# Patient Record
Sex: MALE | Race: WHITE | NOT HISPANIC OR LATINO | Employment: FULL TIME | URBAN - METROPOLITAN AREA
[De-identification: names, ages, dates, MRNs, and addresses within clinical notes are randomized per-mention and may not be internally consistent; named-entity substitution may affect disease eponyms.]

---

## 2022-01-01 ENCOUNTER — LAB VISIT (OUTPATIENT)
Dept: LAB | Facility: HOSPITAL | Age: 52
End: 2022-01-01
Attending: INTERNAL MEDICINE
Payer: COMMERCIAL

## 2022-01-01 ENCOUNTER — PATIENT MESSAGE (OUTPATIENT)
Dept: HEPATOLOGY | Facility: CLINIC | Age: 52
End: 2022-01-01
Payer: COMMERCIAL

## 2022-01-01 ENCOUNTER — HOSPITAL ENCOUNTER (OUTPATIENT)
Facility: HOSPITAL | Age: 52
Discharge: HOME | End: 2022-12-14
Attending: INTERNAL MEDICINE | Admitting: INTERNAL MEDICINE
Payer: COMMERCIAL

## 2022-01-01 ENCOUNTER — NURSE TRIAGE (OUTPATIENT)
Dept: ADMINISTRATIVE | Facility: CLINIC | Age: 52
End: 2022-01-01
Payer: COMMERCIAL

## 2022-01-01 ENCOUNTER — PATIENT MESSAGE (OUTPATIENT)
Dept: ENDOSCOPY | Facility: HOSPITAL | Age: 52
End: 2022-01-01
Payer: COMMERCIAL

## 2022-01-01 ENCOUNTER — HOSPITAL ENCOUNTER (OUTPATIENT)
Facility: HOSPITAL | Age: 52
Discharge: HOME | End: 2022-12-21
Attending: INTERNAL MEDICINE | Admitting: INTERNAL MEDICINE
Payer: COMMERCIAL

## 2022-01-01 ENCOUNTER — TELEPHONE (OUTPATIENT)
Dept: GASTROENTEROLOGY | Facility: CLINIC | Age: 52
End: 2022-01-01
Payer: COMMERCIAL

## 2022-01-01 ENCOUNTER — PATIENT MESSAGE (OUTPATIENT)
Dept: GASTROENTEROLOGY | Facility: CLINIC | Age: 52
End: 2022-01-01
Payer: COMMERCIAL

## 2022-01-01 ENCOUNTER — PATIENT OUTREACH (OUTPATIENT)
Dept: ADMINISTRATIVE | Facility: CLINIC | Age: 52
End: 2022-01-01
Payer: COMMERCIAL

## 2022-01-01 ENCOUNTER — HOSPITAL ENCOUNTER (OUTPATIENT)
Dept: RADIOLOGY | Facility: HOSPITAL | Age: 52
Discharge: HOME | End: 2022-12-08
Attending: INTERNAL MEDICINE
Payer: COMMERCIAL

## 2022-01-01 ENCOUNTER — HOSPITAL ENCOUNTER (INPATIENT)
Facility: HOSPITAL | Age: 52
LOS: 2 days | Discharge: HOME | DRG: 393 | End: 2022-10-19
Attending: INTERNAL MEDICINE | Admitting: INTERNAL MEDICINE
Payer: COMMERCIAL

## 2022-01-01 ENCOUNTER — ANESTHESIA (OUTPATIENT)
Dept: ENDOSCOPY | Facility: HOSPITAL | Age: 52
DRG: 393 | End: 2022-01-01
Payer: COMMERCIAL

## 2022-01-01 ENCOUNTER — ANESTHESIA (OUTPATIENT)
Dept: ENDOSCOPY | Facility: HOSPITAL | Age: 52
End: 2022-01-01
Payer: COMMERCIAL

## 2022-01-01 ENCOUNTER — ANESTHESIA EVENT (OUTPATIENT)
Dept: ENDOSCOPY | Facility: HOSPITAL | Age: 52
DRG: 393 | End: 2022-01-01
Payer: COMMERCIAL

## 2022-01-01 ENCOUNTER — HOSPITAL ENCOUNTER (OUTPATIENT)
Dept: RADIOLOGY | Facility: HOSPITAL | Age: 52
Discharge: HOME | End: 2022-12-27
Attending: PHYSICIAN ASSISTANT
Payer: COMMERCIAL

## 2022-01-01 ENCOUNTER — ANESTHESIA EVENT (OUTPATIENT)
Dept: ENDOSCOPY | Facility: HOSPITAL | Age: 52
End: 2022-01-01
Payer: COMMERCIAL

## 2022-01-01 ENCOUNTER — OFFICE VISIT (OUTPATIENT)
Dept: HEPATOLOGY | Facility: CLINIC | Age: 52
End: 2022-01-01
Payer: COMMERCIAL

## 2022-01-01 ENCOUNTER — TELEPHONE (OUTPATIENT)
Dept: HEPATOLOGY | Facility: CLINIC | Age: 52
End: 2022-01-01

## 2022-01-01 ENCOUNTER — TELEPHONE (OUTPATIENT)
Dept: HEPATOLOGY | Facility: CLINIC | Age: 52
End: 2022-01-01
Payer: COMMERCIAL

## 2022-01-01 VITALS
SYSTOLIC BLOOD PRESSURE: 132 MMHG | OXYGEN SATURATION: 98 % | TEMPERATURE: 99 F | WEIGHT: 182.75 LBS | HEART RATE: 73 BPM | RESPIRATION RATE: 18 BRPM | BODY MASS INDEX: 24.75 KG/M2 | DIASTOLIC BLOOD PRESSURE: 64 MMHG | HEIGHT: 72 IN

## 2022-01-01 VITALS
SYSTOLIC BLOOD PRESSURE: 113 MMHG | BODY MASS INDEX: 24.73 KG/M2 | HEIGHT: 72 IN | RESPIRATION RATE: 18 BRPM | HEART RATE: 95 BPM | OXYGEN SATURATION: 96 % | TEMPERATURE: 99 F | DIASTOLIC BLOOD PRESSURE: 70 MMHG | WEIGHT: 182.56 LBS

## 2022-01-01 VITALS
BODY MASS INDEX: 29.05 KG/M2 | HEIGHT: 72 IN | RESPIRATION RATE: 20 BRPM | SYSTOLIC BLOOD PRESSURE: 137 MMHG | HEART RATE: 92 BPM | WEIGHT: 214.5 LBS | OXYGEN SATURATION: 97 % | TEMPERATURE: 98 F | DIASTOLIC BLOOD PRESSURE: 86 MMHG

## 2022-01-01 VITALS
HEIGHT: 72 IN | HEART RATE: 88 BPM | DIASTOLIC BLOOD PRESSURE: 90 MMHG | BODY MASS INDEX: 29.09 KG/M2 | SYSTOLIC BLOOD PRESSURE: 138 MMHG

## 2022-01-01 DIAGNOSIS — R79.89 ABNORMAL LFTS: Primary | ICD-10-CM

## 2022-01-01 DIAGNOSIS — K86.2 PANCREATIC CYST: ICD-10-CM

## 2022-01-01 DIAGNOSIS — K83.01 PRIMARY SCLEROSING CHOLANGITIS: Primary | ICD-10-CM

## 2022-01-01 DIAGNOSIS — K83.01 PSC (PRIMARY SCLEROSING CHOLANGITIS): Primary | ICD-10-CM

## 2022-01-01 DIAGNOSIS — K83.8 DILATED BILE DUCT: Primary | ICD-10-CM

## 2022-01-01 DIAGNOSIS — K85.90 POST-ERCP ACUTE PANCREATITIS: ICD-10-CM

## 2022-01-01 DIAGNOSIS — R07.9 CHEST PAIN: ICD-10-CM

## 2022-01-01 DIAGNOSIS — R79.89 ABNORMAL LFTS: ICD-10-CM

## 2022-01-01 DIAGNOSIS — K83.01 PSC (PRIMARY SCLEROSING CHOLANGITIS): ICD-10-CM

## 2022-01-01 DIAGNOSIS — R10.9 ABDOMINAL PAIN, UNSPECIFIED ABDOMINAL LOCATION: ICD-10-CM

## 2022-01-01 DIAGNOSIS — K86.2 PANCREATIC CYST: Primary | ICD-10-CM

## 2022-01-01 DIAGNOSIS — K83.01 PRIMARY SCLEROSING CHOLANGITIS: ICD-10-CM

## 2022-01-01 DIAGNOSIS — R10.11 RUQ ABDOMINAL PAIN: Primary | ICD-10-CM

## 2022-01-01 DIAGNOSIS — R10.9 ABDOMINAL PAIN: ICD-10-CM

## 2022-01-01 DIAGNOSIS — R10.11 RUQ ABDOMINAL PAIN: ICD-10-CM

## 2022-01-01 DIAGNOSIS — R93.5 ABNORMAL FINDINGS ON DIAGNOSTIC IMAGING OF ABDOMEN: ICD-10-CM

## 2022-01-01 DIAGNOSIS — R10.9 ABDOMINAL PAIN, UNSPECIFIED ABDOMINAL LOCATION: Primary | ICD-10-CM

## 2022-01-01 DIAGNOSIS — K91.89 POST-ERCP ACUTE PANCREATITIS: ICD-10-CM

## 2022-01-01 LAB
A1AT SERPL-MCNC: 270 MG/DL (ref 100–190)
ALBUMIN SERPL BCP-MCNC: 2.2 G/DL (ref 3.5–5.2)
ALBUMIN SERPL BCP-MCNC: 3.1 G/DL (ref 3.5–5.2)
ALBUMIN SERPL BCP-MCNC: 3.6 G/DL (ref 3.5–5.2)
ALBUMIN SERPL BCP-MCNC: 3.8 G/DL (ref 3.5–5.2)
ALBUMIN SERPL BCP-MCNC: 3.9 G/DL (ref 3.5–5.2)
ALP SERPL-CCNC: 175 U/L (ref 55–135)
ALP SERPL-CCNC: 188 U/L (ref 55–135)
ALP SERPL-CCNC: 190 U/L (ref 55–135)
ALP SERPL-CCNC: 197 U/L (ref 55–135)
ALP SERPL-CCNC: 329 U/L (ref 55–135)
ALP SERPL-CCNC: 338 U/L (ref 55–135)
ALP SERPL-CCNC: 382 U/L (ref 55–135)
ALT SERPL W/O P-5'-P-CCNC: 140 U/L (ref 10–44)
ALT SERPL W/O P-5'-P-CCNC: 174 U/L (ref 10–44)
ALT SERPL W/O P-5'-P-CCNC: 229 U/L (ref 10–44)
ALT SERPL W/O P-5'-P-CCNC: 48 U/L (ref 10–44)
ALT SERPL W/O P-5'-P-CCNC: 49 U/L (ref 10–44)
ALT SERPL W/O P-5'-P-CCNC: 54 U/L (ref 10–44)
ALT SERPL W/O P-5'-P-CCNC: 63 U/L (ref 10–44)
ANA SER QL IF: NORMAL
ANION GAP SERPL CALC-SCNC: 10 MMOL/L (ref 8–16)
ANION GAP SERPL CALC-SCNC: 12 MMOL/L (ref 8–16)
ANION GAP SERPL CALC-SCNC: 14 MMOL/L (ref 8–16)
ANION GAP SERPL CALC-SCNC: 16 MMOL/L (ref 8–16)
ANION GAP SERPL CALC-SCNC: 9 MMOL/L (ref 8–16)
AST SERPL-CCNC: 116 U/L (ref 10–40)
AST SERPL-CCNC: 44 U/L (ref 10–40)
AST SERPL-CCNC: 47 U/L (ref 10–40)
AST SERPL-CCNC: 47 U/L (ref 10–40)
AST SERPL-CCNC: 50 U/L (ref 10–40)
AST SERPL-CCNC: 77 U/L (ref 10–40)
AST SERPL-CCNC: 88 U/L (ref 10–40)
BACTERIA BLD CULT: NORMAL
BACTERIA BLD CULT: NORMAL
BACTERIA STL CULT: NORMAL
BACTERIA STL CULT: NORMAL
BASOPHILS # BLD AUTO: 0.05 K/UL (ref 0–0.2)
BASOPHILS # BLD AUTO: 0.08 K/UL (ref 0–0.2)
BASOPHILS NFR BLD: 0.4 % (ref 0–1.9)
BASOPHILS NFR BLD: 0.7 % (ref 0–1.9)
BASOPHILS NFR BLD: 0.9 % (ref 0–1.9)
BASOPHILS NFR BLD: 1 % (ref 0–1.9)
BILIRUB DIRECT SERPL-MCNC: 0.8 MG/DL (ref 0.1–0.3)
BILIRUB SERPL-MCNC: 0.8 MG/DL (ref 0.1–1)
BILIRUB SERPL-MCNC: 1.1 MG/DL (ref 0.1–1)
BILIRUB SERPL-MCNC: 1.2 MG/DL (ref 0.1–1)
BILIRUB SERPL-MCNC: 1.3 MG/DL (ref 0.1–1)
BILIRUB SERPL-MCNC: 2.6 MG/DL (ref 0.1–1)
BILIRUB SERPL-MCNC: 3 MG/DL (ref 0.1–1)
BILIRUB SERPL-MCNC: 3.6 MG/DL (ref 0.1–1)
BUN SERPL-MCNC: 10 MG/DL (ref 6–20)
BUN SERPL-MCNC: 11 MG/DL (ref 6–20)
BUN SERPL-MCNC: 5 MG/DL (ref 6–20)
BUN SERPL-MCNC: 6 MG/DL (ref 6–20)
BUN SERPL-MCNC: 8 MG/DL (ref 6–20)
BUN SERPL-MCNC: 9 MG/DL (ref 6–20)
BUN SERPL-MCNC: 9 MG/DL (ref 6–20)
C DIFF GDH STL QL: NEGATIVE
C DIFF TOX A+B STL QL IA: NEGATIVE
CALCIUM SERPL-MCNC: 7.9 MG/DL (ref 8.7–10.5)
CALCIUM SERPL-MCNC: 8.1 MG/DL (ref 8.7–10.5)
CALCIUM SERPL-MCNC: 8.7 MG/DL (ref 8.7–10.5)
CALCIUM SERPL-MCNC: 9.1 MG/DL (ref 8.7–10.5)
CALCIUM SERPL-MCNC: 9.6 MG/DL (ref 8.7–10.5)
CALCIUM SERPL-MCNC: 9.7 MG/DL (ref 8.7–10.5)
CALCIUM SERPL-MCNC: 9.8 MG/DL (ref 8.7–10.5)
CANCER AG19-9 SERPL-ACNC: 1181.2 U/ML (ref 0–40)
CERULOPLASMIN SERPL-MCNC: 32 MG/DL (ref 15–45)
CHLORIDE SERPL-SCNC: 101 MMOL/L (ref 95–110)
CHLORIDE SERPL-SCNC: 102 MMOL/L (ref 95–110)
CHLORIDE SERPL-SCNC: 103 MMOL/L (ref 95–110)
CHLORIDE SERPL-SCNC: 98 MMOL/L (ref 95–110)
CHLORIDE SERPL-SCNC: 99 MMOL/L (ref 95–110)
CO2 SERPL-SCNC: 27 MMOL/L (ref 23–29)
CO2 SERPL-SCNC: 28 MMOL/L (ref 23–29)
CO2 SERPL-SCNC: 28 MMOL/L (ref 23–29)
CO2 SERPL-SCNC: 29 MMOL/L (ref 23–29)
CO2 SERPL-SCNC: 31 MMOL/L (ref 23–29)
CREAT SERPL-MCNC: 0.7 MG/DL (ref 0.5–1.4)
CREAT SERPL-MCNC: 0.8 MG/DL (ref 0.5–1.4)
DIFFERENTIAL METHOD: ABNORMAL
EOSINOPHIL # BLD AUTO: 0.1 K/UL (ref 0–0.5)
EOSINOPHIL # BLD AUTO: 0.2 K/UL (ref 0–0.5)
EOSINOPHIL NFR BLD: 0.7 % (ref 0–8)
EOSINOPHIL NFR BLD: 1.4 % (ref 0–8)
EOSINOPHIL NFR BLD: 1.7 % (ref 0–8)
EOSINOPHIL NFR BLD: 2.7 % (ref 0–8)
ERYTHROCYTE [DISTWIDTH] IN BLOOD BY AUTOMATED COUNT: 12.5 % (ref 11.5–14.5)
ERYTHROCYTE [DISTWIDTH] IN BLOOD BY AUTOMATED COUNT: 12.7 % (ref 11.5–14.5)
ERYTHROCYTE [DISTWIDTH] IN BLOOD BY AUTOMATED COUNT: 12.7 % (ref 11.5–14.5)
ERYTHROCYTE [DISTWIDTH] IN BLOOD BY AUTOMATED COUNT: 13.8 % (ref 11.5–14.5)
ERYTHROCYTE [DISTWIDTH] IN BLOOD BY AUTOMATED COUNT: 14 % (ref 11.5–14.5)
ERYTHROCYTE [DISTWIDTH] IN BLOOD BY AUTOMATED COUNT: 14.2 % (ref 11.5–14.5)
ERYTHROCYTE [DISTWIDTH] IN BLOOD BY AUTOMATED COUNT: 14.6 % (ref 11.5–14.5)
EST. GFR  (NO RACE VARIABLE): >60 ML/MIN/1.73 M^2
FERRITIN SERPL-MCNC: 296 NG/ML (ref 20–300)
GGT SERPL-CCNC: 238 U/L (ref 8–55)
GGT SERPL-CCNC: 535 U/L (ref 8–55)
GLUCOSE SERPL-MCNC: 100 MG/DL (ref 70–110)
GLUCOSE SERPL-MCNC: 69 MG/DL (ref 70–110)
GLUCOSE SERPL-MCNC: 80 MG/DL (ref 70–110)
GLUCOSE SERPL-MCNC: 84 MG/DL (ref 70–110)
GLUCOSE SERPL-MCNC: 85 MG/DL (ref 70–110)
GLUCOSE SERPL-MCNC: 89 MG/DL (ref 70–110)
GLUCOSE SERPL-MCNC: 97 MG/DL (ref 70–110)
HAV IGG SER QL IA: NORMAL
HBV SURFACE AB SER-ACNC: 17.97 MIU/ML
HBV SURFACE AB SER-ACNC: REACTIVE M[IU]/ML
HBV SURFACE AG SERPL QL IA: NORMAL
HCT VFR BLD AUTO: 29.1 % (ref 40–54)
HCT VFR BLD AUTO: 32 % (ref 40–54)
HCT VFR BLD AUTO: 32.2 % (ref 40–54)
HCT VFR BLD AUTO: 37.2 % (ref 40–54)
HCT VFR BLD AUTO: 37.5 % (ref 40–54)
HCT VFR BLD AUTO: 41.4 % (ref 40–54)
HCT VFR BLD AUTO: 44.4 % (ref 40–54)
HCV AB SERPL QL IA: NORMAL
HGB BLD-MCNC: 10.3 G/DL (ref 14–18)
HGB BLD-MCNC: 10.6 G/DL (ref 14–18)
HGB BLD-MCNC: 12 G/DL (ref 14–18)
HGB BLD-MCNC: 12.3 G/DL (ref 14–18)
HGB BLD-MCNC: 13.4 G/DL (ref 14–18)
HGB BLD-MCNC: 14.3 G/DL (ref 14–18)
HGB BLD-MCNC: 9.5 G/DL (ref 14–18)
IGA SERPL-MCNC: 273 MG/DL (ref 40–350)
IGG SERPL-MCNC: 1293 MG/DL (ref 650–1600)
IGG4 SER-MCNC: 15 MG/DL (ref 4–86)
IGM SERPL-MCNC: 106 MG/DL (ref 50–300)
IMM GRANULOCYTES # BLD AUTO: 0.01 K/UL (ref 0–0.04)
IMM GRANULOCYTES # BLD AUTO: 0.02 K/UL (ref 0–0.04)
IMM GRANULOCYTES # BLD AUTO: 0.04 K/UL (ref 0–0.04)
IMM GRANULOCYTES # BLD AUTO: 0.06 K/UL (ref 0–0.04)
IMM GRANULOCYTES NFR BLD AUTO: 0.2 % (ref 0–0.5)
IMM GRANULOCYTES NFR BLD AUTO: 0.3 % (ref 0–0.5)
IMM GRANULOCYTES NFR BLD AUTO: 0.5 % (ref 0–0.5)
IMM GRANULOCYTES NFR BLD AUTO: 0.5 % (ref 0–0.5)
IRON SERPL-MCNC: 51 UG/DL (ref 45–160)
LIPASE SERPL-CCNC: 26 U/L (ref 4–60)
LIPASE SERPL-CCNC: 34 U/L (ref 4–60)
LYMPHOCYTES # BLD AUTO: 1 K/UL (ref 1–4.8)
LYMPHOCYTES # BLD AUTO: 1 K/UL (ref 1–4.8)
LYMPHOCYTES # BLD AUTO: 1.2 K/UL (ref 1–4.8)
LYMPHOCYTES # BLD AUTO: 1.6 K/UL (ref 1–4.8)
LYMPHOCYTES NFR BLD: 13.6 % (ref 18–48)
LYMPHOCYTES NFR BLD: 17.3 % (ref 18–48)
LYMPHOCYTES NFR BLD: 21.1 % (ref 18–48)
LYMPHOCYTES NFR BLD: 9.3 % (ref 18–48)
MCH RBC QN AUTO: 29.4 PG (ref 27–31)
MCH RBC QN AUTO: 29.4 PG (ref 27–31)
MCH RBC QN AUTO: 30 PG (ref 27–31)
MCH RBC QN AUTO: 30.6 PG (ref 27–31)
MCH RBC QN AUTO: 30.6 PG (ref 27–31)
MCH RBC QN AUTO: 30.8 PG (ref 27–31)
MCH RBC QN AUTO: 30.9 PG (ref 27–31)
MCHC RBC AUTO-ENTMCNC: 32.2 G/DL (ref 32–36)
MCHC RBC AUTO-ENTMCNC: 32.2 G/DL (ref 32–36)
MCHC RBC AUTO-ENTMCNC: 32.3 G/DL (ref 32–36)
MCHC RBC AUTO-ENTMCNC: 32.4 G/DL (ref 32–36)
MCHC RBC AUTO-ENTMCNC: 32.6 G/DL (ref 32–36)
MCHC RBC AUTO-ENTMCNC: 32.8 G/DL (ref 32–36)
MCHC RBC AUTO-ENTMCNC: 32.9 G/DL (ref 32–36)
MCV RBC AUTO: 91 FL (ref 82–98)
MCV RBC AUTO: 91 FL (ref 82–98)
MCV RBC AUTO: 93 FL (ref 82–98)
MCV RBC AUTO: 94 FL (ref 82–98)
MCV RBC AUTO: 95 FL (ref 82–98)
MITOCHONDRIA AB TITR SER IF: NORMAL {TITER}
MONOCYTES # BLD AUTO: 0.5 K/UL (ref 0.3–1)
MONOCYTES # BLD AUTO: 0.6 K/UL (ref 0.3–1)
MONOCYTES # BLD AUTO: 0.8 K/UL (ref 0.3–1)
MONOCYTES # BLD AUTO: 0.9 K/UL (ref 0.3–1)
MONOCYTES NFR BLD: 10.5 % (ref 4–15)
MONOCYTES NFR BLD: 6.8 % (ref 4–15)
MONOCYTES NFR BLD: 7.5 % (ref 4–15)
MONOCYTES NFR BLD: 9.6 % (ref 4–15)
NEUTROPHILS # BLD AUTO: 10.1 K/UL (ref 1.8–7.7)
NEUTROPHILS # BLD AUTO: 4 K/UL (ref 1.8–7.7)
NEUTROPHILS # BLD AUTO: 5.3 K/UL (ref 1.8–7.7)
NEUTROPHILS # BLD AUTO: 5.5 K/UL (ref 1.8–7.7)
NEUTROPHILS NFR BLD: 67.9 % (ref 38–73)
NEUTROPHILS NFR BLD: 70.6 % (ref 38–73)
NEUTROPHILS NFR BLD: 73.2 % (ref 38–73)
NEUTROPHILS NFR BLD: 81.6 % (ref 38–73)
NRBC BLD-RTO: 0 /100 WBC
PLATELET # BLD AUTO: 332 K/UL (ref 150–450)
PLATELET # BLD AUTO: 354 K/UL (ref 150–450)
PLATELET # BLD AUTO: 374 K/UL (ref 150–450)
PLATELET # BLD AUTO: 383 K/UL (ref 150–450)
PLATELET # BLD AUTO: 432 K/UL (ref 150–450)
PLATELET # BLD AUTO: 452 K/UL (ref 150–450)
PLATELET # BLD AUTO: 482 K/UL (ref 150–450)
PMV BLD AUTO: 8.1 FL (ref 9.2–12.9)
PMV BLD AUTO: 8.8 FL (ref 9.2–12.9)
PMV BLD AUTO: 8.8 FL (ref 9.2–12.9)
PMV BLD AUTO: 9 FL (ref 9.2–12.9)
PMV BLD AUTO: 9.1 FL (ref 9.2–12.9)
PMV BLD AUTO: 9.2 FL (ref 9.2–12.9)
PMV BLD AUTO: 9.4 FL (ref 9.2–12.9)
POTASSIUM SERPL-SCNC: 3 MMOL/L (ref 3.5–5.1)
POTASSIUM SERPL-SCNC: 3.2 MMOL/L (ref 3.5–5.1)
POTASSIUM SERPL-SCNC: 3.4 MMOL/L (ref 3.5–5.1)
POTASSIUM SERPL-SCNC: 3.5 MMOL/L (ref 3.5–5.1)
POTASSIUM SERPL-SCNC: 3.6 MMOL/L (ref 3.5–5.1)
POTASSIUM SERPL-SCNC: 3.7 MMOL/L (ref 3.5–5.1)
POTASSIUM SERPL-SCNC: 3.9 MMOL/L (ref 3.5–5.1)
PROT SERPL-MCNC: 5.3 G/DL (ref 6–8.4)
PROT SERPL-MCNC: 5.3 G/DL (ref 6–8.4)
PROT SERPL-MCNC: 5.9 G/DL (ref 6–8.4)
PROT SERPL-MCNC: 6.7 G/DL (ref 6–8.4)
PROT SERPL-MCNC: 7.3 G/DL (ref 6–8.4)
PROT SERPL-MCNC: 7.9 G/DL (ref 6–8.4)
PROT SERPL-MCNC: 7.9 G/DL (ref 6–8.4)
RBC # BLD AUTO: 3.1 M/UL (ref 4.6–6.2)
RBC # BLD AUTO: 3.37 M/UL (ref 4.6–6.2)
RBC # BLD AUTO: 3.43 M/UL (ref 4.6–6.2)
RBC # BLD AUTO: 3.99 M/UL (ref 4.6–6.2)
RBC # BLD AUTO: 4.08 M/UL (ref 4.6–6.2)
RBC # BLD AUTO: 4.56 M/UL (ref 4.6–6.2)
RBC # BLD AUTO: 4.77 M/UL (ref 4.6–6.2)
SATURATED IRON: 15 % (ref 20–50)
SMOOTH MUSCLE AB TITR SER IF: NORMAL {TITER}
SODIUM SERPL-SCNC: 137 MMOL/L (ref 136–145)
SODIUM SERPL-SCNC: 139 MMOL/L (ref 136–145)
SODIUM SERPL-SCNC: 142 MMOL/L (ref 136–145)
SODIUM SERPL-SCNC: 142 MMOL/L (ref 136–145)
SODIUM SERPL-SCNC: 143 MMOL/L (ref 136–145)
TOTAL IRON BINDING CAPACITY: 330 UG/DL (ref 250–450)
TRANSFERRIN SERPL-MCNC: 223 MG/DL (ref 200–375)
WBC # BLD AUTO: 12.34 K/UL (ref 3.9–12.7)
WBC # BLD AUTO: 12.75 K/UL (ref 3.9–12.7)
WBC # BLD AUTO: 13.85 K/UL (ref 3.9–12.7)
WBC # BLD AUTO: 19.36 K/UL (ref 3.9–12.7)
WBC # BLD AUTO: 5.71 K/UL (ref 3.9–12.7)
WBC # BLD AUTO: 7.44 K/UL (ref 3.9–12.7)
WBC # BLD AUTO: 7.77 K/UL (ref 3.9–12.7)

## 2022-01-01 PROCEDURE — 63600175 PHARM REV CODE 636 W HCPCS: Performed by: NURSE PRACTITIONER

## 2022-01-01 PROCEDURE — 88173 PR  INTERPRETATION OF FNA SMEAR: ICD-10-PCS | Mod: 26,,, | Performed by: PATHOLOGY

## 2022-01-01 PROCEDURE — 80053 COMPREHEN METABOLIC PANEL: CPT | Performed by: INTERNAL MEDICINE

## 2022-01-01 PROCEDURE — 82728 ASSAY OF FERRITIN: CPT | Performed by: INTERNAL MEDICINE

## 2022-01-01 PROCEDURE — 83690 ASSAY OF LIPASE: CPT | Performed by: NURSE PRACTITIONER

## 2022-01-01 PROCEDURE — 94760 N-INVAS EAR/PLS OXIMETRY 1: CPT

## 2022-01-01 PROCEDURE — 37000008 HC ANESTHESIA 1ST 15 MINUTES: Performed by: INTERNAL MEDICINE

## 2022-01-01 PROCEDURE — C1769 GUIDE WIRE: HCPCS | Performed by: INTERNAL MEDICINE

## 2022-01-01 PROCEDURE — 88342 IMHCHEM/IMCYTCHM 1ST ANTB: CPT | Mod: 26,,, | Performed by: PATHOLOGY

## 2022-01-01 PROCEDURE — 1160F PR REVIEW ALL MEDS BY PRESCRIBER/CLIN PHARMACIST DOCUMENTED: ICD-10-PCS | Mod: CPTII,S$GLB,, | Performed by: INTERNAL MEDICINE

## 2022-01-01 PROCEDURE — 82977 ASSAY OF GGT: CPT | Performed by: INTERNAL MEDICINE

## 2022-01-01 PROCEDURE — 25000003 PHARM REV CODE 250: Performed by: NURSE ANESTHETIST, CERTIFIED REGISTERED

## 2022-01-01 PROCEDURE — 27201012 HC FORCEPS, HOT/COLD, DISP: Performed by: INTERNAL MEDICINE

## 2022-01-01 PROCEDURE — 36415 COLL VENOUS BLD VENIPUNCTURE: CPT | Performed by: NURSE PRACTITIONER

## 2022-01-01 PROCEDURE — 25500020 PHARM REV CODE 255: Mod: PN | Performed by: INTERNAL MEDICINE

## 2022-01-01 PROCEDURE — C9113 INJ PANTOPRAZOLE SODIUM, VIA: HCPCS | Performed by: NURSE PRACTITIONER

## 2022-01-01 PROCEDURE — 80076 HEPATIC FUNCTION PANEL: CPT | Performed by: INTERNAL MEDICINE

## 2022-01-01 PROCEDURE — 74328 X-RAY BILE DUCT ENDOSCOPY: CPT | Mod: 26,51,, | Performed by: INTERNAL MEDICINE

## 2022-01-01 PROCEDURE — C2617 STENT, NON-COR, TEM W/O DEL: HCPCS | Performed by: INTERNAL MEDICINE

## 2022-01-01 PROCEDURE — 43245 EGD DILATE STRICTURE: CPT | Mod: 51,,, | Performed by: INTERNAL MEDICINE

## 2022-01-01 PROCEDURE — 83690 ASSAY OF LIPASE: CPT | Performed by: INTERNAL MEDICINE

## 2022-01-01 PROCEDURE — 27202125 HC BALLOON, EXTRACTION (ANY): Performed by: INTERNAL MEDICINE

## 2022-01-01 PROCEDURE — 99214 OFFICE O/P EST MOD 30 MIN: CPT | Mod: S$GLB,,, | Performed by: INTERNAL MEDICINE

## 2022-01-01 PROCEDURE — 25000003 PHARM REV CODE 250: Performed by: PHYSICIAN ASSISTANT

## 2022-01-01 PROCEDURE — 87040 BLOOD CULTURE FOR BACTERIA: CPT | Performed by: NURSE PRACTITIONER

## 2022-01-01 PROCEDURE — 74178 CT ABD&PLV WO CNTR FLWD CNTR: CPT | Mod: TC,PN

## 2022-01-01 PROCEDURE — 88305 TISSUE EXAM BY PATHOLOGIST: CPT | Mod: 59 | Performed by: PATHOLOGY

## 2022-01-01 PROCEDURE — 80053 COMPREHEN METABOLIC PANEL: CPT | Performed by: NURSE PRACTITIONER

## 2022-01-01 PROCEDURE — 99214 PR OFFICE/OUTPT VISIT, EST, LEVL IV, 30-39 MIN: ICD-10-PCS | Mod: S$GLB,,, | Performed by: INTERNAL MEDICINE

## 2022-01-01 PROCEDURE — 27201089 HC SNARE, DISP (ANY): Performed by: INTERNAL MEDICINE

## 2022-01-01 PROCEDURE — 1160F RVW MEDS BY RX/DR IN RCRD: CPT | Mod: CPTII,S$GLB,, | Performed by: INTERNAL MEDICINE

## 2022-01-01 PROCEDURE — 94761 N-INVAS EAR/PLS OXIMETRY MLT: CPT

## 2022-01-01 PROCEDURE — 99222 PR INITIAL HOSPITAL CARE,LEVL II: ICD-10-PCS | Mod: ,,, | Performed by: INTERNAL MEDICINE

## 2022-01-01 PROCEDURE — 85027 COMPLETE CBC AUTOMATED: CPT | Performed by: NURSE PRACTITIONER

## 2022-01-01 PROCEDURE — 99999 PR PBB SHADOW E&M-EST. PATIENT-LVL III: CPT | Mod: PBBFAC,,, | Performed by: INTERNAL MEDICINE

## 2022-01-01 PROCEDURE — 25000003 PHARM REV CODE 250: Performed by: INTERNAL MEDICINE

## 2022-01-01 PROCEDURE — 88342 CHG IMMUNOCYTOCHEMISTRY: ICD-10-PCS | Mod: 26,,, | Performed by: PATHOLOGY

## 2022-01-01 PROCEDURE — 25000003 PHARM REV CODE 250: Performed by: NURSE PRACTITIONER

## 2022-01-01 PROCEDURE — 85025 COMPLETE CBC W/AUTO DIFF WBC: CPT | Performed by: INTERNAL MEDICINE

## 2022-01-01 PROCEDURE — 43260 ERCP W/SPECIMEN COLLECTION: CPT | Mod: 51,53,, | Performed by: INTERNAL MEDICINE

## 2022-01-01 PROCEDURE — 43238 EGD US FINE NEEDLE BX/ASPIR: CPT | Mod: 51,,, | Performed by: INTERNAL MEDICINE

## 2022-01-01 PROCEDURE — 63600175 PHARM REV CODE 636 W HCPCS: Performed by: INTERNAL MEDICINE

## 2022-01-01 PROCEDURE — 96366 THER/PROPH/DIAG IV INF ADDON: CPT

## 2022-01-01 PROCEDURE — 86790 VIRUS ANTIBODY NOS: CPT | Performed by: INTERNAL MEDICINE

## 2022-01-01 PROCEDURE — 27200976 HC DILATOR, BILIARY: Performed by: INTERNAL MEDICINE

## 2022-01-01 PROCEDURE — 4010F PR ACE/ARB THEARPY RXD/TAKEN: ICD-10-PCS | Mod: CPTII,S$GLB,, | Performed by: INTERNAL MEDICINE

## 2022-01-01 PROCEDURE — 25500020 PHARM REV CODE 255: Performed by: INTERNAL MEDICINE

## 2022-01-01 PROCEDURE — 99999 PR PBB SHADOW E&M-EST. PATIENT-LVL III: ICD-10-PCS | Mod: PBBFAC,,, | Performed by: INTERNAL MEDICINE

## 2022-01-01 PROCEDURE — 1159F MED LIST DOCD IN RCRD: CPT | Mod: CPTII,S$GLB,, | Performed by: INTERNAL MEDICINE

## 2022-01-01 PROCEDURE — 82103 ALPHA-1-ANTITRYPSIN TOTAL: CPT | Performed by: INTERNAL MEDICINE

## 2022-01-01 PROCEDURE — 88173 CYTOPATH EVAL FNA REPORT: CPT | Mod: 59 | Performed by: PATHOLOGY

## 2022-01-01 PROCEDURE — 43239 EGD BIOPSY SINGLE/MULTIPLE: CPT | Mod: 59,,, | Performed by: INTERNAL MEDICINE

## 2022-01-01 PROCEDURE — 27201014 HC GRASPER DEVICE: Performed by: INTERNAL MEDICINE

## 2022-01-01 PROCEDURE — 63600175 PHARM REV CODE 636 W HCPCS: Performed by: NURSE ANESTHETIST, CERTIFIED REGISTERED

## 2022-01-01 PROCEDURE — 86038 ANTINUCLEAR ANTIBODIES: CPT | Performed by: INTERNAL MEDICINE

## 2022-01-01 PROCEDURE — 1111F PR DISCHARGE MEDS RECONCILED W/ CURRENT OUTPATIENT MED LIST: ICD-10-PCS | Mod: CPTII,S$GLB,, | Performed by: INTERNAL MEDICINE

## 2022-01-01 PROCEDURE — 87340 HEPATITIS B SURFACE AG IA: CPT | Performed by: INTERNAL MEDICINE

## 2022-01-01 PROCEDURE — 99221 1ST HOSP IP/OBS SF/LOW 40: CPT | Mod: ,,, | Performed by: INTERNAL MEDICINE

## 2022-01-01 PROCEDURE — C1726 CATH, BAL DIL, NON-VASCULAR: HCPCS | Performed by: INTERNAL MEDICINE

## 2022-01-01 PROCEDURE — 82787 IGG 1 2 3 OR 4 EACH: CPT | Performed by: INTERNAL MEDICINE

## 2022-01-01 PROCEDURE — 96365 THER/PROPH/DIAG IV INF INIT: CPT

## 2022-01-01 PROCEDURE — 87045 FECES CULTURE AEROBIC BACT: CPT | Performed by: INTERNAL MEDICINE

## 2022-01-01 PROCEDURE — 43260 ERCP W/SPECIMEN COLLECTION: CPT | Mod: 74 | Performed by: INTERNAL MEDICINE

## 2022-01-01 PROCEDURE — 3008F BODY MASS INDEX DOCD: CPT | Mod: CPTII,S$GLB,, | Performed by: INTERNAL MEDICINE

## 2022-01-01 PROCEDURE — 27200946 HC BRUSH, CYTOLOGY: Performed by: INTERNAL MEDICINE

## 2022-01-01 PROCEDURE — 87427 SHIGA-LIKE TOXIN AG IA: CPT | Mod: 59 | Performed by: INTERNAL MEDICINE

## 2022-01-01 PROCEDURE — 86381 MITOCHONDRIAL ANTIBODY EACH: CPT | Performed by: INTERNAL MEDICINE

## 2022-01-01 PROCEDURE — 43261 PR ERCP,BIOPSY: ICD-10-PCS | Mod: 59,,, | Performed by: INTERNAL MEDICINE

## 2022-01-01 PROCEDURE — 48999 PR ENDOSCOPIC NECROSECTOMY OF PANCREAS: ICD-10-PCS | Mod: ,,, | Performed by: INTERNAL MEDICINE

## 2022-01-01 PROCEDURE — 36415 COLL VENOUS BLD VENIPUNCTURE: CPT | Performed by: INTERNAL MEDICINE

## 2022-01-01 PROCEDURE — 21400001 HC TELEMETRY ROOM

## 2022-01-01 PROCEDURE — 74328 PR  X-RAY FOR BILE DUCT ENDOSCOPY: ICD-10-PCS | Mod: 26,51,, | Performed by: INTERNAL MEDICINE

## 2022-01-01 PROCEDURE — 43276 ERCP STENT EXCHANGE W/DILATE: CPT | Performed by: INTERNAL MEDICINE

## 2022-01-01 PROCEDURE — 43276 PR ERCP W/RMVL/EXCH STENT BILIARY/PANCREATIC DUCT W/DILATION: ICD-10-PCS | Mod: ,,, | Performed by: INTERNAL MEDICINE

## 2022-01-01 PROCEDURE — 4010F ACE/ARB THERAPY RXD/TAKEN: CPT | Mod: CPTII,S$GLB,, | Performed by: INTERNAL MEDICINE

## 2022-01-01 PROCEDURE — 36415 COLL VENOUS BLD VENIPUNCTURE: CPT | Mod: PN | Performed by: INTERNAL MEDICINE

## 2022-01-01 PROCEDURE — 3008F PR BODY MASS INDEX (BMI) DOCUMENTED: ICD-10-PCS | Mod: CPTII,S$GLB,, | Performed by: INTERNAL MEDICINE

## 2022-01-01 PROCEDURE — 86706 HEP B SURFACE ANTIBODY: CPT | Performed by: INTERNAL MEDICINE

## 2022-01-01 PROCEDURE — 88112 CYTOPATH CELL ENHANCE TECH: CPT | Mod: 26,59,, | Performed by: PATHOLOGY

## 2022-01-01 PROCEDURE — 3080F DIAST BP >= 90 MM HG: CPT | Mod: CPTII,S$GLB,, | Performed by: INTERNAL MEDICINE

## 2022-01-01 PROCEDURE — 99221 PR INITIAL HOSPITAL CARE,LEVL I: ICD-10-PCS | Mod: ,,, | Performed by: INTERNAL MEDICINE

## 2022-01-01 PROCEDURE — 43238 EGD US FINE NEEDLE BX/ASPIR: CPT | Performed by: INTERNAL MEDICINE

## 2022-01-01 PROCEDURE — 37000009 HC ANESTHESIA EA ADD 15 MINS: Performed by: INTERNAL MEDICINE

## 2022-01-01 PROCEDURE — 88342 IMHCHEM/IMCYTCHM 1ST ANTB: CPT | Performed by: PATHOLOGY

## 2022-01-01 PROCEDURE — 43260 PR ERCP,DIAGNOSTIC: ICD-10-PCS | Mod: 51,53,, | Performed by: INTERNAL MEDICINE

## 2022-01-01 PROCEDURE — 1111F DSCHRG MED/CURRENT MED MERGE: CPT | Mod: CPTII,S$GLB,, | Performed by: INTERNAL MEDICINE

## 2022-01-01 PROCEDURE — 25000003 PHARM REV CODE 250: Performed by: EMERGENCY MEDICINE

## 2022-01-01 PROCEDURE — 43245 PR EGD, FLEX, W/DILATION, GASTR/DUOD STRICTURE: ICD-10-PCS | Mod: 51,,, | Performed by: INTERNAL MEDICINE

## 2022-01-01 PROCEDURE — 27202059 HC NEEDLE, FNA (ANY): Performed by: INTERNAL MEDICINE

## 2022-01-01 PROCEDURE — 43238 PR UPGI ENDOSCOPY W/US FN BX: ICD-10-PCS | Mod: 51,,, | Performed by: INTERNAL MEDICINE

## 2022-01-01 PROCEDURE — 25500020 PHARM REV CODE 255: Mod: PN | Performed by: PHYSICIAN ASSISTANT

## 2022-01-01 PROCEDURE — 80048 BASIC METABOLIC PNL TOTAL CA: CPT | Performed by: INTERNAL MEDICINE

## 2022-01-01 PROCEDURE — 48999 UNLISTED PROCEDURE PANCREAS: CPT | Mod: ,,, | Performed by: INTERNAL MEDICINE

## 2022-01-01 PROCEDURE — 74177 CT ABD & PELVIS W/CONTRAST: CPT | Mod: TC,PN

## 2022-01-01 PROCEDURE — 88112 PR  CYTOPATH, CELL ENHANCE TECH: ICD-10-PCS | Mod: 26,59,, | Performed by: PATHOLOGY

## 2022-01-01 PROCEDURE — 86803 HEPATITIS C AB TEST: CPT | Performed by: INTERNAL MEDICINE

## 2022-01-01 PROCEDURE — G0379 DIRECT REFER HOSPITAL OBSERV: HCPCS

## 2022-01-01 PROCEDURE — 43239 EGD BIOPSY SINGLE/MULTIPLE: CPT | Mod: 59 | Performed by: INTERNAL MEDICINE

## 2022-01-01 PROCEDURE — 82390 ASSAY OF CERULOPLASMIN: CPT | Performed by: INTERNAL MEDICINE

## 2022-01-01 PROCEDURE — 96376 TX/PRO/DX INJ SAME DRUG ADON: CPT

## 2022-01-01 PROCEDURE — 86301 IMMUNOASSAY TUMOR CA 19-9: CPT | Performed by: INTERNAL MEDICINE

## 2022-01-01 PROCEDURE — 84466 ASSAY OF TRANSFERRIN: CPT | Performed by: INTERNAL MEDICINE

## 2022-01-01 PROCEDURE — 43239 PR EGD, FLEX, W/BIOPSY, SGL/MULTI: ICD-10-PCS | Mod: 59,,, | Performed by: INTERNAL MEDICINE

## 2022-01-01 PROCEDURE — C2625 STENT, NON-COR, TEM W/DEL SY: HCPCS | Performed by: INTERNAL MEDICINE

## 2022-01-01 PROCEDURE — 43261 ENDO CHOLANGIOPANCREATOGRAPH: CPT | Mod: 59 | Performed by: INTERNAL MEDICINE

## 2022-01-01 PROCEDURE — 82784 ASSAY IGA/IGD/IGG/IGM EACH: CPT | Performed by: INTERNAL MEDICINE

## 2022-01-01 PROCEDURE — 63600175 PHARM REV CODE 636 W HCPCS: Performed by: STUDENT IN AN ORGANIZED HEALTH CARE EDUCATION/TRAINING PROGRAM

## 2022-01-01 PROCEDURE — 43276 ERCP STENT EXCHANGE W/DILATE: CPT | Mod: ,,, | Performed by: INTERNAL MEDICINE

## 2022-01-01 PROCEDURE — 43240 EGD W/TRANSMURAL DRAIN CYST: CPT | Mod: ,,, | Performed by: INTERNAL MEDICINE

## 2022-01-01 PROCEDURE — 88305 TISSUE EXAM BY PATHOLOGIST: ICD-10-PCS | Mod: 26,,, | Performed by: PATHOLOGY

## 2022-01-01 PROCEDURE — 43261 ENDO CHOLANGIOPANCREATOGRAPH: CPT | Mod: 59,,, | Performed by: INTERNAL MEDICINE

## 2022-01-01 PROCEDURE — 99222 1ST HOSP IP/OBS MODERATE 55: CPT | Mod: ,,, | Performed by: INTERNAL MEDICINE

## 2022-01-01 PROCEDURE — 3075F SYST BP GE 130 - 139MM HG: CPT | Mod: CPTII,S$GLB,, | Performed by: INTERNAL MEDICINE

## 2022-01-01 PROCEDURE — 88305 TISSUE EXAM BY PATHOLOGIST: CPT | Mod: 26,,, | Performed by: PATHOLOGY

## 2022-01-01 PROCEDURE — 96361 HYDRATE IV INFUSION ADD-ON: CPT

## 2022-01-01 PROCEDURE — 43240 PR UPPER GI ENDOSCOPY,DRAIN PSEUDOCYST: ICD-10-PCS | Mod: ,,, | Performed by: INTERNAL MEDICINE

## 2022-01-01 PROCEDURE — 3080F PR MOST RECENT DIASTOLIC BLOOD PRESSURE >= 90 MM HG: ICD-10-PCS | Mod: CPTII,S$GLB,, | Performed by: INTERNAL MEDICINE

## 2022-01-01 PROCEDURE — 87046 STOOL CULTR AEROBIC BACT EA: CPT | Performed by: INTERNAL MEDICINE

## 2022-01-01 PROCEDURE — 88173 CYTOPATH EVAL FNA REPORT: CPT | Mod: 26,,, | Performed by: PATHOLOGY

## 2022-01-01 PROCEDURE — 43240 EGD W/TRANSMURAL DRAIN CYST: CPT | Performed by: INTERNAL MEDICINE

## 2022-01-01 PROCEDURE — G0378 HOSPITAL OBSERVATION PER HR: HCPCS

## 2022-01-01 PROCEDURE — 1159F PR MEDICATION LIST DOCUMENTED IN MEDICAL RECORD: ICD-10-PCS | Mod: CPTII,S$GLB,, | Performed by: INTERNAL MEDICINE

## 2022-01-01 PROCEDURE — 74328 X-RAY BILE DUCT ENDOSCOPY: CPT | Performed by: INTERNAL MEDICINE

## 2022-01-01 PROCEDURE — 87324 CLOSTRIDIUM AG IA: CPT | Performed by: INTERNAL MEDICINE

## 2022-01-01 PROCEDURE — 88112 CYTOPATH CELL ENHANCE TECH: CPT | Performed by: PATHOLOGY

## 2022-01-01 PROCEDURE — 96375 TX/PRO/DX INJ NEW DRUG ADDON: CPT

## 2022-01-01 PROCEDURE — 48999 UNLISTED PROCEDURE PANCREAS: CPT | Performed by: INTERNAL MEDICINE

## 2022-01-01 PROCEDURE — 43245 EGD DILATE STRICTURE: CPT | Performed by: INTERNAL MEDICINE

## 2022-01-01 PROCEDURE — 27201674 HC SPHINCTERTOME: Performed by: INTERNAL MEDICINE

## 2022-01-01 PROCEDURE — C1874 STENT, COATED/COV W/DEL SYS: HCPCS | Performed by: INTERNAL MEDICINE

## 2022-01-01 PROCEDURE — 3075F PR MOST RECENT SYSTOLIC BLOOD PRESS GE 130-139MM HG: ICD-10-PCS | Mod: CPTII,S$GLB,, | Performed by: INTERNAL MEDICINE

## 2022-01-01 RX ORDER — DICYCLOMINE HYDROCHLORIDE 20 MG/1
20 TABLET ORAL
Status: DISCONTINUED | OUTPATIENT
Start: 2022-01-01 | End: 2022-01-01 | Stop reason: HOSPADM

## 2022-01-01 RX ORDER — FENTANYL CITRATE 50 UG/ML
INJECTION, SOLUTION INTRAMUSCULAR; INTRAVENOUS
Status: DISCONTINUED | OUTPATIENT
Start: 2022-01-01 | End: 2022-01-01

## 2022-01-01 RX ORDER — MAG HYDROX/ALUMINUM HYD/SIMETH 200-200-20
30 SUSPENSION, ORAL (FINAL DOSE FORM) ORAL
Status: DISCONTINUED | OUTPATIENT
Start: 2022-01-01 | End: 2022-01-01

## 2022-01-01 RX ORDER — AMOXICILLIN AND CLAVULANATE POTASSIUM 500; 125 MG/1; MG/1
1 TABLET, FILM COATED ORAL 3 TIMES DAILY
Status: DISCONTINUED | OUTPATIENT
Start: 2022-01-01 | End: 2022-01-01 | Stop reason: HOSPADM

## 2022-01-01 RX ORDER — ONDANSETRON 2 MG/ML
INJECTION INTRAMUSCULAR; INTRAVENOUS
Status: DISCONTINUED | OUTPATIENT
Start: 2022-01-01 | End: 2022-01-01

## 2022-01-01 RX ORDER — POLYETHYLENE GLYCOL 3350 17 G/17G
17 POWDER, FOR SOLUTION ORAL ONCE
Status: COMPLETED | OUTPATIENT
Start: 2022-01-01 | End: 2022-01-01

## 2022-01-01 RX ORDER — OXYCODONE HYDROCHLORIDE 5 MG/1
10 TABLET ORAL EVERY 6 HOURS PRN
Status: DISCONTINUED | OUTPATIENT
Start: 2022-01-01 | End: 2022-01-01 | Stop reason: HOSPADM

## 2022-01-01 RX ORDER — ONDANSETRON 2 MG/ML
4 INJECTION INTRAMUSCULAR; INTRAVENOUS EVERY 8 HOURS PRN
Status: DISCONTINUED | OUTPATIENT
Start: 2022-01-01 | End: 2022-01-01 | Stop reason: HOSPADM

## 2022-01-01 RX ORDER — CIPROFLOXACIN 750 MG/1
750 TABLET, FILM COATED ORAL EVERY 12 HOURS
Status: DISCONTINUED | OUTPATIENT
Start: 2022-01-01 | End: 2022-01-01

## 2022-01-01 RX ORDER — SODIUM CHLORIDE 9 MG/ML
INJECTION, SOLUTION INTRAVENOUS CONTINUOUS
Status: DISCONTINUED | OUTPATIENT
Start: 2022-01-01 | End: 2022-01-01

## 2022-01-01 RX ORDER — LIDOCAINE HYDROCHLORIDE 10 MG/ML
INJECTION, SOLUTION EPIDURAL; INFILTRATION; INTRACAUDAL; PERINEURAL
Status: DISCONTINUED | OUTPATIENT
Start: 2022-01-01 | End: 2022-01-01

## 2022-01-01 RX ORDER — METRONIDAZOLE 500 MG/1
500 TABLET ORAL EVERY 8 HOURS
Status: DISCONTINUED | OUTPATIENT
Start: 2022-01-01 | End: 2022-01-01 | Stop reason: HOSPADM

## 2022-01-01 RX ORDER — AMOXICILLIN AND CLAVULANATE POTASSIUM 875; 125 MG/1; MG/1
1 TABLET, FILM COATED ORAL EVERY 12 HOURS
Status: DISCONTINUED | OUTPATIENT
Start: 2022-01-01 | End: 2022-01-01 | Stop reason: HOSPADM

## 2022-01-01 RX ORDER — MORPHINE SULFATE 4 MG/ML
4 INJECTION, SOLUTION INTRAMUSCULAR; INTRAVENOUS EVERY 4 HOURS PRN
Status: DISCONTINUED | OUTPATIENT
Start: 2022-01-01 | End: 2022-01-01 | Stop reason: HOSPADM

## 2022-01-01 RX ORDER — PROPOFOL 10 MG/ML
VIAL (ML) INTRAVENOUS
Status: DISCONTINUED | OUTPATIENT
Start: 2022-01-01 | End: 2022-01-01

## 2022-01-01 RX ORDER — SODIUM CHLORIDE 9 MG/ML
INJECTION, SOLUTION INTRAVENOUS CONTINUOUS
Status: DISCONTINUED | OUTPATIENT
Start: 2022-01-01 | End: 2022-01-01 | Stop reason: HOSPADM

## 2022-01-01 RX ORDER — ROCURONIUM BROMIDE 10 MG/ML
INJECTION, SOLUTION INTRAVENOUS
Status: DISCONTINUED | OUTPATIENT
Start: 2022-01-01 | End: 2022-01-01

## 2022-01-01 RX ORDER — PANTOPRAZOLE SODIUM 40 MG/1
40 TABLET, DELAYED RELEASE ORAL DAILY
Status: DISCONTINUED | OUTPATIENT
Start: 2022-01-01 | End: 2022-01-01 | Stop reason: HOSPADM

## 2022-01-01 RX ORDER — HYDROMORPHONE HYDROCHLORIDE 1 MG/ML
0.2 INJECTION, SOLUTION INTRAMUSCULAR; INTRAVENOUS; SUBCUTANEOUS ONCE
Status: COMPLETED | OUTPATIENT
Start: 2022-01-01 | End: 2022-01-01

## 2022-01-01 RX ORDER — LANOLIN ALCOHOL/MO/W.PET/CERES
800 CREAM (GRAM) TOPICAL
Status: DISCONTINUED | OUTPATIENT
Start: 2022-01-01 | End: 2022-01-01 | Stop reason: HOSPADM

## 2022-01-01 RX ORDER — HYDROCODONE BITARTRATE AND ACETAMINOPHEN 10; 325 MG/1; MG/1
1 TABLET ORAL EVERY 12 HOURS PRN
COMMUNITY
End: 2023-01-01 | Stop reason: ALTCHOICE

## 2022-01-01 RX ORDER — SODIUM CHLORIDE, SODIUM LACTATE, POTASSIUM CHLORIDE, CALCIUM CHLORIDE 600; 310; 30; 20 MG/100ML; MG/100ML; MG/100ML; MG/100ML
INJECTION, SOLUTION INTRAVENOUS CONTINUOUS
Status: DISCONTINUED | OUTPATIENT
Start: 2022-01-01 | End: 2022-01-01 | Stop reason: HOSPADM

## 2022-01-01 RX ORDER — LIDOCAINE HYDROCHLORIDE 20 MG/ML
INJECTION INTRAVENOUS
Status: DISCONTINUED | OUTPATIENT
Start: 2022-01-01 | End: 2022-01-01

## 2022-01-01 RX ORDER — MIDAZOLAM HYDROCHLORIDE 1 MG/ML
INJECTION, SOLUTION INTRAMUSCULAR; INTRAVENOUS
Status: DISCONTINUED | OUTPATIENT
Start: 2022-01-01 | End: 2022-01-01

## 2022-01-01 RX ORDER — PROCHLORPERAZINE EDISYLATE 5 MG/ML
5 INJECTION INTRAMUSCULAR; INTRAVENOUS EVERY 6 HOURS PRN
Status: DISCONTINUED | OUTPATIENT
Start: 2022-01-01 | End: 2022-01-01 | Stop reason: HOSPADM

## 2022-01-01 RX ORDER — PHENYLEPHRINE HYDROCHLORIDE 10 MG/ML
INJECTION INTRAVENOUS
Status: DISCONTINUED | OUTPATIENT
Start: 2022-01-01 | End: 2022-01-01

## 2022-01-01 RX ORDER — AMOXICILLIN AND CLAVULANATE POTASSIUM 500; 125 MG/1; MG/1
1 TABLET, FILM COATED ORAL 2 TIMES DAILY
Qty: 28 TABLET | Refills: 0 | Status: ON HOLD | OUTPATIENT
Start: 2022-01-01 | End: 2022-01-01 | Stop reason: SDUPTHER

## 2022-01-01 RX ORDER — IPRATROPIUM BROMIDE AND ALBUTEROL SULFATE 2.5; .5 MG/3ML; MG/3ML
3 SOLUTION RESPIRATORY (INHALATION) EVERY 6 HOURS PRN
Status: DISCONTINUED | OUTPATIENT
Start: 2022-01-01 | End: 2022-01-01 | Stop reason: HOSPADM

## 2022-01-01 RX ORDER — MORPHINE SULFATE 2 MG/ML
2 INJECTION, SOLUTION INTRAMUSCULAR; INTRAVENOUS EVERY 4 HOURS PRN
Status: DISCONTINUED | OUTPATIENT
Start: 2022-01-01 | End: 2022-01-01

## 2022-01-01 RX ORDER — SUCCINYLCHOLINE CHLORIDE 20 MG/ML
INJECTION INTRAMUSCULAR; INTRAVENOUS
Status: DISCONTINUED | OUTPATIENT
Start: 2022-01-01 | End: 2022-01-01

## 2022-01-01 RX ORDER — OXYCODONE HYDROCHLORIDE 5 MG/1
10 TABLET ORAL EVERY 4 HOURS PRN
Status: DISCONTINUED | OUTPATIENT
Start: 2022-01-01 | End: 2022-01-01 | Stop reason: HOSPADM

## 2022-01-01 RX ORDER — OXYCODONE HYDROCHLORIDE 5 MG/1
10 TABLET ORAL EVERY 6 HOURS PRN
Status: DISCONTINUED | OUTPATIENT
Start: 2022-01-01 | End: 2022-01-01

## 2022-01-01 RX ORDER — NALOXONE HCL 0.4 MG/ML
0.02 VIAL (ML) INJECTION
Status: DISCONTINUED | OUTPATIENT
Start: 2022-01-01 | End: 2022-01-01 | Stop reason: HOSPADM

## 2022-01-01 RX ORDER — SUCRALFATE 1 G/10ML
1 SUSPENSION ORAL EVERY 6 HOURS
Status: DISCONTINUED | OUTPATIENT
Start: 2022-01-01 | End: 2022-01-01

## 2022-01-01 RX ORDER — AMOXICILLIN AND CLAVULANATE POTASSIUM 500; 125 MG/1; MG/1
1 TABLET, FILM COATED ORAL 2 TIMES DAILY
Status: DISCONTINUED | OUTPATIENT
Start: 2022-01-01 | End: 2022-01-01 | Stop reason: DRUGHIGH

## 2022-01-01 RX ORDER — INDOMETHACIN 50 MG/1
100 SUPPOSITORY RECTAL ONCE AS NEEDED
Status: COMPLETED | OUTPATIENT
Start: 2022-01-01 | End: 2022-01-01

## 2022-01-01 RX ORDER — SODIUM CHLORIDE, SODIUM LACTATE, POTASSIUM CHLORIDE, CALCIUM CHLORIDE 600; 310; 30; 20 MG/100ML; MG/100ML; MG/100ML; MG/100ML
INJECTION, SOLUTION INTRAVENOUS CONTINUOUS PRN
Status: DISCONTINUED | OUTPATIENT
Start: 2022-01-01 | End: 2022-01-01

## 2022-01-01 RX ORDER — ONDANSETRON 2 MG/ML
4 INJECTION INTRAMUSCULAR; INTRAVENOUS ONCE
Status: COMPLETED | OUTPATIENT
Start: 2022-01-01 | End: 2022-01-01

## 2022-01-01 RX ORDER — DICYCLOMINE HYDROCHLORIDE 20 MG/1
20 TABLET ORAL
Qty: 120 TABLET | Refills: 0 | Status: ON HOLD | OUTPATIENT
Start: 2022-01-01 | End: 2023-01-01 | Stop reason: HOSPADM

## 2022-01-01 RX ORDER — ACETAMINOPHEN 500 MG
1000 TABLET ORAL EVERY 6 HOURS PRN
Status: DISCONTINUED | OUTPATIENT
Start: 2022-01-01 | End: 2022-01-01 | Stop reason: HOSPADM

## 2022-01-01 RX ORDER — ONDANSETRON 2 MG/ML
4 INJECTION INTRAMUSCULAR; INTRAVENOUS EVERY 6 HOURS PRN
Status: DISCONTINUED | OUTPATIENT
Start: 2022-01-01 | End: 2022-01-01 | Stop reason: HOSPADM

## 2022-01-01 RX ORDER — MORPHINE SULFATE 2 MG/ML
6 INJECTION, SOLUTION INTRAMUSCULAR; INTRAVENOUS EVERY 4 HOURS PRN
Status: DISCONTINUED | OUTPATIENT
Start: 2022-01-01 | End: 2022-01-01

## 2022-01-01 RX ORDER — DICYCLOMINE HYDROCHLORIDE 10 MG/1
10 CAPSULE ORAL 3 TIMES DAILY
Status: DISCONTINUED | OUTPATIENT
Start: 2022-01-01 | End: 2022-01-01 | Stop reason: HOSPADM

## 2022-01-01 RX ORDER — HYDROCODONE BITARTRATE AND ACETAMINOPHEN 10; 325 MG/1; MG/1
1 TABLET ORAL EVERY 12 HOURS PRN
Status: DISCONTINUED | OUTPATIENT
Start: 2022-01-01 | End: 2022-01-01 | Stop reason: HOSPADM

## 2022-01-01 RX ORDER — IBUPROFEN 600 MG/1
600 TABLET ORAL EVERY 6 HOURS PRN
Status: DISCONTINUED | OUTPATIENT
Start: 2022-01-01 | End: 2022-01-01

## 2022-01-01 RX ORDER — CIPROFLOXACIN 2 MG/ML
INJECTION, SOLUTION INTRAVENOUS
Status: DISCONTINUED | OUTPATIENT
Start: 2022-01-01 | End: 2022-01-01

## 2022-01-01 RX ORDER — SODIUM CHLORIDE 0.9 % (FLUSH) 0.9 %
10 SYRINGE (ML) INJECTION EVERY 8 HOURS PRN
Status: DISCONTINUED | OUTPATIENT
Start: 2022-01-01 | End: 2022-01-01 | Stop reason: HOSPADM

## 2022-01-01 RX ORDER — AMOXICILLIN AND CLAVULANATE POTASSIUM 500; 125 MG/1; MG/1
1 TABLET, FILM COATED ORAL 2 TIMES DAILY
Qty: 14 TABLET | Refills: 0 | Status: SHIPPED | OUTPATIENT
Start: 2022-01-01 | End: 2022-01-01

## 2022-01-01 RX ORDER — CIPROFLOXACIN 500 MG/1
500 TABLET ORAL EVERY 12 HOURS
Status: DISCONTINUED | OUTPATIENT
Start: 2022-01-01 | End: 2022-01-01

## 2022-01-01 RX ORDER — MORPHINE SULFATE 4 MG/ML
4 INJECTION, SOLUTION INTRAMUSCULAR; INTRAVENOUS EVERY 4 HOURS PRN
Status: DISCONTINUED | OUTPATIENT
Start: 2022-01-01 | End: 2022-01-01

## 2022-01-01 RX ORDER — POTASSIUM CHLORIDE 20 MEQ/1
40 TABLET, EXTENDED RELEASE ORAL ONCE
Status: COMPLETED | OUTPATIENT
Start: 2022-01-01 | End: 2022-01-01

## 2022-01-01 RX ORDER — OXYCODONE HYDROCHLORIDE 10 MG/1
10 TABLET ORAL EVERY 6 HOURS PRN
Qty: 10 TABLET | Refills: 0 | Status: SHIPPED | OUTPATIENT
Start: 2022-01-01 | End: 2023-01-01 | Stop reason: ALTCHOICE

## 2022-01-01 RX ORDER — HYDRALAZINE HYDROCHLORIDE 20 MG/ML
10 INJECTION INTRAMUSCULAR; INTRAVENOUS EVERY 6 HOURS PRN
Status: DISCONTINUED | OUTPATIENT
Start: 2022-01-01 | End: 2022-01-01 | Stop reason: HOSPADM

## 2022-01-01 RX ORDER — PANTOPRAZOLE SODIUM 40 MG/10ML
40 INJECTION, POWDER, LYOPHILIZED, FOR SOLUTION INTRAVENOUS 2 TIMES DAILY
Status: DISCONTINUED | OUTPATIENT
Start: 2022-01-01 | End: 2022-01-01 | Stop reason: HOSPADM

## 2022-01-01 RX ORDER — HYDROMORPHONE HYDROCHLORIDE 2 MG/ML
0.5 INJECTION, SOLUTION INTRAMUSCULAR; INTRAVENOUS; SUBCUTANEOUS EVERY 6 HOURS PRN
Status: DISCONTINUED | OUTPATIENT
Start: 2022-01-01 | End: 2022-01-01 | Stop reason: HOSPADM

## 2022-01-01 RX ADMIN — ROCURONIUM BROMIDE 5 MG: 10 INJECTION, SOLUTION INTRAVENOUS at 09:12

## 2022-01-01 RX ADMIN — PIPERACILLIN SODIUM AND TAZOBACTAM SODIUM 4.5 G: 4; .5 INJECTION, POWDER, LYOPHILIZED, FOR SOLUTION INTRAVENOUS at 06:10

## 2022-01-01 RX ADMIN — ONDANSETRON 4 MG: 2 INJECTION, SOLUTION INTRAMUSCULAR; INTRAVENOUS at 09:12

## 2022-01-01 RX ADMIN — SODIUM CHLORIDE, POTASSIUM CHLORIDE, SODIUM LACTATE AND CALCIUM CHLORIDE: 600; 310; 30; 20 INJECTION, SOLUTION INTRAVENOUS at 03:12

## 2022-01-01 RX ADMIN — ACETAMINOPHEN 1000 MG: 500 TABLET ORAL at 06:10

## 2022-01-01 RX ADMIN — FENTANYL CITRATE 25 MCG: 50 INJECTION, SOLUTION INTRAMUSCULAR; INTRAVENOUS at 08:10

## 2022-01-01 RX ADMIN — FENTANYL CITRATE 25 MCG: 50 INJECTION, SOLUTION INTRAMUSCULAR; INTRAVENOUS at 10:12

## 2022-01-01 RX ADMIN — AMOXICILLIN AND CLAVULANATE POTASSIUM 500 MG: 500; 125 TABLET, FILM COATED ORAL at 10:12

## 2022-01-01 RX ADMIN — PANTOPRAZOLE SODIUM 40 MG: 40 TABLET, DELAYED RELEASE ORAL at 09:12

## 2022-01-01 RX ADMIN — MORPHINE SULFATE 4 MG: 4 INJECTION INTRAVENOUS at 05:10

## 2022-01-01 RX ADMIN — OXYCODONE HYDROCHLORIDE 10 MG: 5 TABLET ORAL at 02:10

## 2022-01-01 RX ADMIN — PIPERACILLIN SODIUM AND TAZOBACTAM SODIUM 4.5 G: 4; .5 INJECTION, POWDER, LYOPHILIZED, FOR SOLUTION INTRAVENOUS at 02:10

## 2022-01-01 RX ADMIN — ONDANSETRON 4 MG: 2 INJECTION INTRAMUSCULAR; INTRAVENOUS at 05:12

## 2022-01-01 RX ADMIN — PANTOPRAZOLE SODIUM 40 MG: 40 INJECTION, POWDER, FOR SOLUTION INTRAVENOUS at 08:10

## 2022-01-01 RX ADMIN — DICYCLOMINE HYDROCHLORIDE 20 MG: 20 TABLET ORAL at 09:12

## 2022-01-01 RX ADMIN — SODIUM CHLORIDE, SODIUM LACTATE, POTASSIUM CHLORIDE, AND CALCIUM CHLORIDE: .6; .31; .03; .02 INJECTION, SOLUTION INTRAVENOUS at 08:10

## 2022-01-01 RX ADMIN — ROCURONIUM BROMIDE 5 MG: 10 INJECTION, SOLUTION INTRAVENOUS at 03:12

## 2022-01-01 RX ADMIN — MORPHINE SULFATE 4 MG: 4 INJECTION INTRAVENOUS at 09:10

## 2022-01-01 RX ADMIN — FENTANYL CITRATE 50 MCG: 50 INJECTION, SOLUTION INTRAMUSCULAR; INTRAVENOUS at 09:12

## 2022-01-01 RX ADMIN — HYDROMORPHONE HYDROCHLORIDE 0.5 MG: 2 INJECTION INTRAMUSCULAR; INTRAVENOUS; SUBCUTANEOUS at 01:12

## 2022-01-01 RX ADMIN — DICYCLOMINE HYDROCHLORIDE 10 MG: 10 CAPSULE ORAL at 09:12

## 2022-01-01 RX ADMIN — POLYETHYLENE GLYCOL 3350 17 G: 17 POWDER, FOR SOLUTION ORAL at 12:12

## 2022-01-01 RX ADMIN — MIDAZOLAM HYDROCHLORIDE 2 MG: 1 INJECTION, SOLUTION INTRAMUSCULAR; INTRAVENOUS at 06:10

## 2022-01-01 RX ADMIN — DICYCLOMINE HYDROCHLORIDE 20 MG: 20 TABLET ORAL at 05:12

## 2022-01-01 RX ADMIN — PIPERACILLIN SODIUM AND TAZOBACTAM SODIUM 4.5 G: 4; .5 INJECTION, POWDER, LYOPHILIZED, FOR SOLUTION INTRAVENOUS at 04:12

## 2022-01-01 RX ADMIN — MORPHINE SULFATE 4 MG: 4 INJECTION INTRAVENOUS at 06:10

## 2022-01-01 RX ADMIN — IOHEXOL 30 ML: 350 INJECTION, SOLUTION INTRAVENOUS at 08:12

## 2022-01-01 RX ADMIN — SUCCINYLCHOLINE CHLORIDE 160 MG: 20 INJECTION, SOLUTION INTRAMUSCULAR; INTRAVENOUS at 09:12

## 2022-01-01 RX ADMIN — ONDANSETRON 4 MG: 2 INJECTION, SOLUTION INTRAMUSCULAR; INTRAVENOUS at 07:10

## 2022-01-01 RX ADMIN — SODIUM CHLORIDE, SODIUM LACTATE, POTASSIUM CHLORIDE, AND CALCIUM CHLORIDE: .6; .31; .03; .02 INJECTION, SOLUTION INTRAVENOUS at 06:10

## 2022-01-01 RX ADMIN — OXYCODONE HYDROCHLORIDE 10 MG: 5 TABLET ORAL at 05:10

## 2022-01-01 RX ADMIN — SODIUM CHLORIDE: 9 INJECTION, SOLUTION INTRAVENOUS at 04:10

## 2022-01-01 RX ADMIN — PROPOFOL 150 MG: 10 INJECTION, EMULSION INTRAVENOUS at 09:12

## 2022-01-01 RX ADMIN — HYDROMORPHONE HYDROCHLORIDE 0.5 MG: 2 INJECTION INTRAMUSCULAR; INTRAVENOUS; SUBCUTANEOUS at 05:12

## 2022-01-01 RX ADMIN — FENTANYL CITRATE 50 MCG: 50 INJECTION, SOLUTION INTRAMUSCULAR; INTRAVENOUS at 07:10

## 2022-01-01 RX ADMIN — SODIUM CHLORIDE, SODIUM LACTATE, POTASSIUM CHLORIDE, AND CALCIUM CHLORIDE: .6; .31; .03; .02 INJECTION, SOLUTION INTRAVENOUS at 12:12

## 2022-01-01 RX ADMIN — SODIUM CHLORIDE, SODIUM LACTATE, POTASSIUM CHLORIDE, AND CALCIUM CHLORIDE: .6; .31; .03; .02 INJECTION, SOLUTION INTRAVENOUS at 09:12

## 2022-01-01 RX ADMIN — OXYCODONE HYDROCHLORIDE 10 MG: 5 TABLET ORAL at 09:10

## 2022-01-01 RX ADMIN — OXYCODONE HYDROCHLORIDE 10 MG: 5 TABLET ORAL at 12:12

## 2022-01-01 RX ADMIN — PIPERACILLIN SODIUM AND TAZOBACTAM SODIUM 4.5 G: 4; .5 INJECTION, POWDER, LYOPHILIZED, FOR SOLUTION INTRAVENOUS at 11:10

## 2022-01-01 RX ADMIN — IOHEXOL 100 ML: 350 INJECTION, SOLUTION INTRAVENOUS at 06:12

## 2022-01-01 RX ADMIN — AMOXICILLIN AND CLAVULANATE POTASSIUM 500 MG: 500; 125 TABLET, FILM COATED ORAL at 09:12

## 2022-01-01 RX ADMIN — LIDOCAINE HYDROCHLORIDE 50 MG: 10 INJECTION, SOLUTION EPIDURAL; INFILTRATION; INTRACAUDAL; PERINEURAL at 03:12

## 2022-01-01 RX ADMIN — PHENYLEPHRINE HYDROCHLORIDE 100 MCG: 10 INJECTION INTRAVENOUS at 10:12

## 2022-01-01 RX ADMIN — ALUMINUM HYDROXIDE, MAGNESIUM HYDROXIDE, AND DIMETHICONE 30 ML: 200; 20; 200 SUSPENSION ORAL at 07:12

## 2022-01-01 RX ADMIN — DICYCLOMINE HYDROCHLORIDE 20 MG: 20 TABLET ORAL at 10:12

## 2022-01-01 RX ADMIN — ALUMINUM HYDROXIDE, MAGNESIUM HYDROXIDE, AND DIMETHICONE 30 ML: 200; 20; 200 SUSPENSION ORAL at 09:12

## 2022-01-01 RX ADMIN — MORPHINE SULFATE 4 MG: 4 INJECTION INTRAVENOUS at 10:10

## 2022-01-01 RX ADMIN — PANTOPRAZOLE SODIUM 40 MG: 40 INJECTION, POWDER, FOR SOLUTION INTRAVENOUS at 09:10

## 2022-01-01 RX ADMIN — PIPERACILLIN SODIUM AND TAZOBACTAM SODIUM 4.5 G: 4; .5 INJECTION, POWDER, LYOPHILIZED, FOR SOLUTION INTRAVENOUS at 09:10

## 2022-01-01 RX ADMIN — METRONIDAZOLE 500 MG: 500 TABLET ORAL at 01:10

## 2022-01-01 RX ADMIN — OXYCODONE HYDROCHLORIDE 10 MG: 5 TABLET ORAL at 09:12

## 2022-01-01 RX ADMIN — SODIUM CHLORIDE: 9 INJECTION, SOLUTION INTRAVENOUS at 03:10

## 2022-01-01 RX ADMIN — ROCURONIUM BROMIDE 5 MG: 10 INJECTION, SOLUTION INTRAVENOUS at 07:10

## 2022-01-01 RX ADMIN — SUCRALFATE 1 G: 1 SUSPENSION ORAL at 12:12

## 2022-01-01 RX ADMIN — METRONIDAZOLE 500 MG: 500 TABLET ORAL at 06:10

## 2022-01-01 RX ADMIN — MORPHINE SULFATE 4 MG: 4 INJECTION INTRAVENOUS at 03:10

## 2022-01-01 RX ADMIN — SUCCINYLCHOLINE CHLORIDE 200 MG: 20 INJECTION, SOLUTION INTRAMUSCULAR; INTRAVENOUS at 07:10

## 2022-01-01 RX ADMIN — ONDANSETRON 4 MG: 2 INJECTION, SOLUTION INTRAMUSCULAR; INTRAVENOUS at 03:12

## 2022-01-01 RX ADMIN — PANTOPRAZOLE SODIUM 40 MG: 40 TABLET, DELAYED RELEASE ORAL at 03:12

## 2022-01-01 RX ADMIN — METRONIDAZOLE 500 MG: 500 TABLET ORAL at 09:10

## 2022-01-01 RX ADMIN — OXYCODONE HYDROCHLORIDE 10 MG: 5 TABLET ORAL at 04:12

## 2022-01-01 RX ADMIN — OXYCODONE HYDROCHLORIDE 10 MG: 5 TABLET ORAL at 01:10

## 2022-01-01 RX ADMIN — OXYCODONE HYDROCHLORIDE 10 MG: 5 TABLET ORAL at 08:10

## 2022-01-01 RX ADMIN — OXYCODONE HYDROCHLORIDE 10 MG: 5 TABLET ORAL at 03:10

## 2022-01-01 RX ADMIN — AMOXICILLIN AND CLAVULANATE POTASSIUM 1 TABLET: 875; 125 TABLET, FILM COATED ORAL at 09:12

## 2022-01-01 RX ADMIN — SUCCINYLCHOLINE CHLORIDE 140 MG: 20 INJECTION, SOLUTION INTRAMUSCULAR; INTRAVENOUS at 03:12

## 2022-01-01 RX ADMIN — HYDROMORPHONE HYDROCHLORIDE 0.5 MG: 2 INJECTION INTRAMUSCULAR; INTRAVENOUS; SUBCUTANEOUS at 11:12

## 2022-01-01 RX ADMIN — CIPROFLOXACIN 400 MG: 2 INJECTION INTRAVENOUS at 09:12

## 2022-01-01 RX ADMIN — Medication 80 MG: at 07:10

## 2022-01-01 RX ADMIN — PIPERACILLIN SODIUM AND TAZOBACTAM SODIUM 4.5 G: 4; .5 INJECTION, POWDER, LYOPHILIZED, FOR SOLUTION INTRAVENOUS at 05:10

## 2022-01-01 RX ADMIN — PANTOPRAZOLE SODIUM 40 MG: 40 TABLET, DELAYED RELEASE ORAL at 10:12

## 2022-01-01 RX ADMIN — PROPOFOL 200 MG: 10 INJECTION, EMULSION INTRAVENOUS at 07:10

## 2022-01-01 RX ADMIN — MORPHINE SULFATE 6 MG: 2 INJECTION, SOLUTION INTRAMUSCULAR; INTRAVENOUS at 11:10

## 2022-01-01 RX ADMIN — SODIUM CHLORIDE: 9 INJECTION, SOLUTION INTRAVENOUS at 06:10

## 2022-01-01 RX ADMIN — DICYCLOMINE HYDROCHLORIDE 20 MG: 20 TABLET ORAL at 03:12

## 2022-01-01 RX ADMIN — OXYCODONE HYDROCHLORIDE 10 MG: 5 TABLET ORAL at 12:10

## 2022-01-01 RX ADMIN — LIDOCAINE HYDROCHLORIDE 100 MG: 10 INJECTION, SOLUTION EPIDURAL; INFILTRATION; INTRACAUDAL; PERINEURAL at 09:12

## 2022-01-01 RX ADMIN — OXYCODONE HYDROCHLORIDE 10 MG: 5 TABLET ORAL at 06:10

## 2022-01-01 RX ADMIN — SODIUM CHLORIDE: 9 INJECTION, SOLUTION INTRAVENOUS at 08:10

## 2022-01-01 RX ADMIN — OXYCODONE HYDROCHLORIDE 10 MG: 5 TABLET ORAL at 10:10

## 2022-01-01 RX ADMIN — SODIUM CHLORIDE, SODIUM LACTATE, POTASSIUM CHLORIDE, AND CALCIUM CHLORIDE: .6; .31; .03; .02 INJECTION, SOLUTION INTRAVENOUS at 07:12

## 2022-01-01 RX ADMIN — HYDROMORPHONE HYDROCHLORIDE 0.2 MG: 1 INJECTION, SOLUTION INTRAMUSCULAR; INTRAVENOUS; SUBCUTANEOUS at 05:12

## 2022-01-01 RX ADMIN — SODIUM CHLORIDE: 9 INJECTION, SOLUTION INTRAVENOUS at 12:10

## 2022-01-01 RX ADMIN — DIATRIZOATE MEGLUMINE AND DIATRIZOATE SODIUM 120 ML: 660; 100 LIQUID ORAL; RECTAL at 10:12

## 2022-01-01 RX ADMIN — SODIUM CHLORIDE, SODIUM LACTATE, POTASSIUM CHLORIDE, AND CALCIUM CHLORIDE: .6; .31; .03; .02 INJECTION, SOLUTION INTRAVENOUS at 03:12

## 2022-01-01 RX ADMIN — FENTANYL CITRATE 50 MCG: 50 INJECTION, SOLUTION INTRAMUSCULAR; INTRAVENOUS at 08:10

## 2022-01-01 RX ADMIN — MORPHINE SULFATE 4 MG: 4 INJECTION INTRAVENOUS at 07:10

## 2022-01-01 RX ADMIN — PROPOFOL 150 MG: 10 INJECTION, EMULSION INTRAVENOUS at 03:12

## 2022-01-01 RX ADMIN — CEFTRIAXONE 1 G: 1 INJECTION, SOLUTION INTRAVENOUS at 10:10

## 2022-01-01 RX ADMIN — IOHEXOL 100 ML: 350 INJECTION, SOLUTION INTRAVENOUS at 09:12

## 2022-01-01 RX ADMIN — POTASSIUM CHLORIDE 40 MEQ: 1500 TABLET, EXTENDED RELEASE ORAL at 01:10

## 2022-01-01 RX ADMIN — OXYCODONE HYDROCHLORIDE 10 MG: 5 TABLET ORAL at 07:10

## 2022-01-01 RX ADMIN — FENTANYL CITRATE 50 MCG: 50 INJECTION, SOLUTION INTRAMUSCULAR; INTRAVENOUS at 06:10

## 2022-01-01 RX ADMIN — MORPHINE SULFATE 4 MG: 4 INJECTION INTRAVENOUS at 02:10

## 2022-01-01 RX ADMIN — HYDROMORPHONE HYDROCHLORIDE 0.5 MG: 2 INJECTION INTRAMUSCULAR; INTRAVENOUS; SUBCUTANEOUS at 06:12

## 2022-10-06 NOTE — TELEPHONE ENCOUNTER
Phoned patient and spoke with him and scheduled his EUS/ERCP with Dr. Teran on 10.14.22 per referral from Dr. Santa.     Patient will call back on Monday to receive instructions as he is out of town at the moment.

## 2022-10-06 NOTE — TELEPHONE ENCOUNTER
----- Message from Vince Teran MD sent at 10/6/2022  4:14 PM CDT -----  Please schedule him for EUS with ERCP with me on 10/14/2022

## 2022-10-14 NOTE — ANESTHESIA PREPROCEDURE EVALUATION
10/14/2022  Guevara Osullivan is a 52 y.o., male.      Pre-op Assessment    I have reviewed the Patient Summary Reports.     I have reviewed the Nursing Notes. I have reviewed the NPO Status.   I have reviewed the Medications.     Review of Systems  Anesthesia Hx:  No problems with previous Anesthesia  Denies Family Hx of Anesthesia complications.   Denies Personal Hx of Anesthesia complications.   Social:  Non-Smoker    Hematology/Oncology:  Hematology Normal        Cardiovascular:   Hypertension    Pulmonary:  Pulmonary Normal    Renal/:  Renal/ Normal     Hepatic/GI:   Liver Disease,    Neurological:  Neurology Normal    Endocrine:  Endocrine Normal        Physical Exam  General: Well nourished    Airway:  Mallampati: I   Mouth Opening: Normal  Neck ROM: Normal ROM    Dental:  Intact    Chest/Lungs:  Clear to auscultation, Normal Respiratory Rate    Heart:  Rate: Normal  Rhythm: Regular Rhythm        Anesthesia Plan  Type of Anesthesia, risks & benefits discussed:    Anesthesia Type: Gen ETT  Intra-op Monitoring Plan: Standard ASA Monitors  Post Op Pain Control Plan: multimodal analgesia  Airway Plan: Direct  Informed Consent: Informed consent signed with the Patient and all parties understand the risks and agree with anesthesia plan.  All questions answered.   ASA Score: 2  Day of Surgery Review of History & Physical: H&P Update referred to the surgeon/provider.    Ready For Surgery From Anesthesia Perspective.     .

## 2022-10-14 NOTE — ANESTHESIA PROCEDURE NOTES
Intubation    Date/Time: 10/14/2022 7:02 AM  Performed by: Marguerite Hill CRNA  Authorized by: Zev Loomis MD     Intubation:     Induction:  Intravenous    Intubated:  Postinduction    Mask Ventilation:  Easy mask    Attempts:  1    Attempted By:  CRNA    Method of Intubation:  Video laryngoscopy    Blade:  Kendrick 2    Laryngeal View Grade: Grade I - full view of cords      Difficult Airway Encountered?: No      Complications:  None    Airway Device:  Oral endotracheal tube    Airway Device Size:  7.5    Style/Cuff Inflation:  Cuffed (inflated to minimal occlusive pressure)    Inflation Amount (mL):  7    Tube secured:  21    Secured at:  The lips    Placement Verified By:  Capnometry and Colorimetric ETCO2 device    Complicating Factors:  None    Findings Post-Intubation:  BS equal bilateral

## 2022-10-14 NOTE — ANESTHESIA RELEASE NOTE
Anesthesia Release from PACU Note    Patient: Guevara Osullivan    Procedure(s) Performed: Procedure(s) (LRB):  ERCP (ENDOSCOPIC RETROGRADE CHOLANGIOPANCREATOGRAPHY) with spyglass (N/A)  ULTRASOUND, UPPER GI TRACT, (N/A)    Anesthesia type: general    Post pain: Adequate analgesia    Post assessment: no apparent anesthetic complications, tolerated procedure well and no evidence of recall    Last Vitals:   Visit Vitals  /88 (BP Location: Left arm, Patient Position: Lying)   Pulse 75   Temp 36.6 °C (97.9 °F) (Temporal)   Resp 18   Ht 6' (1.829 m)   Wt 93 kg (205 lb)   SpO2 98%   BMI 27.80 kg/m²       Post vital signs: stable    Level of consciousness: awake, alert  and oriented    Nausea/Vomiting: no nausea/no vomiting    Complications: none    Airway Patency: patent    Respiratory: unassisted, spontaneous ventilation, room air    Cardiovascular: stable and blood pressure at baseline    Hydration: euvolemic

## 2022-10-14 NOTE — TRANSFER OF CARE
Anesthesia Transfer of Care Note    Patient: Guevara Osullivan    Procedure(s) Performed: Procedure(s) (LRB):  ERCP (ENDOSCOPIC RETROGRADE CHOLANGIOPANCREATOGRAPHY) with spyglass (N/A)  ULTRASOUND, UPPER GI TRACT, (N/A)    Patient location: PACU    Anesthesia Type: general    Transport from OR: Transported from OR on room air with adequate spontaneous ventilation    Post pain: adequate analgesia    Post assessment: no apparent anesthetic complications and tolerated procedure well    Post vital signs: stable    Level of consciousness: alert, awake and oriented    Nausea/Vomiting: no nausea/vomiting    Complications: none    Transfer of care protocol was followed      Last vitals:   Visit Vitals  /88 (BP Location: Left arm, Patient Position: Lying)   Pulse 75   Temp 36.6 °C (97.9 °F) (Temporal)   Resp 18   Ht 6' (1.829 m)   Wt 93 kg (205 lb)   SpO2 98%   BMI 27.80 kg/m²

## 2022-10-14 NOTE — ANESTHESIA POSTPROCEDURE EVALUATION
Anesthesia Post Evaluation    Patient: Guevara Osullivan    Procedure(s) Performed: Procedure(s) (LRB):  ERCP (ENDOSCOPIC RETROGRADE CHOLANGIOPANCREATOGRAPHY) with spyglass (N/A)  ULTRASOUND, UPPER GI TRACT, (N/A)    Final Anesthesia Type: general      Patient location during evaluation: GI PACU  Patient participation: Yes- Able to Participate  Level of consciousness: awake and alert  Post-procedure vital signs: reviewed and stable  Pain management: adequate  Airway patency: patent  SHI mitigation strategies: Multimodal analgesia  PONV status at discharge: No PONV  Anesthetic complications: no      Cardiovascular status: blood pressure returned to baseline and hemodynamically stable  Respiratory status: unassisted, spontaneous ventilation and room air  Hydration status: euvolemic  Follow-up not needed.          Vitals Value Taken Time   /82 10/14/22 0855   Temp 36.6 °C (97.9 °F) 10/14/22 0855   Pulse 89 10/14/22 0855   Resp 16 10/14/22 0855   SpO2 100 % 10/14/22 0855         No case tracking events are documented in the log.      Pain/Margo Score: Margo Score: 9 (10/14/2022  8:55 AM)

## 2022-10-15 NOTE — TELEPHONE ENCOUNTER
"Spoke with SO of pt who reports pt had endoscopy procedure yesterday, and pt has been having pain since procedure, pt states pain does worsen with movement ,and feels like spasms. States that pt vomited x2 yesterday. Started antibiotic today. Pt. Advised to be seen in ED now. Pt declined dispo. SO asking if there is anything else that can be done at this time.    Spoke with Dr. Castellano, advised pt should be seen in ED to rule out complications    SO advised, and verbalized understanding.      Reason for Disposition   [1] Pain in upper abdomen lasts > 10 minutes AND [2] age > 50    Additional Information   Negative: Shock suspected (e.g., cold/pale/clammy skin, too weak to stand, low BP, rapid pulse)   Negative: Difficult to awaken or acting confused (e.g., disoriented, slurred speech)   Negative: Passed out (i.e., lost consciousness, collapsed and was not responding)   Negative: Sounds like a life-threatening emergency to the triager   Negative: SEVERE abdomen pain (e.g., excruciating)   Negative: SEVERE dizziness (e.g., unable to stand, requires support to walk, feels like passing out now)   Negative: [1] Vomited blood AND [2] more than a few streaks of blood  (Exception: few streaks and only occurred once)   Negative: [1] Vomiting AND [2] contains black ("coffee ground") material   Negative: Black or tarry bowel movements   Negative: Area of skin on neck or upper chest swells or feels crunchy (crackles) when touched   Negative: [1] Pain in upper abdomen  lasts > 10 minutes AND [2] age > 35 AND [3] at least one cardiac risk factor (i.e., hypertension, diabetes, obesity, smoker or strong family history of heart disease)   Negative: [1] Pain in upper abdomen lasts > 10 minutes AND [2] age > 40 AND [3] associated chest, arm, neck, upper back or jaw pain    Protocols used: Endoscopy (Upper GI) Symptoms and [place]zy[place]otoe-L-CN    "

## 2022-10-16 PROBLEM — K85.90 POST-ERCP ACUTE PANCREATITIS: Status: ACTIVE | Noted: 2022-01-01

## 2022-10-16 PROBLEM — I10 PRIMARY HYPERTENSION: Status: ACTIVE | Noted: 2022-01-01

## 2022-10-16 PROBLEM — E78.5 HYPERLIPIDEMIA: Status: ACTIVE | Noted: 2022-01-01

## 2022-10-16 PROBLEM — K91.89 POST-ERCP ACUTE PANCREATITIS: Status: ACTIVE | Noted: 2022-01-01

## 2022-10-16 PROBLEM — K83.01 PRIMARY SCLEROSING CHOLANGITIS: Status: ACTIVE | Noted: 2022-01-01

## 2022-10-16 PROBLEM — A41.9 SEPSIS: Status: ACTIVE | Noted: 2022-01-01

## 2022-10-16 NOTE — PROVIDER TRANSFER
Outside Transfer Acceptance Note / Regional Referral Center    Referring facility: [facility]   Referring provider: LAMINE HALL  Accepting facility: [facility]  Accepting provider: GLORIA HAILE  Admitting provider: PINO MELCHOR  Reason for transfer: Higher Level of Care   Transfer diagnosis: abdominal pain  Transfer specialty requested: Pancreatic Billiary  Transfer specialty notified: yes  Transfer level: NUMBER 1-5: 2  Bed type requested: med-tele  Isolation status: No active isolations   Admission class or status: OP- Observation      Narrative     52-year-old male with a history of hypertension, hyperlipidemia, ulcerative colitis with fissure, and recent ERCP/EUS (October 14) presented to [facility] with abdominal pain on October 16.  He noted achy abdominal pain worse in the upper abdomen along with nausea without vomiting.  No fever noted.  He has been taking his postprocedure Augmentin, and he has been taking Protonix.  He did report several months of ongoing weight loss and jaundice.  In the emergency department he had abdominal tenderness and mild distention.  No vomiting and no evidence of altered mentation or respiratory compromise.  He received Zosyn, 1 L normal saline, 1 L LR, Zofran, and Dilaudid.  Requesting transfer to Hospital Medicine at [facility] for further Gastroenterology/Advanced Endoscopy evaluation.    COVID negative (by report)--awaiting hard copy  Sodium 131, potassium 3.3, chloride 93, CO2 31, BUN 10, creatinine 0.92, glucose 125, total bilirubin 4.5, AST 56, , phosphorus 2.8, amylase 265, lipase 673, magnesium 1.7, CRP greater than 10.5, INR 1.1, white blood cells 28.12, hemoglobin 14, hematocrit 41, platelets 522, lactic acid 1.7  Urinalysis with 2+ protein, trace ketone, trace blood, 3+ bilirubin, positive nitrite, few bacteria, 0-5 white blood cell, 0-5 red blood  cell    CT of the abdomen and pelvis with IV contrast noted that in this patient who by history had recent placement of biliary/pancreatic duct stent (but it is most likely the pancreatic duct stent), the stent appears displaced and in a loop of small bowel.  No biliary prosthesis appears to be in position.  Marked inflammatory changes surrounding the pancreas and extending into the root of the mesentery.  Multiple air collections in and amongst these marked inflammatory changes.  These could be iatrogenic, introduced during stent placement, versus related to an anaerobic infection, versus related to a bowel perforation (otherwise feel this is much less likely).  Clinical correlation is advised.  Associated mild ascites without definite focal drainable fluid collection seen at this time.  Periportal edema noted as well.  Despite placement of the biliary prosthesis with air in the intrahepatic and intrahepatic biliary tree, the gallbladder remains distended.  Findings are concerning for obstruction of the cystic duct.  Moderate bilateral lower lobe atelectasis/consolidation.  Negative for acute process otherwise.  Changes to the bowel that indicate a near-total/total colectomy.  Fluid in the proximal small bowel loops, most likely early ileus related to the inflammatory changes.    October 14: Endoscopic ultrasound noted stricture in the proximal and mid common bile duct with no mass seen.  Portal vein was endoscopic graphically normal.  Celiac trunk appeared normal.  Normal lymph nodes visualized.  No specimens collected.  -ERCP noted PSC with dominant stricture at the proximal to mid common bile duct with cholangioscopy performed that showed a low vascularity stricture with non malignant appearance with biopsies and brushings obtained.  7 Algerian, 7 cm plastic biliary stent placed in the CBD.5 Algerian, 3 cm stent placed into the ventral pancreatic duct.    August 22:  , ALT 1137, GGT 1640    Objective      Vitals:  Temperature 98°, pulse 95, respirations 18, blood pressure 131/84, oxygen saturation 96% on room air  Allergies: Review of patient's allergies indicates:  No Known Allergies   NPO: No    Instructions    Admit to Hospital Medicine    Upon patient arrival to floor, please contact Hospital Medicine on call.     HENRI Aguayo MD  Hospital Medicine Staff  Cell: [phone]

## 2022-10-16 NOTE — ASSESSMENT & PLAN NOTE
This patient does have evidence of infective focus  My overall impression is sepsis. Vital signs were reviewed and noted in progress note.  Antibiotics given-   Antibiotics (From admission, onward)    Start     Stop Route Frequency Ordered    10/16/22 1815  piperacillin-tazobactam 4.5 g in dextrose 5 % 100 mL IVPB (ready to mix system)         -- IV Every 8 hours (non-standard times) 10/16/22 1710        Cultures were taken-   Microbiology Results (last 7 days)     Procedure Component Value Units Date/Time    Blood culture [[id number]]     Order Status: Sent Specimen: Blood     Blood culture [[id number]]     Order Status: Sent Specimen: Blood         Latest lactate reviewed, they are-  No results for input(s): LACTATE in the last 72 hours.    Organ dysfunction indicated by none  Source- Intra abdominal    Source control Achieved by- antibiotics

## 2022-10-16 NOTE — HPI
52-year-old male with a history of hypertension, hyperlipidemia, ulcerative colitis with fissure, and recent ERCP/EUS (October 14) presented to [facility] with abdominal pain on October 16.  He noted achy abdominal pain worse in the upper abdomen along with nausea without vomiting.  No fever noted.  He has been taking his postprocedure Augmentin, and he has been taking Protonix.  He did report several months of ongoing weight loss and jaundice.  In the emergency department he had abdominal tenderness and mild distention.  No vomiting and no evidence of altered mentation or respiratory compromise.  He received Zosyn, 1 L normal saline, 1 L LR, Zofran, and Dilaudid.  Pt was transferred from outside facility for further evaluation by Gastroenterology/Advanced Endoscopy evaluation.    Sodium 131, potassium 3.3, chloride 93, CO2 31, BUN 10, creatinine 0.92, glucose 125, total bilirubin 4.5, AST 56, , phosphorus 2.8, amylase 265, lipase 673, magnesium 1.7, CRP greater than 10.5, INR 1.1, white blood cells 28.12, hemoglobin 14, hematocrit 41, platelets 522, lactic acid 1.7  Urinalysis with 2+ protein, trace ketone, trace blood, 3+ bilirubin, positive nitrite, few bacteria, 0-5 white blood cell, 0-5 red blood cell    CT imaging notes recent placement of biliary/pancreatic duct stent (but it is most likely the pancreatic duct stent), the stent appears displaced and in a loop of small bowel.  No biliary prosthesis appears to be in position.  Marked inflammatory changes surrounding the pancreas and extending into the root of the mesentery.  Multiple air collections in and amongst these marked inflammatory changes.  These could be iatrogenic, introduced during stent placement, versus related to an anaerobic infection, versus related to a bowel perforation (otherwise feel this is much less likely).  Clinical correlation is advised.  Associated mild ascites without definite focal drainable fluid  collection seen at this time.  Periportal edema noted as well.  Despite placement of the biliary prosthesis with air in the intrahepatic and intrahepatic biliary tree, the gallbladder remains distended.  Findings are concerning for obstruction of the cystic duct.  Moderate bilateral lower lobe atelectasis/consolidation.  Negative for acute process otherwise.  Changes to the bowel that indicate a near-total/total colectomy.  Fluid in the proximal small bowel loops, most likely early ileus related to the inflammatory changes.    As clarification, on 10/16/2022 patient should be admitted for hospital observation services under my care in collaboration with Sidney Diaz, *

## 2022-10-16 NOTE — H&P
[facility] Medicine  History & Physical    Patient Name: Guevara Osullivan  MRN: [id number]  Patient Class: OP- Observation  Admission Date: 10/16/2022  Attending Physician: Sidney Diaz, *   Primary Care Provider: Dima Ch MD         Patient information was obtained from patient, past medical records and ER records.     Subjective:     Principal Problem:Post-ERCP acute pancreatitis    Chief Complaint: No chief complaint on file.       HPI: 52-year-old male with a history of hypertension, hyperlipidemia, ulcerative colitis with fissure, and recent ERCP/EUS (October 14) presented to [facility] with abdominal pain on October 16.  He noted achy abdominal pain worse in the upper abdomen along with nausea without vomiting.  No fever noted.  He has been taking his postprocedure Augmentin, and he has been taking Protonix.  He did report several months of ongoing weight loss and jaundice.  In the emergency department he had abdominal tenderness and mild distention.  No vomiting and no evidence of altered mentation or respiratory compromise.  He received Zosyn, 1 L normal saline, 1 L LR, Zofran, and Dilaudid.  Pt was transferred from outside facility for further evaluation by Gastroenterology/Advanced Endoscopy evaluation.    Sodium 131, potassium 3.3, chloride 93, CO2 31, BUN 10, creatinine 0.92, glucose 125, total bilirubin 4.5, AST 56, , phosphorus 2.8, amylase 265, lipase 673, magnesium 1.7, CRP greater than 10.5, INR 1.1, white blood cells 28.12, hemoglobin 14, hematocrit 41, platelets 522, lactic acid 1.7  Urinalysis with 2+ protein, trace ketone, trace blood, 3+ bilirubin, positive nitrite, few bacteria, 0-5 white blood cell, 0-5 red blood cell    CT imaging notes recent placement of biliary/pancreatic duct stent (but it is most likely the pancreatic duct stent), the stent appears displaced and in a loop of small bowel.  No biliary prosthesis  appears to be in position.  Marked inflammatory changes surrounding the pancreas and extending into the root of the mesentery.  Multiple air collections in and amongst these marked inflammatory changes.  These could be iatrogenic, introduced during stent placement, versus related to an anaerobic infection, versus related to a bowel perforation (otherwise feel this is much less likely).  Clinical correlation is advised.  Associated mild ascites without definite focal drainable fluid collection seen at this time.  Periportal edema noted as well.  Despite placement of the biliary prosthesis with air in the intrahepatic and intrahepatic biliary tree, the gallbladder remains distended.  Findings are concerning for obstruction of the cystic duct.  Moderate bilateral lower lobe atelectasis/consolidation.  Negative for acute process otherwise.  Changes to the bowel that indicate a near-total/total colectomy.  Fluid in the proximal small bowel loops, most likely early ileus related to the inflammatory changes.    As clarification, on 10/16/2022 patient should be admitted for hospital observation services under my care in collaboration with Sidney Diaz, *        Past Medical History:   Diagnosis Date    Cancer     Digestive disorder     Hypertension     Liver disease        Past Surgical History:   Procedure Laterality Date    COLON SURGERY      ENDOSCOPIC ULTRASOUND OF UPPER GASTROINTESTINAL TRACT N/A 10/14/2022    Procedure: ULTRASOUND, UPPER GI TRACT,;  Surgeon: Vince Teran MD;  Location: [facility];  Service: Endoscopy;  Laterality: N/A;  upper and linear    ERCP N/A 10/14/2022    Procedure: ERCP (ENDOSCOPIC RETROGRADE CHOLANGIOPANCREATOGRAPHY) with spyglass;  Surgeon: Vince Teran MD;  Location: [facility];  Service: Endoscopy;  Laterality: N/A;    POUCHOSCOPY         Review of patient's allergies indicates:  No Known Allergies    No current facility-administered medications on  file prior to encounter.     Current Outpatient Medications on File Prior to Encounter   Medication Sig    amoxicillin-clavulanate 875-125mg (AUGMENTIN) 875-125 mg per tablet Take 1 tablet by mouth every 12 (twelve) hours. for 5 days    HYDROcodone-acetaminophen (NORCO) 7.5-325 mg per tablet Take 1 tablet by mouth every 24 hours as needed for Pain.    ondansetron (ZOFRAN-ODT) 4 MG TbDL Take 2 tablets (8 mg total) by mouth every 6 (six) hours as needed (nausea).    pantoprazole (PROTONIX) 40 MG tablet Take 40 mg by mouth once daily.    sucralfate (CARAFATE) 1 gram tablet Take 1 g by mouth 2 (two) times daily.     Family History    None       Tobacco Use    Smoking status: Never    Smokeless tobacco: Never   Substance and Sexual Activity    Alcohol use: Not Currently    Drug use: Never    Sexual activity: Not on file     Review of Systems   Constitutional:  Positive for chills, fatigue and fever.   HENT: Negative.     Respiratory:  Negative for cough and shortness of breath.    Cardiovascular:  Negative for chest pain.   Gastrointestinal:  Positive for abdominal pain and nausea. Negative for diarrhea and vomiting.   Genitourinary: Negative.    All other systems reviewed and are negative.  Objective:     Vital Signs (Most Recent):  Temp: (!) 100.5 °F (38.1 °C) (10/16/22 1701)  Pulse: 99 (10/16/22 1701)  Resp: 18 (10/16/22 1740)  BP: 133/76 (10/16/22 1701)  SpO2: 99 % (10/16/22 1701)   Vital Signs (24h Range):  Temp:  [98 °F (36.7 °C)-100.5 °F (38.1 °C)] 100.5 °F (38.1 °C)  Pulse:  [95-99] 99  Resp:  [18] 18  SpO2:  [96 %-99 %] 99 %  BP: (131-133)/(76-84) 133/76        There is no height or weight on file to calculate BMI.    Physical Exam  Vitals reviewed.   Constitutional:       Appearance: He is well-developed. He is ill-appearing.   HENT:      Head: Normocephalic and atraumatic.      Nose: Nose normal.   Eyes:      General: No scleral icterus.     Conjunctiva/sclera: Conjunctivae normal.   Cardiovascular:       Rate and Rhythm: Normal rate and regular rhythm.      Heart sounds: Normal heart sounds. No murmur heard.    No friction rub. No gallop.   Pulmonary:      Effort: Pulmonary effort is normal.      Breath sounds: Normal breath sounds.   Abdominal:      General: Bowel sounds are normal. There is distension.      Palpations: Abdomen is soft.      Tenderness: There is abdominal tenderness in the right upper quadrant.   Musculoskeletal:         General: No tenderness. Normal range of motion.      Cervical back: Normal range of motion and neck supple.   Skin:     General: Skin is warm and dry.   Neurological:      Mental Status: He is alert and oriented to person, place, and time.   Psychiatric:         Behavior: Behavior normal.           Significant Labs: All pertinent labs within the past 24 hours have been reviewed.  CBC: No results for input(s): WBC, HGB, HCT, PLT in the last 48 hours.  CMP: No results for input(s): NA, K, CL, CO2, GLU, BUN, CREATININE, CALCIUM, PROT, ALBUMIN, BILITOT, ALKPHOS, AST, ALT, ANIONGAP, EGFRNONAA in the last 48 hours.    Invalid input(s): ESTGF[place]    Significant Imaging: I have reviewed all pertinent imaging results/findings within the past 24 hours.    Assessment/Plan:     Sepsis  This patient does have evidence of infective focus  My overall impression is sepsis. Vital signs were reviewed and noted in progress note.  Antibiotics given-   Antibiotics (From admission, onward)    Start     Stop Route Frequency Ordered    10/16/22 1815  piperacillin-tazobactam 4.5 g in dextrose 5 % 100 mL IVPB (ready to mix system)         -- IV Every 8 hours (non-standard times) 10/16/22 1710        Cultures were taken-   Microbiology Results (last 7 days)     Procedure Component Value Units Date/Time    Blood culture [[id number]]     Order Status: Sent Specimen: Blood     Blood culture [[id number]]     Order Status: Sent Specimen: Blood         Latest lactate reviewed, they are-  No results for  input(s): LACTATE in the last 72 hours.    Organ dysfunction indicated by none  Source- Intra abdominal    Source control Achieved by- antibiotics      Primary sclerosing cholangitis  Was seen by Dr. Dhillon on 10/14 and pancreatic duct stent was placed      Primary hypertension  Hydralazine prn      Post-ERCP acute pancreatitis  Pancreatitis vs cholangitis  NPO  Aggressive IV fluids  Prn analgesia and antiemetics  GI consult for possible ERCP      VTE Risk Mitigation (From admission, onward)         Ordered     Reason for No Pharmacological VTE Prophylaxis  Once        Question:  Reasons:  Answer:  Risk of Bleeding    10/16/22 1710     IP VTE HIGH RISK PATIENT  Once         10/16/22 1710     Place sequential compression device  Until discontinued         10/16/22 1710                   Maeve Parish NP  Department of Hospital Medicine   [facility] ([facility])

## 2022-10-16 NOTE — ASSESSMENT & PLAN NOTE
Pancreatitis vs cholangitis  NPO  Aggressive IV fluids  Prn analgesia and antiemetics  GI consult for possible ERCP

## 2022-10-16 NOTE — PLAN OF CARE
POC reviewed with pt. Pt verbalizes understanding of POC. No questions at this time.  AAOx3. NADN.  NSR on cardiac monitor.  Pt remains free of falls tonight.  No complaints at this time.  Safety measures in place. Will continue to monitor.  Informed pt to call for assistance before getting up. Pt verbalizes understanding.

## 2022-10-16 NOTE — SUBJECTIVE & OBJECTIVE
Past Medical History:   Diagnosis Date    Cancer     Digestive disorder     Hypertension     Liver disease        Past Surgical History:   Procedure Laterality Date    COLON SURGERY      ENDOSCOPIC ULTRASOUND OF UPPER GASTROINTESTINAL TRACT N/A 10/14/2022    Procedure: ULTRASOUND, UPPER GI TRACT,;  Surgeon: Vince Teran MD;  Location: [facility];  Service: Endoscopy;  Laterality: N/A;  upper and linear    ERCP N/A 10/14/2022    Procedure: ERCP (ENDOSCOPIC RETROGRADE CHOLANGIOPANCREATOGRAPHY) with spyglass;  Surgeon: Vince Teran MD;  Location: [facility];  Service: Endoscopy;  Laterality: N/A;    POUCHOSCOPY         Review of patient's allergies indicates:  No Known Allergies    No current facility-administered medications on file prior to encounter.     Current Outpatient Medications on File Prior to Encounter   Medication Sig    amoxicillin-clavulanate 875-125mg (AUGMENTIN) 875-125 mg per tablet Take 1 tablet by mouth every 12 (twelve) hours. for 5 days    HYDROcodone-acetaminophen (NORCO) 7.5-325 mg per tablet Take 1 tablet by mouth every 24 hours as needed for Pain.    ondansetron (ZOFRAN-ODT) 4 MG TbDL Take 2 tablets (8 mg total) by mouth every 6 (six) hours as needed (nausea).    pantoprazole (PROTONIX) 40 MG tablet Take 40 mg by mouth once daily.    sucralfate (CARAFATE) 1 gram tablet Take 1 g by mouth 2 (two) times daily.     Family History    None       Tobacco Use    Smoking status: Never    Smokeless tobacco: Never   Substance and Sexual Activity    Alcohol use: Not Currently    Drug use: Never    Sexual activity: Not on file     Review of Systems   Constitutional:  Positive for chills, fatigue and fever.   HENT: Negative.     Respiratory:  Negative for cough and shortness of breath.    Cardiovascular:  Negative for chest pain.   Gastrointestinal:  Positive for abdominal pain and nausea. Negative for diarrhea and vomiting.   Genitourinary: Negative.    All other systems reviewed  and are negative.  Objective:     Vital Signs (Most Recent):  Temp: (!) 100.5 °F (38.1 °C) (10/16/22 1701)  Pulse: 99 (10/16/22 1701)  Resp: 18 (10/16/22 1740)  BP: 133/76 (10/16/22 1701)  SpO2: 99 % (10/16/22 1701)   Vital Signs (24h Range):  Temp:  [98 °F (36.7 °C)-100.5 °F (38.1 °C)] 100.5 °F (38.1 °C)  Pulse:  [95-99] 99  Resp:  [18] 18  SpO2:  [96 %-99 %] 99 %  BP: (131-133)/(76-84) 133/76        There is no height or weight on file to calculate BMI.    Physical Exam  Vitals reviewed.   Constitutional:       Appearance: He is well-developed. He is ill-appearing.   HENT:      Head: Normocephalic and atraumatic.      Nose: Nose normal.   Eyes:      General: No scleral icterus.     Conjunctiva/sclera: Conjunctivae normal.   Cardiovascular:      Rate and Rhythm: Normal rate and regular rhythm.      Heart sounds: Normal heart sounds. No murmur heard.    No friction rub. No gallop.   Pulmonary:      Effort: Pulmonary effort is normal.      Breath sounds: Normal breath sounds.   Abdominal:      General: Bowel sounds are normal. There is distension.      Palpations: Abdomen is soft.      Tenderness: There is abdominal tenderness in the right upper quadrant.   Musculoskeletal:         General: No tenderness. Normal range of motion.      Cervical back: Normal range of motion and neck supple.   Skin:     General: Skin is warm and dry.   Neurological:      Mental Status: He is alert and oriented to person, place, and time.   Psychiatric:         Behavior: Behavior normal.           Significant Labs: All pertinent labs within the past 24 hours have been reviewed.  CBC: No results for input(s): WBC, HGB, HCT, PLT in the last 48 hours.  CMP: No results for input(s): NA, K, CL, CO2, GLU, BUN, CREATININE, CALCIUM, PROT, ALBUMIN, BILITOT, ALKPHOS, AST, ALT, ANIONGAP, EGFRNONAA in the last 48 hours.    Invalid input(s): ESTGF[place]    Significant Imaging: I have reviewed all pertinent imaging results/findings within the past 24  hours.

## 2022-10-17 NOTE — CONSULTS
[facility] ([facility])  Gastroenterology  Consult Note    Patient Name: Guevara Osullivan  MRN: [id number]  Admission Date: 10/16/2022  Hospital Length of Stay: 0 days  Code Status: Full Code   Attending Provider: Sidney Diaz, *   Consulting Provider: Reynaldo Karimi PA-C  Primary Care Physician: Dima Ch MD  Principal Problem:Post-ERCP acute pancreatitis    Inpatient consult to Gastroenterology  Consult performed by: Reynaldo Karimi PA-C  Consult ordered by: Maeve Parish NP  Reason for consult: Post-ERCP pancreatitis        Subjective:     HPI:  The patient has a history of PSC and elevated LFTs. He underwent EUS/ERCP 10/14/22 which showed findings consistent with PSC. Stent was placed in the CBD and ventral PD. He was given a dose of Zosyn following and prescribed five days of Augmentin. He developed pain after his procedure with gradual worsening. Denies nausea or vomiting but had fever. He presented to an outside ER: Sodium 131, potassium 3.3, chloride 93, CO2 31, BUN 10, creatinine 0.92, glucose 125, total bilirubin 4.5, AST 56, , phosphorus 2.8, amylase 265, lipase 673, magnesium 1.7, CRP greater than 10.5, INR 1.1, white blood cells 28.12, hemoglobin 14, hematocrit 41, platelets 522, lactic acid 1.7. Urinalysis with 2+ protein, trace ketone, trace blood, 3+ bilirubin, positive nitrite, few bacteria, 0-5 white blood cell, 0-5 red blood cell.   CT of the abdomen and pelvis with IV contrast noted that in this patient who by history had recent placement of biliary/pancreatic duct stent (but it is most likely the pancreatic duct stent), the stent appears displaced and in a loop of small bowel.  No biliary prosthesis appears to be in position.  Marked inflammatory changes surrounding the pancreas and extending into the root of the mesentery.  Multiple air collections in and amongst these marked inflammatory changes.  These could be iatrogenic, introduced during stent  placement, versus related to an anaerobic infection, versus related to a bowel perforation (otherwise feel this is much less likely).  Clinical correlation is advised.  Associated mild ascites without definite focal drainable fluid collection seen at this time.  Periportal edema noted as well.  Despite placement of the biliary prosthesis with air in the intrahepatic and intrahepatic biliary tree, the gallbladder remains distended.  Findings are concerning for obstruction of the cystic duct.  Moderate bilateral lower lobe atelectasis/consolidation. Negative for acute process otherwise.  Changes to the bowel that indicate a near-total/total colectomy.  Fluid in the proximal small bowel loops, most likely early ileus related to the inflammatory changes.  WBC count, Lipase and T. Bili trending down.       Past Medical History:   Diagnosis Date    Cancer     Digestive disorder     Hypertension     Liver disease        Past Surgical History:   Procedure Laterality Date    COLON SURGERY      ENDOSCOPIC ULTRASOUND OF UPPER GASTROINTESTINAL TRACT N/A 10/14/2022    Procedure: ULTRASOUND, UPPER GI TRACT,;  Surgeon: Vince Teran MD;  Location: [facility];  Service: Endoscopy;  Laterality: N/A;  upper and linear    ERCP N/A 10/14/2022    Procedure: ERCP (ENDOSCOPIC RETROGRADE CHOLANGIOPANCREATOGRAPHY) with spyglass;  Surgeon: Vince Teran MD;  Location: [facility];  Service: Endoscopy;  Laterality: N/A;    POUCHOSCOPY         Review of patient's allergies indicates:  No Known Allergies  Family History    None       Tobacco Use    Smoking status: Never    Smokeless tobacco: Never   Substance and Sexual Activity    Alcohol use: Not Currently    Drug use: Never    Sexual activity: Not on file     Review of Systems   Constitutional:  Positive for fever.   HENT:  Negative for hearing loss.    Eyes:  Negative for visual disturbance.   Respiratory:  Negative for cough and shortness of breath.     Cardiovascular:  Negative for chest pain and palpitations.   Gastrointestinal:         As per HPI.   Genitourinary:  Negative for difficulty urinating, dysuria, frequency and hematuria.   Musculoskeletal:  Negative for arthralgias and back pain.   Skin:  Negative for color change and rash.   Neurological:  Negative for seizures, syncope, weakness, numbness and headaches.   Hematological:  Does not bruise/bleed easily.   Psychiatric/Behavioral:  The patient is not nervous/anxious.    Objective:     Vital Signs (Most Recent):  Temp: 99.4 °F (37.4 °C) (10/17/22 0719)  Pulse: 95 (10/17/22 0719)  Resp: 18 (10/17/22 0719)  BP: 119/75 (10/17/22 0719)  SpO2: 95 % (10/17/22 0719) Vital Signs (24h Range):  Temp:  [98 °F (36.7 °C)-101.1 °F (38.4 °C)] 99.4 °F (37.4 °C)  Pulse:  [] 95  Resp:  [17-20] 18  SpO2:  [94 %-99 %] 95 %  BP: (119-133)/(74-84) 119/75        There is no height or weight on file to calculate BMI.      Intake/Output Summary (Last 24 hours) at 10/17/2022 0808  Last data filed at 10/17/2022 0438  Gross per 24 hour   Intake --   Output 300 ml   Net -300 ml       Lines/Drains/Airways       None                   Physical Exam  Constitutional:       Appearance: He is well-developed. He is not ill-appearing.   HENT:      Head: Normocephalic and atraumatic.   Eyes:      Extraocular Movements: Extraocular movements intact.   Cardiovascular:      Rate and Rhythm: Normal rate and regular rhythm.      Heart sounds: Normal heart sounds. No murmur heard.  Pulmonary:      Effort: Pulmonary effort is normal. No respiratory distress.      Breath sounds: Normal breath sounds. No wheezing.   Abdominal:      General: Bowel sounds are normal. There is distension.      Palpations: Abdomen is soft. There is hepatomegaly.      Tenderness: There is abdominal tenderness (moderate, worse across the upper abdomen). There is no guarding or rebound.   Musculoskeletal:      Cervical back: Normal range of motion and neck supple.       Right lower leg: No edema.      Left lower leg: No edema.   Skin:     General: Skin is warm and dry.      Findings: No rash.   Neurological:      Mental Status: He is alert and oriented to person, place, and time.      Cranial Nerves: No cranial nerve deficit.   Psychiatric:         Behavior: Behavior normal.       Significant Labs:  CBC:   Recent Labs   Lab 10/17/22  0429   WBC 19.36*   HGB 10.6*   HCT 32.2*        CMP:   Recent Labs   Lab 10/17/22  0429   GLU 80   CALCIUM 8.7   ALBUMIN 2.2*   PROT 5.9*      K 3.2*   CO2 28   CL 99   BUN 9   CREATININE 0.7   ALKPHOS 190*   ALT 54*   AST 44*   BILITOT 3.6*     Coagulation: No results for input(s): PT, INR, APTT in the last 48 hours.  Lipase:   Recent Labs   Lab 10/17/22  0429   LIPASE 34       Significant Imaging:  Imaging results within the past 24 hours have been reviewed.    Assessment/Plan:     * Post-ERCP acute pancreatitis  Continued bowel rest and IV fluids.   Pain meds and antiemetics PRN.   CT reports PD stent moved. Will get KUB to check placement of CBD stent. Will also check for free air.   LFTs and WBC count trending down. Continue to monitor.   Continue antibiotics.     Primary sclerosing cholangitis  See plan above.         Thank you for your consult. I will follow-up with patient. Please contact us if you have any additional questions.    Reynaldo Karimi PA-C  Gastroenterology  [facility] ([facility])

## 2022-10-17 NOTE — HOSPITAL COURSE
10/17: Bili: 3.5, GI evaluating CBD stent. Continues on NPO, WBC: 19.36, afebrile. Awaiting further recommendations from GI. Vitals stable. Pain improved.    10/18: Bilirubin is trending down. Lipase normal. Potassium replaced. Pt experiencing less abdominal pain and denies further nausea. Dr. Teran spoke with patient about proper placement of pancreatic duct stent. Pt tolerated a clear liquid and will try a soft diet. Possible discharge today as patient is very anxious about discharge due to worry of recurrence of symptoms.     10/19/22 - Pt was tolerating oral intake without increased abdominal pain and nausea. He verbalized desire for discharge. Pt was prescribed oxycodone and Augmentin to complete antibiotic course. Pt was reminded to follow up with his Gastroenterology and Dr. Serra, Hepatology. Also, pt to have stent removal in December by Dr. Dhillon. Pt was seen and examined and determined to be safe and stable for discharge.

## 2022-10-17 NOTE — PLAN OF CARE
[facility] - Telemetry (Hospital)  Initial Discharge Assessment       Primary Care Provider: Dima Ch MD    Admission Diagnosis: Abdominal pain [R10.9]    Admission Date: 10/16/2022  Expected Discharge Date:     Discharge Barriers Identified: None    Payor: [employer] / Plan: [facility] CHOICE PLUS / Product Type: Commercial /     Extended Emergency Contact Information  Primary Emergency Contact: Kylah Méndez  Mobile Phone: [phone]  Relation: Significant other  Preferred language: English   needed? No    Discharge Plan A: Home with family         [id number] - [facility] [place] - [address]  [address]  [place] [address]  Phone: [phone] Fax: [phone]      Initial Assessment (most recent)       Adult Discharge Assessment - 10/17/22 1334          Discharge Assessment    Assessment Type Discharge Planning Assessment     Confirmed/corrected address, phone number and insurance Yes     Confirmed Demographics Correct on Facesheet     Source of Information patient     Communicated APRIL with patient/caregiver Date not available/Unable to determine     Reason For Admission pancreatitis     Lives With significant other     Facility Arrived From: home     Do you expect to return to your current living situation? Yes     Do you have help at home or someone to help you manage your care at home? Yes     Prior to hospitilization cognitive status: Alert/Oriented     Current cognitive status: Alert/Oriented     Walking or Climbing Stairs Difficulty none     Dressing/Bathing Difficulty none     Equipment Currently Used at Home none     Readmission within 30 days? No     Patient currently being followed by outpatient case management? No     Do you currently have service(s) that help you manage your care at home? No     Do you take prescription medications? Yes     Do you have prescription coverage? Yes     Do you have any problems affording any of your  prescribed medications? No     Is the patient taking medications as prescribed? yes     Who is going to help you get home at discharge? significant other     How do you get to doctors appointments? car, drives self     Are you on dialysis? No     Discharge Plan A Home with family     DME Needed Upon Discharge  none     Discharge Plan discussed with: Patient     Discharge Barriers Identified None                   Anticipated DC Dispo: home with significant other  Prior Level of Function: independent, drives self and denies use of DME.  PCP: Dr. Ch  Comments:  CM met with patient at bedside to introduce role and discuss d/c planning. Patient is independent, no identified CM needs at this time. Will continue to follow.

## 2022-10-17 NOTE — PROGRESS NOTES
[facility] Hospital Medicine  Progress Note    Patient Name: Guevara Osullivan  MRN: [id number]  Patient Class: IP- Inpatient   Admission Date: 10/16/2022  Length of Stay: 0 days  Attending Physician: Sidney Diaz, *  Primary Care Provider: Dima Ch MD        Subjective:     Principal Problem:Post-ERCP acute pancreatitis        HPI:  52-year-old male with a history of hypertension, hyperlipidemia, ulcerative colitis with fissure, and recent ERCP/EUS (October 14) presented to [facility] with abdominal pain on October 16.  He noted achy abdominal pain worse in the upper abdomen along with nausea without vomiting.  No fever noted.  He has been taking his postprocedure Augmentin, and he has been taking Protonix.  He did report several months of ongoing weight loss and jaundice.  In the emergency department he had abdominal tenderness and mild distention.  No vomiting and no evidence of altered mentation or respiratory compromise.  He received Zosyn, 1 L normal saline, 1 L LR, Zofran, and Dilaudid.  Pt was transferred from outside facility for further evaluation by Gastroenterology/Advanced Endoscopy evaluation.    Sodium 131, potassium 3.3, chloride 93, CO2 31, BUN 10, creatinine 0.92, glucose 125, total bilirubin 4.5, AST 56, , phosphorus 2.8, amylase 265, lipase 673, magnesium 1.7, CRP greater than 10.5, INR 1.1, white blood cells 28.12, hemoglobin 14, hematocrit 41, platelets 522, lactic acid 1.7  Urinalysis with 2+ protein, trace ketone, trace blood, 3+ bilirubin, positive nitrite, few bacteria, 0-5 white blood cell, 0-5 red blood cell    CT imaging notes recent placement of biliary/pancreatic duct stent (but it is most likely the pancreatic duct stent), the stent appears displaced and in a loop of small bowel.  No biliary prosthesis appears to be in position.  Marked inflammatory changes surrounding the pancreas and extending into the root of the  mesentery.  Multiple air collections in and amongst these marked inflammatory changes.  These could be iatrogenic, introduced during stent placement, versus related to an anaerobic infection, versus related to a bowel perforation (otherwise feel this is much less likely).  Clinical correlation is advised.  Associated mild ascites without definite focal drainable fluid collection seen at this time.  Periportal edema noted as well.  Despite placement of the biliary prosthesis with air in the intrahepatic and intrahepatic biliary tree, the gallbladder remains distended.  Findings are concerning for obstruction of the cystic duct.  Moderate bilateral lower lobe atelectasis/consolidation.  Negative for acute process otherwise.  Changes to the bowel that indicate a near-total/total colectomy.  Fluid in the proximal small bowel loops, most likely early ileus related to the inflammatory changes.    As clarification, on 10/16/2022 patient should be admitted for hospital observation services under my care in collaboration with Sidney Diaz, *        Overview/Hospital Course:  10/17: Bili: 3.5, GI evaluating CBD stent. Continues on NPO, WBC: 19.36, afebrile. Awaiting further recommendations from GI. Vitals stable. Pain improved.        Interval History: Awaiting further recommendations from GI, evaluating CBD stent. Pain controlled, N/V improved. Bili: 3.5    Review of Systems   Constitutional:  Positive for activity change, appetite change and fatigue. Negative for chills, diaphoresis, fever and unexpected weight change.   HENT:  Negative for congestion, hearing loss, nosebleeds, postnasal drip, rhinorrhea and trouble swallowing.    Eyes:  Negative for discharge and visual disturbance.   Respiratory:  Negative for cough, chest tightness and shortness of breath.    Cardiovascular:  Negative for chest pain, palpitations and leg swelling.   Gastrointestinal:  Positive for abdominal distention and abdominal pain.  Negative for constipation, diarrhea, nausea and vomiting.   Endocrine: Negative for cold intolerance and heat intolerance.   Genitourinary:  Negative for difficulty urinating, dysuria, frequency and hematuria.   Musculoskeletal:  Positive for arthralgias and back pain. Negative for gait problem and myalgias.   Skin: Negative.    Neurological:  Negative for dizziness, weakness, light-headedness and headaches.   Hematological:  Negative for adenopathy. Does not bruise/bleed easily.   Psychiatric/Behavioral:  Negative for agitation, behavioral problems and confusion. The patient is not nervous/anxious.    Objective:     Vital Signs (Most Recent):  Temp: 99.4 °F (37.4 °C) (10/17/22 1550)  Pulse: 99 (10/17/22 1550)  Resp: 18 (10/17/22 1550)  BP: 135/84 (10/17/22 1550)  SpO2: 95 % (10/17/22 1550) Vital Signs (24h Range):  Temp:  [98.8 °F (37.1 °C)-101.1 °F (38.4 °C)] 99.4 °F (37.4 °C)  Pulse:  [] 99  Resp:  [16-20] 18  SpO2:  [94 %-99 %] 95 %  BP: (119-136)/(74-84) 135/84     Weight: 96 kg (211 lb 10.3 oz)  Body mass index is 28.7 kg/m².    Intake/Output Summary (Last 24 hours) at 10/17/2022 1659  Last data filed at 10/17/2022 1227  Gross per 24 hour   Intake 517.26 ml   Output 300 ml   Net 217.26 ml      Physical Exam  Vitals and nursing note reviewed.   Constitutional:       General: He is not in acute distress.     Appearance: He is well-developed. He is not diaphoretic.   HENT:      Head: Normocephalic and atraumatic.      Right Ear: Hearing and external ear normal.      Left Ear: Hearing and external ear normal.      Nose: Nose normal. No mucosal edema or rhinorrhea.      Mouth/Throat:      Pharynx: Uvula midline.   Eyes:      General:         Right eye: No discharge.         Left eye: No discharge.      Conjunctiva/sclera: Conjunctivae normal.      Right eye: No chemosis.     Left eye: No chemosis.     Pupils: Pupils are equal, round, and reactive to light.   Neck:      Thyroid: No thyroid mass or thyromegaly.       Trachea: Trachea normal.   Cardiovascular:      Rate and Rhythm: Normal rate and regular rhythm.      Pulses:           Dorsalis pedis pulses are 2+ on the right side and 2+ on the left side.      Heart sounds: Normal heart sounds. No murmur heard.  Pulmonary:      Effort: Pulmonary effort is normal. No respiratory distress.      Breath sounds: Normal breath sounds. No decreased breath sounds or wheezing.   Abdominal:      General: Bowel sounds are normal. There is no distension.      Palpations: Abdomen is soft.      Tenderness: There is generalized abdominal tenderness.   Musculoskeletal:         General: Normal range of motion.      Cervical back: Normal range of motion and neck supple.   Lymphadenopathy:      Cervical: No cervical adenopathy.      Upper Body:      Right upper body: No supraclavicular adenopathy.      Left upper body: No supraclavicular adenopathy.   Skin:     General: Skin is warm and dry.      Capillary Refill: Capillary refill takes less than 2 seconds.      Findings: No rash.   Neurological:      Mental Status: He is alert and oriented to person, place, and time.   Psychiatric:         Mood and Affect: Mood is not anxious.         Speech: Speech normal.         Behavior: Behavior normal.         Thought Content: Thought content normal.         Judgment: Judgment normal.       Significant Labs: All pertinent labs within the past 24 hours have been reviewed.  CBC:   Recent Labs   Lab 10/17/22  0429   WBC 19.36*   HGB 10.6*   HCT 32.2*        CMP:   Recent Labs   Lab 10/17/22  0429      K 3.2*   CL 99   CO2 28   GLU 80   BUN 9   CREATININE 0.7   CALCIUM 8.7   PROT 5.9*   ALBUMIN 2.2*   BILITOT 3.6*   ALKPHOS 190*   AST 44*   ALT 54*   ANIONGAP 10       Significant Imaging: I have reviewed all pertinent imaging results/findings within the past 24 hours.      Assessment/Plan:      * Post-ERCP acute pancreatitis  Pancreatitis vs cholangitis  NPO  Aggressive IV fluids  Prn  analgesia and antiemetics  GI consult for possible ERCP- evaluating    Sepsis  This patient does have evidence of infective focus  My overall impression is sepsis. Vital signs were reviewed and noted in progress note.  Antibiotics given-   Antibiotics (From admission, onward)    Start     Stop Route Frequency Ordered    10/16/22 1815  piperacillin-tazobactam 4.5 g in dextrose 5 % 100 mL IVPB (ready to mix system)         -- IV Every 8 hours (non-standard times) 10/16/22 1710        Cultures were taken-   Microbiology Results (last 7 days)     Procedure Component Value Units Date/Time    Blood culture [[id number]] Collected: 10/16/22 1849    Order Status: Completed Specimen: Blood from Antecubital, Right Arm Updated: 10/17/22 0915     Blood Culture, Routine No Growth to date    Blood culture [[id number]] Collected: 10/16/22 1848    Order Status: Completed Specimen: Blood from Antecubital, Left Arm Updated: 10/17/22 0915     Blood Culture, Routine No Growth to date        Latest lactate reviewed, they are-  No results for input(s): LACTATE in the last 72 hours.    Organ dysfunction indicated by none  Source- Intra abdominal    Source control Achieved by- antibiotics      Primary sclerosing cholangitis  Was seen by Dr. Dhillon on 10/14 and pancreatic duct stent was placed      Primary hypertension  Hydralazine prn        VTE Risk Mitigation (From admission, onward)         Ordered     Reason for No Pharmacological VTE Prophylaxis  Once        Question:  Reasons:  Answer:  Risk of Bleeding    10/16/22 1710     IP VTE HIGH RISK PATIENT  Once         10/16/22 1710     Place sequential compression device  Until discontinued         10/16/22 1710                Discharge Planning   APRIL:      Code Status: Full Code   Is the patient medically ready for discharge?:     Reason for patient still in hospital (select all that apply): Patient trending condition  Discharge Plan A: Home with family                  Shahida Naik  NP  Department of Hospital Medicine   [facility] - Telemetry ([facility])

## 2022-10-17 NOTE — SUBJECTIVE & OBJECTIVE
Interval History: Awaiting further recommendations from GI, evaluating CBD stent. Pain controlled, N/V improved. Bili: 3.5    Review of Systems   Constitutional:  Positive for activity change, appetite change and fatigue. Negative for chills, diaphoresis, fever and unexpected weight change.   HENT:  Negative for congestion, hearing loss, nosebleeds, postnasal drip, rhinorrhea and trouble swallowing.    Eyes:  Negative for discharge and visual disturbance.   Respiratory:  Negative for cough, chest tightness and shortness of breath.    Cardiovascular:  Negative for chest pain, palpitations and leg swelling.   Gastrointestinal:  Positive for abdominal distention and abdominal pain. Negative for constipation, diarrhea, nausea and vomiting.   Endocrine: Negative for cold intolerance and heat intolerance.   Genitourinary:  Negative for difficulty urinating, dysuria, frequency and hematuria.   Musculoskeletal:  Positive for arthralgias and back pain. Negative for gait problem and myalgias.   Skin: Negative.    Neurological:  Negative for dizziness, weakness, light-headedness and headaches.   Hematological:  Negative for adenopathy. Does not bruise/bleed easily.   Psychiatric/Behavioral:  Negative for agitation, behavioral problems and confusion. The patient is not nervous/anxious.    Objective:     Vital Signs (Most Recent):  Temp: 99.4 °F (37.4 °C) (10/17/22 1550)  Pulse: 99 (10/17/22 1550)  Resp: 18 (10/17/22 1550)  BP: 135/84 (10/17/22 1550)  SpO2: 95 % (10/17/22 1550) Vital Signs (24h Range):  Temp:  [98.8 °F (37.1 °C)-101.1 °F (38.4 °C)] 99.4 °F (37.4 °C)  Pulse:  [] 99  Resp:  [16-20] 18  SpO2:  [94 %-99 %] 95 %  BP: (119-136)/(74-84) 135/84     Weight: 96 kg (211 lb 10.3 oz)  Body mass index is 28.7 kg/m².    Intake/Output Summary (Last 24 hours) at 10/17/2022 1659  Last data filed at 10/17/2022 1227  Gross per 24 hour   Intake 517.26 ml   Output 300 ml   Net 217.26 ml      Physical Exam  Vitals and nursing  note reviewed.   Constitutional:       General: He is not in acute distress.     Appearance: He is well-developed. He is not diaphoretic.   HENT:      Head: Normocephalic and atraumatic.      Right Ear: Hearing and external ear normal.      Left Ear: Hearing and external ear normal.      Nose: Nose normal. No mucosal edema or rhinorrhea.      Mouth/Throat:      Pharynx: Uvula midline.   Eyes:      General:         Right eye: No discharge.         Left eye: No discharge.      Conjunctiva/sclera: Conjunctivae normal.      Right eye: No chemosis.     Left eye: No chemosis.     Pupils: Pupils are equal, round, and reactive to light.   Neck:      Thyroid: No thyroid mass or thyromegaly.      Trachea: Trachea normal.   Cardiovascular:      Rate and Rhythm: Normal rate and regular rhythm.      Pulses:           Dorsalis pedis pulses are 2+ on the right side and 2+ on the left side.      Heart sounds: Normal heart sounds. No murmur heard.  Pulmonary:      Effort: Pulmonary effort is normal. No respiratory distress.      Breath sounds: Normal breath sounds. No decreased breath sounds or wheezing.   Abdominal:      General: Bowel sounds are normal. There is no distension.      Palpations: Abdomen is soft.      Tenderness: There is generalized abdominal tenderness.   Musculoskeletal:         General: Normal range of motion.      Cervical back: Normal range of motion and neck supple.   Lymphadenopathy:      Cervical: No cervical adenopathy.      Upper Body:      Right upper body: No supraclavicular adenopathy.      Left upper body: No supraclavicular adenopathy.   Skin:     General: Skin is warm and dry.      Capillary Refill: Capillary refill takes less than 2 seconds.      Findings: No rash.   Neurological:      Mental Status: He is alert and oriented to person, place, and time.   Psychiatric:         Mood and Affect: Mood is not anxious.         Speech: Speech normal.         Behavior: Behavior normal.         Thought  Content: Thought content normal.         Judgment: Judgment normal.       Significant Labs: All pertinent labs within the past 24 hours have been reviewed.  CBC:   Recent Labs   Lab 10/17/22  0429   WBC 19.36*   HGB 10.6*   HCT 32.2*        CMP:   Recent Labs   Lab 10/17/22  0429      K 3.2*   CL 99   CO2 28   GLU 80   BUN 9   CREATININE 0.7   CALCIUM 8.7   PROT 5.9*   ALBUMIN 2.2*   BILITOT 3.6*   ALKPHOS 190*   AST 44*   ALT 54*   ANIONGAP 10       Significant Imaging: I have reviewed all pertinent imaging results/findings within the past 24 hours.

## 2022-10-17 NOTE — ASSESSMENT & PLAN NOTE
This patient does have evidence of infective focus  My overall impression is sepsis. Vital signs were reviewed and noted in progress note.  Antibiotics given-   Antibiotics (From admission, onward)    Start     Stop Route Frequency Ordered    10/16/22 1815  piperacillin-tazobactam 4.5 g in dextrose 5 % 100 mL IVPB (ready to mix system)         -- IV Every 8 hours (non-standard times) 10/16/22 1710        Cultures were taken-   Microbiology Results (last 7 days)     Procedure Component Value Units Date/Time    Blood culture [[id number]] Collected: 10/16/22 1849    Order Status: Completed Specimen: Blood from Antecubital, Right Arm Updated: 10/17/22 0915     Blood Culture, Routine No Growth to date    Blood culture [[id number]] Collected: 10/16/22 1848    Order Status: Completed Specimen: Blood from Antecubital, Left Arm Updated: 10/17/22 0915     Blood Culture, Routine No Growth to date        Latest lactate reviewed, they are-  No results for input(s): LACTATE in the last 72 hours.    Organ dysfunction indicated by none  Source- Intra abdominal    Source control Achieved by- antibiotics

## 2022-10-17 NOTE — SUBJECTIVE & OBJECTIVE
Past Medical History:   Diagnosis Date    Cancer     Digestive disorder     Hypertension     Liver disease        Past Surgical History:   Procedure Laterality Date    COLON SURGERY      ENDOSCOPIC ULTRASOUND OF UPPER GASTROINTESTINAL TRACT N/A 10/14/2022    Procedure: ULTRASOUND, UPPER GI TRACT,;  Surgeon: Vince Teran MD;  Location: [facility];  Service: Endoscopy;  Laterality: N/A;  upper and linear    ERCP N/A 10/14/2022    Procedure: ERCP (ENDOSCOPIC RETROGRADE CHOLANGIOPANCREATOGRAPHY) with spyglass;  Surgeon: Vince Teran MD;  Location: [facility];  Service: Endoscopy;  Laterality: N/A;    POUCHOSCOPY         Review of patient's allergies indicates:  No Known Allergies  Family History    None       Tobacco Use    Smoking status: Never    Smokeless tobacco: Never   Substance and Sexual Activity    Alcohol use: Not Currently    Drug use: Never    Sexual activity: Not on file     Review of Systems   Constitutional:  Positive for fever.   HENT:  Negative for hearing loss.    Eyes:  Negative for visual disturbance.   Respiratory:  Negative for cough and shortness of breath.    Cardiovascular:  Negative for chest pain and palpitations.   Gastrointestinal:         As per HPI.   Genitourinary:  Negative for difficulty urinating, dysuria, frequency and hematuria.   Musculoskeletal:  Negative for arthralgias and back pain.   Skin:  Negative for color change and rash.   Neurological:  Negative for seizures, syncope, weakness, numbness and headaches.   Hematological:  Does not bruise/bleed easily.   Psychiatric/Behavioral:  The patient is not nervous/anxious.    Objective:     Vital Signs (Most Recent):  Temp: 99.4 °F (37.4 °C) (10/17/22 0719)  Pulse: 95 (10/17/22 0719)  Resp: 18 (10/17/22 0719)  BP: 119/75 (10/17/22 0719)  SpO2: 95 % (10/17/22 0719) Vital Signs (24h Range):  Temp:  [98 °F (36.7 °C)-101.1 °F (38.4 °C)] 99.4 °F (37.4 °C)  Pulse:  [] 95  Resp:  [17-20] 18  SpO2:  [94 %-99 %]  95 %  BP: (119-133)/(74-84) 119/75        There is no height or weight on file to calculate BMI.      Intake/Output Summary (Last 24 hours) at 10/17/2022 0808  Last data filed at 10/17/2022 0438  Gross per 24 hour   Intake --   Output 300 ml   Net -300 ml       Lines/Drains/Airways       None                   Physical Exam  Constitutional:       Appearance: He is well-developed. He is not ill-appearing.   HENT:      Head: Normocephalic and atraumatic.   Eyes:      Extraocular Movements: Extraocular movements intact.   Cardiovascular:      Rate and Rhythm: Normal rate and regular rhythm.      Heart sounds: Normal heart sounds. No murmur heard.  Pulmonary:      Effort: Pulmonary effort is normal. No respiratory distress.      Breath sounds: Normal breath sounds. No wheezing.   Abdominal:      General: Bowel sounds are normal. There is distension.      Palpations: Abdomen is soft. There is hepatomegaly.      Tenderness: There is abdominal tenderness (moderate, worse across the upper abdomen). There is no guarding or rebound.   Musculoskeletal:      Cervical back: Normal range of motion and neck supple.      Right lower leg: No edema.      Left lower leg: No edema.   Skin:     General: Skin is warm and dry.      Findings: No rash.   Neurological:      Mental Status: He is alert and oriented to person, place, and time.      Cranial Nerves: No cranial nerve deficit.   Psychiatric:         Behavior: Behavior normal.       Significant Labs:  CBC:   Recent Labs   Lab 10/17/22  0429   WBC 19.36*   HGB 10.6*   HCT 32.2*        CMP:   Recent Labs   Lab 10/17/22  0429   GLU 80   CALCIUM 8.7   ALBUMIN 2.2*   PROT 5.9*      K 3.2*   CO2 28   CL 99   BUN 9   CREATININE 0.7   ALKPHOS 190*   ALT 54*   AST 44*   BILITOT 3.6*     Coagulation: No results for input(s): PT, INR, APTT in the last 48 hours.  Lipase:   Recent Labs   Lab 10/17/22  0429   LIPASE 34       Significant Imaging:  Imaging results within the past 24  hours have been reviewed.

## 2022-10-17 NOTE — PLAN OF CARE
Patient and spouse updated on plan of care. Instructed patient to use call light for assistance, call light in reach. Hourly rounding performed, bed locked, side rails up, personal items within reach, and in low position. NS @150ml/hr. Patient low-grade temperature with tmax 100.9 but resolved to 99.4F. Patient pain addressed with PRN medication; BP and RR stable. Education provided, questions answered as of now. Sinus rhythm to sinus tach on Tele box #[id number].

## 2022-10-17 NOTE — ASSESSMENT & PLAN NOTE
Continued bowel rest and IV fluids.   Pain meds and antiemetics PRN.   CT reports PD stent moved. Will get KUB to check placement of CBD stent. Will also check for free air.   LFTs and WBC count trending down. Continue to monitor.   Continue antibiotics.

## 2022-10-17 NOTE — ASSESSMENT & PLAN NOTE
Pancreatitis vs cholangitis  NPO  Aggressive IV fluids  Prn analgesia and antiemetics  GI consult for possible ERCP- evaluating

## 2022-10-17 NOTE — HPI
The patient has a history of PSC and elevated LFTs. He underwent EUS/ERCP 10/14/22 which showed findings consistent with PSC. Stent was placed in the CBD and ventral PD. He was given a dose of Zosyn following and prescribed five days of Augmentin. He developed pain after his procedure with gradual worsening. Denies nausea or vomiting but had fever. He presented to an outside ER: Sodium 131, potassium 3.3, chloride 93, CO2 31, BUN 10, creatinine 0.92, glucose 125, total bilirubin 4.5, AST 56, , phosphorus 2.8, amylase 265, lipase 673, magnesium 1.7, CRP greater than 10.5, INR 1.1, white blood cells 28.12, hemoglobin 14, hematocrit 41, platelets 522, lactic acid 1.7. Urinalysis with 2+ protein, trace ketone, trace blood, 3+ bilirubin, positive nitrite, few bacteria, 0-5 white blood cell, 0-5 red blood cell.   CT of the abdomen and pelvis with IV contrast noted that in this patient who by history had recent placement of biliary/pancreatic duct stent (but it is most likely the pancreatic duct stent), the stent appears displaced and in a loop of small bowel.  No biliary prosthesis appears to be in position.  Marked inflammatory changes surrounding the pancreas and extending into the root of the mesentery.  Multiple air collections in and amongst these marked inflammatory changes.  These could be iatrogenic, introduced during stent placement, versus related to an anaerobic infection, versus related to a bowel perforation (otherwise feel this is much less likely).  Clinical correlation is advised.  Associated mild ascites without definite focal drainable fluid collection seen at this time.  Periportal edema noted as well.  Despite placement of the biliary prosthesis with air in the intrahepatic and intrahepatic biliary tree, the gallbladder remains distended.  Findings are concerning for obstruction of the cystic duct.  Moderate bilateral lower lobe atelectasis/consolidation. Negative for acute process otherwise.   Changes to the bowel that indicate a near-total/total colectomy.  Fluid in the proximal small bowel loops, most likely early ileus related to the inflammatory changes.  WBC count, Lipase and T. Bili trending down.

## 2022-10-17 NOTE — PLAN OF CARE
Aox4. Able to verbalize needs & follow commands. Calm & cooperative throughout shift.   POC reviewed with pt & spouse. Interventions implemented as appropriate.    VSS; SR on tele-monitor.  On RA.    PIV patent. Integrity maintained. NS infusing.  Receiving IV abx. No adverse reactions noted.  Skin WDI. No new skin issues.    C/O abdominal pain. Alleviated w/ prn Oxycodone IR 10mg. Has Morphine 4 & 6mg, depending on pain rating, for breakthrough pain.  Ambulatory. Activity ad dudley. Frequent position changes encouraged. Able to reposition in bed independently.  Educated on s/sx of pressure injury;  verbalized understanding.  NADN. Resting quietly in bed.   Free of falls. Hourly rounding complete.   All safety measures remain in place. SR up x2; bed low & locked. Call light w/in reach.   Will continue to monitor throughout shift.  Spouse at bedside participating in care.      Problem: Adult Inpatient Plan of Care  Goal: Plan of Care Review  Outcome: Ongoing, Progressing  Goal: Patient-Specific Goal (Individualized)  Outcome: Ongoing, Progressing  Goal: Absence of Hospital-Acquired Illness or Injury  Outcome: Ongoing, Progressing  Goal: Optimal Comfort and Wellbeing  Outcome: Ongoing, Progressing  Goal: Readiness for Transition of Care  Outcome: Ongoing, Progressing     Problem: Infection  Goal: Absence of Infection Signs and Symptoms  Outcome: Ongoing, Progressing

## 2022-10-18 NOTE — ASSESSMENT & PLAN NOTE
Clinically improving.   Start clear liquid and advance to soft later as tolerated.   Pain meds and antiemetics PRN.   LFTs and WBC count trending down.   Continue antibiotics.

## 2022-10-18 NOTE — PROGRESS NOTES
[facility] Medicine  Progress Note    Patient Name: Guevara Osullivan  MRN: [id number]  Patient Class: IP- Inpatient   Admission Date: 10/16/2022  Length of Stay: 1 days  Attending Physician: Cordelia Santa, *  Primary Care Provider: Dima Ch MD        Subjective:     Principal Problem:Post-ERCP acute pancreatitis        HPI:  52-year-old male with a history of hypertension, hyperlipidemia, ulcerative colitis with fissure, and recent ERCP/EUS (October 14) presented to [facility] with abdominal pain on October 16.  He noted achy abdominal pain worse in the upper abdomen along with nausea without vomiting.  No fever noted.  He has been taking his postprocedure Augmentin, and he has been taking Protonix.  He did report several months of ongoing weight loss and jaundice.  In the emergency department he had abdominal tenderness and mild distention.  No vomiting and no evidence of altered mentation or respiratory compromise.  He received Zosyn, 1 L normal saline, 1 L LR, Zofran, and Dilaudid.  Pt was transferred from outside facility for further evaluation by Gastroenterology/Advanced Endoscopy evaluation.    Sodium 131, potassium 3.3, chloride 93, CO2 31, BUN 10, creatinine 0.92, glucose 125, total bilirubin 4.5, AST 56, , phosphorus 2.8, amylase 265, lipase 673, magnesium 1.7, CRP greater than 10.5, INR 1.1, white blood cells 28.12, hemoglobin 14, hematocrit 41, platelets 522, lactic acid 1.7  Urinalysis with 2+ protein, trace ketone, trace blood, 3+ bilirubin, positive nitrite, few bacteria, 0-5 white blood cell, 0-5 red blood cell    CT imaging notes recent placement of biliary/pancreatic duct stent (but it is most likely the pancreatic duct stent), the stent appears displaced and in a loop of small bowel.  No biliary prosthesis appears to be in position.  Marked inflammatory changes surrounding the pancreas and extending into the root of the  mesentery.  Multiple air collections in and amongst these marked inflammatory changes.  These could be iatrogenic, introduced during stent placement, versus related to an anaerobic infection, versus related to a bowel perforation (otherwise feel this is much less likely).  Clinical correlation is advised.  Associated mild ascites without definite focal drainable fluid collection seen at this time.  Periportal edema noted as well.  Despite placement of the biliary prosthesis with air in the intrahepatic and intrahepatic biliary tree, the gallbladder remains distended.  Findings are concerning for obstruction of the cystic duct.  Moderate bilateral lower lobe atelectasis/consolidation.  Negative for acute process otherwise.  Changes to the bowel that indicate a near-total/total colectomy.  Fluid in the proximal small bowel loops, most likely early ileus related to the inflammatory changes.    As clarification, on 10/16/2022 patient should be admitted for hospital observation services under my care in collaboration with Sidney Diaz, *        Overview/Hospital Course:  10/17: Bili: 3.5, GI evaluating CBD stent. Continues on NPO, WBC: 19.36, afebrile. Awaiting further recommendations from GI. Vitals stable. Pain improved.    10/18: Bilirubin is trending down. Lipase normal. Potassium replaced. Pt experiencing less abdominal pain and denies further nausea. Dr. Teran spoke with patient about proper placement of pancreatic duct stent. Pt tolerated a clear liquid and will try a soft diet. Possible discharge today as patient is very anxious about discharge due to worry of recurrence of symptoms.       Interval History:  See Hospital course    Review of Systems   Constitutional:  Positive for chills, fatigue and fever.   HENT: Negative.     Respiratory:  Negative for cough and shortness of breath.    Cardiovascular:  Negative for chest pain.   Gastrointestinal:  Positive for abdominal pain and nausea. Negative  for diarrhea and vomiting.   Genitourinary: Negative.    All other systems reviewed and are negative.  Objective:     Vital Signs (Most Recent):  Temp: 98.5 °F (36.9 °C) (10/18/22 1146)  Pulse: 103 (10/18/22 1146)  Resp: 18 (10/18/22 1311)  BP: (!) 157/77 (10/18/22 1146)  SpO2: 95 % (10/18/22 1146)   Vital Signs (24h Range):  Temp:  [98.5 °F (36.9 °C)-99.6 °F (37.6 °C)] 98.5 °F (36.9 °C)  Pulse:  [] 103  Resp:  [16-20] 18  SpO2:  [94 %-96 %] 95 %  BP: (131-157)/(77-89) 157/77     Weight: 95.9 kg (211 lb 6.7 oz)  Body mass index is 28.67 kg/m².    Intake/Output Summary (Last 24 hours) at 10/18/2022 1432  Last data filed at 10/18/2022 1300  Gross per 24 hour   Intake 691.17 ml   Output --   Net 691.17 ml      Physical Exam  Vitals and nursing note reviewed.   Constitutional:       General: He is not in acute distress.     Appearance: He is well-developed. He is not diaphoretic.   HENT:      Head: Normocephalic and atraumatic.      Right Ear: Hearing and external ear normal.      Left Ear: Hearing and external ear normal.      Nose: Nose normal. No mucosal edema or rhinorrhea.      Mouth/Throat:      Pharynx: Uvula midline.   Eyes:      General:         Right eye: No discharge.         Left eye: No discharge.      Conjunctiva/sclera: Conjunctivae normal.      Right eye: No chemosis.     Left eye: No chemosis.     Pupils: Pupils are equal, round, and reactive to light.   Neck:      Thyroid: No thyroid mass or thyromegaly.      Trachea: Trachea normal.   Cardiovascular:      Rate and Rhythm: Normal rate and regular rhythm.      Pulses:           Dorsalis pedis pulses are 2+ on the right side and 2+ on the left side.      Heart sounds: Normal heart sounds. No murmur heard.  Pulmonary:      Effort: Pulmonary effort is normal. No respiratory distress.      Breath sounds: Normal breath sounds. No decreased breath sounds or wheezing.   Abdominal:      General: Bowel sounds are normal. There is no distension.       Palpations: Abdomen is soft.      Tenderness: There is generalized abdominal tenderness.   Musculoskeletal:         General: Normal range of motion.      Cervical back: Normal range of motion and neck supple.   Lymphadenopathy:      Cervical: No cervical adenopathy.      Upper Body:      Right upper body: No supraclavicular adenopathy.      Left upper body: No supraclavicular adenopathy.   Skin:     General: Skin is warm and dry.      Capillary Refill: Capillary refill takes less than 2 seconds.      Findings: No rash.   Neurological:      Mental Status: He is alert and oriented to person, place, and time.   Psychiatric:         Mood and Affect: Mood is not anxious.         Speech: Speech normal.         Behavior: Behavior normal.         Thought Content: Thought content normal.         Judgment: Judgment normal.       Significant Labs: All pertinent labs within the past 24 hours have been reviewed.  CBC:   Recent Labs   Lab 10/17/22  0429 10/18/22  0454   WBC 19.36* 13.85*   HGB 10.6* 10.3*   HCT 32.2* 32.0*    432     CMP:   Recent Labs   Lab 10/17/22  0429 10/18/22  0454    137   K 3.2* 3.4*   CL 99 98   CO2 28 29   GLU 80 69*   BUN 9 9   CREATININE 0.7 0.7   CALCIUM 8.7 8.1*   PROT 5.9* 5.3*   ALBUMIN 2.2* 2.2*   BILITOT 3.6* 3.0*   ALKPHOS 190* 188*   AST 44* 47*   ALT 54* 49*   ANIONGAP 10 10       Significant Imaging: I have reviewed all pertinent imaging results/findings within the past 24 hours.      Assessment/Plan:      * Post-ERCP acute pancreatitis  improving  Aggressive IV fluids  Prn analgesia and antiemetics  Soft diet  cipro and flagyl upon discharge        Sepsis  This patient does have evidence of infective focus  My overall impression is sepsis. Vital signs were reviewed and noted in progress note.  Antibiotics given-   Antibiotics (From admission, onward)    Start     Stop Route Frequency Ordered    10/18/22 2100  ciprofloxacin HCl tablet 750 mg         -- Oral Every 12 hours  10/18/22 1240    10/18/22 1400  metroNIDAZOLE tablet 500 mg         -- Oral Every 8 hours 10/18/22 1224        Cultures were taken-   Microbiology Results (last 7 days)     Procedure Component Value Units Date/Time    Blood culture [[id number]] Collected: 10/16/22 1849    Order Status: Completed Specimen: Blood from Antecubital, Right Arm Updated: 10/18/22 0613     Blood Culture, Routine No Growth to date      No Growth to date    Blood culture [[id number]] Collected: 10/16/22 1848    Order Status: Completed Specimen: Blood from Antecubital, Left Arm Updated: 10/18/22 0613     Blood Culture, Routine No Growth to date      No Growth to date        Latest lactate reviewed, they are-  No results for input(s): LACTATE in the last 72 hours.    Organ dysfunction indicated by none  Source- Intra abdominal    Source control Achieved by- antibiotics      Primary sclerosing cholangitis  Was seen by Dr. Dhillon on 10/14 and pancreatic duct stent was placed      Hyperlipidemia        Primary hypertension  Hydralazine prn        VTE Risk Mitigation (From admission, onward)         Ordered     Reason for No Pharmacological VTE Prophylaxis  Once        Question:  Reasons:  Answer:  Risk of Bleeding    10/16/22 1710     IP VTE HIGH RISK PATIENT  Once         10/16/22 1710     Place sequential compression device  Until discontinued         10/16/22 1710                Discharge Planning   APRIL:      Code Status: Full Code   Is the patient medically ready for discharge?:     Reason for patient still in hospital (select all that apply): Patient trending condition  Discharge Plan A: Home with family                  Maeve Parish NP  Department of Hospital Medicine   [facility] ([facility])

## 2022-10-18 NOTE — ASSESSMENT & PLAN NOTE
improving  Aggressive IV fluids  Prn analgesia and antiemetics  Soft diet  cipro and flagyl upon discharge

## 2022-10-18 NOTE — PROGRESS NOTES
[facility] ([facility])  Gastroenterology  Progress Note    Patient Name: Guevara Osullivan  MRN: [id number]  Admission Date: 10/16/2022  Hospital Length of Stay: 1 days  Code Status: Full Code   Attending Provider: Cordelia Santa, *  Consulting Provider: Reynaldo Karimi PA-C  Primary Care Physician: Dima Ch MD  Principal Problem: Post-ERCP acute pancreatitis      Subjective:     Interval History: The patient is feeling better. Pain continues but much better compared to two days ago. He is hungry and tolerated clear liquids so far. He had a BM today. LFTs trending down. KUB was ok.    Review of Systems   Constitutional:         See Interval History for daily ROS.    Objective:     Vital Signs (Most Recent):  Temp: 99.1 °F (37.3 °C) (10/18/22 0715)  Pulse: 103 (10/18/22 0803)  Resp: 18 (10/18/22 0916)  BP: 136/79 (10/18/22 0715)  SpO2: (!) 94 % (10/18/22 0803)   Vital Signs (24h Range):  Temp:  [99 °F (37.2 °C)-100.9 °F (38.3 °C)] 99.1 °F (37.3 °C)  Pulse:  [] 103  Resp:  [16-20] 18  SpO2:  [94 %-96 %] 94 %  BP: (131-137)/(79-89) 136/79     Weight: 95.9 kg (211 lb 6.7 oz) (10/18/22 0004)  Body mass index is 28.67 kg/m².      Intake/Output Summary (Last 24 hours) at 10/18/2022 1042  Last data filed at 10/17/2022 1840  Gross per 24 hour   Intake 989.43 ml   Output --   Net 989.43 ml       Lines/Drains/Airways       Peripheral Intravenous Line  Duration                  Peripheral IV - Single Lumen 10/16/22 1200 20 G Anterior;Distal;Left Upper Arm 1 day                    Physical Exam  Constitutional:       General: He is not in acute distress.     Appearance: He is well-developed. He is not diaphoretic.   HENT:      Head: Normocephalic and atraumatic.   Eyes:      Extraocular Movements: Extraocular movements intact.   Cardiovascular:      Rate and Rhythm: Normal rate and regular rhythm.   Pulmonary:      Effort: Pulmonary effort is normal. No respiratory distress.      Breath sounds:  Normal breath sounds. No wheezing.   Abdominal:      General: Bowel sounds are normal. There is no distension.      Palpations: Abdomen is soft.      Tenderness: There is abdominal tenderness (upper abdomen, improved). There is no guarding or rebound.   Neurological:      Mental Status: He is alert and oriented to person, place, and time.      Cranial Nerves: No cranial nerve deficit.   Psychiatric:         Behavior: Behavior normal.       Significant Labs:  CBC:   Recent Labs   Lab 10/17/22  0429 10/18/22  0454   WBC 19.36* 13.85*   HGB 10.6* 10.3*   HCT 32.2* 32.0*    432     CMP:   Recent Labs   Lab 10/18/22  0454   GLU 69*   CALCIUM 8.1*   ALBUMIN 2.2*   PROT 5.3*      K 3.4*   CO2 29   CL 98   BUN 9   CREATININE 0.7   ALKPHOS 188*   ALT 49*   AST 47*   BILITOT 3.0*     Coagulation: No results for input(s): PT, INR, APTT in the last 48 hours.      Significant Imaging:  Imaging results within the past 24 hours have been reviewed.    Assessment/Plan:     * Post-ERCP acute pancreatitis  Clinically improving.   Start clear liquid and advance to soft later as tolerated.   Pain meds and antiemetics PRN.   LFTs and WBC count trending down.   Continue antibiotics.     Primary sclerosing cholangitis  See plan above. Follow up with Dr. Santa and recommend establishing care with a Hepatologist.         Thank you for your consult. I will sign off. Please contact us if you have any additional questions.    Reynaldo Karimi PA-C  Gastroenterology  [facility] ([facility])

## 2022-10-18 NOTE — PLAN OF CARE
Aox4. Able to verbalize needs & follow commands. Calm & cooperative throughout shift.   POC reviewed with pt & spouse. Interventions implemented as appropriate.    VSS; SR on tele-monitor.  On RA.    PIV patent. Integrity maintained. NS infusing.  Receiving IV abx. No adverse reactions noted.  Skin WDI. No new skin issues.    C/O abdominal pain. Alleviated w/ prn Oxycodone IR 10mg q4h & Morphine 4 mg q4h for breakthrough pain.  Ambulatory. Activity ad dudley. Frequent position changes encouraged. Able to reposition in bed independently.  Educated on s/sx of pressure injury;  verbalized understanding.  NADN. Resting quietly in bed.   Free of falls. Hourly rounding complete.   All safety measures remain in place. SR up x2; bed low & locked. Call light w/in reach.   Will continue to monitor throughout shift.        Problem: Adult Inpatient Plan of Care  Goal: Plan of Care Review  Outcome: Ongoing, Progressing  Goal: Patient-Specific Goal (Individualized)  Outcome: Ongoing, Progressing  Goal: Absence of Hospital-Acquired Illness or Injury  Outcome: Ongoing, Progressing  Goal: Optimal Comfort and Wellbeing  Outcome: Ongoing, Progressing  Goal: Readiness for Transition of Care  Outcome: Ongoing, Progressing     Problem: Infection  Goal: Absence of Infection Signs and Symptoms  Outcome: Ongoing, Progressing

## 2022-10-18 NOTE — PROGRESS NOTES
Pharmacist Renal Dose Adjustment Note    Guevara Osullivan is a 52 y.o. male being treated with the medication ciprofloxacin.    Patient Data:    Vital Signs (Most Recent):  Temp: 98.5 °F (36.9 °C) (10/18/22 1146)  Pulse: 103 (10/18/22 1146)  Resp: 18 (10/18/22 1146)  BP: (!) 157/77 (10/18/22 1146)  SpO2: 95 % (10/18/22 1146)   Vital Signs (72h Range):  Temp:  [98 °F (36.7 °C)-101.1 °F (38.4 °C)]   Pulse:  []   Resp:  [16-20]   BP: (119-157)/(74-89)   SpO2:  [94 %-99 %]      Recent Labs   Lab 10/17/22  0429 10/18/22  0454   CREATININE 0.7 0.7     Serum creatinine: 0.7 mg/dL 10/18/22 0454  Estimated creatinine clearance: 144.9 mL/min    Medication: Ciprofloxacin dose: 500 mg frequency every 12 hours will be changed to medication:ciprofloxcin dose: 750 mg  frequency: every 12 hours, for CrCl> 30 ml/min    Pharmacist's Name: Seda Curran, PharmD

## 2022-10-18 NOTE — SUBJECTIVE & OBJECTIVE
Subjective:     Interval History: The patient is feeling better. Pain continues but much better compared to two days ago. He is hungry and tolerated clear liquids so far. He had a BM today. LFTs trending down.    Review of Systems   Constitutional:         See Interval History for daily ROS.    Objective:     Vital Signs (Most Recent):  Temp: 99.1 °F (37.3 °C) (10/18/22 0715)  Pulse: 103 (10/18/22 0803)  Resp: 18 (10/18/22 0916)  BP: 136/79 (10/18/22 0715)  SpO2: (!) 94 % (10/18/22 0803)   Vital Signs (24h Range):  Temp:  [99 °F (37.2 °C)-100.9 °F (38.3 °C)] 99.1 °F (37.3 °C)  Pulse:  [] 103  Resp:  [16-20] 18  SpO2:  [94 %-96 %] 94 %  BP: (131-137)/(79-89) 136/79     Weight: 95.9 kg (211 lb 6.7 oz) (10/18/22 0004)  Body mass index is 28.67 kg/m².      Intake/Output Summary (Last 24 hours) at 10/18/2022 1042  Last data filed at 10/17/2022 1840  Gross per 24 hour   Intake 989.43 ml   Output --   Net 989.43 ml       Lines/Drains/Airways       Peripheral Intravenous Line  Duration                  Peripheral IV - Single Lumen 10/16/22 1200 20 G Anterior;Distal;Left Upper Arm 1 day                    Physical Exam  Constitutional:       General: He is not in acute distress.     Appearance: He is well-developed. He is not diaphoretic.   HENT:      Head: Normocephalic and atraumatic.   Eyes:      Extraocular Movements: Extraocular movements intact.   Cardiovascular:      Rate and Rhythm: Normal rate and regular rhythm.   Pulmonary:      Effort: Pulmonary effort is normal. No respiratory distress.      Breath sounds: Normal breath sounds. No wheezing.   Abdominal:      General: Bowel sounds are normal. There is no distension.      Palpations: Abdomen is soft.      Tenderness: There is abdominal tenderness (upper abdomen, improved). There is no guarding or rebound.   Neurological:      Mental Status: He is alert and oriented to person, place, and time.      Cranial Nerves: No cranial nerve deficit.   Psychiatric:          Behavior: Behavior normal.       Significant Labs:  CBC:   Recent Labs   Lab 10/17/22  0429 10/18/22  0454   WBC 19.36* 13.85*   HGB 10.6* 10.3*   HCT 32.2* 32.0*    432     CMP:   Recent Labs   Lab 10/18/22  0454   GLU 69*   CALCIUM 8.1*   ALBUMIN 2.2*   PROT 5.3*      K 3.4*   CO2 29   CL 98   BUN 9   CREATININE 0.7   ALKPHOS 188*   ALT 49*   AST 47*   BILITOT 3.0*     Coagulation: No results for input(s): PT, INR, APTT in the last 48 hours.      Significant Imaging:  Imaging results within the past 24 hours have been reviewed.

## 2022-10-18 NOTE — ASSESSMENT & PLAN NOTE
This patient does have evidence of infective focus  My overall impression is sepsis. Vital signs were reviewed and noted in progress note.  Antibiotics given-   Antibiotics (From admission, onward)    Start     Stop Route Frequency Ordered    10/18/22 2100  ciprofloxacin HCl tablet 750 mg         -- Oral Every 12 hours 10/18/22 1240    10/18/22 1400  metroNIDAZOLE tablet 500 mg         -- Oral Every 8 hours 10/18/22 1224        Cultures were taken-   Microbiology Results (last 7 days)     Procedure Component Value Units Date/Time    Blood culture [[id number]] Collected: 10/16/22 1849    Order Status: Completed Specimen: Blood from Antecubital, Right Arm Updated: 10/18/22 0613     Blood Culture, Routine No Growth to date      No Growth to date    Blood culture [[id number]] Collected: 10/16/22 1848    Order Status: Completed Specimen: Blood from Antecubital, Left Arm Updated: 10/18/22 0613     Blood Culture, Routine No Growth to date      No Growth to date        Latest lactate reviewed, they are-  No results for input(s): LACTATE in the last 72 hours.    Organ dysfunction indicated by none  Source- Intra abdominal    Source control Achieved by- antibiotics

## 2022-10-18 NOTE — SUBJECTIVE & OBJECTIVE
Interval History:  See Hospital course    Review of Systems   Constitutional:  Positive for chills, fatigue and fever.   HENT: Negative.     Respiratory:  Negative for cough and shortness of breath.    Cardiovascular:  Negative for chest pain.   Gastrointestinal:  Positive for abdominal pain and nausea. Negative for diarrhea and vomiting.   Genitourinary: Negative.    All other systems reviewed and are negative.  Objective:     Vital Signs (Most Recent):  Temp: 98.5 °F (36.9 °C) (10/18/22 1146)  Pulse: 103 (10/18/22 1146)  Resp: 18 (10/18/22 1311)  BP: (!) 157/77 (10/18/22 1146)  SpO2: 95 % (10/18/22 1146)   Vital Signs (24h Range):  Temp:  [98.5 °F (36.9 °C)-99.6 °F (37.6 °C)] 98.5 °F (36.9 °C)  Pulse:  [] 103  Resp:  [16-20] 18  SpO2:  [94 %-96 %] 95 %  BP: (131-157)/(77-89) 157/77     Weight: 95.9 kg (211 lb 6.7 oz)  Body mass index is 28.67 kg/m².    Intake/Output Summary (Last 24 hours) at 10/18/2022 1432  Last data filed at 10/18/2022 1300  Gross per 24 hour   Intake 691.17 ml   Output --   Net 691.17 ml      Physical Exam  Vitals and nursing note reviewed.   Constitutional:       General: He is not in acute distress.     Appearance: He is well-developed. He is not diaphoretic.   HENT:      Head: Normocephalic and atraumatic.      Right Ear: Hearing and external ear normal.      Left Ear: Hearing and external ear normal.      Nose: Nose normal. No mucosal edema or rhinorrhea.      Mouth/Throat:      Pharynx: Uvula midline.   Eyes:      General:         Right eye: No discharge.         Left eye: No discharge.      Conjunctiva/sclera: Conjunctivae normal.      Right eye: No chemosis.     Left eye: No chemosis.     Pupils: Pupils are equal, round, and reactive to light.   Neck:      Thyroid: No thyroid mass or thyromegaly.      Trachea: Trachea normal.   Cardiovascular:      Rate and Rhythm: Normal rate and regular rhythm.      Pulses:           Dorsalis pedis pulses are 2+ on the right side and 2+ on the  left side.      Heart sounds: Normal heart sounds. No murmur heard.  Pulmonary:      Effort: Pulmonary effort is normal. No respiratory distress.      Breath sounds: Normal breath sounds. No decreased breath sounds or wheezing.   Abdominal:      General: Bowel sounds are normal. There is no distension.      Palpations: Abdomen is soft.      Tenderness: There is generalized abdominal tenderness.   Musculoskeletal:         General: Normal range of motion.      Cervical back: Normal range of motion and neck supple.   Lymphadenopathy:      Cervical: No cervical adenopathy.      Upper Body:      Right upper body: No supraclavicular adenopathy.      Left upper body: No supraclavicular adenopathy.   Skin:     General: Skin is warm and dry.      Capillary Refill: Capillary refill takes less than 2 seconds.      Findings: No rash.   Neurological:      Mental Status: He is alert and oriented to person, place, and time.   Psychiatric:         Mood and Affect: Mood is not anxious.         Speech: Speech normal.         Behavior: Behavior normal.         Thought Content: Thought content normal.         Judgment: Judgment normal.       Significant Labs: All pertinent labs within the past 24 hours have been reviewed.  CBC:   Recent Labs   Lab 10/17/22  0429 10/18/22  0454   WBC 19.36* 13.85*   HGB 10.6* 10.3*   HCT 32.2* 32.0*    432     CMP:   Recent Labs   Lab 10/17/22  0429 10/18/22  0454    137   K 3.2* 3.4*   CL 99 98   CO2 28 29   GLU 80 69*   BUN 9 9   CREATININE 0.7 0.7   CALCIUM 8.7 8.1*   PROT 5.9* 5.3*   ALBUMIN 2.2* 2.2*   BILITOT 3.6* 3.0*   ALKPHOS 190* 188*   AST 44* 47*   ALT 54* 49*   ANIONGAP 10 10       Significant Imaging: I have reviewed all pertinent imaging results/findings within the past 24 hours.

## 2022-10-19 NOTE — NURSING
Pt refused ciprofloxin. Pt previously had medication reaction; suffered achilles rupture d/t med.

## 2022-10-19 NOTE — PLAN OF CARE
[facility] - Telemetry (Hospital)  Discharge Final Note    Primary Care Provider: Dima Ch MD    Expected Discharge Date: 10/19/2022    Final Discharge Note (most recent)       Final Note - 10/19/22 1133          Final Note    Assessment Type Final Discharge Note     Anticipated Discharge Disposition Home or Self Care     Hospital Resources/Appts/Education Provided Appointments scheduled and added to AVS                     Important Message from Medicare             Contact Info       Dr. Santa        Next Steps: Follow up    Instructions: Follow up regarding cholangitis    Annia Vieira MD   Specialty: Gastroenterology, Hepatology    [address]  [place] [address]   Phone: [phone]       Next Steps: Follow up    Instructions: Follow up with liver specialist regarding cholangitis    Annia Vieira MD   Specialty: Gastroenterology, Hepatology    [address] [facility] [address]   Phone: [phone]       Next Steps: Follow up          DC Dispo: home  Patient has f/u scheduled Oct 31st

## 2022-10-19 NOTE — NURSING
Discharge instructions received and reviewed with pt and family at bedside.  Pt voiced understanding and all questions answered to satisfaction.  Stressed importance to making and keeping all follow up appointments.  Medications brought to bedside and reviewed with pt.  Tele monitor removed and brought to monitor tech.  IV d/c'd with tip intact, pressure dressing applied.  Pt eating lunch prior to d/c.

## 2022-10-19 NOTE — PLAN OF CARE
Aox4. Able to verbalize needs & follow commands. Calm & cooperative throughout shift.   POC reviewed with pt & spouse. Interventions implemented as appropriate.    VSS; SR on tele-monitor.  On RA.    PIV patent. Integrity maintained. NS infusing.  Receiving IV abx. No adverse reactions noted.  Skin WDI. No new skin issues.    C/O abdominal pain. Alleviated w/ prn Oxycodone IR 10mg q4h. Has not required Morphine for breakthrough pain.  Ambulated in moreno twice during shift. Activity ad dudley. Frequent position changes encouraged. Able to reposition in bed independently.  Educated on s/sx of pressure injury;  verbalized understanding.  NADN. Resting quietly in bed.   Free of falls. Hourly rounding complete.   All safety measures remain in place. SR up x2; bed low & locked. Call light w/in reach.   Will continue to monitor throughout shift.

## 2022-10-19 NOTE — DISCHARGE SUMMARY
[facility]  Discharge Summary      Patient Name: Guevara Osullivan  MRN: [id number]  Patient Class: IP- Inpatient  Admission Date: 10/16/2022  Hospital Length of Stay: 2 days  Discharge Date and Time:  10/19/2022 12:39 PM  Attending Physician: Cordelia Santa MD  Discharging Provider: Maeve Parish NP  Primary Care Provider: Dima Ch MD      HPI:   52-year-old male with a history of hypertension, hyperlipidemia, ulcerative colitis with fissure, and recent ERCP/EUS (October 14) presented to [facility] with abdominal pain on October 16.  He noted achy abdominal pain worse in the upper abdomen along with nausea without vomiting.  No fever noted.  He has been taking his postprocedure Augmentin, and he has been taking Protonix.  He did report several months of ongoing weight loss and jaundice.  In the emergency department he had abdominal tenderness and mild distention.  No vomiting and no evidence of altered mentation or respiratory compromise.  He received Zosyn, 1 L normal saline, 1 L LR, Zofran, and Dilaudid.  Pt was transferred from outside facility for further evaluation by Gastroenterology/Advanced Endoscopy evaluation.    Sodium 131, potassium 3.3, chloride 93, CO2 31, BUN 10, creatinine 0.92, glucose 125, total bilirubin 4.5, AST 56, , phosphorus 2.8, amylase 265, lipase 673, magnesium 1.7, CRP greater than 10.5, INR 1.1, white blood cells 28.12, hemoglobin 14, hematocrit 41, platelets 522, lactic acid 1.7  Urinalysis with 2+ protein, trace ketone, trace blood, 3+ bilirubin, positive nitrite, few bacteria, 0-5 white blood cell, 0-5 red blood cell    CT imaging notes recent placement of biliary/pancreatic duct stent (but it is most likely the pancreatic duct stent), the stent appears displaced and in a loop of small bowel.  No biliary prosthesis appears to be in position.  Marked inflammatory changes surrounding the pancreas and  extending into the root of the mesentery.  Multiple air collections in and amongst these marked inflammatory changes.  These could be iatrogenic, introduced during stent placement, versus related to an anaerobic infection, versus related to a bowel perforation (otherwise feel this is much less likely).  Clinical correlation is advised.  Associated mild ascites without definite focal drainable fluid collection seen at this time.  Periportal edema noted as well.  Despite placement of the biliary prosthesis with air in the intrahepatic and intrahepatic biliary tree, the gallbladder remains distended.  Findings are concerning for obstruction of the cystic duct.  Moderate bilateral lower lobe atelectasis/consolidation.  Negative for acute process otherwise.  Changes to the bowel that indicate a near-total/total colectomy.  Fluid in the proximal small bowel loops, most likely early ileus related to the inflammatory changes.    As clarification, on 10/16/2022 patient should be admitted for hospital observation services under my care in collaboration with Sidney Diaz, *        * No surgery found *      Hospital Course:   10/17: Bili: 3.5, GI evaluating CBD stent. Continues on NPO, WBC: 19.36, afebrile. Awaiting further recommendations from GI. Vitals stable. Pain improved.    10/18: Bilirubin is trending down. Lipase normal. Potassium replaced. Pt experiencing less abdominal pain and denies further nausea. Dr. Teran spoke with patient about proper placement of pancreatic duct stent. Pt tolerated a clear liquid and will try a soft diet. Possible discharge today as patient is very anxious about discharge due to worry of recurrence of symptoms.     10/19/22 - Pt was tolerating oral intake without increased abdominal pain and nausea. He verbalized desire for discharge. Pt was prescribed oxycodone and Augmentin to complete antibiotic course. Pt was reminded to follow up with his Gastroenterology and Dr. Serra,  Hepatology. Also, pt to have stent removal in December by Dr. Dhillon. Pt was seen and examined and determined to be safe and stable for discharge.        Goals of Care Treatment Preferences:  Code Status: Full Code      Consults:   Consults (From admission, onward)        Status Ordering Provider     Inpatient consult to Gastroenterology  Once        Provider:  Tiesha Castellano MD    Completed EDNISE SUNG          No new Assessment & Plan notes have been filed under this hospital service since the last note was generated.  Service: Hospital Medicine    Final Active Diagnoses:    Diagnosis Date Noted POA    PRINCIPAL PROBLEM:  Post-ERCP acute pancreatitis [K91.89, K85.90] 10/16/2022 Yes    Primary hypertension [I10] 10/16/2022 Yes    Hyperlipidemia [E78.5] 10/16/2022 Yes    Primary sclerosing cholangitis [K83.01] 10/16/2022 Yes    Sepsis [A41.9] 10/16/2022 Yes      Problems Resolved During this Admission:       Discharged Condition: stable    Disposition: Home or Self Care    Follow Up:   Follow-up Information     Dr. Santa Follow up.    Why: Follow up regarding cholangitis           Annia Vieira MD Follow up.    Specialties: Gastroenterology, Hepatology  Why: Follow up with liver specialist regarding cholangitis  Contact information:  [address]  [place] [address] [phone]             Annia Vieira MD .    Specialties: Gastroenterology, Hepatology  Contact information:  [address] [facility] [address] [phone]                       Patient Instructions:      Notify your health care provider if you experience any of the following:  temperature >100.4     Notify your health care provider if you experience any of the following:  persistent nausea and vomiting or diarrhea     Notify your health care provider if you experience any of the following:  severe uncontrolled pain     Activity as tolerated       Significant Diagnostic Studies: Labs:   CMP   Recent  Labs   Lab 10/18/22  0454 10/19/22  0448    137   K 3.4* 3.0*   CL 98 98   CO2 29 27   GLU 69* 89   BUN 9 6   CREATININE 0.7 0.7   CALCIUM 8.1* 7.9*   PROT 5.3* 5.3*   ALBUMIN 2.2* 2.2*   BILITOT 3.0* 2.6*   ALKPHOS 188* 175*   AST 47* 50*   ALT 49* 48*   ANIONGAP 10 12    and CBC   Recent Labs   Lab 10/18/22  0454 10/19/22  0448   WBC 13.85* 12.75*   HGB 10.3* 9.5*   HCT 32.0* 29.1*    452*       Pending Diagnostic Studies:     None         Medications:  Reconciled Home Medications:      Medication List      START taking these medications    oxyCODONE 10 mg Tab immediate release tablet  Commonly known as: ROXICODONE  Take 1 tablet (10 mg total) by mouth every 6 (six) hours as needed for Pain.        CONTINUE taking these medications    amoxicillin-clavulanate 875-125mg 875-125 mg per tablet  Commonly known as: AUGMENTIN  Take 1 tablet by mouth every 12 (twelve) hours. for 5 days     ondansetron 4 MG Tbdl  Commonly known as: ZOFRAN-ODT  Take 2 tablets (8 mg total) by mouth every 6 (six) hours as needed (nausea).     pantoprazole 40 MG tablet  Commonly known as: PROTONIX  Take 40 mg by mouth once daily.     sucralfate 1 gram tablet  Commonly known as: CARAFATE  Take 1 g by mouth 2 (two) times daily.        STOP taking these medications    HYDROcodone-acetaminophen 7.5-325 mg per tablet  Commonly known as: NORCO            Indwelling Lines/Drains at time of discharge:   Lines/Drains/Airways     None                 Time spent on the discharge of patient: > 30 minutes         Maeve Parish NP  Department of Hospital Medicine  [facility] - Telemetry ([facility])

## 2022-10-20 NOTE — PROGRESS NOTES
C3 nurse spoke with Guevara Osullivan  for a TCC post hospital discharge follow up call. The patient reports does not have a scheduled HOSFU appointment. C3 nurse was unable to schedule HOSFU appointment for [facility]. Patient advised to contact their PCP to schedule a HOSPFU within 5-7 days.

## 2022-10-25 NOTE — TELEPHONE ENCOUNTER
Returned call and scheduled patient for ERCP on 12.19.22.  Went over instructions and will also send via [facility] Spoon.  Patient asked if he will need to stay overnight like he did last time because he developed post procedure pancreatitis.  Advised patient that Dr. Teran is out of the office but I will send a message and we will get back with him.  He verbalized understanding.

## 2022-10-25 NOTE — TELEPHONE ENCOUNTER
----- Message from Esme Alexander sent at 10/25/2022 11:01 AM CDT -----  Regarding: stint removal  Contact: patient  Patient needs to schedule his stint removal for 12/19/22, please call him back at [phone]

## 2022-10-26 NOTE — TELEPHONE ENCOUNTER
----- Message from Crista Daniel sent at 10/26/2022  1:58 PM CDT -----  Contact: Guevara  Type:  Sooner Apoointment Request    Caller is requesting a sooner appointment. Caller will not accept being placed on the waitlist and is requesting a message be sent to doctor.  Name of Caller:Guevara  When is the first available appointment?unknown  Symptoms:referral  Would the patient rather a call back or a response via [facility]? call  Best Call Back Number:[phone]  Additional Information: Reports a verbal referral is to be sent provided by GI doctor and request to schedule for soonest availability.  Thank you,  GH

## 2022-10-26 NOTE — TELEPHONE ENCOUNTER
----- Message from Vince Teran MD sent at 10/20/2022  5:03 PM CDT -----  Please schedule him for ERCP in 2 months for a 60 minute block.    -EVA

## 2022-10-26 NOTE — TELEPHONE ENCOUNTER
Returned patient's call. He states that he's currently scheduled to see Dr. Vieira but was told by Dr. Santa to see Dr. Wang only. I informed the patient Dr. Wang doesn't have an availability until January, patient then stated that Dr. Santa will be contacting Dr. Wang personally. I informed the patient that once they speak and Dr. Wang gives permission that we would have our coordinator reach out to him to schedule. I also advised the patient to contact Dr. Santa's office to have them place the referral. Patient voiced understanding.

## 2022-10-27 NOTE — TELEPHONE ENCOUNTER
----- Message from Cele Saavedra sent at 10/27/2022  9:39 AM CDT -----  Regarding: Dr. Santa office  Nesha Bass with Dr. Arina white is requesting a call back regarding the following patient.    Nesha can be reached out [phone].    Thank you  [facility]  Joe De La Torre

## 2022-10-27 NOTE — TELEPHONE ENCOUNTER
Jose Miguel Jeffries's call. I informed her that the patient is currently scheduled to see Dr. Vieira but per Dr. Santa only wants the patient to see Dr. Wang. I explained that Dr. Wang doesn't have any availability but I would forward the message to Dr. Wang for her input and follow up with her.

## 2022-10-31 NOTE — TELEPHONE ENCOUNTER
----- Message from Jaya East MA sent at 10/31/2022 11:10 AM CDT -----  Regarding: FW: Dr. Santa office  This is the one that said Dr. Wang spoke with Dr. Santa.   ----- Message -----  From: Yennifer Wang MD  Sent: 10/30/2022  11:13 AM CDT  To: Jaya East MA  Subject: RE: Dr. Santa office                           Yes he contacted me about this patient. PLease schedule him to see me.  ----- Message -----  From: aJya East MA  Sent: 10/27/2022  10:33 AM CDT  To: Yennifer Wang MD  Subject: FW: Dr. Santa office                           Dr. Santa's nurse stated that he has tried contacting you in reference to getting this patient seen by YOU only. Please advise. Patient is currently scheduled to see Dr. Vieira on Monday 10/31/22.   ----- Message -----  From: Cele Saavedra  Sent: 10/27/2022   9:41 AM CDT  To: [facility] Staff  Subject: Dr. Santa office                               Nesha Bass with Dr. Arina white is requesting a call back regarding the following patient.    Nesha can be reached out [phone].    Thank you  Cele De La Torre

## 2022-10-31 NOTE — TELEPHONE ENCOUNTER
Spoke with patient and scheduled an appointment for 11/02/2022 at 4:30 pm with Dr. Yennifer Wang at [facility].

## 2022-11-02 NOTE — PROGRESS NOTES
Subjective:     Guevara Osullivan is here for evaluation of PSC      HPI  Guevara Osullivan is here for evaluation of possible PSC.  Of note the patient has a history of ulcerative colitis status post colectomy with a J-pouch.  That was done decades ago.  He more recently had some issues with abnormal LFTs in pruritus.  He had further evaluation with a biliary stricture and has concern for PSC.  Brushings and cytology were done which were negative for cancer.  After the procedure he developed post ERCP pancreatitis and has still been having abdominal pain.  Each day he reports he is getting a little bit better but the pain can be mild to moderate.  He also has issues with fatigue.  He does have other chronic pain issues for which she sees pain management.    No significant pruritus at this time.    Review of Systems   Constitutional:  Positive for activity change and fatigue.   HENT: Negative.     Eyes: Negative.    Respiratory: Negative.     Cardiovascular: Negative.    Gastrointestinal:  Positive for abdominal pain.   Genitourinary: Negative.    Musculoskeletal:  Positive for arthralgias and myalgias.   Skin: Negative.    Neurological: Negative.    Psychiatric/Behavioral: Negative.       Objective:     Physical Exam  Vitals reviewed.   Constitutional:       General: He is not in acute distress.     Appearance: He is well-developed.   HENT:      Head: Normocephalic and atraumatic.      Mouth/Throat:      Pharynx: No oropharyngeal exudate.   Eyes:      General: No scleral icterus.        Right eye: No discharge.         Left eye: No discharge.      Conjunctiva/sclera: Conjunctivae normal.      Pupils: Pupils are equal, round, and reactive to light.   Pulmonary:      Effort: Pulmonary effort is normal. No respiratory distress.      Breath sounds: Normal breath sounds. No wheezing.   Abdominal:      General: There is no distension.      Palpations: Abdomen is soft.      Tenderness: There is abdominal  tenderness.   Neurological:      Mental Status: He is alert and oriented to person, place, and time.   Psychiatric:         Behavior: Behavior normal.       Computed MELD-Na score unavailable. Necessary lab results were not found in the last year.  Computed MELD score unavailable. Necessary lab results were not found in the last year.    WBC   Date Value Ref Range Status   11/02/2022 7.77 3.90 - 12.70 K/uL Final     Hemoglobin   Date Value Ref Range Status   11/02/2022 12.3 (L) 14.0 - 18.0 g/dL Final     Hematocrit   Date Value Ref Range Status   11/02/2022 37.5 (L) 40.0 - 54.0 % Final     Platelets   Date Value Ref Range Status   11/02/2022 482 (H) 150 - 450 K/uL Final     BUN   Date Value Ref Range Status   11/02/2022 10 6 - 20 mg/dL Final     Creatinine   Date Value Ref Range Status   11/02/2022 0.8 0.5 - 1.4 mg/dL Final     Glucose   Date Value Ref Range Status   11/02/2022 100 70 - 110 mg/dL Final     Calcium   Date Value Ref Range Status   11/02/2022 9.7 8.7 - 10.5 mg/dL Final     Sodium   Date Value Ref Range Status   11/02/2022 143 136 - 145 mmol/L Final     Potassium   Date Value Ref Range Status   11/02/2022 3.7 3.5 - 5.1 mmol/L Final     Chloride   Date Value Ref Range Status   11/02/2022 102 95 - 110 mmol/L Final     AST   Date Value Ref Range Status   11/02/2022 47 (H) 10 - 40 U/L Final     ALT   Date Value Ref Range Status   11/02/2022 63 (H) 10 - 44 U/L Final     Alkaline Phosphatase   Date Value Ref Range Status   11/02/2022 197 (H) 55 - 135 U/L Final     Total Bilirubin   Date Value Ref Range Status   11/02/2022 1.3 (H) 0.1 - 1.0 mg/dL Final     Comment:     For infants and newborns, interpretation of results should be based  on gestational age, weight and in agreement with clinical  observations.    Premature Infant recommended reference ranges:  Up to 24 hours.............<8.0 mg/dL  Up to 48 hours............<12.0 mg/dL  3-5 days..................<15.0 mg/dL  6-29 days.................<15.0  mg/dL       Albumin   Date Value Ref Range Status   11/02/2022 3.8 3.5 - 5.2 g/dL Final     No results found for: INR      Assessment/Plan:     1. Primary sclerosing cholangitis    2. Abnormal LFTs    3. Post-ERCP acute pancreatitis      Guevara Osullivan is a 52 y.o. male withPSC    Primary sclerosing cholangitis-likely diagnosis. Will check for Igg4 component  -Educated patient and wife about dx and that there is no effective treatment for PSC but we need to monitor labs and survey for cholangiocarcinoma  -Will discuss with Dr. Teran  -     Immunoglobulins (IgG, IgA, IgM) Quantitative; Future; Expected date: 11/02/2022  -     PATRICK Screen w/Reflex; Future; Expected date: 11/02/2022  -     Hepatic Function Panel; Standing  -     Cancer Antigen 19-9; Future; Expected date: 11/02/2022  -     Immunoglobulin G Subclass 4; Future; Expected date: 11/02/2022    Abnormal LFTs- will eval for other causes of liver disease  -     Immunoglobulins (IgG, IgA, IgM) Quantitative; Future; Expected date: 11/02/2022  -     Anti-Smooth Muscle Antibody; Future; Expected date: 11/02/2022  -     PATRICK Screen w/Reflex; Future; Expected date: 11/02/2022  -     Iron and TIBC; Future; Expected date: 11/02/2022  -     Ferritin; Future; Expected date: 11/02/2022  -     Ceruloplasmin; Future; Expected date: 11/02/2022  -     Alpha-1-Antitrypsin; Future; Expected date: 11/02/2022  -     Hepatitis A antibody, IgG; Future; Expected date: 11/02/2022  -     Gamma GT; Standing  -     Antimitochondrial Antibody; Future; Expected date: 11/02/2022  -     CBC Auto Differential; Standing  -     Hepatic Function Panel; Standing  -     Basic Metabolic Panel; Standing  -     Cancer Antigen 19-9; Future; Expected date: 11/02/2022  -     Hepatitis B Surface Ab, Qualitative; Future; Expected date: 11/02/2022  -     Hepatitis B Surface Antigen; Future; Expected date: 11/02/2022  -     Hepatitis C Antibody; Future; Expected date: 11/02/2022  -      Immunoglobulin G Subclass 4; Future; Expected date: 11/02/2022    Post-ERCP Pancreatitis-significant symptoms from his post ERCP pancreatitis.  This is significantly affecting his energy level, fatigue, abdominal pain inability to function.  He would not be able to return to work until he the post ERCP pancreatitis resolves.  If the symptoms do not get better over the next few weeks will need to re-evaluate whether not his symptoms could be from the biliary stent itself.    RTC in 3 months with preclinic labs    Yennifer Wang MD     Addendum: Discussed case with Dr. Teran there is intrahepatic biliary dilatation. Will get repeat CA 19-9 with his next preclinic labs

## 2022-11-07 NOTE — PROGRESS NOTES
ERCP is consistent with PSC with a dominant stricture that was stented and will need follow up ERCP. Biopsies via cholangioscopy and brushings were negative for malignancy. Stricture will be re-biopsied on repeat ERCP.    -[facility]
negative - no change in level of consciousness

## 2022-12-01 NOTE — TELEPHONE ENCOUNTER
Patient concerned that he is still having abdominal pain that is significant in his epigastric area to the right upper quadrant.  He reports he is slowly getting better but concerned that it is still present.  Uncertain if this could be related to his episode of pancreatitis or the stents that were placed.  Will send a message to Dr. Teran to see if he has any other thoughts. Will get repeat labs.

## 2022-12-03 NOTE — TELEPHONE ENCOUNTER
Pt called. Weight loss of 50 lbs experienced along with abdominal pain.     Will need CT to assess for pseudocyst or other etiologies. ERCP will be planned on the 19th of December as scheduled.    Vince Teran MD  Gastroenterology  Director of Advanced Endoscopy at [facility]

## 2022-12-05 NOTE — TELEPHONE ENCOUNTER
----- Message from Vince Teran MD sent at 12/2/2022  6:16 PM CST -----  Please schedule him for CT abdomen .    -EVA

## 2022-12-13 NOTE — ANESTHESIA POSTPROCEDURE EVALUATION
Anesthesia Post Evaluation    Patient: Guevara Osullivan II    Procedure(s) Performed: Procedure(s) (LRB):  ERCP (ENDOSCOPIC RETROGRADE CHOLANGIOPANCREATOGRAPHY) (N/A)  ULTRASOUND, UPPER GI TRACT, ENDOSCOPIC (N/A)    Final Anesthesia Type: general      Patient location during evaluation: PACU  Patient participation: Yes- Able to Participate  Level of consciousness: awake  Post-procedure vital signs: reviewed and stable  Pain management: adequate  Airway patency: patent    PONV status at discharge: No PONV  Anesthetic complications: no      Cardiovascular status: blood pressure returned to baseline and hemodynamically stable  Respiratory status: unassisted and spontaneous ventilation  Hydration status: euvolemic  Follow-up not needed.          No case tracking events are documented in the log.      Pain/Margo Score: No data recorded

## 2022-12-13 NOTE — ANESTHESIA PROCEDURE NOTES
Intubation    Date/Time: 12/13/2022 3:42 PM  Performed by: Jonnathan George CRNA  Authorized by: Jose J Abdullahi II, MD     Intubation:     Induction:  Intravenous    Intubated:  Postinduction    Mask Ventilation:  Easy with oral airway    Attempts:  1    Attempted By:  CRNA    Method of Intubation:  Direct    Blade:  Kendrick 2    Laryngeal View Grade: Grade I - full view of cords      Difficult Airway Encountered?: No      Complications:  None    Airway Device:  Oral endotracheal tube    Airway Device Size:  7.5    Style/Cuff Inflation:  Cuffed (inflated to minimal occlusive pressure)    Tube secured:  22    Secured at:  The lips    Placement Verified By:  Capnometry    Complicating Factors:  None    Findings Post-Intubation:  BS equal bilateral and atraumatic/condition of teeth unchanged

## 2022-12-13 NOTE — PLAN OF CARE
Pt sleepy but easily aroused, vss, pt tx to med surg, report given to and bs handoff completed with Marianne    no

## 2022-12-13 NOTE — PLAN OF CARE
Dr. Teran at  to discuss findings. VSS. Pain decreasing, no GI bleeding. Pt to be discharged from unit to med surg

## 2022-12-13 NOTE — H&P
PRE PROCEDURE H&P    Patient Name: Guevara Osullivan II  MRN: [id number]  : 1970  Date of Procedure:  2022  Referring Physician: Vince Teran *  Primary Physician: Dima Ch MD  Procedure Physician: Vince Teran MD       Planned Procedure: EUS and ERCP  Diagnosis: PSC, pancreatic walled off necrosis cystgastrostomy  Chief Complaint: Same as above    HPI: Patient is an 52 y.o. male is here for the above.     ERCP is currently recommended. The risks, benefits and alternatives of the procedure were discussed with the patient in detail. Benefits include removal of stones, stents, debri, sludge; dilation; placement of a stent; biopsies. Risks include bleeding (0.3-2%), pancreatitis (3%), cholangitis (0.5-3%), perforation (0.08-0.6%), cholecystitis (0.5%), sedation related adverse events. Endoscopic ultrasound with possible fine needle aspiration/biopsy was recommended. The procedure was described along with the risks and benefits. Risks include perforation (0.02%), pancreatitis (0-2%), bleeding (4%), sedation related adverse events. Other risks include, risks of adverse reaction to sedation requiring the use of reversal agents, bleeding requiring blood transfusion, perforation requiring surgical intervention, technical failure, aspiration leading to respiratory distress and respiratory failure resulting in endotracheal intubation and mechanical ventilation including death. Anesthesia is utilized for this procedure, it is up to the anesthesiologist to determine airway safety including elective endotracheal intubation. Questions were answered, the patient agreed to proceed. There were no language barriers.        Past Medical History:   Past Medical History:   Diagnosis Date    Cancer     COLON CANCER     Digestive disorder     Hypertension     Primary sclerosing cholangitis     Ulcerative colitis     s/p colectomy with J- pouch        Past Surgical History:  Past Surgical  History:   Procedure Laterality Date    COLON SURGERY      Colectomy with J- pouch    ENDOSCOPIC ULTRASOUND OF UPPER GASTROINTESTINAL TRACT N/A 10/14/2022    Procedure: ULTRASOUND, UPPER GI TRACT,;  Surgeon: Vince Teran MD;  Location: [facility];  Service: Endoscopy;  Laterality: N/A;  upper and linear    ERCP N/A 10/14/2022    Procedure: ERCP (ENDOSCOPIC RETROGRADE CHOLANGIOPANCREATOGRAPHY) with spyglass;  Surgeon: Vince Teran MD;  Location: [facility];  Service: Endoscopy;  Laterality: N/A;    POUCHOSCOPY          Home Medications:  Prior to Admission medications    Medication Sig Start Date End Date Taking? Authorizing Provider   oxyCODONE (ROXICODONE) 10 mg Tab immediate release tablet Take 1 tablet (10 mg total) by mouth every 6 (six) hours as needed for Pain. 10/19/22  Yes Maeve Parish, JOHN   pantoprazole (PROTONIX) 40 MG tablet Take 40 mg by mouth once daily.   Yes Historical Provider        Allergies:  Review of patient's allergies indicates:   Allergen Reactions    Ciprofloxacin Other (See Comments)     Pt previously had medication reaction; suffered achilles rupture         Social History:   Social History     Socioeconomic History    Marital status: Single   Tobacco Use    Smoking status: Never    Smokeless tobacco: Never   Substance and Sexual Activity    Alcohol use: Not Currently    Drug use: Never       Family History:  History reviewed. No pertinent family history.    ROS: No acute cardiac events, no acute respiratory complaints.     Physical Exam (all patients):    /73 (BP Location: Left arm, Patient Position: Lying)   Pulse 81   Temp 98.6 °F (37 °C) (Temporal)   Resp 16   Ht 6' (1.829 m)   Wt 83.9 kg (185 lb)   SpO2 100%   BMI 25.09 kg/m²   Lungs: Clear to auscultation bilaterally, respirations unlabored  Heart: Regular rate and rhythm, S1 and S2 normal, no obvious murmurs  Abdomen:         Soft, non-tender, bowel sounds normal, no masses, no  organomegaly    Lab Results   Component Value Date    WBC 5.71 12/02/2022    MCV 93 12/02/2022    RDW 12.7 12/02/2022     12/02/2022    GLU 97 12/02/2022    HGBA1C 5.5 10/20/2021    BUN 8 12/02/2022     12/02/2022    K 3.9 12/02/2022     12/02/2022        SEDATION PLAN: per anesthesia      History reviewed, vital signs satisfactory, cardiopulmonary status satisfactory, sedation options, risks and plans have been discussed with the patient  All their questions were answered and the patient agrees to the sedation procedures as planned and the patient is deemed an appropriate candidate for the sedation as planned.    Procedure explained to patient, informed consent obtained and placed in chart.    Vince Teran  12/13/2022  3:30 PM

## 2022-12-13 NOTE — TRANSFER OF CARE
Anesthesia Transfer of Care Note    Patient: Guevara Osullivan II    Procedure(s) Performed: Procedure(s) (LRB):  ERCP (ENDOSCOPIC RETROGRADE CHOLANGIOPANCREATOGRAPHY) (N/A)  ULTRASOUND, UPPER GI TRACT, ENDOSCOPIC (N/A)    Patient location: PACU    Anesthesia Type: general    Transport from OR: Transported from OR on room air with adequate spontaneous ventilation    Post pain: adequate analgesia    Post assessment: no apparent anesthetic complications and tolerated procedure well    Post vital signs: stable    Level of consciousness: awake    Nausea/Vomiting: no nausea/vomiting    Complications: none    Transfer of care protocol was followed      Last vitals:   Visit Vitals  /73 (BP Location: Left arm, Patient Position: Lying)   Pulse 81   Temp 37 °C (98.6 °F) (Temporal)   Resp 16   Ht 6' (1.829 m)   Wt 83.9 kg (185 lb)   SpO2 100%   BMI 25.09 kg/m²

## 2022-12-13 NOTE — ANESTHESIA PREPROCEDURE EVALUATION
12/13/2022  Guevara Osullivan II is a 52 y.o., male.      Pre-op Assessment    I have reviewed the Patient Summary Reports.     I have reviewed the Nursing Notes. I have reviewed the NPO Status.   I have reviewed the Medications.     Review of Systems  Anesthesia Hx:  No problems with previous Anesthesia  Denies Family Hx of Anesthesia complications.   Denies Personal Hx of Anesthesia complications.   Social:  No Alcohol Use, Non-Smoker    Hematology/Oncology:         -- Anemia:   Cardiovascular:   Hypertension Denies MI.   Denies CABG/stent.   Denies CHF. hyperlipidemia    Pulmonary:   Denies COPD.  Denies Asthma.  Denies Sleep Apnea.    Renal/:  Renal/ Normal     Hepatic/GI:   PUD, Denies GERD. Liver Disease,  Denies Hepatitis. Primary sclerosing cholangitis  Ulcerative colitis   Musculoskeletal:  Musculoskeletal Normal    Neurological:   Denies CVA. Denies Seizures.    Endocrine:   Denies Diabetes. Denies Hypothyroidism. Denies Hyperthyroidism.        Physical Exam  General: Well nourished    Airway:  Mallampati: II   Mouth Opening: Normal    Dental:  Intact    Chest/Lungs:  Clear to auscultation, Normal Respiratory Rate    Heart:  Rate: Normal  Rhythm: Regular Rhythm        Anesthesia Plan  Type of Anesthesia, risks & benefits discussed:    Anesthesia Type: Gen ETT  Intra-op Monitoring Plan: Standard ASA Monitors  Induction:  IV  Informed Consent: Informed consent signed with the Patient and all parties understand the risks and agree with anesthesia plan.  All questions answered.   ASA Score: 3  Day of Surgery Review of History & Physical: H&P Update referred to the surgeon/provider.    Ready For Surgery From Anesthesia Perspective.     .

## 2022-12-13 NOTE — PROGRESS NOTES
Endoscopic ultrasound was performed with cyst gastrostomy.  10 mm x 10 mm axial stent was placed.  On ERCP the duodenal bulb was compressed due to the pseudocyst and of the duodenum scope was not able to advance to the post bulbar duodenum    Will admit postprocedure observe for pancreatitis, get a CT abdomen to assess the pseudocyst after cyst gastrostomy.  Continue antibiotics will continue Augmentin as he was given Zosyn during the procedure.  We will check CBC and CMP.  Can continue with clear liquids.    Will plan on ERCP tentatively soon.    Vince Teran MD  Gastroenterology  Director of Advanced Endoscopy at [facility]

## 2022-12-14 PROBLEM — K86.2 PANCREATIC CYST: Status: ACTIVE | Noted: 2022-01-01

## 2022-12-14 NOTE — PLAN OF CARE
Pt being discharged Home in stable condition. IV removed, catheter intact, pt tolerated well. No tele monitor in place. Discharge instructions given to pt, pt verbalized understanding. Medications delivered to bedside. 12 hour chart check completed.

## 2022-12-14 NOTE — NURSING
Pt is resting in bed. No adverse events overnight. Pt remains free from falls. Chart check Q12 hours complete.

## 2022-12-14 NOTE — PROVATION PATIENT INSTRUCTIONS
Discharge Summary/Instructions after an Endoscopic Procedure  Patient Name: Guevara Osullivan  Patient MRN: [id number]  Patient YOB: 1970 Tuesday, December 13, 2022 Vince Teran MD  Dear patient,  As a result of recent federal legislation (The Federal Cures Act), you may   receive lab or pathology results from your procedure in your [facility]   account before your physician is able to contact you. Your physician or   their representative will relay the results to you with their   recommendations at their soonest availability.  Thank you,  RESTRICTIONS:  During your procedure today, you received medications for sedation.  These   medications may affect your judgment, balance and coordination.  Therefore,   for 24 hours, you have the following restrictions:   - DO NOT drive a car, operate machinery, make legal/financial decisions,   sign important papers or drink alcohol.    ACTIVITY:  Today: no heavy lifting, straining or running due to procedural   sedation/anesthesia.  The following day: return to full activity including work.  DIET:  Eat and drink normally unless instructed otherwise.     TREATMENT FOR COMMON SIDE EFFECTS:  - Mild abdominal pain, nausea, belching, bloating or excessive gas:  rest,   eat lightly and use a heating pad.  - Sore Throat: treat with throat lozenges and/or gargle with warm salt   water.  - Because air was used during the procedure, expelling large amounts of air   from your rectum or belching is normal.  - If a bowel prep was taken, you may not have a bowel movement for 1-3 days.    This is normal.  SYMPTOMS TO WATCH FOR AND REPORT TO YOUR PHYSICIAN:  1. Abdominal pain or bloating, other than gas cramps.  2. Chest pain.  3. Back pain.  4. Signs of infection such as: chills or fever occurring within 24 hours   after the procedure.  5. Rectal bleeding, which would show as bright red, maroon, or black stools.   (A tablespoon of blood from the rectum is not serious,  especially if   hemorrhoids are present.)  6. Vomiting.  7. Weakness or dizziness.  GO DIRECTLY TO THE NEAREST EMERGENCY ROOM IF YOU HAVE ANY OF THE FOLLOWING:      Difficulty breathing              Chills and/or fever over 101 F   Persistent vomiting and/or vomiting blood   Severe abdominal pain   Severe chest pain   Black, tarry stools   Bleeding- more than one tablespoon   Any other symptom or condition that you feel may need urgent attention  Your doctor recommends these additional instructions:  If any biopsies were taken, your doctors clinic will contact you in 1 to 2   weeks with any results.  - Admit the patient to [facility] for observation.   - Clear liquid diet.   - Continue present medications.   - Augmentin (amoxicillin/clavulanate) 875 mg PO BID for 2 weeks.   - Perform CT scan (computed tomography) of the abdomen with contrast in 2   weeks.   - EGD in 1 week for necrosectomy.  For questions, problems or results please call your physician Vince Teran MD at Work:  [phone]  If you have any questions about the above instructions, call the GI   department at [phone] or call the endoscopy unit at [phone]   from 7am until 3 pm.  [facility], EMERGENCY ROOM PHONE NUMBER:   [phone]  IF A COMPLICATION OR EMERGENCY SITUATION ARISES AND YOU ARE UNABLE TO REACH   YOUR PHYSICIAN - GO DIRECTLY TO THE EMERGENCY ROOM.  I have read or have had read to me these discharge instructions for my   procedure and have received a written copy.  I understand these   instructions and will follow-up with my physician if I have any questions.     __________________________________       _____________________________________  Nurse Signature                                          Patient/Designated   Responsible Party Signature  MD Vince Eduardo MD  12/13/2022 8:46:18 PM  This report has been verified and signed  electronically.  Dear patient,  As a result of recent federal legislation (The Federal Cures Act), you may   receive lab or pathology results from your procedure in your [facility]   account before your physician is able to contact you. Your physician or   their representative will relay the results to you with their   recommendations at their soonest availability.  Thank you,  PROVATION

## 2022-12-14 NOTE — PROVATION PATIENT INSTRUCTIONS
Discharge Summary/Instructions after an Endoscopic Procedure  Patient Name: Guevara Osullivan  Patient MRN: [id number]  Patient YOB: 1970 Tuesday, December 13, 2022 Vince Teran MD  Dear patient,  As a result of recent federal legislation (The Federal Cures Act), you may   receive lab or pathology results from your procedure in your [facility]   account before your physician is able to contact you. Your physician or   their representative will relay the results to you with their   recommendations at their soonest availability.  Thank you,  RESTRICTIONS:  During your procedure today, you received medications for sedation.  These   medications may affect your judgment, balance and coordination.  Therefore,   for 24 hours, you have the following restrictions:   - DO NOT drive a car, operate machinery, make legal/financial decisions,   sign important papers or drink alcohol.    ACTIVITY:  Today: no heavy lifting, straining or running due to procedural   sedation/anesthesia.  The following day: return to full activity including work.  DIET:  Eat and drink normally unless instructed otherwise.     TREATMENT FOR COMMON SIDE EFFECTS:  - Mild abdominal pain, nausea, belching, bloating or excessive gas:  rest,   eat lightly and use a heating pad.  - Sore Throat: treat with throat lozenges and/or gargle with warm salt   water.  - Because air was used during the procedure, expelling large amounts of air   from your rectum or belching is normal.  - If a bowel prep was taken, you may not have a bowel movement for 1-3 days.    This is normal.  SYMPTOMS TO WATCH FOR AND REPORT TO YOUR PHYSICIAN:  1. Abdominal pain or bloating, other than gas cramps.  2. Chest pain.  3. Back pain.  4. Signs of infection such as: chills or fever occurring within 24 hours   after the procedure.  5. Rectal bleeding, which would show as bright red, maroon, or black stools.   (A tablespoon of blood from the rectum is not serious,  especially if   hemorrhoids are present.)  6. Vomiting.  7. Weakness or dizziness.  GO DIRECTLY TO THE NEAREST EMERGENCY ROOM IF YOU HAVE ANY OF THE FOLLOWING:      Difficulty breathing              Chills and/or fever over 101 F   Persistent vomiting and/or vomiting blood   Severe abdominal pain   Severe chest pain   Black, tarry stools   Bleeding- more than one tablespoon   Any other symptom or condition that you feel may need urgent attention  Your doctor recommends these additional instructions:  If any biopsies were taken, your doctors clinic will contact you in 1 to 2   weeks with any results.  - Admit the patient to [facility] for ongoing care.   - Resume previous diet.   - Continue present medications.   - Attempt ERCP after cystgastrostomy and decompression.  For questions, problems or results please call your physician Vince Teran MD at Work:  [phone]  If you have any questions about the above instructions, call the GI   department at [phone] or call the endoscopy unit at [phone]   from 7am until 3 pm.  [facility], EMERGENCY ROOM PHONE NUMBER:   [phone]  IF A COMPLICATION OR EMERGENCY SITUATION ARISES AND YOU ARE UNABLE TO REACH   YOUR PHYSICIAN - GO DIRECTLY TO THE EMERGENCY ROOM.  I have read or have had read to me these discharge instructions for my   procedure and have received a written copy.  I understand these   instructions and will follow-up with my physician if I have any questions.     __________________________________       _____________________________________  Nurse Signature                                          Patient/Designated   Responsible Party Signature  MD Vince Eduardo MD  12/13/2022 9:08:56 PM  This report has been verified and signed electronically.  Dear patient,  As a result of recent federal legislation (The Federal Cures Act), you may   receive lab or pathology  results from your procedure in your [facility]   account before your physician is able to contact you. Your physician or   their representative will relay the results to you with their   recommendations at their soonest availability.  Thank you,  PROVATION

## 2022-12-14 NOTE — DISCHARGE SUMMARY
[facility] Surg  Discharge Note  Short Stay    Procedure(s) (LRB):  ERCP (ENDOSCOPIC RETROGRADE CHOLANGIOPANCREATOGRAPHY) (N/A)  ULTRASOUND, UPPER GI TRACT, ENDOSCOPIC (N/A)      OUTCOME: Patient tolerated treatment/procedure well without complication and is now ready for discharge.    DISPOSITION: Home or Self Care    FINAL DIAGNOSIS:  Pancreatic cyst    FOLLOWUP: In clinic    DISCHARGE INSTRUCTIONS:    Discharge Procedure Orders   Diet Dysphagia Soft     Activity as tolerated        TIME SPENT ON DISCHARGE: 16 minutes

## 2022-12-14 NOTE — PLAN OF CARE
Problem: Adjustment to Illness (Sepsis/Septic Shock)  Goal: Optimal Coping  Outcome: Ongoing, Progressing     Problem: Bleeding (Sepsis/Septic Shock)  Goal: Absence of Bleeding  Outcome: Ongoing, Progressing     Problem: Glycemic Control Impaired (Sepsis/Septic Shock)  Goal: Blood Glucose Level Within Desired Range  Outcome: Ongoing, Progressing     Problem: Infection Progression (Sepsis/Septic Shock)  Goal: Absence of Infection Signs and Symptoms  Outcome: Ongoing, Progressing     Problem: Nutrition Impaired (Sepsis/Septic Shock)  Goal: Optimal Nutrition Intake  Outcome: Ongoing, Progressing     Problem: Adult Inpatient Plan of Care  Goal: Plan of Care Review  Outcome: Ongoing, Progressing  Goal: Patient-Specific Goal (Individualized)  Outcome: Ongoing, Progressing  Goal: Absence of Hospital-Acquired Illness or Injury  Outcome: Ongoing, Progressing  Goal: Optimal Comfort and Wellbeing  Outcome: Ongoing, Progressing  Goal: Readiness for Transition of Care  Outcome: Ongoing, Progressing

## 2022-12-14 NOTE — PLAN OF CARE
[facility] Med Surg  Initial Discharge Assessment       Primary Care Provider: Dima Ch MD    Admission Diagnosis: PSC (primary sclerosing cholangitis) [K83.01]    Admission Date: 12/13/2022  Expected Discharge Date: 12/14/2022    Discharge Barriers Identified: None    Payor: [employer] / Plan: [facility] CHOICE PLUS / Product Type: Commercial /     Extended Emergency Contact Information  Primary Emergency Contact: Kylah Méndez  Mobile Phone: [phone]  Relation: Significant other  Preferred language: English   needed? No    Discharge Plan A: Home with family  Discharge Plan B: Home with family      [id number] - [place] - [address]  [address]  [employer] [address]  Phone: [phone] Fax: [phone]      Initial Assessment (most recent)       Adult Discharge Assessment - 12/14/22 1422          Discharge Assessment    Assessment Type Discharge Planning Assessment     Confirmed/corrected address, phone number and insurance Yes     Confirmed Demographics Correct on Facesheet     Source of Information patient     Communicated APRIL with patient/caregiver Yes     People in Home significant other     Do you expect to return to your current living situation? Yes     Do you have help at home or someone to help you manage your care at home? Yes     Who are your caregiver(s) and their phone number(s)? Leticia Lynne (ANDREI) [phone]     Prior to hospitilization cognitive status: Alert/Oriented     Current cognitive status: Alert/Oriented     Equipment Currently Used at Home none     Readmission within 30 days? No     Patient currently being followed by outpatient case management? No     Do you currently have service(s) that help you manage your care at home? No     Do you take prescription medications? Yes     Do you have prescription coverage? Yes     Do you have any problems affording any of your prescribed medications? No     Is the  patient taking medications as prescribed? yes     Who is going to help you get home at discharge? Leticia Lynne ([place]) [phone]     How do you get to doctors appointments? car, drives self     Are you on dialysis? No     Do you take coumadin? No     Discharge Plan A Home with family     Discharge Plan B Home with family     DME Needed Upon Discharge  none     Discharge Plan discussed with: Spouse/sig other     Name(s) and Number(s) Leticia Stillur ([place]) [phone]     Discharge Barriers Identified None                      Spoke to pt at bedside for DC assessment. Pt lives at home with his partner and does not use any DME. Pt's partner asked that pt's nurse take pt's temperature because she's worried that it may be rising. Pt's nurse notified. No CM DC needs identified at this time.     Tino Birmingham LMSW 12/14/2022 2:27 PM

## 2022-12-14 NOTE — PLAN OF CARE
[facility] Med [facility]  Discharge Final Note    Primary Care Provider: Dima Ch MD    Expected Discharge Date: 12/14/2022    Final Discharge Note (most recent)       Final Note - 12/14/22 1430          Final Note    Assessment Type Final Discharge Note     Anticipated Discharge Disposition Home or Self Care        Post-Acute Status    Discharge Delays None known at this time                     Pt to DC with no CM DC needs.    Tino Birmingham LMSW 12/14/2022 2:32 PM

## 2022-12-20 NOTE — H&P
PRE PROCEDURE H&P    Patient Name: Guevara Osullivan II  MRN: [id number]  : 1970  Date of Procedure:  2022  Referring Physician: Reynaldo Karimi PA-C  Primary Physician: Dima Ch MD  Procedure Physician: Vince Teran MD       Planned Procedure: EGD and ERCP  Diagnosis: pancreatic endoscopic necrosectomy, ERCP for stent exchange for PSC  Chief Complaint: Same as above    HPI: Patient is an 52 y.o. male is here for the above.     ERCP is currently recommended. The risks, benefits and alternatives of the procedure were discussed with the patient in detail. Benefits include removal of stones, stents, debri, sludge; dilation; placement of a stent; biopsies. Risks include bleeding (0.3-2%), pancreatitis (3%), cholangitis (0.5-3%), perforation (0.08-0.6%), cholecystitis (0.5%), sedation related adverse events.. Educational material of the biliary anatomy has been provided today for educational purposes. Other risks include, risks of adverse reaction to sedation requiring the use of reversal agents, bleeding requiring blood transfusion, perforation requiring surgical intervention, technical failure, aspiration leading to respiratory distress and respiratory failure resulting in endotracheal intubation and mechanical ventilation including death. Anesthesia is utilized for this procedure, it is up to the anesthesiologist to determine airway safety including elective endotracheal intubation. Questions were answered, the patient agree to proceed. There was no language barriers.       Past Medical History:   Past Medical History:   Diagnosis Date    Cancer     COLON CANCER     Digestive disorder     Hypertension     Primary sclerosing cholangitis     Ulcerative colitis     s/p colectomy with J- pouch        Past Surgical History:  Past Surgical History:   Procedure Laterality Date    COLON SURGERY      Colectomy with J- pouch    ENDOSCOPIC ULTRASOUND OF UPPER GASTROINTESTINAL TRACT N/A  10/14/2022    Procedure: ULTRASOUND, UPPER GI TRACT,;  Surgeon: Vince Teran MD;  Location: [facility] ENDO;  Service: Endoscopy;  Laterality: N/A;  upper and linear    ENDOSCOPIC ULTRASOUND OF UPPER GASTROINTESTINAL TRACT N/A 12/13/2022    Procedure: ULTRASOUND, UPPER GI TRACT, ENDOSCOPIC;  Surgeon: Vince Teran MD;  Location: [facility] ENDO;  Service: Endoscopy;  Laterality: N/A;    ERCP N/A 10/14/2022    Procedure: ERCP (ENDOSCOPIC RETROGRADE CHOLANGIOPANCREATOGRAPHY) with spyglass;  Surgeon: Vince Teran MD;  Location: [facility] ENDO;  Service: Endoscopy;  Laterality: N/A;    ERCP N/A 12/13/2022    Procedure: ERCP (ENDOSCOPIC RETROGRADE CHOLANGIOPANCREATOGRAPHY);  Surgeon: Vince Teran MD;  Location: [facility];  Service: Endoscopy;  Laterality: N/A;    POUCHOSCOPY          Home Medications:  Prior to Admission medications    Medication Sig Start Date End Date Taking? Authorizing Provider   amoxicillin-clavulanate 500-125mg (AUGMENTIN) 500-125 mg Tab Take 1 tablet (500 mg total) by mouth 2 (two) times daily. for 14 days 12/14/22 12/28/22 Yes Reynaldo Karimi PA-C   dicyclomine (BENTYL) 20 mg tablet Take 1 tablet (20 mg total) by mouth 4 (four) times daily before meals and nightly. PRN 12/14/22 1/13/23 Yes Reynaldo Karimi PA-C   HYDROcodone-acetaminophen (NORCO)  mg per tablet Take 1 tablet by mouth every 12 (twelve) hours as needed for Pain.   Yes Historical Provider   oxyCODONE (ROXICODONE) 10 mg Tab immediate release tablet Take 1 tablet (10 mg total) by mouth every 6 (six) hours as needed for Pain. 10/19/22  Yes Maeve Parish, JOHN   pantoprazole (PROTONIX) 40 MG tablet Take 40 mg by mouth once daily.    Historical Provider        Allergies:  Review of patient's allergies indicates:   Allergen Reactions    Ciprofloxacin Other (See Comments)     Pt previously had medication reaction; suffered achilles rupture         Social History:   Social History     Socioeconomic  History    Marital status: Single   Tobacco Use    Smoking status: Never    Smokeless tobacco: Never   Substance and Sexual Activity    Alcohol use: Not Currently    Drug use: Never       Family History:  History reviewed. No pertinent family history.    ROS: No acute cardiac events, no acute respiratory complaints.     Physical Exam (all patients):    /82 (BP Location: Left arm, Patient Position: Lying)   Pulse 89   Temp 97.7 °F (36.5 °C) (Temporal)   Resp 18   Ht 6' (1.829 m)   Wt 81.6 kg (180 lb)   SpO2 98%   BMI 24.41 kg/m²   Lungs: Clear to auscultation bilaterally, respirations unlabored  Heart: Regular rate and rhythm, S1 and S2 normal, no obvious murmurs  Abdomen:         Soft, non-tender, bowel sounds normal, no masses, no organomegaly    Lab Results   Component Value Date    WBC 12.34 12/14/2022    MCV 91 12/14/2022    RDW 12.7 12/14/2022     12/14/2022    GLU 84 12/14/2022    HGBA1C 5.5 10/20/2021    BUN 11 12/14/2022     12/14/2022    K 3.6 12/14/2022    CL 98 12/14/2022        SEDATION PLAN: per anesthesia      History reviewed, vital signs satisfactory, cardiopulmonary status satisfactory, sedation options, risks and plans have been discussed with the patient  All their questions were answered and the patient agrees to the sedation procedures as planned and the patient is deemed an appropriate candidate for the sedation as planned.    Procedure explained to patient, informed consent obtained and placed in chart.    Vince Trean  12/20/2022  9:25 AM

## 2022-12-20 NOTE — PLAN OF CARE
Discussed poc with pt, pt verbalized understanding    Purposeful rounding every 2hours    VS wnl  Fall precautions in place, remains injury free  Pain and nausea under control with PRN meds    IVFs  Abx to be given as prescribed  Bed locked at lowest position  Call light within reach    Chart check complete  Will cont with POC

## 2022-12-20 NOTE — PROGRESS NOTES
Mr. Osullivan underwent upper endoscopy with necrosectomy of the pancreatic walled-off necrosis.  A duodenal stricture was seen at the bulb that was biopsied and dilated to 18 mm.  Did not appear neoplastic but appeared more concerning for a peptic stricture.  Mr. Osullivan did admit to having prior peptic ulcer disease at the distal stomach/duodenum.      ERCP was then performed and the biliary stent was exchanged.  Intrahepatic ducts had a string of beads appearance consistent with PSC.  A distal bile duct stricture 3 cm was seen that was brushed and biopsied.  Contrast is not drained easily from the intrahepatic ducts and the common hepatic duct so the decision was made to place another stent.  Dilation of the distal biliary stricture was performed with Soehendra dilators with 7 Romanian followed by a half Romanian.  The 7 Romanian 7 cm stent was placed across the stricture.    He is to continue antibiotics and will be continued with Augmentin 500 mg twice a day for a week.  Clear liquids for now.  We will get an upper GI study to assess the duodenal stricture and if it is patent on this study we will hold off of any further treatment otherwise we will plan to place a temporary fully covered duodenal stent, we will plan for an Axios 20 mm stent.    Will discuss his last CT findings on tumor board as well as Dr. Wang.    Vince Teran MD  Gastroenterology  Director of Advanced Endoscopy at [facility]

## 2022-12-20 NOTE — PROVATION PATIENT INSTRUCTIONS
Discharge Summary/Instructions after an Endoscopic Procedure  Patient Name: Guevara Osullivan  Patient MRN: [id number]  Patient YOB: 1970 Tuesday, December 20, 2022 Vince Teran MD  Dear patient,  As a result of recent federal legislation (The Federal Cures Act), you may   receive lab or pathology results from your procedure in your [facility]   account before your physician is able to contact you. Your physician or   their representative will relay the results to you with their   recommendations at their soonest availability.  Thank you,  RESTRICTIONS:  During your procedure today, you received medications for sedation.  These   medications may affect your judgment, balance and coordination.  Therefore,   for 24 hours, you have the following restrictions:   - DO NOT drive a car, operate machinery, make legal/financial decisions,   sign important papers or drink alcohol.    ACTIVITY:  Today: no heavy lifting, straining or running due to procedural   sedation/anesthesia.  The following day: return to full activity including work.  DIET:  Eat and drink normally unless instructed otherwise.     TREATMENT FOR COMMON SIDE EFFECTS:  - Mild abdominal pain, nausea, belching, bloating or excessive gas:  rest,   eat lightly and use a heating pad.  - Sore Throat: treat with throat lozenges and/or gargle with warm salt   water.  - Because air was used during the procedure, expelling large amounts of air   from your rectum or belching is normal.  - If a bowel prep was taken, you may not have a bowel movement for 1-3 days.    This is normal.  SYMPTOMS TO WATCH FOR AND REPORT TO YOUR PHYSICIAN:  1. Abdominal pain or bloating, other than gas cramps.  2. Chest pain.  3. Back pain.  4. Signs of infection such as: chills or fever occurring within 24 hours   after the procedure.  5. Rectal bleeding, which would show as bright red, maroon, or black stools.   (A tablespoon of blood from the rectum is not serious,  especially if   hemorrhoids are present.)  6. Vomiting.  7. Weakness or dizziness.  GO DIRECTLY TO THE NEAREST EMERGENCY ROOM IF YOU HAVE ANY OF THE FOLLOWING:      Difficulty breathing              Chills and/or fever over 101 F   Persistent vomiting and/or vomiting blood   Severe abdominal pain   Severe chest pain   Black, tarry stools   Bleeding- more than one tablespoon   Any other symptom or condition that you feel may need urgent attention  Your doctor recommends these additional instructions:  If any biopsies were taken, your doctors clinic will contact you in 1 to 2   weeks with any results.  - Admit the patient to [facility] for observation.   - Clear liquid diet.   - Continue present medications.   - Await pathology results.   - Repeat ERCP in 2 months to exchange stent.  For questions, problems or results please call your physician Vince Teran MD at Work:  [phone]  If you have any questions about the above instructions, call the GI   department at [phone] or call the endoscopy unit at [phone]   from 7am until 3 pm.  [facility], EMERGENCY ROOM PHONE NUMBER:   [phone]  IF A COMPLICATION OR EMERGENCY SITUATION ARISES AND YOU ARE UNABLE TO REACH   YOUR PHYSICIAN - GO DIRECTLY TO THE EMERGENCY ROOM.  I have read or have had read to me these discharge instructions for my   procedure and have received a written copy.  I understand these   instructions and will follow-up with my physician if I have any questions.     __________________________________       _____________________________________  Nurse Signature                                          Patient/Designated   Responsible Party Signature  MD Vince Eduardo MD  12/20/2022 4:08:42 PM  This report has been verified and signed electronically.  Dear patient,  As a result of recent federal legislation (The Federal Cures Act), you may   receive lab or  pathology results from your procedure in your [facility]   account before your physician is able to contact you. Your physician or   their representative will relay the results to you with their   recommendations at their soonest availability.  Thank you,  PROVATION

## 2022-12-20 NOTE — PROGRESS NOTES
Pharmacist Renal Dose Adjustment Note    Guevara Osullivan II is a 52 y.o. male being treated with the medication augmentin    Patient Data:    Vital Signs (Most Recent):  Temp: 98.2 °F (36.8 °C) (12/20/22 1112)  Pulse: 88 (12/20/22 1410)  Resp: 16 (12/20/22 1340)  BP: 117/71 (12/20/22 1410)  SpO2: 97 % (12/20/22 1410) Vital Signs (72h Range):  Temp:  [97.7 °F (36.5 °C)-98.2 °F (36.8 °C)]   Pulse:  [77-91]   Resp:  [16-18]   BP: (110-133)/(67-82)   SpO2:  [97 %-98 %]      Recent Labs   Lab 12/14/22  0450   CREATININE 0.8     Serum creatinine: 0.8 mg/dL 12/14/22 0450  Estimated creatinine clearance: 115.9 mL/min    Medication:augmentin 500mg every 12 hours will be changed to augmentin 500mg every 8 hours for crcl >30 ml/min    Pharmacist's Name: Maeve Zuniga  Pharmacist's Extension: [phone]

## 2022-12-20 NOTE — PROVATION PATIENT INSTRUCTIONS
Discharge Summary/Instructions after an Endoscopic Procedure  Patient Name: Guevara Osullivan  Patient MRN: [id number]  Patient YOB: 1970 Tuesday, December 20, 2022 Vince Teran MD  Dear patient,  As a result of recent federal legislation (The Federal Cures Act), you may   receive lab or pathology results from your procedure in your [facility]   account before your physician is able to contact you. Your physician or   their representative will relay the results to you with their   recommendations at their soonest availability.  Thank you,  RESTRICTIONS:  During your procedure today, you received medications for sedation.  These   medications may affect your judgment, balance and coordination.  Therefore,   for 24 hours, you have the following restrictions:   - DO NOT drive a car, operate machinery, make legal/financial decisions,   sign important papers or drink alcohol.    ACTIVITY:  Today: no heavy lifting, straining or running due to procedural   sedation/anesthesia.  The following day: return to full activity including work.  DIET:  Eat and drink normally unless instructed otherwise.     TREATMENT FOR COMMON SIDE EFFECTS:  - Mild abdominal pain, nausea, belching, bloating or excessive gas:  rest,   eat lightly and use a heating pad.  - Sore Throat: treat with throat lozenges and/or gargle with warm salt   water.  - Because air was used during the procedure, expelling large amounts of air   from your rectum or belching is normal.  - If a bowel prep was taken, you may not have a bowel movement for 1-3 days.    This is normal.  SYMPTOMS TO WATCH FOR AND REPORT TO YOUR PHYSICIAN:  1. Abdominal pain or bloating, other than gas cramps.  2. Chest pain.  3. Back pain.  4. Signs of infection such as: chills or fever occurring within 24 hours   after the procedure.  5. Rectal bleeding, which would show as bright red, maroon, or black stools.   (A tablespoon of blood from the rectum is not serious,  especially if   hemorrhoids are present.)  6. Vomiting.  7. Weakness or dizziness.  GO DIRECTLY TO THE NEAREST EMERGENCY ROOM IF YOU HAVE ANY OF THE FOLLOWING:      Difficulty breathing              Chills and/or fever over 101 F   Persistent vomiting and/or vomiting blood   Severe abdominal pain   Severe chest pain   Black, tarry stools   Bleeding- more than one tablespoon   Any other symptom or condition that you feel may need urgent attention  Your doctor recommends these additional instructions:  If any biopsies were taken, your doctors clinic will contact you in 1 to 2   weeks with any results.  - Admit the patient to [facility] for observation.   - Clear liquid diet.   - Continue present medications.   - Repeat upper endoscopy in 2 weeks stent removal.  For questions, problems or results please call your physician Vince Teran MD at Work:  [phone]  If you have any questions about the above instructions, call the GI   department at [phone] or call the endoscopy unit at [phone]   from 7am until 3 pm.  [facility] [place], EMERGENCY ROOM PHONE NUMBER:   [phone]  IF A COMPLICATION OR EMERGENCY SITUATION ARISES AND YOU ARE UNABLE TO REACH   YOUR PHYSICIAN - GO DIRECTLY TO THE EMERGENCY ROOM.  I have read or have had read to me these discharge instructions for my   procedure and have received a written copy.  I understand these   instructions and will follow-up with my physician if I have any questions.     __________________________________       _____________________________________  Nurse Signature                                          Patient/Designated   Responsible Party Signature  MD Vince Eduardo MD  12/20/2022 4:00:05 PM  This report has been verified and signed electronically.  Dear patient,  As a result of recent federal legislation (The Federal Cures Act), you may   receive lab or pathology results from  your procedure in your [facility]   account before your physician is able to contact you. Your physician or   their representative will relay the results to you with their   recommendations at their soonest availability.  Thank you,  PROVATION

## 2022-12-20 NOTE — ANESTHESIA PREPROCEDURE EVALUATION
12/20/2022  Guevara Osullivan II is a 52 y.o., male.      Pre-op Assessment    I have reviewed the Patient Summary Reports.     I have reviewed the Nursing Notes. I have reviewed the NPO Status.   I have reviewed the Medications.     Review of Systems  Anesthesia Hx:  No problems with previous Anesthesia  Denies Family Hx of Anesthesia complications.   Denies Personal Hx of Anesthesia complications.   Social:  Non-Smoker    Hematology/Oncology:  Hematology Normal       -- Cancer in past history:    EENT/Dental:EENT/Dental Normal   Cardiovascular:   Exercise tolerance: good Hypertension ECG has been reviewed.    Pulmonary:  Pulmonary Normal    Renal/:  Renal/ Normal     Hepatic/GI:   PUD, Liver Disease, Primary sclerosing cholangitis and pancreatic cyst   Musculoskeletal:  Musculoskeletal Normal    Neurological:  Neurology Normal    Endocrine:  Endocrine Normal    Dermatological:  Skin Normal    Psych:  Psychiatric Normal           Physical Exam  General: Well nourished, Cooperative, Alert and Oriented    Airway:  Mallampati: II   Mouth Opening: Normal  TM Distance: Normal  Tongue: Normal  Neck ROM: Normal ROM    Dental:  Intact  Pt denies loose or removable dentition  Heart:  Rate: Normal  Rhythm: Regular Rhythm        Anesthesia Plan  Type of Anesthesia, risks & benefits discussed:    Anesthesia Type: Gen ETT  Intra-op Monitoring Plan: Standard ASA Monitors  Post Op Pain Control Plan: multimodal analgesia  Induction:  IV  Informed Consent: Informed consent signed with the Patient and all parties understand the risks and agree with anesthesia plan.  All questions answered.   ASA Score: 3  Day of Surgery Review of History & Physical: H&P Update referred to the surgeon/provider.I have interviewed and examined the patient. I have reviewed the patient's H&P dated: There are no significant changes.      Ready For Surgery From Anesthesia Perspective.     .

## 2022-12-20 NOTE — TRANSFER OF CARE
Anesthesia Transfer of Care Note    Patient: Guevara Osullivan II    Procedure(s) Performed: Procedure(s) (LRB):  ERCP (ENDOSCOPIC RETROGRADE CHOLANGIOPANCREATOGRAPHY) (N/A)  EGD (ESOPHAGOGASTRODUODENOSCOPY) (N/A)    Patient location: GI    Anesthesia Type: general    Transport from OR: Transported from OR on room air with adequate spontaneous ventilation    Post pain: adequate analgesia    Post assessment: no apparent anesthetic complications    Post vital signs: stable    Level of consciousness: sedated and responds to stimulation    Nausea/Vomiting: no nausea/vomiting    Complications: none    Transfer of care protocol was followed      Last vitals:   Visit Vitals  /67 (BP Location: Left arm, Patient Position: Lying)   Pulse 91   Temp 36.8 °C (98.2 °F) (Temporal)   Resp 18   Ht 6' (1.829 m)   Wt 81.6 kg (180 lb)   SpO2 98%   BMI 24.41 kg/m²

## 2022-12-21 NOTE — DISCHARGE SUMMARY
[facility]  Discharge Note  Short Stay    Procedure(s) (LRB):  ERCP (ENDOSCOPIC RETROGRADE CHOLANGIOPANCREATOGRAPHY) (N/A)  EGD (ESOPHAGOGASTRODUODENOSCOPY) (N/A)      OUTCOME: Patient tolerated treatment/procedure well without complication and is now ready for discharge.  UGI series showed duodenal narrowing but contrast was able to traverse the area. Patient tolerating liquid diet. Pain controlled. LFTs trending down.     DISPOSITION: Home or Self Care    FINAL DIAGNOSIS:  <principal problem not specified>    FOLLOWUP: In clinic    DISCHARGE INSTRUCTIONS:    Discharge Procedure Orders   Diet Dysphagia Soft     Activity as tolerated         Clinical Reference Documents Added to Patient Instructions         Document    BILIARY STENT PLACEMENT (ENGLISH)    ENDOSCOPIC RETROGRADE CHOLANGIOPANCREATOGRAPHY DISCHARGE INSTRUCTIONS (ENGLISH)            TIME SPENT ON DISCHARGE: 16 minutes

## 2022-12-21 NOTE — PLAN OF CARE
[facility] Med Surg  Discharge Final Note    Primary Care Provider: Dima Ch MD    Expected Discharge Date: 12/21/2022    Final Discharge Note (most recent)       Final Note - 12/21/22 1515          Final Note    Assessment Type Final Discharge Note     Anticipated Discharge Disposition Home or Self Care        Post-Acute Status    Discharge Delays None known at this time                   Pt Dced home with no CM DC needs.    Tino Birmingham LMSW 12/21/2022 3:17 PM

## 2022-12-21 NOTE — NURSING
DISCHARGE INSTRUCTIONS REVIEWED WITH PATIENT AND FAMILY AT BEDSIDE WITH VERBALIZATION OF UNDERSTANDING.  SALINE LOCK REMOVED FROM RIGHT AC WITH PRESSURE DRESSING APPLIED. MEDICATION RECEIVED FROM PHARMACY. PATIENT TRANSPORTED TO VEHICLE PER W/C ACCOMPANIED BY STAFF.

## 2022-12-21 NOTE — PLAN OF CARE
Problem: Adult Inpatient Plan of Care  Goal: Plan of Care Review  Outcome: Ongoing, Progressing     Problem: Pain Acute  Goal: Acceptable Pain Control and Functional Ability  Outcome: Ongoing, Progressing     Problem: Nutrition Impaired (Sepsis/Septic Shock)  Goal: Optimal Nutrition Intake  Outcome: Ongoing, Progressing     Problem: Infection Progression (Sepsis/Septic Shock)  Goal: Absence of Infection Signs and Symptoms  Outcome: Ongoing, Progressing    Patient remains free from injury. Safety precautions maintained. No s/s of acute distress. Pain controlled per MD order. IVF infusing. Chart and orders review completed. Pt education about care completed.

## 2022-12-22 NOTE — ANESTHESIA POSTPROCEDURE EVALUATION
Anesthesia Post Evaluation    Patient: Guevara Osullivan II    Procedure(s) Performed: Procedure(s) (LRB):  ERCP (ENDOSCOPIC RETROGRADE CHOLANGIOPANCREATOGRAPHY) (N/A)  EGD (ESOPHAGOGASTRODUODENOSCOPY) (N/A)    Final Anesthesia Type: general      Patient location during evaluation: PACU  Patient participation: Yes- Able to Participate  Level of consciousness: awake and alert and oriented  Post-procedure vital signs: reviewed and stable  Pain management: adequate  Airway patency: patent  SHI mitigation strategies: Verification of full reversal of neuromuscular block  PONV status at discharge: No PONV  Anesthetic complications: no      Cardiovascular status: blood pressure returned to baseline and hemodynamically stable  Respiratory status: unassisted  Hydration status: euvolemic  Follow-up not needed.          Vitals Value Taken Time   /64 12/21/22 1300   Temp 37.2 °C (98.9 °F) 12/21/22 1300   Pulse 73 12/21/22 1300   Resp 18 12/21/22 1315   SpO2 98 % 12/21/22 1300         Event Time   Out of Recovery 16:08:04         Pain/Margo Score: Pain Rating Prior to Med Admin: 7 (12/21/2022  1:15 PM)  Pain Rating Post Med Admin: 2 (12/21/2022  1:45 PM)

## 2022-12-22 NOTE — ANESTHESIA POSTPROCEDURE EVALUATION
Anesthesia Post Evaluation    Patient: Guevara Osullivan II    Procedure(s) Performed: Procedure(s) (LRB):  ERCP (ENDOSCOPIC RETROGRADE CHOLANGIOPANCREATOGRAPHY) (N/A)  EGD (ESOPHAGOGASTRODUODENOSCOPY) (N/A)    [facility] Anesthesia Post Op Evaluation      Vitals Value Taken Time   /64 12/21/22 1300   Temp 37.2 °C (98.9 °F) 12/21/22 1300   Pulse 73 12/21/22 1300   Resp 18 12/21/22 1315   SpO2 98 % 12/21/22 1300         Event Time   Out of Recovery 16:08:04         Pain/Margo Score: Pain Rating Prior to Med Admin: 7 (12/21/2022  1:15 PM)  Pain Rating Post Med Admin: 2 (12/21/2022  1:45 PM)

## 2023-01-01 ENCOUNTER — LAB VISIT (OUTPATIENT)
Dept: LAB | Facility: HOSPITAL | Age: 53
End: 2023-01-01
Attending: INTERNAL MEDICINE
Payer: COMMERCIAL

## 2023-01-01 ENCOUNTER — HOSPITAL ENCOUNTER (OUTPATIENT)
Facility: HOSPITAL | Age: 53
Discharge: HOME | End: 2023-01-18
Attending: STUDENT IN AN ORGANIZED HEALTH CARE EDUCATION/TRAINING PROGRAM | Admitting: STUDENT IN AN ORGANIZED HEALTH CARE EDUCATION/TRAINING PROGRAM
Payer: COMMERCIAL

## 2023-01-01 ENCOUNTER — TELEPHONE (OUTPATIENT)
Dept: PALLIATIVE MEDICINE | Facility: HOSPITAL | Age: 53
End: 2023-01-01
Payer: COMMERCIAL

## 2023-01-01 ENCOUNTER — DOCUMENTATION ONLY (OUTPATIENT)
Dept: HEMATOLOGY/ONCOLOGY | Facility: CLINIC | Age: 53
End: 2023-01-01
Payer: COMMERCIAL

## 2023-01-01 ENCOUNTER — ANESTHESIA EVENT (OUTPATIENT)
Dept: SURGERY | Facility: HOSPITAL | Age: 53
End: 2023-01-01
Payer: COMMERCIAL

## 2023-01-01 ENCOUNTER — PATIENT MESSAGE (OUTPATIENT)
Dept: NUTRITION | Facility: CLINIC | Age: 53
End: 2023-01-01
Payer: COMMERCIAL

## 2023-01-01 ENCOUNTER — TELEPHONE (OUTPATIENT)
Dept: SURGERY | Facility: CLINIC | Age: 53
End: 2023-01-01
Payer: COMMERCIAL

## 2023-01-01 ENCOUNTER — PATIENT MESSAGE (OUTPATIENT)
Dept: GASTROENTEROLOGY | Facility: CLINIC | Age: 53
End: 2023-01-01
Payer: COMMERCIAL

## 2023-01-01 ENCOUNTER — TELEPHONE (OUTPATIENT)
Dept: ENDOSCOPY | Facility: HOSPITAL | Age: 53
End: 2023-01-01
Payer: COMMERCIAL

## 2023-01-01 ENCOUNTER — PATIENT MESSAGE (OUTPATIENT)
Dept: PALLIATIVE MEDICINE | Facility: CLINIC | Age: 53
End: 2023-01-01
Payer: COMMERCIAL

## 2023-01-01 ENCOUNTER — OFFICE VISIT (OUTPATIENT)
Dept: GASTROENTEROLOGY | Facility: CLINIC | Age: 53
End: 2023-01-01
Payer: COMMERCIAL

## 2023-01-01 ENCOUNTER — PATIENT MESSAGE (OUTPATIENT)
Dept: HEMATOLOGY/ONCOLOGY | Facility: CLINIC | Age: 53
End: 2023-01-01

## 2023-01-01 ENCOUNTER — PATIENT MESSAGE (OUTPATIENT)
Dept: SURGERY | Facility: CLINIC | Age: 53
End: 2023-01-01
Payer: COMMERCIAL

## 2023-01-01 ENCOUNTER — OFFICE VISIT (OUTPATIENT)
Dept: PALLIATIVE MEDICINE | Facility: CLINIC | Age: 53
End: 2023-01-01
Payer: COMMERCIAL

## 2023-01-01 ENCOUNTER — ANESTHESIA EVENT (OUTPATIENT)
Dept: ENDOSCOPY | Facility: HOSPITAL | Age: 53
DRG: 920 | End: 2023-01-01
Payer: COMMERCIAL

## 2023-01-01 ENCOUNTER — PATIENT MESSAGE (OUTPATIENT)
Dept: PALLIATIVE MEDICINE | Facility: HOSPITAL | Age: 53
End: 2023-01-01
Payer: COMMERCIAL

## 2023-01-01 ENCOUNTER — TELEPHONE (OUTPATIENT)
Dept: PALLIATIVE MEDICINE | Facility: CLINIC | Age: 53
End: 2023-01-01
Payer: COMMERCIAL

## 2023-01-01 ENCOUNTER — OFFICE VISIT (OUTPATIENT)
Dept: HEMATOLOGY/ONCOLOGY | Facility: CLINIC | Age: 53
End: 2023-01-01
Payer: COMMERCIAL

## 2023-01-01 ENCOUNTER — HOSPITAL ENCOUNTER (OUTPATIENT)
Facility: HOSPITAL | Age: 53
Discharge: HOME | End: 2023-03-22
Attending: EMERGENCY MEDICINE | Admitting: INTERNAL MEDICINE
Payer: COMMERCIAL

## 2023-01-01 ENCOUNTER — TELEPHONE (OUTPATIENT)
Dept: PREADMISSION TESTING | Facility: HOSPITAL | Age: 53
End: 2023-01-01
Payer: COMMERCIAL

## 2023-01-01 ENCOUNTER — ANESTHESIA EVENT (OUTPATIENT)
Dept: ENDOSCOPY | Facility: HOSPITAL | Age: 53
DRG: 374 | End: 2023-01-01
Payer: COMMERCIAL

## 2023-01-01 ENCOUNTER — TELEPHONE (OUTPATIENT)
Dept: ENDOSCOPY | Facility: HOSPITAL | Age: 53
End: 2023-01-01

## 2023-01-01 ENCOUNTER — PATIENT MESSAGE (OUTPATIENT)
Dept: HEMATOLOGY/ONCOLOGY | Facility: CLINIC | Age: 53
End: 2023-01-01
Payer: COMMERCIAL

## 2023-01-01 ENCOUNTER — OFFICE VISIT (OUTPATIENT)
Dept: GENETICS | Facility: CLINIC | Age: 53
End: 2023-01-01
Payer: COMMERCIAL

## 2023-01-01 ENCOUNTER — HOSPITAL ENCOUNTER (OUTPATIENT)
Dept: RADIOLOGY | Facility: HOSPITAL | Age: 53
Discharge: HOME | End: 2023-03-09
Payer: COMMERCIAL

## 2023-01-01 ENCOUNTER — TELEPHONE (OUTPATIENT)
Dept: HEMATOLOGY/ONCOLOGY | Facility: CLINIC | Age: 53
End: 2023-01-01
Payer: COMMERCIAL

## 2023-01-01 ENCOUNTER — INFUSION (OUTPATIENT)
Dept: INFUSION THERAPY | Facility: HOSPITAL | Age: 53
End: 2023-01-01
Attending: INTERNAL MEDICINE
Payer: COMMERCIAL

## 2023-01-01 ENCOUNTER — ANESTHESIA (OUTPATIENT)
Dept: ENDOSCOPY | Facility: HOSPITAL | Age: 53
DRG: 920 | End: 2023-01-01
Payer: COMMERCIAL

## 2023-01-01 ENCOUNTER — PATIENT MESSAGE (OUTPATIENT)
Dept: HEPATOLOGY | Facility: CLINIC | Age: 53
End: 2023-01-01
Payer: COMMERCIAL

## 2023-01-01 ENCOUNTER — PATIENT MESSAGE (OUTPATIENT)
Dept: GENETICS | Facility: CLINIC | Age: 53
End: 2023-01-01
Payer: COMMERCIAL

## 2023-01-01 ENCOUNTER — HOSPITAL ENCOUNTER (OUTPATIENT)
Dept: RADIOLOGY | Facility: HOSPITAL | Age: 53
Discharge: HOME | End: 2023-02-09
Attending: SURGERY | Admitting: SURGERY
Payer: COMMERCIAL

## 2023-01-01 ENCOUNTER — SOCIAL WORK (OUTPATIENT)
Dept: HEMATOLOGY/ONCOLOGY | Facility: CLINIC | Age: 53
End: 2023-01-01
Payer: COMMERCIAL

## 2023-01-01 ENCOUNTER — HOSPITAL ENCOUNTER (OUTPATIENT)
Dept: RADIOLOGY | Facility: HOSPITAL | Age: 53
Discharge: HOME | End: 2023-02-02
Attending: INTERNAL MEDICINE
Payer: COMMERCIAL

## 2023-01-01 ENCOUNTER — HOSPITAL ENCOUNTER (OUTPATIENT)
Facility: HOSPITAL | Age: 53
Discharge: HOME | End: 2023-02-09
Attending: SURGERY | Admitting: SURGERY
Payer: COMMERCIAL

## 2023-01-01 ENCOUNTER — HOSPITAL ENCOUNTER (INPATIENT)
Facility: HOSPITAL | Age: 53
LOS: 2 days | Discharge: HOME | DRG: 920 | End: 2023-01-05
Attending: EMERGENCY MEDICINE | Admitting: FAMILY MEDICINE
Payer: COMMERCIAL

## 2023-01-01 ENCOUNTER — ANESTHESIA (OUTPATIENT)
Dept: ENDOSCOPY | Facility: HOSPITAL | Age: 53
End: 2023-01-01
Payer: COMMERCIAL

## 2023-01-01 ENCOUNTER — DOCUMENTATION ONLY (OUTPATIENT)
Dept: PALLIATIVE MEDICINE | Facility: CLINIC | Age: 53
End: 2023-01-01
Payer: COMMERCIAL

## 2023-01-01 ENCOUNTER — ANESTHESIA (OUTPATIENT)
Dept: SURGERY | Facility: HOSPITAL | Age: 53
End: 2023-01-01
Payer: COMMERCIAL

## 2023-01-01 ENCOUNTER — LAB VISIT (OUTPATIENT)
Dept: LAB | Facility: HOSPITAL | Age: 53
End: 2023-01-01
Attending: NURSE PRACTITIONER
Payer: COMMERCIAL

## 2023-01-01 ENCOUNTER — DOCUMENTATION ONLY (OUTPATIENT)
Dept: HEMATOLOGY/ONCOLOGY | Facility: CLINIC | Age: 53
End: 2023-01-01

## 2023-01-01 ENCOUNTER — TUMOR BOARD CONFERENCE (OUTPATIENT)
Dept: HEMATOLOGY/ONCOLOGY | Facility: CLINIC | Age: 53
End: 2023-01-01
Payer: COMMERCIAL

## 2023-01-01 ENCOUNTER — ANESTHESIA (OUTPATIENT)
Dept: ENDOSCOPY | Facility: HOSPITAL | Age: 53
DRG: 374 | End: 2023-01-01
Payer: COMMERCIAL

## 2023-01-01 ENCOUNTER — OFFICE VISIT (OUTPATIENT)
Dept: SURGERY | Facility: CLINIC | Age: 53
End: 2023-01-01
Payer: COMMERCIAL

## 2023-01-01 ENCOUNTER — TELEPHONE (OUTPATIENT)
Dept: HEPATOLOGY | Facility: CLINIC | Age: 53
End: 2023-01-01
Payer: COMMERCIAL

## 2023-01-01 ENCOUNTER — HOSPITAL ENCOUNTER (OUTPATIENT)
Dept: RADIOLOGY | Facility: HOSPITAL | Age: 53
Discharge: HOME | End: 2023-04-11
Attending: INTERNAL MEDICINE
Payer: COMMERCIAL

## 2023-01-01 ENCOUNTER — HOSPITAL ENCOUNTER (INPATIENT)
Facility: HOSPITAL | Age: 53
LOS: 5 days | Discharge: HOME | DRG: 374 | End: 2023-02-25
Attending: EMERGENCY MEDICINE | Admitting: INTERNAL MEDICINE
Payer: COMMERCIAL

## 2023-01-01 ENCOUNTER — DOCUMENTATION ONLY (OUTPATIENT)
Dept: SURGERY | Facility: CLINIC | Age: 53
End: 2023-01-01

## 2023-01-01 ENCOUNTER — CLINICAL SUPPORT (OUTPATIENT)
Dept: HEMATOLOGY/ONCOLOGY | Facility: CLINIC | Age: 53
End: 2023-01-01
Payer: COMMERCIAL

## 2023-01-01 ENCOUNTER — ANESTHESIA EVENT (OUTPATIENT)
Dept: ENDOSCOPY | Facility: HOSPITAL | Age: 53
End: 2023-01-01
Payer: COMMERCIAL

## 2023-01-01 ENCOUNTER — HOSPITAL ENCOUNTER (OUTPATIENT)
Facility: HOSPITAL | Age: 53
Discharge: HOME | End: 2023-04-24
Attending: INTERNAL MEDICINE | Admitting: INTERNAL MEDICINE
Payer: COMMERCIAL

## 2023-01-01 ENCOUNTER — HOSPITAL ENCOUNTER (OUTPATIENT)
Dept: RADIOLOGY | Facility: HOSPITAL | Age: 53
Discharge: HOME | End: 2023-03-10
Payer: COMMERCIAL

## 2023-01-01 ENCOUNTER — HOSPITAL ENCOUNTER (OUTPATIENT)
Dept: CARDIOLOGY | Facility: HOSPITAL | Age: 53
Discharge: HOME | End: 2023-03-29
Payer: COMMERCIAL

## 2023-01-01 ENCOUNTER — PATIENT MESSAGE (OUTPATIENT)
Dept: GASTROENTEROLOGY | Facility: CLINIC | Age: 53
End: 2023-01-01

## 2023-01-01 ENCOUNTER — CLINICAL SUPPORT (OUTPATIENT)
Dept: AUDIOLOGY | Facility: CLINIC | Age: 53
End: 2023-01-01
Payer: COMMERCIAL

## 2023-01-01 ENCOUNTER — CLINICAL SUPPORT (OUTPATIENT)
Dept: NUTRITION | Facility: CLINIC | Age: 53
End: 2023-01-01
Payer: COMMERCIAL

## 2023-01-01 VITALS
RESPIRATION RATE: 18 BRPM | SYSTOLIC BLOOD PRESSURE: 131 MMHG | TEMPERATURE: 97 F | HEART RATE: 69 BPM | HEART RATE: 71 BPM | DIASTOLIC BLOOD PRESSURE: 67 MMHG | OXYGEN SATURATION: 98 % | OXYGEN SATURATION: 97 % | RESPIRATION RATE: 16 BRPM | DIASTOLIC BLOOD PRESSURE: 82 MMHG | SYSTOLIC BLOOD PRESSURE: 106 MMHG | BODY MASS INDEX: 22.46 KG/M2 | HEIGHT: 72 IN | WEIGHT: 165.81 LBS | TEMPERATURE: 99 F

## 2023-01-01 VITALS
HEART RATE: 83 BPM | OXYGEN SATURATION: 98 % | WEIGHT: 164.25 LBS | HEIGHT: 72 IN | DIASTOLIC BLOOD PRESSURE: 79 MMHG | SYSTOLIC BLOOD PRESSURE: 122 MMHG | TEMPERATURE: 98 F | BODY MASS INDEX: 22.25 KG/M2

## 2023-01-01 VITALS
HEART RATE: 80 BPM | RESPIRATION RATE: 18 BRPM | SYSTOLIC BLOOD PRESSURE: 101 MMHG | DIASTOLIC BLOOD PRESSURE: 85 MMHG | OXYGEN SATURATION: 98 % | OXYGEN SATURATION: 97 % | SYSTOLIC BLOOD PRESSURE: 127 MMHG | RESPIRATION RATE: 16 BRPM | DIASTOLIC BLOOD PRESSURE: 72 MMHG | TEMPERATURE: 98 F | TEMPERATURE: 98 F | HEART RATE: 62 BPM

## 2023-01-01 VITALS
DIASTOLIC BLOOD PRESSURE: 74 MMHG | HEART RATE: 77 BPM | TEMPERATURE: 98 F | HEART RATE: 69 BPM | OXYGEN SATURATION: 98 % | TEMPERATURE: 98 F | RESPIRATION RATE: 16 BRPM | SYSTOLIC BLOOD PRESSURE: 127 MMHG | DIASTOLIC BLOOD PRESSURE: 71 MMHG | OXYGEN SATURATION: 97 % | SYSTOLIC BLOOD PRESSURE: 109 MMHG | RESPIRATION RATE: 16 BRPM

## 2023-01-01 VITALS
HEIGHT: 72 IN | TEMPERATURE: 98 F | WEIGHT: 173.31 LBS | OXYGEN SATURATION: 100 % | HEART RATE: 78 BPM | SYSTOLIC BLOOD PRESSURE: 152 MMHG | DIASTOLIC BLOOD PRESSURE: 94 MMHG | BODY MASS INDEX: 23.47 KG/M2

## 2023-01-01 VITALS
RESPIRATION RATE: 16 BRPM | HEIGHT: 72 IN | DIASTOLIC BLOOD PRESSURE: 68 MMHG | TEMPERATURE: 98 F | WEIGHT: 138.25 LBS | OXYGEN SATURATION: 98 % | HEART RATE: 71 BPM | BODY MASS INDEX: 18.73 KG/M2 | SYSTOLIC BLOOD PRESSURE: 107 MMHG

## 2023-01-01 VITALS
BODY MASS INDEX: 24.18 KG/M2 | DIASTOLIC BLOOD PRESSURE: 84 MMHG | TEMPERATURE: 98 F | HEART RATE: 74 BPM | OXYGEN SATURATION: 99 % | SYSTOLIC BLOOD PRESSURE: 129 MMHG | HEIGHT: 72 IN | WEIGHT: 178.56 LBS | RESPIRATION RATE: 18 BRPM

## 2023-01-01 VITALS
BODY MASS INDEX: 21.42 KG/M2 | DIASTOLIC BLOOD PRESSURE: 69 MMHG | TEMPERATURE: 98 F | WEIGHT: 153 LBS | DIASTOLIC BLOOD PRESSURE: 78 MMHG | SYSTOLIC BLOOD PRESSURE: 102 MMHG | RESPIRATION RATE: 16 BRPM | SYSTOLIC BLOOD PRESSURE: 111 MMHG | HEIGHT: 71 IN | SYSTOLIC BLOOD PRESSURE: 118 MMHG | BODY MASS INDEX: 20.6 KG/M2 | OXYGEN SATURATION: 97 % | OXYGEN SATURATION: 98 % | RESPIRATION RATE: 20 BRPM | WEIGHT: 147.69 LBS | HEART RATE: 76 BPM | OXYGEN SATURATION: 98 % | TEMPERATURE: 98 F | HEART RATE: 77 BPM | TEMPERATURE: 98 F | HEART RATE: 86 BPM | DIASTOLIC BLOOD PRESSURE: 78 MMHG

## 2023-01-01 VITALS
HEART RATE: 79 BPM | SYSTOLIC BLOOD PRESSURE: 128 MMHG | DIASTOLIC BLOOD PRESSURE: 79 MMHG | TEMPERATURE: 98 F | WEIGHT: 167.13 LBS | BODY MASS INDEX: 21.68 KG/M2 | HEART RATE: 87 BPM | HEIGHT: 72 IN | SYSTOLIC BLOOD PRESSURE: 155 MMHG | DIASTOLIC BLOOD PRESSURE: 93 MMHG | TEMPERATURE: 98 F | HEIGHT: 72 IN | RESPIRATION RATE: 15 BRPM | OXYGEN SATURATION: 97 % | OXYGEN SATURATION: 97 % | WEIGHT: 160.06 LBS | BODY MASS INDEX: 22.64 KG/M2 | RESPIRATION RATE: 18 BRPM

## 2023-01-01 VITALS
HEIGHT: 72 IN | TEMPERATURE: 98 F | OXYGEN SATURATION: 98 % | WEIGHT: 170 LBS | BODY MASS INDEX: 23.03 KG/M2 | SYSTOLIC BLOOD PRESSURE: 141 MMHG | HEART RATE: 80 BPM | DIASTOLIC BLOOD PRESSURE: 88 MMHG | RESPIRATION RATE: 15 BRPM

## 2023-01-01 VITALS
BODY MASS INDEX: 23.56 KG/M2 | WEIGHT: 173.94 LBS | SYSTOLIC BLOOD PRESSURE: 117 MMHG | HEART RATE: 84 BPM | HEIGHT: 72 IN | RESPIRATION RATE: 20 BRPM | DIASTOLIC BLOOD PRESSURE: 74 MMHG | TEMPERATURE: 98 F

## 2023-01-01 VITALS
DIASTOLIC BLOOD PRESSURE: 88 MMHG | SYSTOLIC BLOOD PRESSURE: 123 MMHG | OXYGEN SATURATION: 99 % | BODY MASS INDEX: 17.5 KG/M2 | HEART RATE: 77 BPM | TEMPERATURE: 98 F | HEIGHT: 72 IN | WEIGHT: 129.19 LBS

## 2023-01-01 VITALS
HEART RATE: 77 BPM | SYSTOLIC BLOOD PRESSURE: 104 MMHG | TEMPERATURE: 98 F | WEIGHT: 145.94 LBS | BODY MASS INDEX: 20.43 KG/M2 | DIASTOLIC BLOOD PRESSURE: 77 MMHG | HEIGHT: 71 IN | OXYGEN SATURATION: 98 %

## 2023-01-01 VITALS
DIASTOLIC BLOOD PRESSURE: 78 MMHG | WEIGHT: 171.06 LBS | SYSTOLIC BLOOD PRESSURE: 117 MMHG | TEMPERATURE: 98 F | BODY MASS INDEX: 23.17 KG/M2 | HEIGHT: 72 IN | HEART RATE: 78 BPM

## 2023-01-01 VITALS
OXYGEN SATURATION: 100 % | BODY MASS INDEX: 23.28 KG/M2 | TEMPERATURE: 99 F | SYSTOLIC BLOOD PRESSURE: 133 MMHG | HEIGHT: 72 IN | HEART RATE: 86 BPM | WEIGHT: 171.88 LBS | DIASTOLIC BLOOD PRESSURE: 77 MMHG

## 2023-01-01 VITALS
WEIGHT: 140 LBS | OXYGEN SATURATION: 100 % | SYSTOLIC BLOOD PRESSURE: 123 MMHG | RESPIRATION RATE: 13 BRPM | HEIGHT: 72 IN | TEMPERATURE: 98 F | DIASTOLIC BLOOD PRESSURE: 72 MMHG | BODY MASS INDEX: 18.96 KG/M2 | HEART RATE: 67 BPM

## 2023-01-01 VITALS
DIASTOLIC BLOOD PRESSURE: 90 MMHG | RESPIRATION RATE: 16 BRPM | HEART RATE: 76 BPM | OXYGEN SATURATION: 98 % | TEMPERATURE: 98 F | SYSTOLIC BLOOD PRESSURE: 138 MMHG

## 2023-01-01 VITALS
RESPIRATION RATE: 18 BRPM | OXYGEN SATURATION: 98 % | DIASTOLIC BLOOD PRESSURE: 75 MMHG | SYSTOLIC BLOOD PRESSURE: 124 MMHG | TEMPERATURE: 98 F | HEART RATE: 81 BPM

## 2023-01-01 DIAGNOSIS — C48.2 PRIMARY PERITONEAL ADENOCARCINOMA: ICD-10-CM

## 2023-01-01 DIAGNOSIS — D84.821 IMMUNODEFICIENCY DUE TO CHEMOTHERAPY: ICD-10-CM

## 2023-01-01 DIAGNOSIS — C17.9: ICD-10-CM

## 2023-01-01 DIAGNOSIS — R63.4 UNINTENTIONAL WEIGHT LOSS: ICD-10-CM

## 2023-01-01 DIAGNOSIS — H90.3 SENSORINEURAL HEARING LOSS (SNHL) OF BOTH EARS: Primary | ICD-10-CM

## 2023-01-01 DIAGNOSIS — T45.1X5A IMMUNODEFICIENCY DUE TO CHEMOTHERAPY: ICD-10-CM

## 2023-01-01 DIAGNOSIS — G89.3 NEOPLASM RELATED PAIN: Primary | ICD-10-CM

## 2023-01-01 DIAGNOSIS — Z90.49 S/P TOTAL COLECTOMY: ICD-10-CM

## 2023-01-01 DIAGNOSIS — C17.9: Primary | ICD-10-CM

## 2023-01-01 DIAGNOSIS — Z15.89: ICD-10-CM

## 2023-01-01 DIAGNOSIS — R79.89 ELEVATED LFTS: ICD-10-CM

## 2023-01-01 DIAGNOSIS — F40.298 FEAR OF SIDE EFFECTS OF MEDICATION: ICD-10-CM

## 2023-01-01 DIAGNOSIS — C80.1 PANCREATOBILIARY-TYPE CARCINOMA: ICD-10-CM

## 2023-01-01 DIAGNOSIS — C79.9 METASTATIC ADENOCARCINOMA: ICD-10-CM

## 2023-01-01 DIAGNOSIS — I10 PRIMARY HYPERTENSION: ICD-10-CM

## 2023-01-01 DIAGNOSIS — C76.2 ABDOMINAL CARCINOMATOSIS: Primary | ICD-10-CM

## 2023-01-01 DIAGNOSIS — Z15.89 MONOALLELIC MUTATION OF FH GENE: Primary | ICD-10-CM

## 2023-01-01 DIAGNOSIS — D84.821 IMMUNOCOMPROMISED STATE DUE TO DRUG THERAPY: ICD-10-CM

## 2023-01-01 DIAGNOSIS — K59.00 CONSTIPATION: ICD-10-CM

## 2023-01-01 DIAGNOSIS — R10.84 GENERALIZED ABDOMINAL PAIN: Primary | ICD-10-CM

## 2023-01-01 DIAGNOSIS — C76.2 ABDOMINAL CARCINOMATOSIS: ICD-10-CM

## 2023-01-01 DIAGNOSIS — Z71.3 DIETARY COUNSELING AND SURVEILLANCE: ICD-10-CM

## 2023-01-01 DIAGNOSIS — C26.9 MUCINOUS ADENOCARCINOMA OF GASTROINTESTINAL TRACT: ICD-10-CM

## 2023-01-01 DIAGNOSIS — E43 PROTEIN-CALORIE MALNUTRITION, SEVERE: Primary | ICD-10-CM

## 2023-01-01 DIAGNOSIS — R07.9 CHEST PAIN: ICD-10-CM

## 2023-01-01 DIAGNOSIS — Z15.09 MONOALLELIC MUTATION OF FH GENE: ICD-10-CM

## 2023-01-01 DIAGNOSIS — D50.0 IRON DEFICIENCY ANEMIA DUE TO CHRONIC BLOOD LOSS: ICD-10-CM

## 2023-01-01 DIAGNOSIS — R11.2 NAUSEA AND VOMITING, UNSPECIFIED VOMITING TYPE: ICD-10-CM

## 2023-01-01 DIAGNOSIS — C79.9 METASTATIC ADENOCARCINOMA: Primary | ICD-10-CM

## 2023-01-01 DIAGNOSIS — Z79.899 IMMUNODEFICIENCY DUE TO CHEMOTHERAPY: ICD-10-CM

## 2023-01-01 DIAGNOSIS — K83.01 PRIMARY SCLEROSING CHOLANGITIS: ICD-10-CM

## 2023-01-01 DIAGNOSIS — Z80.9 FAMILY HISTORY OF CANCER: ICD-10-CM

## 2023-01-01 DIAGNOSIS — R64 CACHEXIA: ICD-10-CM

## 2023-01-01 DIAGNOSIS — R63.4 ABNORMAL WEIGHT LOSS: ICD-10-CM

## 2023-01-01 DIAGNOSIS — E78.1 PURE HYPERTRIGLYCERIDEMIA: ICD-10-CM

## 2023-01-01 DIAGNOSIS — C48.2 PRIMARY PERITONEAL ADENOCARCINOMA: Primary | ICD-10-CM

## 2023-01-01 DIAGNOSIS — F40.298 FEAR OF SIDE EFFECTS OF MEDICATION: Primary | ICD-10-CM

## 2023-01-01 DIAGNOSIS — Z15.89 MONOALLELIC MUTATION OF FH GENE: ICD-10-CM

## 2023-01-01 DIAGNOSIS — D50.0 IRON DEFICIENCY ANEMIA DUE TO CHRONIC BLOOD LOSS: Primary | ICD-10-CM

## 2023-01-01 DIAGNOSIS — R00.0 TACHYCARDIA: ICD-10-CM

## 2023-01-01 DIAGNOSIS — K83.8 DILATED BILE DUCT: ICD-10-CM

## 2023-01-01 DIAGNOSIS — R93.89 ABNORMAL FINDING ON IMAGING: Primary | ICD-10-CM

## 2023-01-01 DIAGNOSIS — G89.3 NEOPLASM RELATED PAIN: ICD-10-CM

## 2023-01-01 DIAGNOSIS — K86.2 PANCREATIC CYST: ICD-10-CM

## 2023-01-01 DIAGNOSIS — T45.1X5A CHEMOTHERAPY-INDUCED NEUROPATHY: ICD-10-CM

## 2023-01-01 DIAGNOSIS — G62.0 CHEMOTHERAPY-INDUCED NEUROPATHY: ICD-10-CM

## 2023-01-01 DIAGNOSIS — Z15.09: ICD-10-CM

## 2023-01-01 DIAGNOSIS — K83.01 PRIMARY SCLEROSING CHOLANGITIS: Primary | ICD-10-CM

## 2023-01-01 DIAGNOSIS — K83.01 PSC (PRIMARY SCLEROSING CHOLANGITIS): ICD-10-CM

## 2023-01-01 DIAGNOSIS — R50.9 FEVER: ICD-10-CM

## 2023-01-01 DIAGNOSIS — K83.8 DILATED BILE DUCT: Primary | ICD-10-CM

## 2023-01-01 DIAGNOSIS — C26.9: Primary | ICD-10-CM

## 2023-01-01 DIAGNOSIS — K59.04 CHRONIC IDIOPATHIC CONSTIPATION: ICD-10-CM

## 2023-01-01 DIAGNOSIS — Z79.899 IMMUNOCOMPROMISED STATE DUE TO DRUG THERAPY: ICD-10-CM

## 2023-01-01 DIAGNOSIS — Z51.5 ENCOUNTER FOR PALLIATIVE CARE: ICD-10-CM

## 2023-01-01 DIAGNOSIS — Z15.09 MONOALLELIC MUTATION OF FH GENE: Primary | ICD-10-CM

## 2023-01-01 DIAGNOSIS — Z98.890 S/P ERCP: ICD-10-CM

## 2023-01-01 DIAGNOSIS — R63.0 DECREASED APPETITE: Primary | ICD-10-CM

## 2023-01-01 DIAGNOSIS — K65.1 INTRA-ABDOMINAL ABSCESS: ICD-10-CM

## 2023-01-01 DIAGNOSIS — R10.84 INTRACTABLE GENERALIZED ABDOMINAL PAIN: Primary | ICD-10-CM

## 2023-01-01 DIAGNOSIS — K83.1 BILIARY STRICTURE: Primary | ICD-10-CM

## 2023-01-01 LAB
ALBUMIN SERPL BCP-MCNC: 2.5 G/DL (ref 3.5–5.2)
ALBUMIN SERPL BCP-MCNC: 2.7 G/DL (ref 3.5–5.2)
ALBUMIN SERPL BCP-MCNC: 2.8 G/DL (ref 3.5–5.2)
ALBUMIN SERPL BCP-MCNC: 2.8 G/DL (ref 3.5–5.2)
ALBUMIN SERPL BCP-MCNC: 2.9 G/DL (ref 3.5–5.2)
ALBUMIN SERPL BCP-MCNC: 2.9 G/DL (ref 3.5–5.2)
ALBUMIN SERPL BCP-MCNC: 3 G/DL (ref 3.5–5.2)
ALBUMIN SERPL BCP-MCNC: 3.1 G/DL (ref 3.5–5.2)
ALBUMIN SERPL BCP-MCNC: 3.2 G/DL (ref 3.5–5.2)
ALBUMIN SERPL BCP-MCNC: 3.2 G/DL (ref 3.5–5.2)
ALBUMIN SERPL BCP-MCNC: 3.3 G/DL (ref 3.5–5.2)
ALBUMIN SERPL BCP-MCNC: 3.3 G/DL (ref 3.5–5.2)
ALBUMIN SERPL BCP-MCNC: 3.4 G/DL (ref 3.5–5.2)
ALBUMIN SERPL BCP-MCNC: 3.5 G/DL (ref 3.5–5.2)
ALBUMIN SERPL BCP-MCNC: 3.6 G/DL (ref 3.5–5.2)
ALP SERPL-CCNC: 1245 U/L (ref 55–135)
ALP SERPL-CCNC: 349 U/L (ref 55–135)
ALP SERPL-CCNC: 349 U/L (ref 55–135)
ALP SERPL-CCNC: 375 U/L (ref 55–135)
ALP SERPL-CCNC: 479 U/L (ref 55–135)
ALP SERPL-CCNC: 502 U/L (ref 55–135)
ALP SERPL-CCNC: 527 U/L (ref 55–135)
ALP SERPL-CCNC: 535 U/L (ref 55–135)
ALP SERPL-CCNC: 598 U/L (ref 55–135)
ALP SERPL-CCNC: 611 U/L (ref 55–135)
ALP SERPL-CCNC: 631 U/L (ref 55–135)
ALP SERPL-CCNC: 642 U/L (ref 55–135)
ALP SERPL-CCNC: 645 U/L (ref 55–135)
ALP SERPL-CCNC: 678 U/L (ref 55–135)
ALP SERPL-CCNC: 704 U/L (ref 55–135)
ALP SERPL-CCNC: 724 U/L (ref 55–135)
ALP SERPL-CCNC: 790 U/L (ref 55–135)
ALP SERPL-CCNC: 800 U/L (ref 55–135)
ALP SERPL-CCNC: 825 U/L (ref 55–135)
ALP SERPL-CCNC: 878 U/L (ref 55–135)
ALP SERPL-CCNC: 972 U/L (ref 55–135)
ALT SERPL W/O P-5'-P-CCNC: 104 U/L (ref 10–44)
ALT SERPL W/O P-5'-P-CCNC: 105 U/L (ref 10–44)
ALT SERPL W/O P-5'-P-CCNC: 121 U/L (ref 10–44)
ALT SERPL W/O P-5'-P-CCNC: 122 U/L (ref 10–44)
ALT SERPL W/O P-5'-P-CCNC: 135 U/L (ref 10–44)
ALT SERPL W/O P-5'-P-CCNC: 141 U/L (ref 10–44)
ALT SERPL W/O P-5'-P-CCNC: 146 U/L (ref 10–44)
ALT SERPL W/O P-5'-P-CCNC: 170 U/L (ref 10–44)
ALT SERPL W/O P-5'-P-CCNC: 46 U/L (ref 10–44)
ALT SERPL W/O P-5'-P-CCNC: 52 U/L (ref 10–44)
ALT SERPL W/O P-5'-P-CCNC: 53 U/L (ref 10–44)
ALT SERPL W/O P-5'-P-CCNC: 56 U/L (ref 10–44)
ALT SERPL W/O P-5'-P-CCNC: 63 U/L (ref 10–44)
ALT SERPL W/O P-5'-P-CCNC: 65 U/L (ref 10–44)
ALT SERPL W/O P-5'-P-CCNC: 72 U/L (ref 10–44)
ALT SERPL W/O P-5'-P-CCNC: 75 U/L (ref 10–44)
ALT SERPL W/O P-5'-P-CCNC: 80 U/L (ref 10–44)
ALT SERPL W/O P-5'-P-CCNC: 80 U/L (ref 10–44)
ALT SERPL W/O P-5'-P-CCNC: 82 U/L (ref 10–44)
ALT SERPL W/O P-5'-P-CCNC: 91 U/L (ref 10–44)
ALT SERPL W/O P-5'-P-CCNC: 96 U/L (ref 10–44)
AMYLASE SERPL-CCNC: 60 U/L (ref 20–110)
ANION GAP SERPL CALC-SCNC: 10 MMOL/L (ref 8–16)
ANION GAP SERPL CALC-SCNC: 11 MMOL/L (ref 8–16)
ANION GAP SERPL CALC-SCNC: 13 MMOL/L (ref 8–16)
ANION GAP SERPL CALC-SCNC: 14 MMOL/L (ref 8–16)
ANION GAP SERPL CALC-SCNC: 15 MMOL/L (ref 8–16)
ANION GAP SERPL CALC-SCNC: 15 MMOL/L (ref 8–16)
ANION GAP SERPL CALC-SCNC: 9 MMOL/L (ref 8–16)
ANION GAP SERPL CALC-SCNC: 9 MMOL/L (ref 8–16)
ANISOCYTOSIS BLD QL SMEAR: SLIGHT
AST SERPL-CCNC: 100 U/L (ref 10–40)
AST SERPL-CCNC: 101 U/L (ref 10–40)
AST SERPL-CCNC: 119 U/L (ref 10–40)
AST SERPL-CCNC: 175 U/L (ref 10–40)
AST SERPL-CCNC: 31 U/L (ref 10–40)
AST SERPL-CCNC: 36 U/L (ref 10–40)
AST SERPL-CCNC: 39 U/L (ref 10–40)
AST SERPL-CCNC: 41 U/L (ref 10–40)
AST SERPL-CCNC: 43 U/L (ref 10–40)
AST SERPL-CCNC: 49 U/L (ref 10–40)
AST SERPL-CCNC: 51 U/L (ref 10–40)
AST SERPL-CCNC: 52 U/L (ref 10–40)
AST SERPL-CCNC: 53 U/L (ref 10–40)
AST SERPL-CCNC: 56 U/L (ref 10–40)
AST SERPL-CCNC: 57 U/L (ref 10–40)
AST SERPL-CCNC: 58 U/L (ref 10–40)
AST SERPL-CCNC: 60 U/L (ref 10–40)
AST SERPL-CCNC: 73 U/L (ref 10–40)
AST SERPL-CCNC: 73 U/L (ref 10–40)
AST SERPL-CCNC: 88 U/L (ref 10–40)
AST SERPL-CCNC: 90 U/L (ref 10–40)
BACTERIA #/AREA URNS HPF: NORMAL /HPF
BACTERIA BLD CULT: NORMAL
BASOPHILS # BLD AUTO: 0.01 K/UL (ref 0–0.2)
BASOPHILS # BLD AUTO: 0.01 K/UL (ref 0–0.2)
BASOPHILS # BLD AUTO: 0.02 K/UL (ref 0–0.2)
BASOPHILS # BLD AUTO: 0.03 K/UL (ref 0–0.2)
BASOPHILS # BLD AUTO: 0.04 K/UL (ref 0–0.2)
BASOPHILS # BLD AUTO: 0.05 K/UL (ref 0–0.2)
BASOPHILS # BLD AUTO: 0.05 K/UL (ref 0–0.2)
BASOPHILS # BLD AUTO: 0.06 K/UL (ref 0–0.2)
BASOPHILS # BLD AUTO: 0.08 K/UL (ref 0–0.2)
BASOPHILS NFR BLD: 0 % (ref 0–1.9)
BASOPHILS NFR BLD: 0.2 % (ref 0–1.9)
BASOPHILS NFR BLD: 0.3 % (ref 0–1.9)
BASOPHILS NFR BLD: 0.4 % (ref 0–1.9)
BASOPHILS NFR BLD: 0.5 % (ref 0–1.9)
BASOPHILS NFR BLD: 0.5 % (ref 0–1.9)
BASOPHILS NFR BLD: 0.6 % (ref 0–1.9)
BASOPHILS NFR BLD: 0.6 % (ref 0–1.9)
BASOPHILS NFR BLD: 0.8 % (ref 0–1.9)
BASOPHILS NFR BLD: 0.8 % (ref 0–1.9)
BASOPHILS NFR BLD: 1 % (ref 0–1.9)
BASOPHILS NFR BLD: 1.1 % (ref 0–1.9)
BASOPHILS NFR BLD: 1.4 % (ref 0–1.9)
BILIRUB DIRECT SERPL-MCNC: 0.5 MG/DL (ref 0.1–0.3)
BILIRUB DIRECT SERPL-MCNC: 0.7 MG/DL (ref 0.1–0.3)
BILIRUB SERPL-MCNC: 0.7 MG/DL (ref 0.1–1)
BILIRUB SERPL-MCNC: 0.8 MG/DL (ref 0.1–1)
BILIRUB SERPL-MCNC: 0.9 MG/DL (ref 0.1–1)
BILIRUB SERPL-MCNC: 0.9 MG/DL (ref 0.1–1)
BILIRUB SERPL-MCNC: 1 MG/DL (ref 0.1–1)
BILIRUB SERPL-MCNC: 1.1 MG/DL (ref 0.1–1)
BILIRUB SERPL-MCNC: 1.2 MG/DL (ref 0.1–1)
BILIRUB SERPL-MCNC: 1.2 MG/DL (ref 0.1–1)
BILIRUB SERPL-MCNC: 1.4 MG/DL (ref 0.1–1)
BILIRUB SERPL-MCNC: 1.5 MG/DL (ref 0.1–1)
BILIRUB SERPL-MCNC: 1.6 MG/DL (ref 0.1–1)
BILIRUB SERPL-MCNC: 1.7 MG/DL (ref 0.1–1)
BILIRUB SERPL-MCNC: 1.7 MG/DL (ref 0.1–1)
BILIRUB SERPL-MCNC: 3.4 MG/DL (ref 0.1–1)
BILIRUB SERPL-MCNC: 3.6 MG/DL (ref 0.1–1)
BILIRUB UR QL STRIP: NEGATIVE
BILIRUB UR QL STRIP: NEGATIVE
BUN SERPL-MCNC: 10 MG/DL (ref 6–20)
BUN SERPL-MCNC: 11 MG/DL (ref 6–20)
BUN SERPL-MCNC: 11 MG/DL (ref 6–20)
BUN SERPL-MCNC: 12 MG/DL (ref 6–20)
BUN SERPL-MCNC: 13 MG/DL (ref 6–20)
BUN SERPL-MCNC: 13 MG/DL (ref 6–20)
BUN SERPL-MCNC: 3 MG/DL (ref 6–20)
BUN SERPL-MCNC: 5 MG/DL (ref 6–20)
BUN SERPL-MCNC: 6 MG/DL (ref 6–20)
BUN SERPL-MCNC: 7 MG/DL (ref 6–20)
BUN SERPL-MCNC: 7 MG/DL (ref 6–20)
BUN SERPL-MCNC: 8 MG/DL (ref 6–20)
BUN SERPL-MCNC: 9 MG/DL (ref 6–20)
BURR CELLS BLD QL SMEAR: ABNORMAL
CALCIUM SERPL-MCNC: 8.5 MG/DL (ref 8.7–10.5)
CALCIUM SERPL-MCNC: 8.5 MG/DL (ref 8.7–10.5)
CALCIUM SERPL-MCNC: 8.7 MG/DL (ref 8.7–10.5)
CALCIUM SERPL-MCNC: 8.7 MG/DL (ref 8.7–10.5)
CALCIUM SERPL-MCNC: 8.8 MG/DL (ref 8.7–10.5)
CALCIUM SERPL-MCNC: 8.9 MG/DL (ref 8.7–10.5)
CALCIUM SERPL-MCNC: 9 MG/DL (ref 8.7–10.5)
CALCIUM SERPL-MCNC: 9.2 MG/DL (ref 8.7–10.5)
CALCIUM SERPL-MCNC: 9.3 MG/DL (ref 8.7–10.5)
CALCIUM SERPL-MCNC: 9.6 MG/DL (ref 8.7–10.5)
CALCIUM SERPL-MCNC: 9.8 MG/DL (ref 8.7–10.5)
CANCER AG19-9 SERPL-ACNC: 3193.5 U/ML (ref 0–40)
CANCER AG19-9 SERPL-ACNC: 4071.4 U/ML (ref 0–40)
CANCER AG19-9 SERPL-ACNC: 5162.3 U/ML (ref 0–40)
CANCER AG19-9 SERPL-ACNC: 6980.9 U/ML (ref 0–40)
CEA SERPL-MCNC: 5.8 NG/ML (ref 0–5)
CEA SERPL-MCNC: 6.4 NG/ML (ref 0–5)
CEA SERPL-MCNC: 7.1 NG/ML (ref 0–5)
CEA SERPL-MCNC: 9.5 NG/ML (ref 0–5)
CHLORIDE SERPL-SCNC: 100 MMOL/L (ref 95–110)
CHLORIDE SERPL-SCNC: 88 MMOL/L (ref 95–110)
CHLORIDE SERPL-SCNC: 92 MMOL/L (ref 95–110)
CHLORIDE SERPL-SCNC: 93 MMOL/L (ref 95–110)
CHLORIDE SERPL-SCNC: 95 MMOL/L (ref 95–110)
CHLORIDE SERPL-SCNC: 96 MMOL/L (ref 95–110)
CHLORIDE SERPL-SCNC: 97 MMOL/L (ref 95–110)
CHLORIDE SERPL-SCNC: 98 MMOL/L (ref 95–110)
CHLORIDE SERPL-SCNC: 98 MMOL/L (ref 95–110)
CHLORIDE SERPL-SCNC: 99 MMOL/L (ref 95–110)
CK SERPL-CCNC: 13 U/L (ref 20–200)
CLARITY UR: CLEAR
CLARITY UR: CLEAR
CO2 SERPL-SCNC: 24 MMOL/L (ref 23–29)
CO2 SERPL-SCNC: 25 MMOL/L (ref 23–29)
CO2 SERPL-SCNC: 27 MMOL/L (ref 23–29)
CO2 SERPL-SCNC: 27 MMOL/L (ref 23–29)
CO2 SERPL-SCNC: 28 MMOL/L (ref 23–29)
CO2 SERPL-SCNC: 29 MMOL/L (ref 23–29)
CO2 SERPL-SCNC: 30 MMOL/L (ref 23–29)
CO2 SERPL-SCNC: 30 MMOL/L (ref 23–29)
CO2 SERPL-SCNC: 31 MMOL/L (ref 23–29)
CO2 SERPL-SCNC: 32 MMOL/L (ref 23–29)
CO2 SERPL-SCNC: 33 MMOL/L (ref 23–29)
CO2 SERPL-SCNC: 35 MMOL/L (ref 23–29)
COLOR UR: ABNORMAL
COLOR UR: YELLOW
COMMENT: ABNORMAL
COMMENT: ABNORMAL
CREAT SERPL-MCNC: 0.6 MG/DL (ref 0.5–1.4)
CREAT SERPL-MCNC: 0.7 MG/DL (ref 0.5–1.4)
CREAT SERPL-MCNC: 0.8 MG/DL (ref 0.5–1.4)
CREAT SERPL-MCNC: 0.8 MG/DL (ref 0.5–1.4)
CREAT SERPL-MCNC: 0.9 MG/DL (ref 0.5–1.4)
CREAT SERPL-MCNC: 0.9 MG/DL (ref 0.5–1.4)
CTP QC/QA: YES
CTP QC/QA: YES
DACRYOCYTES BLD QL SMEAR: ABNORMAL
DIFFERENTIAL METHOD: ABNORMAL
DNA RANGE(S) EXAMINED NAR: NORMAL
DNA RANGE(S) EXAMINED NAR: NORMAL
EOSINOPHIL # BLD AUTO: 0 K/UL (ref 0–0.5)
EOSINOPHIL # BLD AUTO: 0.1 K/UL (ref 0–0.5)
EOSINOPHIL NFR BLD: 0 % (ref 0–8)
EOSINOPHIL NFR BLD: 0.1 % (ref 0–8)
EOSINOPHIL NFR BLD: 0.2 % (ref 0–8)
EOSINOPHIL NFR BLD: 0.3 % (ref 0–8)
EOSINOPHIL NFR BLD: 0.4 % (ref 0–8)
EOSINOPHIL NFR BLD: 0.5 % (ref 0–8)
EOSINOPHIL NFR BLD: 0.8 % (ref 0–8)
EOSINOPHIL NFR BLD: 0.9 % (ref 0–8)
EOSINOPHIL NFR BLD: 1 % (ref 0–8)
EOSINOPHIL NFR BLD: 1.6 % (ref 0–8)
ERYTHROCYTE [DISTWIDTH] IN BLOOD BY AUTOMATED COUNT: 12.7 % (ref 11.5–14.5)
ERYTHROCYTE [DISTWIDTH] IN BLOOD BY AUTOMATED COUNT: 12.8 % (ref 11.5–14.5)
ERYTHROCYTE [DISTWIDTH] IN BLOOD BY AUTOMATED COUNT: 12.9 % (ref 11.5–14.5)
ERYTHROCYTE [DISTWIDTH] IN BLOOD BY AUTOMATED COUNT: 12.9 % (ref 11.5–14.5)
ERYTHROCYTE [DISTWIDTH] IN BLOOD BY AUTOMATED COUNT: 13.4 % (ref 11.5–14.5)
ERYTHROCYTE [DISTWIDTH] IN BLOOD BY AUTOMATED COUNT: 13.4 % (ref 11.5–14.5)
ERYTHROCYTE [DISTWIDTH] IN BLOOD BY AUTOMATED COUNT: 13.6 % (ref 11.5–14.5)
ERYTHROCYTE [DISTWIDTH] IN BLOOD BY AUTOMATED COUNT: 13.7 % (ref 11.5–14.5)
ERYTHROCYTE [DISTWIDTH] IN BLOOD BY AUTOMATED COUNT: 13.9 % (ref 11.5–14.5)
ERYTHROCYTE [DISTWIDTH] IN BLOOD BY AUTOMATED COUNT: 14 % (ref 11.5–14.5)
ERYTHROCYTE [DISTWIDTH] IN BLOOD BY AUTOMATED COUNT: 14.1 % (ref 11.5–14.5)
ERYTHROCYTE [DISTWIDTH] IN BLOOD BY AUTOMATED COUNT: 14.3 % (ref 11.5–14.5)
ERYTHROCYTE [DISTWIDTH] IN BLOOD BY AUTOMATED COUNT: 15.3 % (ref 11.5–14.5)
ERYTHROCYTE [DISTWIDTH] IN BLOOD BY AUTOMATED COUNT: 15.8 % (ref 11.5–14.5)
ERYTHROCYTE [DISTWIDTH] IN BLOOD BY AUTOMATED COUNT: 15.9 % (ref 11.5–14.5)
ERYTHROCYTE [DISTWIDTH] IN BLOOD BY AUTOMATED COUNT: 15.9 % (ref 11.5–14.5)
ERYTHROCYTE [DISTWIDTH] IN BLOOD BY AUTOMATED COUNT: 16.8 % (ref 11.5–14.5)
ERYTHROCYTE [DISTWIDTH] IN BLOOD BY AUTOMATED COUNT: 17.2 % (ref 11.5–14.5)
EST. GFR  (NO RACE VARIABLE): >60 ML/MIN/1.73 M^2
FERRITIN SERPL-MCNC: 230 NG/ML (ref 20–300)
FERRITIN SERPL-MCNC: 631 NG/ML (ref 20–300)
FINAL PATHOLOGIC DIAGNOSIS: ABNORMAL
GENE DIS ANL INTERP-IMP: POSITIVE
GENE DIS ANL INTERP-IMP: POSITIVE
GENE DIS ASSESSED: NORMAL
GENE DIS ASSESSED: NORMAL
GENE MUT TESTED BLD/T: 0 M/MB
GIANT PLATELETS BLD QL SMEAR: ABNORMAL
GLUCOSE SERPL-MCNC: 100 MG/DL (ref 70–110)
GLUCOSE SERPL-MCNC: 100 MG/DL (ref 70–110)
GLUCOSE SERPL-MCNC: 105 MG/DL (ref 70–110)
GLUCOSE SERPL-MCNC: 112 MG/DL (ref 70–110)
GLUCOSE SERPL-MCNC: 112 MG/DL (ref 70–110)
GLUCOSE SERPL-MCNC: 114 MG/DL (ref 70–110)
GLUCOSE SERPL-MCNC: 118 MG/DL (ref 70–110)
GLUCOSE SERPL-MCNC: 125 MG/DL (ref 70–110)
GLUCOSE SERPL-MCNC: 77 MG/DL (ref 70–110)
GLUCOSE SERPL-MCNC: 80 MG/DL (ref 70–110)
GLUCOSE SERPL-MCNC: 82 MG/DL (ref 70–110)
GLUCOSE SERPL-MCNC: 83 MG/DL (ref 70–110)
GLUCOSE SERPL-MCNC: 86 MG/DL (ref 70–110)
GLUCOSE SERPL-MCNC: 87 MG/DL (ref 70–110)
GLUCOSE SERPL-MCNC: 90 MG/DL (ref 70–110)
GLUCOSE SERPL-MCNC: 91 MG/DL (ref 70–110)
GLUCOSE SERPL-MCNC: 93 MG/DL (ref 70–110)
GLUCOSE SERPL-MCNC: 95 MG/DL (ref 70–110)
GLUCOSE SERPL-MCNC: 97 MG/DL (ref 70–110)
GLUCOSE SERPL-MCNC: 99 MG/DL (ref 70–110)
GLUCOSE UR QL STRIP: NEGATIVE
GLUCOSE UR QL STRIP: NEGATIVE
GROSS: ABNORMAL
HCT VFR BLD AUTO: 30.6 % (ref 40–54)
HCT VFR BLD AUTO: 30.8 % (ref 40–54)
HCT VFR BLD AUTO: 30.9 % (ref 40–54)
HCT VFR BLD AUTO: 30.9 % (ref 40–54)
HCT VFR BLD AUTO: 31.2 % (ref 40–54)
HCT VFR BLD AUTO: 31.5 % (ref 40–54)
HCT VFR BLD AUTO: 31.5 % (ref 40–54)
HCT VFR BLD AUTO: 33.4 % (ref 40–54)
HCT VFR BLD AUTO: 34.1 % (ref 40–54)
HCT VFR BLD AUTO: 34.3 % (ref 40–54)
HCT VFR BLD AUTO: 35 % (ref 40–54)
HCT VFR BLD AUTO: 35.5 % (ref 40–54)
HCT VFR BLD AUTO: 35.7 % (ref 40–54)
HCT VFR BLD AUTO: 36.2 % (ref 40–54)
HCT VFR BLD AUTO: 36.4 % (ref 40–54)
HCT VFR BLD AUTO: 37.5 % (ref 40–54)
HCT VFR BLD AUTO: 37.7 % (ref 40–54)
HCT VFR BLD AUTO: 37.9 % (ref 40–54)
HCT VFR BLD AUTO: 38.3 % (ref 40–54)
HCT VFR BLD AUTO: 38.8 % (ref 40–54)
HCT VFR BLD AUTO: 42.8 % (ref 40–54)
HGB BLD-MCNC: 10.1 G/DL (ref 14–18)
HGB BLD-MCNC: 10.2 G/DL (ref 14–18)
HGB BLD-MCNC: 10.2 G/DL (ref 14–18)
HGB BLD-MCNC: 10.4 G/DL (ref 14–18)
HGB BLD-MCNC: 10.5 G/DL (ref 14–18)
HGB BLD-MCNC: 10.7 G/DL (ref 14–18)
HGB BLD-MCNC: 11 G/DL (ref 14–18)
HGB BLD-MCNC: 11.2 G/DL (ref 14–18)
HGB BLD-MCNC: 11.6 G/DL (ref 14–18)
HGB BLD-MCNC: 11.6 G/DL (ref 14–18)
HGB BLD-MCNC: 11.7 G/DL (ref 14–18)
HGB BLD-MCNC: 11.7 G/DL (ref 14–18)
HGB BLD-MCNC: 12 G/DL (ref 14–18)
HGB BLD-MCNC: 12 G/DL (ref 14–18)
HGB BLD-MCNC: 12.1 G/DL (ref 14–18)
HGB BLD-MCNC: 12.1 G/DL (ref 14–18)
HGB BLD-MCNC: 12.2 G/DL (ref 14–18)
HGB BLD-MCNC: 12.7 G/DL (ref 14–18)
HGB BLD-MCNC: 12.8 G/DL (ref 14–18)
HGB BLD-MCNC: 13.8 G/DL (ref 14–18)
HGB BLD-MCNC: 9.9 G/DL (ref 14–18)
HGB UR QL STRIP: NEGATIVE
HGB UR QL STRIP: NEGATIVE
HYALINE CASTS #/AREA URNS LPF: 0 /LPF
IMM GRANULOCYTES # BLD AUTO: 0.01 K/UL (ref 0–0.04)
IMM GRANULOCYTES # BLD AUTO: 0.02 K/UL (ref 0–0.04)
IMM GRANULOCYTES # BLD AUTO: 0.03 K/UL (ref 0–0.04)
IMM GRANULOCYTES # BLD AUTO: 0.04 K/UL (ref 0–0.04)
IMM GRANULOCYTES # BLD AUTO: 0.06 K/UL (ref 0–0.04)
IMM GRANULOCYTES # BLD AUTO: 0.07 K/UL (ref 0–0.04)
IMM GRANULOCYTES # BLD AUTO: 0.07 K/UL (ref 0–0.04)
IMM GRANULOCYTES # BLD AUTO: 0.08 K/UL (ref 0–0.04)
IMM GRANULOCYTES # BLD AUTO: 0.08 K/UL (ref 0–0.04)
IMM GRANULOCYTES # BLD AUTO: 0.11 K/UL (ref 0–0.04)
IMM GRANULOCYTES # BLD AUTO: ABNORMAL K/UL (ref 0–0.04)
IMM GRANULOCYTES # BLD AUTO: ABNORMAL K/UL (ref 0–0.04)
IMM GRANULOCYTES NFR BLD AUTO: 0.3 % (ref 0–0.5)
IMM GRANULOCYTES NFR BLD AUTO: 0.4 % (ref 0–0.5)
IMM GRANULOCYTES NFR BLD AUTO: 0.4 % (ref 0–0.5)
IMM GRANULOCYTES NFR BLD AUTO: 0.5 % (ref 0–0.5)
IMM GRANULOCYTES NFR BLD AUTO: 0.6 % (ref 0–0.5)
IMM GRANULOCYTES NFR BLD AUTO: 0.7 % (ref 0–0.5)
IMM GRANULOCYTES NFR BLD AUTO: 0.7 % (ref 0–0.5)
IMM GRANULOCYTES NFR BLD AUTO: 0.9 % (ref 0–0.5)
IMM GRANULOCYTES NFR BLD AUTO: 0.9 % (ref 0–0.5)
IMM GRANULOCYTES NFR BLD AUTO: ABNORMAL % (ref 0–0.5)
IMM GRANULOCYTES NFR BLD AUTO: ABNORMAL % (ref 0–0.5)
IRON SERPL-MCNC: 24 UG/DL (ref 45–160)
IRON SERPL-MCNC: 36 UG/DL (ref 45–160)
KETONES UR QL STRIP: ABNORMAL
KETONES UR QL STRIP: NEGATIVE
LACTATE SERPL-SCNC: 0.6 MMOL/L (ref 0.5–2.2)
LACTATE SERPL-SCNC: 0.7 MMOL/L (ref 0.5–2.2)
LACTATE SERPL-SCNC: 1 MMOL/L (ref 0.5–2.2)
LDH SERPL L TO P-CCNC: 126 U/L (ref 110–260)
LDH SERPL L TO P-CCNC: 126 U/L (ref 110–260)
LDH SERPL L TO P-CCNC: 131 U/L (ref 110–260)
LEUKOCYTE ESTERASE UR QL STRIP: NEGATIVE
LEUKOCYTE ESTERASE UR QL STRIP: NEGATIVE
LIPASE SERPL-CCNC: 11 U/L (ref 4–60)
LIPASE SERPL-CCNC: 196 U/L (ref 4–60)
LIPASE SERPL-CCNC: 26 U/L (ref 4–60)
LIPASE SERPL-CCNC: 4 U/L (ref 4–60)
LIPASE SERPL-CCNC: 4 U/L (ref 4–60)
LIPASE SERPL-CCNC: 57 U/L (ref 4–60)
LYMPHOCYTES # BLD AUTO: 0.3 K/UL (ref 1–4.8)
LYMPHOCYTES # BLD AUTO: 0.4 K/UL (ref 1–4.8)
LYMPHOCYTES # BLD AUTO: 0.5 K/UL (ref 1–4.8)
LYMPHOCYTES # BLD AUTO: 0.5 K/UL (ref 1–4.8)
LYMPHOCYTES # BLD AUTO: 0.6 K/UL (ref 1–4.8)
LYMPHOCYTES # BLD AUTO: 0.7 K/UL (ref 1–4.8)
LYMPHOCYTES # BLD AUTO: 0.7 K/UL (ref 1–4.8)
LYMPHOCYTES # BLD AUTO: 0.8 K/UL (ref 1–4.8)
LYMPHOCYTES # BLD AUTO: 0.9 K/UL (ref 1–4.8)
LYMPHOCYTES # BLD AUTO: 1 K/UL (ref 1–4.8)
LYMPHOCYTES # BLD AUTO: 1.2 K/UL (ref 1–4.8)
LYMPHOCYTES # BLD AUTO: 1.3 K/UL (ref 1–4.8)
LYMPHOCYTES # BLD AUTO: 1.4 K/UL (ref 1–4.8)
LYMPHOCYTES # BLD AUTO: 1.5 K/UL (ref 1–4.8)
LYMPHOCYTES NFR BLD: 1.9 % (ref 18–48)
LYMPHOCYTES NFR BLD: 10.6 % (ref 18–48)
LYMPHOCYTES NFR BLD: 11.6 % (ref 18–48)
LYMPHOCYTES NFR BLD: 11.7 % (ref 18–48)
LYMPHOCYTES NFR BLD: 12.3 % (ref 18–48)
LYMPHOCYTES NFR BLD: 13.5 % (ref 18–48)
LYMPHOCYTES NFR BLD: 14.7 % (ref 18–48)
LYMPHOCYTES NFR BLD: 14.8 % (ref 18–48)
LYMPHOCYTES NFR BLD: 16.5 % (ref 18–48)
LYMPHOCYTES NFR BLD: 16.6 % (ref 18–48)
LYMPHOCYTES NFR BLD: 17.9 % (ref 18–48)
LYMPHOCYTES NFR BLD: 20 % (ref 18–48)
LYMPHOCYTES NFR BLD: 20.7 % (ref 18–48)
LYMPHOCYTES NFR BLD: 21.3 % (ref 18–48)
LYMPHOCYTES NFR BLD: 22 % (ref 18–48)
LYMPHOCYTES NFR BLD: 3.4 % (ref 18–48)
LYMPHOCYTES NFR BLD: 31.9 % (ref 18–48)
LYMPHOCYTES NFR BLD: 38 % (ref 18–48)
LYMPHOCYTES NFR BLD: 5.8 % (ref 18–48)
LYMPHOCYTES NFR BLD: 7.2 % (ref 18–48)
LYMPHOCYTES NFR BLD: 8.7 % (ref 18–48)
Lab: ABNORMAL
MAGNESIUM SERPL-MCNC: 1.6 MG/DL (ref 1.6–2.6)
MAGNESIUM SERPL-MCNC: 1.6 MG/DL (ref 1.6–2.6)
MAGNESIUM SERPL-MCNC: 1.7 MG/DL (ref 1.6–2.6)
MAGNESIUM SERPL-MCNC: 1.8 MG/DL (ref 1.6–2.6)
MAGNESIUM SERPL-MCNC: 1.8 MG/DL (ref 1.6–2.6)
MAGNESIUM SERPL-MCNC: 1.9 MG/DL (ref 1.6–2.6)
MAGNESIUM SERPL-MCNC: 2 MG/DL (ref 1.6–2.6)
MCH RBC QN AUTO: 28.6 PG (ref 27–31)
MCH RBC QN AUTO: 28.6 PG (ref 27–31)
MCH RBC QN AUTO: 28.8 PG (ref 27–31)
MCH RBC QN AUTO: 28.8 PG (ref 27–31)
MCH RBC QN AUTO: 29 PG (ref 27–31)
MCH RBC QN AUTO: 29.1 PG (ref 27–31)
MCH RBC QN AUTO: 29.2 PG (ref 27–31)
MCH RBC QN AUTO: 29.5 PG (ref 27–31)
MCH RBC QN AUTO: 29.6 PG (ref 27–31)
MCH RBC QN AUTO: 29.7 PG (ref 27–31)
MCH RBC QN AUTO: 29.8 PG (ref 27–31)
MCH RBC QN AUTO: 30 PG (ref 27–31)
MCH RBC QN AUTO: 30 PG (ref 27–31)
MCH RBC QN AUTO: 30.2 PG (ref 27–31)
MCH RBC QN AUTO: 30.3 PG (ref 27–31)
MCH RBC QN AUTO: 30.7 PG (ref 27–31)
MCH RBC QN AUTO: 31.2 PG (ref 27–31)
MCHC RBC AUTO-ENTMCNC: 31.7 G/DL (ref 32–36)
MCHC RBC AUTO-ENTMCNC: 32.2 G/DL (ref 32–36)
MCHC RBC AUTO-ENTMCNC: 32.3 G/DL (ref 32–36)
MCHC RBC AUTO-ENTMCNC: 32.4 G/DL (ref 32–36)
MCHC RBC AUTO-ENTMCNC: 32.4 G/DL (ref 32–36)
MCHC RBC AUTO-ENTMCNC: 32.7 G/DL (ref 32–36)
MCHC RBC AUTO-ENTMCNC: 32.8 G/DL (ref 32–36)
MCHC RBC AUTO-ENTMCNC: 32.9 G/DL (ref 32–36)
MCHC RBC AUTO-ENTMCNC: 33 G/DL (ref 32–36)
MCHC RBC AUTO-ENTMCNC: 33.1 G/DL (ref 32–36)
MCHC RBC AUTO-ENTMCNC: 33.1 G/DL (ref 32–36)
MCHC RBC AUTO-ENTMCNC: 33.2 G/DL (ref 32–36)
MCHC RBC AUTO-ENTMCNC: 33.2 G/DL (ref 32–36)
MCHC RBC AUTO-ENTMCNC: 33.3 G/DL (ref 32–36)
MCHC RBC AUTO-ENTMCNC: 33.7 G/DL (ref 32–36)
MCHC RBC AUTO-ENTMCNC: 33.8 G/DL (ref 32–36)
MCHC RBC AUTO-ENTMCNC: 34 G/DL (ref 32–36)
MCV RBC AUTO: 87 FL (ref 82–98)
MCV RBC AUTO: 88 FL (ref 82–98)
MCV RBC AUTO: 89 FL (ref 82–98)
MCV RBC AUTO: 89 FL (ref 82–98)
MCV RBC AUTO: 90 FL (ref 82–98)
MCV RBC AUTO: 91 FL (ref 82–98)
MCV RBC AUTO: 93 FL (ref 82–98)
MCV RBC AUTO: 94 FL (ref 82–98)
MCV RBC AUTO: 95 FL (ref 82–98)
MICROSCOPIC COMMENT: NORMAL
MICROSCOPIC EXAM: ABNORMAL
MONOCYTES # BLD AUTO: 0 K/UL (ref 0.3–1)
MONOCYTES # BLD AUTO: 0.1 K/UL (ref 0.3–1)
MONOCYTES # BLD AUTO: 0.1 K/UL (ref 0.3–1)
MONOCYTES # BLD AUTO: 0.2 K/UL (ref 0.3–1)
MONOCYTES # BLD AUTO: 0.3 K/UL (ref 0.3–1)
MONOCYTES # BLD AUTO: 0.3 K/UL (ref 0.3–1)
MONOCYTES # BLD AUTO: 0.5 K/UL (ref 0.3–1)
MONOCYTES # BLD AUTO: 0.6 K/UL (ref 0.3–1)
MONOCYTES # BLD AUTO: 0.7 K/UL (ref 0.3–1)
MONOCYTES # BLD AUTO: 0.8 K/UL (ref 0.3–1)
MONOCYTES # BLD AUTO: 0.9 K/UL (ref 0.3–1)
MONOCYTES # BLD AUTO: 1.5 K/UL (ref 0.3–1)
MONOCYTES # BLD AUTO: 1.8 K/UL (ref 0.3–1)
MONOCYTES NFR BLD: 1 % (ref 4–15)
MONOCYTES NFR BLD: 1.4 % (ref 4–15)
MONOCYTES NFR BLD: 10 % (ref 4–15)
MONOCYTES NFR BLD: 10.2 % (ref 4–15)
MONOCYTES NFR BLD: 10.3 % (ref 4–15)
MONOCYTES NFR BLD: 2.7 % (ref 4–15)
MONOCYTES NFR BLD: 3.4 % (ref 4–15)
MONOCYTES NFR BLD: 4.2 % (ref 4–15)
MONOCYTES NFR BLD: 5.2 % (ref 4–15)
MONOCYTES NFR BLD: 6.1 % (ref 4–15)
MONOCYTES NFR BLD: 6.4 % (ref 4–15)
MONOCYTES NFR BLD: 6.4 % (ref 4–15)
MONOCYTES NFR BLD: 7 % (ref 4–15)
MONOCYTES NFR BLD: 7.3 % (ref 4–15)
MONOCYTES NFR BLD: 7.4 % (ref 4–15)
MONOCYTES NFR BLD: 7.6 % (ref 4–15)
MONOCYTES NFR BLD: 8.2 % (ref 4–15)
MONOCYTES NFR BLD: 8.6 % (ref 4–15)
MONOCYTES NFR BLD: 8.7 % (ref 4–15)
MONOCYTES NFR BLD: 9 % (ref 4–15)
MONOCYTES NFR BLD: 9.2 % (ref 4–15)
MSI CA SPEC-IMP: NORMAL
MSI CA SPEC-IMP: NOT DETECTED
NEUTROPHILS # BLD AUTO: 1.3 K/UL (ref 1.8–7.7)
NEUTROPHILS # BLD AUTO: 12.2 K/UL (ref 1.8–7.7)
NEUTROPHILS # BLD AUTO: 13.6 K/UL (ref 1.8–7.7)
NEUTROPHILS # BLD AUTO: 16.4 K/UL (ref 1.8–7.7)
NEUTROPHILS # BLD AUTO: 2.1 K/UL (ref 1.8–7.7)
NEUTROPHILS # BLD AUTO: 2.8 K/UL (ref 1.8–7.7)
NEUTROPHILS # BLD AUTO: 3 K/UL (ref 1.8–7.7)
NEUTROPHILS # BLD AUTO: 3.1 K/UL (ref 1.8–7.7)
NEUTROPHILS # BLD AUTO: 3.9 K/UL (ref 1.8–7.7)
NEUTROPHILS # BLD AUTO: 4.3 K/UL (ref 1.8–7.7)
NEUTROPHILS # BLD AUTO: 4.6 K/UL (ref 1.8–7.7)
NEUTROPHILS # BLD AUTO: 4.7 K/UL (ref 1.8–7.7)
NEUTROPHILS # BLD AUTO: 5.3 K/UL (ref 1.8–7.7)
NEUTROPHILS # BLD AUTO: 5.5 K/UL (ref 1.8–7.7)
NEUTROPHILS # BLD AUTO: 5.9 K/UL (ref 1.8–7.7)
NEUTROPHILS # BLD AUTO: 6.4 K/UL (ref 1.8–7.7)
NEUTROPHILS # BLD AUTO: 7.6 K/UL (ref 1.8–7.7)
NEUTROPHILS # BLD AUTO: 8 K/UL (ref 1.8–7.7)
NEUTROPHILS # BLD AUTO: 9.1 K/UL (ref 1.8–7.7)
NEUTROPHILS NFR BLD: 52 % (ref 38–73)
NEUTROPHILS NFR BLD: 63.8 % (ref 38–73)
NEUTROPHILS NFR BLD: 67.2 % (ref 38–73)
NEUTROPHILS NFR BLD: 69.1 % (ref 38–73)
NEUTROPHILS NFR BLD: 71.7 % (ref 38–73)
NEUTROPHILS NFR BLD: 71.8 % (ref 38–73)
NEUTROPHILS NFR BLD: 73.5 % (ref 38–73)
NEUTROPHILS NFR BLD: 76 % (ref 38–73)
NEUTROPHILS NFR BLD: 76.7 % (ref 38–73)
NEUTROPHILS NFR BLD: 78.4 % (ref 38–73)
NEUTROPHILS NFR BLD: 78.6 % (ref 38–73)
NEUTROPHILS NFR BLD: 78.9 % (ref 38–73)
NEUTROPHILS NFR BLD: 79.1 % (ref 38–73)
NEUTROPHILS NFR BLD: 79.8 % (ref 38–73)
NEUTROPHILS NFR BLD: 80.6 % (ref 38–73)
NEUTROPHILS NFR BLD: 81.9 % (ref 38–73)
NEUTROPHILS NFR BLD: 82.5 % (ref 38–73)
NEUTROPHILS NFR BLD: 82.6 % (ref 38–73)
NEUTROPHILS NFR BLD: 83.2 % (ref 38–73)
NEUTROPHILS NFR BLD: 86.3 % (ref 38–73)
NEUTROPHILS NFR BLD: 93 % (ref 38–73)
NEUTS BAND NFR BLD MANUAL: 2 %
NITRITE UR QL STRIP: NEGATIVE
NITRITE UR QL STRIP: NEGATIVE
NRBC BLD-RTO: 0 /100 WBC
PH UR STRIP: 7 [PH] (ref 5–8)
PH UR STRIP: 7 [PH] (ref 5–8)
PHOSPHATE SERPL-MCNC: 2.8 MG/DL (ref 2.7–4.5)
PHOSPHATE SERPL-MCNC: 3.1 MG/DL (ref 2.7–4.5)
PLATELET # BLD AUTO: 156 K/UL (ref 150–450)
PLATELET # BLD AUTO: 186 K/UL (ref 150–450)
PLATELET # BLD AUTO: 198 K/UL (ref 150–450)
PLATELET # BLD AUTO: 203 K/UL (ref 150–450)
PLATELET # BLD AUTO: 231 K/UL (ref 150–450)
PLATELET # BLD AUTO: 242 K/UL (ref 150–450)
PLATELET # BLD AUTO: 274 K/UL (ref 150–450)
PLATELET # BLD AUTO: 278 K/UL (ref 150–450)
PLATELET # BLD AUTO: 284 K/UL (ref 150–450)
PLATELET # BLD AUTO: 294 K/UL (ref 150–450)
PLATELET # BLD AUTO: 299 K/UL (ref 150–450)
PLATELET # BLD AUTO: 315 K/UL (ref 150–450)
PLATELET # BLD AUTO: 316 K/UL (ref 150–450)
PLATELET # BLD AUTO: 324 K/UL (ref 150–450)
PLATELET # BLD AUTO: 329 K/UL (ref 150–450)
PLATELET # BLD AUTO: 347 K/UL (ref 150–450)
PLATELET # BLD AUTO: 357 K/UL (ref 150–450)
PLATELET # BLD AUTO: 366 K/UL (ref 150–450)
PLATELET # BLD AUTO: 408 K/UL (ref 150–450)
PLATELET # BLD AUTO: 456 K/UL (ref 150–450)
PLATELET # BLD AUTO: 463 K/UL (ref 150–450)
PLATELET BLD QL SMEAR: ABNORMAL
PMV BLD AUTO: 7.4 FL (ref 9.2–12.9)
PMV BLD AUTO: 7.9 FL (ref 9.2–12.9)
PMV BLD AUTO: 8 FL (ref 9.2–12.9)
PMV BLD AUTO: 8.1 FL (ref 9.2–12.9)
PMV BLD AUTO: 8.1 FL (ref 9.2–12.9)
PMV BLD AUTO: 8.3 FL (ref 9.2–12.9)
PMV BLD AUTO: 8.5 FL (ref 9.2–12.9)
PMV BLD AUTO: 8.6 FL (ref 9.2–12.9)
PMV BLD AUTO: 8.6 FL (ref 9.2–12.9)
PMV BLD AUTO: 8.7 FL (ref 9.2–12.9)
PMV BLD AUTO: 8.8 FL (ref 9.2–12.9)
PMV BLD AUTO: 9.3 FL (ref 9.2–12.9)
POC MOLECULAR INFLUENZA A AGN: NEGATIVE
POC MOLECULAR INFLUENZA B AGN: NEGATIVE
POCT GLUCOSE: 84 MG/DL (ref 70–110)
POCT GLUCOSE: 85 MG/DL (ref 70–110)
POCT GLUCOSE: 90 MG/DL (ref 70–110)
POIKILOCYTOSIS BLD QL SMEAR: SLIGHT
POTASSIUM SERPL-SCNC: 3.1 MMOL/L (ref 3.5–5.1)
POTASSIUM SERPL-SCNC: 3.3 MMOL/L (ref 3.5–5.1)
POTASSIUM SERPL-SCNC: 3.4 MMOL/L (ref 3.5–5.1)
POTASSIUM SERPL-SCNC: 3.5 MMOL/L (ref 3.5–5.1)
POTASSIUM SERPL-SCNC: 3.5 MMOL/L (ref 3.5–5.1)
POTASSIUM SERPL-SCNC: 3.6 MMOL/L (ref 3.5–5.1)
POTASSIUM SERPL-SCNC: 3.7 MMOL/L (ref 3.5–5.1)
POTASSIUM SERPL-SCNC: 3.8 MMOL/L (ref 3.5–5.1)
POTASSIUM SERPL-SCNC: 3.9 MMOL/L (ref 3.5–5.1)
POTASSIUM SERPL-SCNC: 3.9 MMOL/L (ref 3.5–5.1)
POTASSIUM SERPL-SCNC: 4 MMOL/L (ref 3.5–5.1)
POTASSIUM SERPL-SCNC: 4.2 MMOL/L (ref 3.5–5.1)
POTASSIUM SERPL-SCNC: 4.4 MMOL/L (ref 3.5–5.1)
POTASSIUM SERPL-SCNC: 4.7 MMOL/L (ref 3.5–5.1)
PREALB SERPL-MCNC: 17 MG/DL (ref 20–43)
PROCALCITONIN SERPL IA-MCNC: 0.16 NG/ML
PROT SERPL-MCNC: 6 G/DL (ref 6–8.4)
PROT SERPL-MCNC: 6.1 G/DL (ref 6–8.4)
PROT SERPL-MCNC: 6.2 G/DL (ref 6–8.4)
PROT SERPL-MCNC: 6.4 G/DL (ref 6–8.4)
PROT SERPL-MCNC: 6.7 G/DL (ref 6–8.4)
PROT SERPL-MCNC: 6.8 G/DL (ref 6–8.4)
PROT SERPL-MCNC: 6.9 G/DL (ref 6–8.4)
PROT SERPL-MCNC: 6.9 G/DL (ref 6–8.4)
PROT SERPL-MCNC: 7 G/DL (ref 6–8.4)
PROT SERPL-MCNC: 7.1 G/DL (ref 6–8.4)
PROT SERPL-MCNC: 7.4 G/DL (ref 6–8.4)
PROT SERPL-MCNC: 7.5 G/DL (ref 6–8.4)
PROT SERPL-MCNC: 7.5 G/DL (ref 6–8.4)
PROT SERPL-MCNC: 7.6 G/DL (ref 6–8.4)
PROT SERPL-MCNC: 7.7 G/DL (ref 6–8.4)
PROT SERPL-MCNC: 7.9 G/DL (ref 6–8.4)
PROT UR QL STRIP: ABNORMAL
PROT UR QL STRIP: ABNORMAL
RBC # BLD AUTO: 3.36 M/UL (ref 4.6–6.2)
RBC # BLD AUTO: 3.4 M/UL (ref 4.6–6.2)
RBC # BLD AUTO: 3.46 M/UL (ref 4.6–6.2)
RBC # BLD AUTO: 3.49 M/UL (ref 4.6–6.2)
RBC # BLD AUTO: 3.54 M/UL (ref 4.6–6.2)
RBC # BLD AUTO: 3.56 M/UL (ref 4.6–6.2)
RBC # BLD AUTO: 3.57 M/UL (ref 4.6–6.2)
RBC # BLD AUTO: 3.72 M/UL (ref 4.6–6.2)
RBC # BLD AUTO: 3.78 M/UL (ref 4.6–6.2)
RBC # BLD AUTO: 3.83 M/UL (ref 4.6–6.2)
RBC # BLD AUTO: 3.92 M/UL (ref 4.6–6.2)
RBC # BLD AUTO: 3.93 M/UL (ref 4.6–6.2)
RBC # BLD AUTO: 3.98 M/UL (ref 4.6–6.2)
RBC # BLD AUTO: 4.04 M/UL (ref 4.6–6.2)
RBC # BLD AUTO: 4.06 M/UL (ref 4.6–6.2)
RBC # BLD AUTO: 4.07 M/UL (ref 4.6–6.2)
RBC # BLD AUTO: 4.13 M/UL (ref 4.6–6.2)
RBC # BLD AUTO: 4.16 M/UL (ref 4.6–6.2)
RBC # BLD AUTO: 4.38 M/UL (ref 4.6–6.2)
RBC # BLD AUTO: 4.39 M/UL (ref 4.6–6.2)
RBC # BLD AUTO: 4.82 M/UL (ref 4.6–6.2)
RBC #/AREA URNS HPF: 0 /HPF (ref 0–4)
REASON FOR STUDY: NORMAL
REASON FOR STUDY: NORMAL
SARS-COV-2 RDRP RESP QL NAA+PROBE: NEGATIVE
SATURATED IRON: 15 % (ref 20–50)
SATURATED IRON: 9 % (ref 20–50)
SODIUM SERPL-SCNC: 133 MMOL/L (ref 136–145)
SODIUM SERPL-SCNC: 134 MMOL/L (ref 136–145)
SODIUM SERPL-SCNC: 135 MMOL/L (ref 136–145)
SODIUM SERPL-SCNC: 136 MMOL/L (ref 136–145)
SODIUM SERPL-SCNC: 137 MMOL/L (ref 136–145)
SODIUM SERPL-SCNC: 138 MMOL/L (ref 136–145)
SODIUM SERPL-SCNC: 139 MMOL/L (ref 136–145)
SODIUM SERPL-SCNC: 140 MMOL/L (ref 136–145)
SODIUM SERPL-SCNC: 141 MMOL/L (ref 136–145)
SP GR UR STRIP: 1.01 (ref 1–1.03)
SP GR UR STRIP: >1.03 (ref 1–1.03)
SUPPLEMENTAL DIAGNOSIS: ABNORMAL
TARGETS BLD QL SMEAR: ABNORMAL
TARGETS BLD QL SMEAR: ABNORMAL
TEMPUS FUSIONADDENDUM: NORMAL
TEMPUS LCA: NORMAL
TEMPUS PORTAL: NORMAL
TEMPUS PORTAL: NORMAL
TEMPUS TRIAL1: NORMAL
TEMPUS TRIAL2: NORMAL
TEMPUS TRIAL3: NORMAL
TEMPUS TRIALCOUNT: 3
TOTAL IRON BINDING CAPACITY: 238 UG/DL (ref 250–450)
TOTAL IRON BINDING CAPACITY: 275 UG/DL (ref 250–450)
TRANSFERRIN SERPL-MCNC: 161 MG/DL (ref 200–375)
TRANSFERRIN SERPL-MCNC: 186 MG/DL (ref 200–375)
TROPONIN I SERPL DL<=0.01 NG/ML-MCNC: <0.006 NG/ML (ref 0–0.03)
TSH SERPL DL<=0.005 MIU/L-ACNC: 1.93 UIU/ML (ref 0.4–4)
URN SPEC COLLECT METH UR: ABNORMAL
URN SPEC COLLECT METH UR: ABNORMAL
UROBILINOGEN UR STRIP-ACNC: NEGATIVE EU/DL
UROBILINOGEN UR STRIP-ACNC: NEGATIVE EU/DL
WBC # BLD AUTO: 1.27 K/UL (ref 3.9–12.7)
WBC # BLD AUTO: 1.77 K/UL (ref 3.9–12.7)
WBC # BLD AUTO: 11.43 K/UL (ref 3.9–12.7)
WBC # BLD AUTO: 14.58 K/UL (ref 3.9–12.7)
WBC # BLD AUTO: 14.73 K/UL (ref 3.9–12.7)
WBC # BLD AUTO: 18.99 K/UL (ref 3.9–12.7)
WBC # BLD AUTO: 2.07 K/UL (ref 3.9–12.7)
WBC # BLD AUTO: 2.67 K/UL (ref 3.9–12.7)
WBC # BLD AUTO: 3.62 K/UL (ref 3.9–12.7)
WBC # BLD AUTO: 3.77 K/UL (ref 3.9–12.7)
WBC # BLD AUTO: 4.21 K/UL (ref 3.9–12.7)
WBC # BLD AUTO: 5.43 K/UL (ref 3.9–12.7)
WBC # BLD AUTO: 5.59 K/UL (ref 3.9–12.7)
WBC # BLD AUTO: 6.3 K/UL (ref 3.9–12.7)
WBC # BLD AUTO: 6.88 K/UL (ref 3.9–12.7)
WBC # BLD AUTO: 6.97 K/UL (ref 3.9–12.7)
WBC # BLD AUTO: 7.04 K/UL (ref 3.9–12.7)
WBC # BLD AUTO: 7.43 K/UL (ref 3.9–12.7)
WBC # BLD AUTO: 7.96 K/UL (ref 3.9–12.7)
WBC # BLD AUTO: 9.59 K/UL (ref 3.9–12.7)
WBC # BLD AUTO: 9.71 K/UL (ref 3.9–12.7)
WBC #/AREA URNS HPF: 0 /HPF (ref 0–5)

## 2023-01-01 PROCEDURE — 63600175 PHARM REV CODE 636 W HCPCS: Performed by: INTERNAL MEDICINE

## 2023-01-01 PROCEDURE — 96365 THER/PROPH/DIAG IV INF INIT: CPT

## 2023-01-01 PROCEDURE — 96375 TX/PRO/DX INJ NEW DRUG ADDON: CPT

## 2023-01-01 PROCEDURE — 74019 RADEX ABDOMEN 2 VIEWS: CPT | Mod: 26,,, | Performed by: RADIOLOGY

## 2023-01-01 PROCEDURE — 96376 TX/PRO/DX INJ SAME DRUG ADON: CPT

## 2023-01-01 PROCEDURE — 83735 ASSAY OF MAGNESIUM: CPT | Performed by: NURSE PRACTITIONER

## 2023-01-01 PROCEDURE — 36415 COLL VENOUS BLD VENIPUNCTURE: CPT | Performed by: INTERNAL MEDICINE

## 2023-01-01 PROCEDURE — 37000008 HC ANESTHESIA 1ST 15 MINUTES: Performed by: STUDENT IN AN ORGANIZED HEALTH CARE EDUCATION/TRAINING PROGRAM

## 2023-01-01 PROCEDURE — 3008F PR BODY MASS INDEX (BMI) DOCUMENTED: ICD-10-PCS | Mod: CPTII,S$GLB,, | Performed by: INTERNAL MEDICINE

## 2023-01-01 PROCEDURE — 74177 CT CHEST ABDOMEN PELVIS WITH CONTRAST (XPD): ICD-10-PCS | Mod: 26,,, | Performed by: STUDENT IN AN ORGANIZED HEALTH CARE EDUCATION/TRAINING PROGRAM

## 2023-01-01 PROCEDURE — C2617 STENT, NON-COR, TEM W/O DEL: HCPCS | Performed by: INTERNAL MEDICINE

## 2023-01-01 PROCEDURE — 27201423 OPTIME MED/SURG SUP & DEVICES STERILE SUPPLY: Performed by: STUDENT IN AN ORGANIZED HEALTH CARE EDUCATION/TRAINING PROGRAM

## 2023-01-01 PROCEDURE — 37000009 HC ANESTHESIA EA ADD 15 MINS: Performed by: INTERNAL MEDICINE

## 2023-01-01 PROCEDURE — 81000 URINALYSIS NONAUTO W/SCOPE: CPT | Performed by: INTERNAL MEDICINE

## 2023-01-01 PROCEDURE — 25000003 PHARM REV CODE 250: Performed by: INTERNAL MEDICINE

## 2023-01-01 PROCEDURE — 93971 EXTREMITY STUDY: CPT | Mod: TC,RT

## 2023-01-01 PROCEDURE — 1160F PR REVIEW ALL MEDS BY PRESCRIBER/CLIN PHARMACIST DOCUMENTED: ICD-10-PCS | Mod: CPTII,S$GLB,, | Performed by: INTERNAL MEDICINE

## 2023-01-01 PROCEDURE — C2625 STENT, NON-COR, TEM W/DEL SY: HCPCS | Performed by: INTERNAL MEDICINE

## 2023-01-01 PROCEDURE — 80053 COMPREHEN METABOLIC PANEL: CPT | Performed by: INTERNAL MEDICINE

## 2023-01-01 PROCEDURE — C1769 GUIDE WIRE: HCPCS

## 2023-01-01 PROCEDURE — 85025 COMPLETE CBC W/AUTO DIFF WBC: CPT | Performed by: INTERNAL MEDICINE

## 2023-01-01 PROCEDURE — 43245 EGD DILATE STRICTURE: CPT | Mod: 51,,, | Performed by: INTERNAL MEDICINE

## 2023-01-01 PROCEDURE — 99214 OFFICE O/P EST MOD 30 MIN: CPT | Mod: 95,,, | Performed by: INTERNAL MEDICINE

## 2023-01-01 PROCEDURE — A9698 NON-RAD CONTRAST MATERIALNOC: HCPCS | Performed by: INTERNAL MEDICINE

## 2023-01-01 PROCEDURE — 92557 PR COMPREHENSIVE HEARING TEST: ICD-10-PCS | Mod: S$GLB,,,

## 2023-01-01 PROCEDURE — 88342 IMHCHEM/IMCYTCHM 1ST ANTB: CPT | Mod: 26,,, | Performed by: PATHOLOGY

## 2023-01-01 PROCEDURE — D9220A PRA ANESTHESIA: ICD-10-PCS | Mod: ANES,,, | Performed by: ANESTHESIOLOGY

## 2023-01-01 PROCEDURE — 81003 URINALYSIS AUTO W/O SCOPE: CPT | Performed by: EMERGENCY MEDICINE

## 2023-01-01 PROCEDURE — 99203 PR OFFICE/OUTPT VISIT, NEW, LEVL III, 30-44 MIN: ICD-10-PCS | Mod: S$GLB,,, | Performed by: SURGERY

## 2023-01-01 PROCEDURE — 1159F MED LIST DOCD IN RCRD: CPT | Mod: CPTII,S$GLB,, | Performed by: NURSE PRACTITIONER

## 2023-01-01 PROCEDURE — 1111F PR DISCHARGE MEDS RECONCILED W/ CURRENT OUTPATIENT MED LIST: ICD-10-PCS | Mod: CPTII,S$GLB,,

## 2023-01-01 PROCEDURE — 3008F BODY MASS INDEX DOCD: CPT | Mod: CPTII,S$GLB,,

## 2023-01-01 PROCEDURE — 99205 PR OFFICE/OUTPT VISIT, NEW, LEVL V, 60-74 MIN: ICD-10-PCS | Mod: S$GLB,,, | Performed by: NURSE PRACTITIONER

## 2023-01-01 PROCEDURE — 99999 PR PBB SHADOW E&M-EST. PATIENT-LVL IV: ICD-10-PCS | Mod: PBBFAC,,, | Performed by: INTERNAL MEDICINE

## 2023-01-01 PROCEDURE — 63600175 PHARM REV CODE 636 W HCPCS: Performed by: NURSE PRACTITIONER

## 2023-01-01 PROCEDURE — 1111F PR DISCHARGE MEDS RECONCILED W/ CURRENT OUTPATIENT MED LIST: ICD-10-PCS | Mod: CPTII,S$GLB,, | Performed by: NURSE PRACTITIONER

## 2023-01-01 PROCEDURE — 25500020 PHARM REV CODE 255: Performed by: EMERGENCY MEDICINE

## 2023-01-01 PROCEDURE — D9220A PRA ANESTHESIA: Mod: CRNA,,, | Performed by: NURSE ANESTHETIST, CERTIFIED REGISTERED

## 2023-01-01 PROCEDURE — 48999 UNLISTED PROCEDURE PANCREAS: CPT | Performed by: INTERNAL MEDICINE

## 2023-01-01 PROCEDURE — 71000015 HC POSTOP RECOV 1ST HR: Performed by: STUDENT IN AN ORGANIZED HEALTH CARE EDUCATION/TRAINING PROGRAM

## 2023-01-01 PROCEDURE — 80076 HEPATIC FUNCTION PANEL: CPT | Performed by: PHYSICIAN ASSISTANT

## 2023-01-01 PROCEDURE — 3074F PR MOST RECENT SYSTOLIC BLOOD PRESSURE < 130 MM HG: ICD-10-PCS | Mod: CPTII,S$GLB,, | Performed by: INTERNAL MEDICINE

## 2023-01-01 PROCEDURE — 3078F DIAST BP <80 MM HG: CPT | Mod: CPTII,S$GLB,,

## 2023-01-01 PROCEDURE — 74328 X-RAY BILE DUCT ENDOSCOPY: CPT | Mod: TC | Performed by: INTERNAL MEDICINE

## 2023-01-01 PROCEDURE — 99205 OFFICE O/P NEW HI 60 MIN: CPT | Mod: S$GLB,,, | Performed by: INTERNAL MEDICINE

## 2023-01-01 PROCEDURE — D9220A PRA ANESTHESIA: Mod: ANES,,, | Performed by: ANESTHESIOLOGY

## 2023-01-01 PROCEDURE — 3074F SYST BP LT 130 MM HG: CPT | Mod: CPTII,S$GLB,, | Performed by: INTERNAL MEDICINE

## 2023-01-01 PROCEDURE — 99999 PR PBB SHADOW E&M-EST. PATIENT-LVL IV: CPT | Mod: PBBFAC,,, | Performed by: INTERNAL MEDICINE

## 2023-01-01 PROCEDURE — 3078F DIAST BP <80 MM HG: CPT | Mod: CPTII,S$GLB,, | Performed by: INTERNAL MEDICINE

## 2023-01-01 PROCEDURE — 93010 ELECTROCARDIOGRAM REPORT: CPT | Mod: ,,, | Performed by: INTERNAL MEDICINE

## 2023-01-01 PROCEDURE — 99233 SBSQ HOSP IP/OBS HIGH 50: CPT | Mod: ,,, | Performed by: NURSE PRACTITIONER

## 2023-01-01 PROCEDURE — 3079F PR MOST RECENT DIASTOLIC BLOOD PRESSURE 80-89 MM HG: ICD-10-PCS | Mod: CPTII,S$GLB,, | Performed by: INTERNAL MEDICINE

## 2023-01-01 PROCEDURE — 63600175 PHARM REV CODE 636 W HCPCS: Performed by: EMERGENCY MEDICINE

## 2023-01-01 PROCEDURE — 99498 ADVNCD CARE PLAN ADDL 30 MIN: CPT | Mod: ,,, | Performed by: NURSE PRACTITIONER

## 2023-01-01 PROCEDURE — 43245 PR EGD, FLEX, W/DILATION, GASTR/DUOD STRICTURE: ICD-10-PCS | Mod: 51,,, | Performed by: INTERNAL MEDICINE

## 2023-01-01 PROCEDURE — 43264 ERCP REMOVE DUCT CALCULI: CPT | Performed by: INTERNAL MEDICINE

## 2023-01-01 PROCEDURE — 3080F DIAST BP >= 90 MM HG: CPT | Mod: CPTII,S$GLB,, | Performed by: NURSE PRACTITIONER

## 2023-01-01 PROCEDURE — 1159F MED LIST DOCD IN RCRD: CPT | Mod: CPTII,S$GLB,, | Performed by: INTERNAL MEDICINE

## 2023-01-01 PROCEDURE — 1160F RVW MEDS BY RX/DR IN RCRD: CPT | Mod: CPTII,S$GLB,, | Performed by: SURGERY

## 2023-01-01 PROCEDURE — 3078F DIAST BP <80 MM HG: CPT | Mod: CPTII,S$GLB,, | Performed by: NURSE PRACTITIONER

## 2023-01-01 PROCEDURE — 96367 TX/PROPH/DG ADDL SEQ IV INF: CPT

## 2023-01-01 PROCEDURE — 99223 1ST HOSP IP/OBS HIGH 75: CPT | Mod: ,,, | Performed by: INTERNAL MEDICINE

## 2023-01-01 PROCEDURE — 1111F DSCHRG MED/CURRENT MED MERGE: CPT | Mod: CPTII,S$GLB,, | Performed by: INTERNAL MEDICINE

## 2023-01-01 PROCEDURE — 37000008 HC ANESTHESIA 1ST 15 MINUTES: Performed by: INTERNAL MEDICINE

## 2023-01-01 PROCEDURE — 1111F DSCHRG MED/CURRENT MED MERGE: CPT | Mod: CPTII,S$GLB,,

## 2023-01-01 PROCEDURE — 1159F MED LIST DOCD IN RCRD: CPT | Mod: CPTII,95,, | Performed by: INTERNAL MEDICINE

## 2023-01-01 PROCEDURE — 93971 EXTREMITY STUDY: CPT | Mod: 26,RT,, | Performed by: RADIOLOGY

## 2023-01-01 PROCEDURE — 1160F PR REVIEW ALL MEDS BY PRESCRIBER/CLIN PHARMACIST DOCUMENTED: ICD-10-PCS | Mod: CPTII,95,, | Performed by: INTERNAL MEDICINE

## 2023-01-01 PROCEDURE — 99223 PR INITIAL HOSPITAL CARE,LEVL III: ICD-10-PCS | Mod: NSCH,,, | Performed by: INTERNAL MEDICINE

## 2023-01-01 PROCEDURE — 25000003 PHARM REV CODE 250: Performed by: NURSE PRACTITIONER

## 2023-01-01 PROCEDURE — 82728 ASSAY OF FERRITIN: CPT | Performed by: INTERNAL MEDICINE

## 2023-01-01 PROCEDURE — 99499 UNLISTED E&M SERVICE: CPT | Mod: S$GLB,,,

## 2023-01-01 PROCEDURE — 48999 PR ENDOSCOPIC NECROSECTOMY OF PANCREAS: ICD-10-PCS | Mod: ,,, | Performed by: INTERNAL MEDICINE

## 2023-01-01 PROCEDURE — 25000003 PHARM REV CODE 250: Performed by: EMERGENCY MEDICINE

## 2023-01-01 PROCEDURE — 27201014 HC GRASPER DEVICE: Performed by: INTERNAL MEDICINE

## 2023-01-01 PROCEDURE — 63600175 PHARM REV CODE 636 W HCPCS: Performed by: NURSE ANESTHETIST, CERTIFIED REGISTERED

## 2023-01-01 PROCEDURE — 43276 ERCP STENT EXCHANGE W/DILATE: CPT | Mod: ,,, | Performed by: INTERNAL MEDICINE

## 2023-01-01 PROCEDURE — 85025 COMPLETE CBC W/AUTO DIFF WBC: CPT | Performed by: FAMILY MEDICINE

## 2023-01-01 PROCEDURE — 63600175 PHARM REV CODE 636 W HCPCS: Performed by: SURGERY

## 2023-01-01 PROCEDURE — 99215 PR OFFICE/OUTPT VISIT, EST, LEVL V, 40-54 MIN: ICD-10-PCS | Mod: 25,S$GLB,, | Performed by: INTERNAL MEDICINE

## 2023-01-01 PROCEDURE — D9220A PRA ANESTHESIA: ICD-10-PCS | Mod: CRNA,,, | Performed by: NURSE ANESTHETIST, CERTIFIED REGISTERED

## 2023-01-01 PROCEDURE — 99999 PR PBB SHADOW E&M-EST. PATIENT-LVL IV: ICD-10-PCS | Mod: PBBFAC,,, | Performed by: SURGERY

## 2023-01-01 PROCEDURE — 11000001 HC ACUTE MED/SURG PRIVATE ROOM

## 2023-01-01 PROCEDURE — 36000707: Performed by: SURGERY

## 2023-01-01 PROCEDURE — 96360 HYDRATION IV INFUSION INIT: CPT

## 2023-01-01 PROCEDURE — 87040 BLOOD CULTURE FOR BACTERIA: CPT | Performed by: INTERNAL MEDICINE

## 2023-01-01 PROCEDURE — 3079F DIAST BP 80-89 MM HG: CPT | Mod: CPTII,S$GLB,, | Performed by: INTERNAL MEDICINE

## 2023-01-01 PROCEDURE — 3008F BODY MASS INDEX DOCD: CPT | Mod: CPTII,S$GLB,, | Performed by: INTERNAL MEDICINE

## 2023-01-01 PROCEDURE — 99215 OFFICE O/P EST HI 40 MIN: CPT | Mod: S$GLB,,, | Performed by: INTERNAL MEDICINE

## 2023-01-01 PROCEDURE — 74019 RADEX ABDOMEN 2 VIEWS: CPT | Mod: TC

## 2023-01-01 PROCEDURE — 1111F PR DISCHARGE MEDS RECONCILED W/ CURRENT OUTPATIENT MED LIST: ICD-10-PCS | Mod: CPTII,S$GLB,, | Performed by: INTERNAL MEDICINE

## 2023-01-01 PROCEDURE — 3077F SYST BP >= 140 MM HG: CPT | Mod: CPTII,S$GLB,, | Performed by: NURSE PRACTITIONER

## 2023-01-01 PROCEDURE — 84145 PROCALCITONIN (PCT): CPT | Performed by: INTERNAL MEDICINE

## 2023-01-01 PROCEDURE — 83690 ASSAY OF LIPASE: CPT | Performed by: NURSE PRACTITIONER

## 2023-01-01 PROCEDURE — 25000003 PHARM REV CODE 250: Performed by: FAMILY MEDICINE

## 2023-01-01 PROCEDURE — 25500020 PHARM REV CODE 255

## 2023-01-01 PROCEDURE — 3008F PR BODY MASS INDEX (BMI) DOCUMENTED: ICD-10-PCS | Mod: CPTII,S$GLB,, | Performed by: NURSE PRACTITIONER

## 2023-01-01 PROCEDURE — 25000003 PHARM REV CODE 250: Performed by: STUDENT IN AN ORGANIZED HEALTH CARE EDUCATION/TRAINING PROGRAM

## 2023-01-01 PROCEDURE — 82378 CARCINOEMBRYONIC ANTIGEN: CPT | Performed by: NURSE PRACTITIONER

## 2023-01-01 PROCEDURE — 99499 NO LOS: ICD-10-PCS | Mod: S$GLB,,,

## 2023-01-01 PROCEDURE — 96417 CHEMO IV INFUS EACH ADDL SEQ: CPT

## 2023-01-01 PROCEDURE — 96361 HYDRATE IV INFUSION ADD-ON: CPT

## 2023-01-01 PROCEDURE — 63600175 PHARM REV CODE 636 W HCPCS: Performed by: FAMILY MEDICINE

## 2023-01-01 PROCEDURE — 43264 ERCP REMOVE DUCT CALCULI: CPT | Mod: 51,,, | Performed by: INTERNAL MEDICINE

## 2023-01-01 PROCEDURE — 99497 ADVNCD CARE PLAN 30 MIN: CPT | Mod: 95,,, | Performed by: NURSE PRACTITIONER

## 2023-01-01 PROCEDURE — 99999 PR PBB SHADOW E&M-EST. PATIENT-LVL V: ICD-10-PCS | Mod: PBBFAC,,, | Performed by: INTERNAL MEDICINE

## 2023-01-01 PROCEDURE — 99497 PR ADVNCD CARE PLAN 30 MIN: ICD-10-PCS | Mod: 25,,, | Performed by: NURSE PRACTITIONER

## 2023-01-01 PROCEDURE — 21400001 HC TELEMETRY ROOM

## 2023-01-01 PROCEDURE — 63600175 PHARM REV CODE 636 W HCPCS: Mod: JZ,JG | Performed by: INTERNAL MEDICINE

## 2023-01-01 PROCEDURE — 99233 SBSQ HOSP IP/OBS HIGH 50: CPT | Mod: ,,, | Performed by: INTERNAL MEDICINE

## 2023-01-01 PROCEDURE — C1726 CATH, BAL DIL, NON-VASCULAR: HCPCS | Performed by: INTERNAL MEDICINE

## 2023-01-01 PROCEDURE — 1111F DSCHRG MED/CURRENT MED MERGE: CPT | Mod: CPTII,S$GLB,, | Performed by: NURSE PRACTITIONER

## 2023-01-01 PROCEDURE — 74328 PR  X-RAY FOR BILE DUCT ENDOSCOPY: ICD-10-PCS | Mod: 26,,, | Performed by: INTERNAL MEDICINE

## 2023-01-01 PROCEDURE — 99233 PR SUBSEQUENT HOSPITAL CARE,LEVL III: ICD-10-PCS | Mod: ,,, | Performed by: INTERNAL MEDICINE

## 2023-01-01 PROCEDURE — 3078F PR MOST RECENT DIASTOLIC BLOOD PRESSURE < 80 MM HG: ICD-10-PCS | Mod: CPTII,S$GLB,, | Performed by: INTERNAL MEDICINE

## 2023-01-01 PROCEDURE — 71260 CT THORAX DX C+: CPT | Mod: 26,,, | Performed by: RADIOLOGY

## 2023-01-01 PROCEDURE — 99215 PR OFFICE/OUTPT VISIT, EST, LEVL V, 40-54 MIN: ICD-10-PCS | Mod: ,,, | Performed by: NURSE PRACTITIONER

## 2023-01-01 PROCEDURE — 1159F PR MEDICATION LIST DOCUMENTED IN MEDICAL RECORD: ICD-10-PCS | Mod: CPTII,S$GLB,, | Performed by: INTERNAL MEDICINE

## 2023-01-01 PROCEDURE — 3008F BODY MASS INDEX DOCD: CPT | Mod: CPTII,S$GLB,, | Performed by: NURSE PRACTITIONER

## 2023-01-01 PROCEDURE — 1160F RVW MEDS BY RX/DR IN RCRD: CPT | Mod: CPTII,95,, | Performed by: NURSE PRACTITIONER

## 2023-01-01 PROCEDURE — 93005 ELECTROCARDIOGRAM TRACING: CPT

## 2023-01-01 PROCEDURE — 71260 CT THORAX DX C+: CPT | Mod: TC

## 2023-01-01 PROCEDURE — 88112 CYTOPATH CELL ENHANCE TECH: CPT | Mod: 26,,, | Performed by: PATHOLOGY

## 2023-01-01 PROCEDURE — 71000033 HC RECOVERY, INTIAL HOUR: Performed by: SURGERY

## 2023-01-01 PROCEDURE — 99223 PR INITIAL HOSPITAL CARE,LEVL III: ICD-10-PCS | Mod: ,,, | Performed by: NURSE PRACTITIONER

## 2023-01-01 PROCEDURE — 93010 EKG 12-LEAD: ICD-10-PCS | Mod: ,,, | Performed by: INTERNAL MEDICINE

## 2023-01-01 PROCEDURE — 71260 CT CHEST ABDOMEN PELVIS WITH CONTRAST (XPD): ICD-10-PCS | Mod: 26,,, | Performed by: STUDENT IN AN ORGANIZED HEALTH CARE EDUCATION/TRAINING PROGRAM

## 2023-01-01 PROCEDURE — 3080F PR MOST RECENT DIASTOLIC BLOOD PRESSURE >= 90 MM HG: ICD-10-PCS | Mod: CPTII,S$GLB,, | Performed by: NURSE PRACTITIONER

## 2023-01-01 PROCEDURE — 99215 PR OFFICE/OUTPT VISIT, EST, LEVL V, 40-54 MIN: ICD-10-PCS | Mod: S$GLB,,,

## 2023-01-01 PROCEDURE — 99231 PR SUBSEQUENT HOSPITAL CARE,LEVL I: ICD-10-PCS | Mod: ,,, | Performed by: PHYSICIAN ASSISTANT

## 2023-01-01 PROCEDURE — 99497 ADVNCD CARE PLAN 30 MIN: CPT | Mod: ,,, | Performed by: NURSE PRACTITIONER

## 2023-01-01 PROCEDURE — 36415 COLL VENOUS BLD VENIPUNCTURE: CPT | Performed by: NURSE PRACTITIONER

## 2023-01-01 PROCEDURE — 83735 ASSAY OF MAGNESIUM: CPT | Performed by: INTERNAL MEDICINE

## 2023-01-01 PROCEDURE — 85025 COMPLETE CBC W/AUTO DIFF WBC: CPT | Performed by: NURSE PRACTITIONER

## 2023-01-01 PROCEDURE — 25500020 PHARM REV CODE 255: Performed by: INTERNAL MEDICINE

## 2023-01-01 PROCEDURE — 83615 LACTATE (LD) (LDH) ENZYME: CPT | Performed by: INTERNAL MEDICINE

## 2023-01-01 PROCEDURE — 25000003 PHARM REV CODE 250: Performed by: NURSE ANESTHETIST, CERTIFIED REGISTERED

## 2023-01-01 PROCEDURE — 99999 PR PBB SHADOW E&M-EST. PATIENT-LVL II: ICD-10-PCS | Mod: PBBFAC,,,

## 2023-01-01 PROCEDURE — 1160F RVW MEDS BY RX/DR IN RCRD: CPT | Mod: CPTII,95,, | Performed by: INTERNAL MEDICINE

## 2023-01-01 PROCEDURE — 1160F PR REVIEW ALL MEDS BY PRESCRIBER/CLIN PHARMACIST DOCUMENTED: ICD-10-PCS | Mod: CPTII,S$GLB,, | Performed by: NURSE PRACTITIONER

## 2023-01-01 PROCEDURE — 99223 1ST HOSP IP/OBS HIGH 75: CPT | Mod: NSCH,,, | Performed by: INTERNAL MEDICINE

## 2023-01-01 PROCEDURE — 84134 ASSAY OF PREALBUMIN: CPT | Performed by: NURSE PRACTITIONER

## 2023-01-01 PROCEDURE — 99999 PR PBB SHADOW E&M-EST. PATIENT-LVL IV: CPT | Mod: PBBFAC,,, | Performed by: SURGERY

## 2023-01-01 PROCEDURE — 48999 UNLISTED PROCEDURE PANCREAS: CPT | Mod: ,,, | Performed by: INTERNAL MEDICINE

## 2023-01-01 PROCEDURE — 25500020 PHARM REV CODE 255: Performed by: STUDENT IN AN ORGANIZED HEALTH CARE EDUCATION/TRAINING PROGRAM

## 2023-01-01 PROCEDURE — 71045 XR CHEST AP PORTABLE: ICD-10-PCS | Mod: 26,,, | Performed by: RADIOLOGY

## 2023-01-01 PROCEDURE — 43245 EGD DILATE STRICTURE: CPT | Performed by: INTERNAL MEDICINE

## 2023-01-01 PROCEDURE — 99233 SBSQ HOSP IP/OBS HIGH 50: CPT | Mod: GT,,, | Performed by: NURSE PRACTITIONER

## 2023-01-01 PROCEDURE — 88305 TISSUE EXAM BY PATHOLOGIST: CPT | Performed by: PATHOLOGY

## 2023-01-01 PROCEDURE — 3078F DIAST BP <80 MM HG: CPT | Mod: CPTII,S$GLB,, | Performed by: SURGERY

## 2023-01-01 PROCEDURE — 96413 CHEMO IV INFUSION 1 HR: CPT

## 2023-01-01 PROCEDURE — 99215 PR OFFICE/OUTPT VISIT, EST, LEVL V, 40-54 MIN: ICD-10-PCS | Mod: S$GLB,,, | Performed by: INTERNAL MEDICINE

## 2023-01-01 PROCEDURE — 82550 ASSAY OF CK (CPK): CPT | Performed by: EMERGENCY MEDICINE

## 2023-01-01 PROCEDURE — G0378 HOSPITAL OBSERVATION PER HR: HCPCS

## 2023-01-01 PROCEDURE — 80053 COMPREHEN METABOLIC PANEL: CPT | Performed by: NURSE PRACTITIONER

## 2023-01-01 PROCEDURE — 99999 PR PBB SHADOW E&M-EST. PATIENT-LVL II: CPT | Mod: PBBFAC,,, | Performed by: INTERNAL MEDICINE

## 2023-01-01 PROCEDURE — 1160F PR REVIEW ALL MEDS BY PRESCRIBER/CLIN PHARMACIST DOCUMENTED: ICD-10-PCS | Mod: CPTII,95,, | Performed by: NURSE PRACTITIONER

## 2023-01-01 PROCEDURE — 86301 IMMUNOASSAY TUMOR CA 19-9: CPT | Performed by: NURSE PRACTITIONER

## 2023-01-01 PROCEDURE — 99215 OFFICE O/P EST HI 40 MIN: CPT | Mod: 95,,, | Performed by: NURSE PRACTITIONER

## 2023-01-01 PROCEDURE — 49321 LAPAROSCOPY BIOPSY: CPT | Mod: ,,, | Performed by: STUDENT IN AN ORGANIZED HEALTH CARE EDUCATION/TRAINING PROGRAM

## 2023-01-01 PROCEDURE — 88112 CYTOPATH CELL ENHANCE TECH: CPT | Performed by: PATHOLOGY

## 2023-01-01 PROCEDURE — 36000708 HC OR TIME LEV III 1ST 15 MIN: Performed by: STUDENT IN AN ORGANIZED HEALTH CARE EDUCATION/TRAINING PROGRAM

## 2023-01-01 PROCEDURE — 37000008 HC ANESTHESIA 1ST 15 MINUTES: Performed by: SURGERY

## 2023-01-01 PROCEDURE — 99497 ADVNCD CARE PLAN 30 MIN: CPT | Mod: 25,,, | Performed by: NURSE PRACTITIONER

## 2023-01-01 PROCEDURE — 99223 PR INITIAL HOSPITAL CARE,LEVL III: ICD-10-PCS | Mod: ,,, | Performed by: INTERNAL MEDICINE

## 2023-01-01 PROCEDURE — C1769 GUIDE WIRE: HCPCS | Performed by: INTERNAL MEDICINE

## 2023-01-01 PROCEDURE — 74177 CT ABD & PELVIS W/CONTRAST: CPT | Mod: TC

## 2023-01-01 PROCEDURE — 3008F PR BODY MASS INDEX (BMI) DOCUMENTED: ICD-10-PCS | Mod: CPTII,S$GLB,,

## 2023-01-01 PROCEDURE — 99205 PR OFFICE/OUTPT VISIT, NEW, LEVL V, 60-74 MIN: ICD-10-PCS | Mod: S$GLB,,, | Performed by: INTERNAL MEDICINE

## 2023-01-01 PROCEDURE — 1160F RVW MEDS BY RX/DR IN RCRD: CPT | Mod: CPTII,S$GLB,, | Performed by: INTERNAL MEDICINE

## 2023-01-01 PROCEDURE — 87502 INFLUENZA DNA AMP PROBE: CPT | Mod: 59

## 2023-01-01 PROCEDURE — 88341 IMHCHEM/IMCYTCHM EA ADD ANTB: CPT | Mod: 26,,, | Performed by: PATHOLOGY

## 2023-01-01 PROCEDURE — 99497 PR ADVNCD CARE PLAN 30 MIN: ICD-10-PCS | Mod: ,,, | Performed by: NURSE PRACTITIONER

## 2023-01-01 PROCEDURE — 84466 ASSAY OF TRANSFERRIN: CPT | Performed by: INTERNAL MEDICINE

## 2023-01-01 PROCEDURE — 27202127 HC STENT INTRODUCER: Performed by: INTERNAL MEDICINE

## 2023-01-01 PROCEDURE — 43276 ERCP STENT EXCHANGE W/DILATE: CPT | Performed by: INTERNAL MEDICINE

## 2023-01-01 PROCEDURE — 1159F PR MEDICATION LIST DOCUMENTED IN MEDICAL RECORD: ICD-10-PCS | Mod: CPTII,95,, | Performed by: INTERNAL MEDICINE

## 2023-01-01 PROCEDURE — 97116 GAIT TRAINING THERAPY: CPT

## 2023-01-01 PROCEDURE — 82150 ASSAY OF AMYLASE: CPT | Performed by: NURSE PRACTITIONER

## 2023-01-01 PROCEDURE — 88112 PR  CYTOPATH, CELL ENHANCE TECH: ICD-10-PCS | Mod: 26,,, | Performed by: PATHOLOGY

## 2023-01-01 PROCEDURE — 82378 CARCINOEMBRYONIC ANTIGEN: CPT | Performed by: INTERNAL MEDICINE

## 2023-01-01 PROCEDURE — 99285 EMERGENCY DEPT VISIT HI MDM: CPT | Mod: 25

## 2023-01-01 PROCEDURE — 71000044 HC DOSC ROUTINE RECOVERY FIRST HOUR: Performed by: STUDENT IN AN ORGANIZED HEALTH CARE EDUCATION/TRAINING PROGRAM

## 2023-01-01 PROCEDURE — C1874 STENT, COATED/COV W/DEL SYS: HCPCS | Performed by: INTERNAL MEDICINE

## 2023-01-01 PROCEDURE — 96372 THER/PROPH/DIAG INJ SC/IM: CPT | Performed by: NURSE PRACTITIONER

## 2023-01-01 PROCEDURE — 99999 PR PBB SHADOW E&M-EST. PATIENT-LVL V: CPT | Mod: PBBFAC,,, | Performed by: INTERNAL MEDICINE

## 2023-01-01 PROCEDURE — 1159F MED LIST DOCD IN RCRD: CPT | Mod: CPTII,S$GLB,, | Performed by: SURGERY

## 2023-01-01 PROCEDURE — 88341 PR IHC OR ICC EACH ADD'L SINGLE ANTIBODY  STAINPR: ICD-10-PCS | Mod: 26,,, | Performed by: PATHOLOGY

## 2023-01-01 PROCEDURE — 86301 IMMUNOASSAY TUMOR CA 19-9: CPT | Performed by: INTERNAL MEDICINE

## 2023-01-01 PROCEDURE — 99024 POSTOP FOLLOW-UP VISIT: CPT | Mod: S$GLB,,, | Performed by: NURSE PRACTITIONER

## 2023-01-01 PROCEDURE — 77001 CHG FLUOROGUIDE CNTRL VEN ACCESS,PLACE,REPLACE,REMOVE: ICD-10-PCS | Mod: 26,,, | Performed by: SURGERY

## 2023-01-01 PROCEDURE — 99999 PR PBB SHADOW E&M-EST. PATIENT-LVL III: CPT | Mod: PBBFAC,,, | Performed by: NURSE PRACTITIONER

## 2023-01-01 PROCEDURE — 3075F PR MOST RECENT SYSTOLIC BLOOD PRESS GE 130-139MM HG: ICD-10-PCS | Mod: CPTII,S$GLB,, | Performed by: NURSE PRACTITIONER

## 2023-01-01 PROCEDURE — 43245 PR EGD, FLEX, W/DILATION, GASTR/DUOD STRICTURE: ICD-10-PCS | Mod: ,,, | Performed by: INTERNAL MEDICINE

## 2023-01-01 PROCEDURE — 3074F PR MOST RECENT SYSTOLIC BLOOD PRESSURE < 130 MM HG: ICD-10-PCS | Mod: CPTII,S$GLB,, | Performed by: SURGERY

## 2023-01-01 PROCEDURE — 99215 OFFICE O/P EST HI 40 MIN: CPT | Mod: 25,S$GLB,, | Performed by: INTERNAL MEDICINE

## 2023-01-01 PROCEDURE — 99233 PR SUBSEQUENT HOSPITAL CARE,LEVL III: ICD-10-PCS | Mod: ,,, | Performed by: NURSE PRACTITIONER

## 2023-01-01 PROCEDURE — 71260 CT CHEST ABDOMEN PELVIS WITH CONTRAST (XPD): ICD-10-PCS | Mod: 26,,, | Performed by: RADIOLOGY

## 2023-01-01 PROCEDURE — 84484 ASSAY OF TROPONIN QUANT: CPT | Performed by: EMERGENCY MEDICINE

## 2023-01-01 PROCEDURE — 74019 XR ABDOMEN FLAT AND ERECT: ICD-10-PCS | Mod: 26,,, | Performed by: RADIOLOGY

## 2023-01-01 PROCEDURE — 99215 OFFICE O/P EST HI 40 MIN: CPT | Mod: ,,, | Performed by: NURSE PRACTITIONER

## 2023-01-01 PROCEDURE — 83690 ASSAY OF LIPASE: CPT | Performed by: EMERGENCY MEDICINE

## 2023-01-01 PROCEDURE — 92588 EVOKED AUDITORY TST COMPLETE: ICD-10-PCS | Mod: S$GLB,,,

## 2023-01-01 PROCEDURE — 99214 PR OFFICE/OUTPT VISIT, EST, LEVL IV, 30-39 MIN: ICD-10-PCS | Mod: S$GLB,,, | Performed by: INTERNAL MEDICINE

## 2023-01-01 PROCEDURE — 1159F MED LIST DOCD IN RCRD: CPT | Mod: CPTII,95,, | Performed by: NURSE PRACTITIONER

## 2023-01-01 PROCEDURE — 96366 THER/PROPH/DIAG IV INF ADDON: CPT

## 2023-01-01 PROCEDURE — 36415 COLL VENOUS BLD VENIPUNCTURE: CPT | Performed by: PHYSICIAN ASSISTANT

## 2023-01-01 PROCEDURE — 3077F PR MOST RECENT SYSTOLIC BLOOD PRESSURE >= 140 MM HG: ICD-10-PCS | Mod: CPTII,S$GLB,, | Performed by: NURSE PRACTITIONER

## 2023-01-01 PROCEDURE — A9585 GADOBUTROL INJECTION: HCPCS | Performed by: STUDENT IN AN ORGANIZED HEALTH CARE EDUCATION/TRAINING PROGRAM

## 2023-01-01 PROCEDURE — 27202125 HC BALLOON, EXTRACTION (ANY): Performed by: INTERNAL MEDICINE

## 2023-01-01 PROCEDURE — 99999 PR PBB SHADOW E&M-EST. PATIENT-LVL II: CPT | Mod: PBBFAC,,,

## 2023-01-01 PROCEDURE — 99223 1ST HOSP IP/OBS HIGH 75: CPT | Mod: ,,, | Performed by: NURSE PRACTITIONER

## 2023-01-01 PROCEDURE — 99233 PR SUBSEQUENT HOSPITAL CARE,LEVL III: ICD-10-PCS | Mod: GT,,, | Performed by: NURSE PRACTITIONER

## 2023-01-01 PROCEDURE — 85007 BL SMEAR W/DIFF WBC COUNT: CPT | Mod: NCS | Performed by: EMERGENCY MEDICINE

## 2023-01-01 PROCEDURE — 80053 COMPREHEN METABOLIC PANEL: CPT | Performed by: EMERGENCY MEDICINE

## 2023-01-01 PROCEDURE — 99024 PR POST-OP FOLLOW-UP VISIT: ICD-10-PCS | Mod: S$GLB,,, | Performed by: NURSE PRACTITIONER

## 2023-01-01 PROCEDURE — 3075F SYST BP GE 130 - 139MM HG: CPT | Mod: CPTII,S$GLB,, | Performed by: NURSE PRACTITIONER

## 2023-01-01 PROCEDURE — 74177 CT ABD & PELVIS W/CONTRAST: CPT | Mod: 26,,, | Performed by: RADIOLOGY

## 2023-01-01 PROCEDURE — 85027 COMPLETE CBC AUTOMATED: CPT | Performed by: INTERNAL MEDICINE

## 2023-01-01 PROCEDURE — 43276 PR ERCP W/RMVL/EXCH STENT BILIARY/PANCREATIC DUCT W/DILATION: ICD-10-PCS | Mod: ,,, | Performed by: INTERNAL MEDICINE

## 2023-01-01 PROCEDURE — 99498 PR ADVNCD CARE PLAN ADDL 30 MIN: ICD-10-PCS | Mod: ,,, | Performed by: NURSE PRACTITIONER

## 2023-01-01 PROCEDURE — 1159F PR MEDICATION LIST DOCUMENTED IN MEDICAL RECORD: ICD-10-PCS | Mod: CPTII,S$GLB,, | Performed by: SURGERY

## 2023-01-01 PROCEDURE — 71260 CT THORAX DX C+: CPT | Mod: 26,,, | Performed by: STUDENT IN AN ORGANIZED HEALTH CARE EDUCATION/TRAINING PROGRAM

## 2023-01-01 PROCEDURE — 83605 ASSAY OF LACTIC ACID: CPT | Performed by: INTERNAL MEDICINE

## 2023-01-01 PROCEDURE — 85025 COMPLETE CBC W/AUTO DIFF WBC: CPT | Performed by: EMERGENCY MEDICINE

## 2023-01-01 PROCEDURE — 99999 PR PBB SHADOW E&M-EST. PATIENT-LVL III: ICD-10-PCS | Mod: PBBFAC,,, | Performed by: INTERNAL MEDICINE

## 2023-01-01 PROCEDURE — 1159F PR MEDICATION LIST DOCUMENTED IN MEDICAL RECORD: ICD-10-PCS | Mod: CPTII,S$GLB,, | Performed by: NURSE PRACTITIONER

## 2023-01-01 PROCEDURE — 99215 PR OFFICE/OUTPT VISIT, EST, LEVL V, 40-54 MIN: ICD-10-PCS | Mod: 95,,, | Performed by: NURSE PRACTITIONER

## 2023-01-01 PROCEDURE — 1111F DSCHRG MED/CURRENT MED MERGE: CPT | Mod: CPTII,S$GLB,, | Performed by: SURGERY

## 2023-01-01 PROCEDURE — 84443 ASSAY THYROID STIM HORMONE: CPT | Performed by: INTERNAL MEDICINE

## 2023-01-01 PROCEDURE — 63600175 PHARM REV CODE 636 W HCPCS: Performed by: STUDENT IN AN ORGANIZED HEALTH CARE EDUCATION/TRAINING PROGRAM

## 2023-01-01 PROCEDURE — 80048 BASIC METABOLIC PNL TOTAL CA: CPT | Performed by: FAMILY MEDICINE

## 2023-01-01 PROCEDURE — 77001 FLUOROGUIDE FOR VEIN DEVICE: CPT | Mod: 26,,, | Performed by: SURGERY

## 2023-01-01 PROCEDURE — 27201674 HC SPHINCTERTOME: Performed by: INTERNAL MEDICINE

## 2023-01-01 PROCEDURE — 99214 PR OFFICE/OUTPT VISIT, EST, LEVL IV, 30-39 MIN: ICD-10-PCS | Mod: 95,,, | Performed by: INTERNAL MEDICINE

## 2023-01-01 PROCEDURE — 99214 OFFICE O/P EST MOD 30 MIN: CPT | Mod: S$GLB,,, | Performed by: INTERNAL MEDICINE

## 2023-01-01 PROCEDURE — 36000706: Performed by: SURGERY

## 2023-01-01 PROCEDURE — 83605 ASSAY OF LACTIC ACID: CPT | Mod: 91 | Performed by: FAMILY MEDICINE

## 2023-01-01 PROCEDURE — 1111F DSCHRG MED/CURRENT MED MERGE: CPT | Mod: CPTII,95,, | Performed by: INTERNAL MEDICINE

## 2023-01-01 PROCEDURE — 25000003 PHARM REV CODE 250

## 2023-01-01 PROCEDURE — 3078F PR MOST RECENT DIASTOLIC BLOOD PRESSURE < 80 MM HG: ICD-10-PCS | Mod: CPTII,S$GLB,,

## 2023-01-01 PROCEDURE — 97165 OT EVAL LOW COMPLEX 30 MIN: CPT

## 2023-01-01 PROCEDURE — 92588 EVOKED AUDITORY TST COMPLETE: CPT | Mod: S$GLB,,,

## 2023-01-01 PROCEDURE — 96523 IRRIG DRUG DELIVERY DEVICE: CPT

## 2023-01-01 PROCEDURE — 3008F PR BODY MASS INDEX (BMI) DOCUMENTED: ICD-10-PCS | Mod: CPTII,S$GLB,, | Performed by: SURGERY

## 2023-01-01 PROCEDURE — 88342 CHG IMMUNOCYTOCHEMISTRY: ICD-10-PCS | Mod: 26,,, | Performed by: PATHOLOGY

## 2023-01-01 PROCEDURE — 1111F PR DISCHARGE MEDS RECONCILED W/ CURRENT OUTPATIENT MED LIST: ICD-10-PCS | Mod: CPTII,S$GLB,, | Performed by: SURGERY

## 2023-01-01 PROCEDURE — 96040 PR GENETIC COUNSELING, EACH 30 MIN: ICD-10-PCS | Mod: S$GLB,,,

## 2023-01-01 PROCEDURE — 84100 ASSAY OF PHOSPHORUS: CPT | Performed by: NURSE PRACTITIONER

## 2023-01-01 PROCEDURE — 99999 PR PBB SHADOW E&M-EST. PATIENT-LVL III: CPT | Mod: PBBFAC,,, | Performed by: INTERNAL MEDICINE

## 2023-01-01 PROCEDURE — A4216 STERILE WATER/SALINE, 10 ML: HCPCS | Performed by: SURGERY

## 2023-01-01 PROCEDURE — 74328 X-RAY BILE DUCT ENDOSCOPY: CPT | Mod: 26,,, | Performed by: INTERNAL MEDICINE

## 2023-01-01 PROCEDURE — 71045 X-RAY EXAM CHEST 1 VIEW: CPT | Mod: 26,,, | Performed by: RADIOLOGY

## 2023-01-01 PROCEDURE — 88305 TISSUE EXAM BY PATHOLOGIST: ICD-10-PCS | Mod: 26,,, | Performed by: PATHOLOGY

## 2023-01-01 PROCEDURE — 1160F RVW MEDS BY RX/DR IN RCRD: CPT | Mod: CPTII,S$GLB,, | Performed by: NURSE PRACTITIONER

## 2023-01-01 PROCEDURE — 43264 PR ERCP,W/REMOVAL STONE,BIL/PANCR DUCTS: ICD-10-PCS | Mod: 51,,, | Performed by: INTERNAL MEDICINE

## 2023-01-01 PROCEDURE — 85027 COMPLETE CBC AUTOMATED: CPT | Performed by: EMERGENCY MEDICINE

## 2023-01-01 PROCEDURE — 3078F PR MOST RECENT DIASTOLIC BLOOD PRESSURE < 80 MM HG: ICD-10-PCS | Mod: CPTII,S$GLB,, | Performed by: NURSE PRACTITIONER

## 2023-01-01 PROCEDURE — 71045 X-RAY EXAM CHEST 1 VIEW: CPT | Mod: 59,TC

## 2023-01-01 PROCEDURE — 99999 PR PBB SHADOW E&M-EST. PATIENT-LVL II: ICD-10-PCS | Mod: PBBFAC,,, | Performed by: INTERNAL MEDICINE

## 2023-01-01 PROCEDURE — 99291 CRITICAL CARE FIRST HOUR: CPT | Mod: 25

## 2023-01-01 PROCEDURE — 99203 OFFICE O/P NEW LOW 30 MIN: CPT | Mod: S$GLB,,, | Performed by: SURGERY

## 2023-01-01 PROCEDURE — 36561 PR INSERT TUNNELED CV CATH WITH PORT: ICD-10-PCS | Mod: LT,,, | Performed by: SURGERY

## 2023-01-01 PROCEDURE — 43245 EGD DILATE STRICTURE: CPT | Mod: ,,, | Performed by: INTERNAL MEDICINE

## 2023-01-01 PROCEDURE — 74177 CT ABD & PELVIS W/CONTRAST: CPT | Mod: 26,,, | Performed by: STUDENT IN AN ORGANIZED HEALTH CARE EDUCATION/TRAINING PROGRAM

## 2023-01-01 PROCEDURE — 92567 PR TYMPA2METRY: ICD-10-PCS | Mod: S$GLB,,,

## 2023-01-01 PROCEDURE — 3074F PR MOST RECENT SYSTOLIC BLOOD PRESSURE < 130 MM HG: ICD-10-PCS | Mod: CPTII,S$GLB,,

## 2023-01-01 PROCEDURE — 1160F PR REVIEW ALL MEDS BY PRESCRIBER/CLIN PHARMACIST DOCUMENTED: ICD-10-PCS | Mod: CPTII,S$GLB,, | Performed by: SURGERY

## 2023-01-01 PROCEDURE — 87040 BLOOD CULTURE FOR BACTERIA: CPT | Mod: 59 | Performed by: EMERGENCY MEDICINE

## 2023-01-01 PROCEDURE — 71000015 HC POSTOP RECOV 1ST HR: Performed by: SURGERY

## 2023-01-01 PROCEDURE — 99215 OFFICE O/P EST HI 40 MIN: CPT | Mod: S$GLB,,,

## 2023-01-01 PROCEDURE — 88305 TISSUE EXAM BY PATHOLOGIST: CPT | Mod: 26,,, | Performed by: PATHOLOGY

## 2023-01-01 PROCEDURE — 37000009 HC ANESTHESIA EA ADD 15 MINS: Performed by: STUDENT IN AN ORGANIZED HEALTH CARE EDUCATION/TRAINING PROGRAM

## 2023-01-01 PROCEDURE — 25000003 PHARM REV CODE 250: Performed by: SURGERY

## 2023-01-01 PROCEDURE — C1788 PORT, INDWELLING, IMP: HCPCS | Performed by: SURGERY

## 2023-01-01 PROCEDURE — 97530 THERAPEUTIC ACTIVITIES: CPT

## 2023-01-01 PROCEDURE — 92567 TYMPANOMETRY: CPT | Mod: S$GLB,,,

## 2023-01-01 PROCEDURE — 99205 OFFICE O/P NEW HI 60 MIN: CPT | Mod: S$GLB,,, | Performed by: NURSE PRACTITIONER

## 2023-01-01 PROCEDURE — 1159F PR MEDICATION LIST DOCUMENTED IN MEDICAL RECORD: ICD-10-PCS | Mod: CPTII,95,, | Performed by: NURSE PRACTITIONER

## 2023-01-01 PROCEDURE — 93971 US LOWER EXTREMITY VEINS RIGHT: ICD-10-PCS | Mod: 26,RT,, | Performed by: RADIOLOGY

## 2023-01-01 PROCEDURE — 96040 PR GENETIC COUNSELING, EACH 30 MIN: CPT | Mod: S$GLB,,,

## 2023-01-01 PROCEDURE — 63600175 PHARM REV CODE 636 W HCPCS

## 2023-01-01 PROCEDURE — 36000709 HC OR TIME LEV III EA ADD 15 MIN: Performed by: STUDENT IN AN ORGANIZED HEALTH CARE EDUCATION/TRAINING PROGRAM

## 2023-01-01 PROCEDURE — 85007 BL SMEAR W/DIFF WBC COUNT: CPT | Performed by: INTERNAL MEDICINE

## 2023-01-01 PROCEDURE — 36415 COLL VENOUS BLD VENIPUNCTURE: CPT | Performed by: FAMILY MEDICINE

## 2023-01-01 PROCEDURE — 99999 PR PBB SHADOW E&M-EST. PATIENT-LVL I: CPT | Mod: PBBFAC,,,

## 2023-01-01 PROCEDURE — 37000009 HC ANESTHESIA EA ADD 15 MINS: Performed by: SURGERY

## 2023-01-01 PROCEDURE — 49321 PR LAP,DX SURGICAL ABD W/BIOPSY: ICD-10-PCS | Mod: ,,, | Performed by: STUDENT IN AN ORGANIZED HEALTH CARE EDUCATION/TRAINING PROGRAM

## 2023-01-01 PROCEDURE — 74177 CT ABDOMEN PELVIS WITH CONTRAST: ICD-10-PCS | Mod: 26,,, | Performed by: STUDENT IN AN ORGANIZED HEALTH CARE EDUCATION/TRAINING PROGRAM

## 2023-01-01 PROCEDURE — 97162 PT EVAL MOD COMPLEX 30 MIN: CPT

## 2023-01-01 PROCEDURE — 71000016 HC POSTOP RECOV ADDL HR: Performed by: STUDENT IN AN ORGANIZED HEALTH CARE EDUCATION/TRAINING PROGRAM

## 2023-01-01 PROCEDURE — 63600175 PHARM REV CODE 636 W HCPCS: Performed by: ANESTHESIOLOGY

## 2023-01-01 PROCEDURE — 99231 SBSQ HOSP IP/OBS SF/LOW 25: CPT | Mod: ,,, | Performed by: PHYSICIAN ASSISTANT

## 2023-01-01 PROCEDURE — 99497 PR ADVNCD CARE PLAN 30 MIN: ICD-10-PCS | Mod: 95,,, | Performed by: NURSE PRACTITIONER

## 2023-01-01 PROCEDURE — 3074F SYST BP LT 130 MM HG: CPT | Mod: CPTII,S$GLB,,

## 2023-01-01 PROCEDURE — 87635 SARS-COV-2 COVID-19 AMP PRB: CPT | Performed by: EMERGENCY MEDICINE

## 2023-01-01 PROCEDURE — 83605 ASSAY OF LACTIC ACID: CPT | Performed by: EMERGENCY MEDICINE

## 2023-01-01 PROCEDURE — 36561 INSERT TUNNELED CV CATH: CPT | Mod: LT,,, | Performed by: SURGERY

## 2023-01-01 PROCEDURE — 3078F PR MOST RECENT DIASTOLIC BLOOD PRESSURE < 80 MM HG: ICD-10-PCS | Mod: CPTII,S$GLB,, | Performed by: SURGERY

## 2023-01-01 PROCEDURE — 85025 COMPLETE CBC W/AUTO DIFF WBC: CPT | Performed by: PHYSICIAN ASSISTANT

## 2023-01-01 PROCEDURE — 3008F BODY MASS INDEX DOCD: CPT | Mod: CPTII,S$GLB,, | Performed by: SURGERY

## 2023-01-01 PROCEDURE — 88342 IMHCHEM/IMCYTCHM 1ST ANTB: CPT | Performed by: PATHOLOGY

## 2023-01-01 PROCEDURE — 99999 PR PBB SHADOW E&M-EST. PATIENT-LVL I: ICD-10-PCS | Mod: PBBFAC,,,

## 2023-01-01 PROCEDURE — 99999 PR PBB SHADOW E&M-EST. PATIENT-LVL III: ICD-10-PCS | Mod: PBBFAC,,,

## 2023-01-01 PROCEDURE — 74177 CT CHEST ABDOMEN PELVIS WITH CONTRAST (XPD): ICD-10-PCS | Mod: 26,,, | Performed by: RADIOLOGY

## 2023-01-01 PROCEDURE — A4216 STERILE WATER/SALINE, 10 ML: HCPCS | Performed by: INTERNAL MEDICINE

## 2023-01-01 PROCEDURE — 99999 PR PBB SHADOW E&M-EST. PATIENT-LVL III: CPT | Mod: PBBFAC,,,

## 2023-01-01 PROCEDURE — 99999 PR PBB SHADOW E&M-EST. PATIENT-LVL III: ICD-10-PCS | Mod: PBBFAC,,, | Performed by: NURSE PRACTITIONER

## 2023-01-01 PROCEDURE — 88341 IMHCHEM/IMCYTCHM EA ADD ANTB: CPT | Performed by: PATHOLOGY

## 2023-01-01 PROCEDURE — 83735 ASSAY OF MAGNESIUM: CPT | Performed by: EMERGENCY MEDICINE

## 2023-01-01 PROCEDURE — 3074F SYST BP LT 130 MM HG: CPT | Mod: CPTII,S$GLB,, | Performed by: SURGERY

## 2023-01-01 PROCEDURE — 1111F PR DISCHARGE MEDS RECONCILED W/ CURRENT OUTPATIENT MED LIST: ICD-10-PCS | Mod: CPTII,95,, | Performed by: INTERNAL MEDICINE

## 2023-01-01 PROCEDURE — 92557 COMPREHENSIVE HEARING TEST: CPT | Mod: S$GLB,,,

## 2023-01-01 DEVICE — PORT POWER CLEAR VIEW: Type: IMPLANTABLE DEVICE | Site: CHEST  WALL | Status: FUNCTIONAL

## 2023-01-01 DEVICE — BILIARY STENT
Type: IMPLANTABLE DEVICE | Site: BILE DUCT | Status: FUNCTIONAL
Brand: ADVANIX™ BILIARY

## 2023-01-01 RX ORDER — AMOXICILLIN AND CLAVULANATE POTASSIUM 875; 125 MG/1; MG/1
1 TABLET, FILM COATED ORAL EVERY 12 HOURS
Qty: 10 TABLET | Refills: 0 | Status: SHIPPED | OUTPATIENT
Start: 2023-01-01 | End: 2023-01-01

## 2023-01-01 RX ORDER — LORAZEPAM 2 MG/ML
0.5 INJECTION INTRAMUSCULAR
Status: DISCONTINUED | OUTPATIENT
Start: 2023-01-01 | End: 2023-01-01 | Stop reason: HOSPADM

## 2023-01-01 RX ORDER — HYDROMORPHONE HYDROCHLORIDE 1 MG/ML
1 INJECTION, SOLUTION INTRAMUSCULAR; INTRAVENOUS; SUBCUTANEOUS
Status: COMPLETED | OUTPATIENT
Start: 2023-01-01 | End: 2023-01-01

## 2023-01-01 RX ORDER — MAG HYDROX/ALUMINUM HYD/SIMETH 200-200-20
30 SUSPENSION, ORAL (FINAL DOSE FORM) ORAL 4 TIMES DAILY PRN
Status: DISCONTINUED | OUTPATIENT
Start: 2023-01-01 | End: 2023-01-01 | Stop reason: HOSPADM

## 2023-01-01 RX ORDER — OLANZAPINE 5 MG/1
10 TABLET, ORALLY DISINTEGRATING ORAL NIGHTLY
Status: DISCONTINUED | OUTPATIENT
Start: 2023-01-01 | End: 2023-01-01 | Stop reason: HOSPADM

## 2023-01-01 RX ORDER — GADOBUTROL 604.72 MG/ML
10 INJECTION INTRAVENOUS
Status: COMPLETED | OUTPATIENT
Start: 2023-01-01 | End: 2023-01-01

## 2023-01-01 RX ORDER — ONDANSETRON 4 MG/1
4 TABLET, FILM COATED ORAL EVERY 8 HOURS PRN
Qty: 20 TABLET | Refills: 11 | Status: SHIPPED | OUTPATIENT
Start: 2023-01-01 | End: 2023-01-01

## 2023-01-01 RX ORDER — HEPARIN 100 UNIT/ML
500 SYRINGE INTRAVENOUS
Status: DISCONTINUED | OUTPATIENT
Start: 2023-01-01 | End: 2023-01-01 | Stop reason: HOSPADM

## 2023-01-01 RX ORDER — HEPARIN 100 UNIT/ML
500 SYRINGE INTRAVENOUS
Status: CANCELLED | OUTPATIENT
Start: 2023-01-01

## 2023-01-01 RX ORDER — DIPHENHYDRAMINE HYDROCHLORIDE 50 MG/ML
50 INJECTION INTRAMUSCULAR; INTRAVENOUS ONCE AS NEEDED
Status: CANCELLED | OUTPATIENT
Start: 2023-01-01

## 2023-01-01 RX ORDER — SODIUM CHLORIDE 9 MG/ML
INJECTION, SOLUTION INTRAVENOUS CONTINUOUS
Status: CANCELLED | OUTPATIENT
Start: 2023-01-01

## 2023-01-01 RX ORDER — OXYCODONE HYDROCHLORIDE 5 MG/1
5 TABLET ORAL EVERY 4 HOURS PRN
Status: DISCONTINUED | OUTPATIENT
Start: 2023-01-01 | End: 2023-01-01 | Stop reason: HOSPADM

## 2023-01-01 RX ORDER — TALC
6 POWDER (GRAM) TOPICAL NIGHTLY PRN
Status: DISCONTINUED | OUTPATIENT
Start: 2023-01-01 | End: 2023-01-01 | Stop reason: HOSPADM

## 2023-01-01 RX ORDER — AMOXICILLIN 250 MG
1 CAPSULE ORAL 2 TIMES DAILY
Status: DISCONTINUED | OUTPATIENT
Start: 2023-01-01 | End: 2023-01-01

## 2023-01-01 RX ORDER — BUPIVACAINE HYDROCHLORIDE 2.5 MG/ML
INJECTION, SOLUTION EPIDURAL; INFILTRATION; INTRACAUDAL
Status: DISCONTINUED | OUTPATIENT
Start: 2023-01-01 | End: 2023-01-01 | Stop reason: HOSPADM

## 2023-01-01 RX ORDER — LIDOCAINE AND PRILOCAINE 25; 25 MG/G; MG/G
CREAM TOPICAL
Qty: 5 G | Refills: 11 | Status: SHIPPED | OUTPATIENT
Start: 2023-01-01

## 2023-01-01 RX ORDER — SODIUM CHLORIDE 9 MG/ML
INJECTION, SOLUTION INTRAVENOUS CONTINUOUS
Status: DISCONTINUED | OUTPATIENT
Start: 2023-01-01 | End: 2023-01-01 | Stop reason: HOSPADM

## 2023-01-01 RX ORDER — SODIUM CHLORIDE 0.9 % (FLUSH) 0.9 %
10 SYRINGE (ML) INJECTION
Status: CANCELLED | OUTPATIENT
Start: 2023-01-01

## 2023-01-01 RX ORDER — ROCURONIUM BROMIDE 10 MG/ML
INJECTION, SOLUTION INTRAVENOUS
Status: DISCONTINUED | OUTPATIENT
Start: 2023-01-01 | End: 2023-01-01

## 2023-01-01 RX ORDER — SODIUM,POTASSIUM PHOSPHATES 280-250MG
2 POWDER IN PACKET (EA) ORAL
Status: DISCONTINUED | OUTPATIENT
Start: 2023-01-01 | End: 2023-01-01 | Stop reason: HOSPADM

## 2023-01-01 RX ORDER — FENTANYL 75 UG/H
1 PATCH TRANSDERMAL
Qty: 5 PATCH | Refills: 0 | Status: SHIPPED | OUTPATIENT
Start: 2023-01-01 | End: 2023-01-01 | Stop reason: SDUPTHER

## 2023-01-01 RX ORDER — ONDANSETRON 2 MG/ML
8 INJECTION INTRAMUSCULAR; INTRAVENOUS
Status: CANCELLED | OUTPATIENT
Start: 2023-01-01

## 2023-01-01 RX ORDER — MIDAZOLAM HYDROCHLORIDE 1 MG/ML
INJECTION, SOLUTION INTRAMUSCULAR; INTRAVENOUS
Status: DISCONTINUED | OUTPATIENT
Start: 2023-01-01 | End: 2023-01-01

## 2023-01-01 RX ORDER — SODIUM CHLORIDE 9 MG/ML
INJECTION, SOLUTION INTRAVENOUS CONTINUOUS
Status: DISCONTINUED | OUTPATIENT
Start: 2023-01-01 | End: 2023-01-01

## 2023-01-01 RX ORDER — OLANZAPINE 5 MG/1
10 TABLET, ORALLY DISINTEGRATING ORAL ONCE
Status: DISCONTINUED | OUTPATIENT
Start: 2023-01-01 | End: 2023-01-01

## 2023-01-01 RX ORDER — LIDOCAINE HYDROCHLORIDE 20 MG/ML
INJECTION INTRAVENOUS
Status: DISCONTINUED | OUTPATIENT
Start: 2023-01-01 | End: 2023-01-01

## 2023-01-01 RX ORDER — OXYCODONE AND ACETAMINOPHEN 10; 325 MG/1; MG/1
1 TABLET ORAL 3 TIMES DAILY PRN
Status: ON HOLD | COMMUNITY
Start: 2022-01-01 | End: 2023-01-01 | Stop reason: HOSPADM

## 2023-01-01 RX ORDER — ONDANSETRON 2 MG/ML
4 INJECTION INTRAMUSCULAR; INTRAVENOUS ONCE AS NEEDED
Status: DISCONTINUED | OUTPATIENT
Start: 2023-01-01 | End: 2023-01-01 | Stop reason: HOSPADM

## 2023-01-01 RX ORDER — NALOXONE HCL 0.4 MG/ML
0.02 VIAL (ML) INJECTION
Status: DISCONTINUED | OUTPATIENT
Start: 2023-01-01 | End: 2023-01-01 | Stop reason: HOSPADM

## 2023-01-01 RX ORDER — DEXAMETHASONE SODIUM PHOSPHATE 4 MG/ML
INJECTION, SOLUTION INTRA-ARTICULAR; INTRALESIONAL; INTRAMUSCULAR; INTRAVENOUS; SOFT TISSUE
Status: DISCONTINUED | OUTPATIENT
Start: 2023-01-01 | End: 2023-01-01

## 2023-01-01 RX ORDER — PROPOFOL 10 MG/ML
VIAL (ML) INTRAVENOUS
Status: DISCONTINUED | OUTPATIENT
Start: 2023-01-01 | End: 2023-01-01

## 2023-01-01 RX ORDER — ONDANSETRON 2 MG/ML
INJECTION INTRAMUSCULAR; INTRAVENOUS
Status: DISCONTINUED | OUTPATIENT
Start: 2023-01-01 | End: 2023-01-01

## 2023-01-01 RX ORDER — HYDROMORPHONE HYDROCHLORIDE 2 MG/ML
2 INJECTION, SOLUTION INTRAMUSCULAR; INTRAVENOUS; SUBCUTANEOUS EVERY 4 HOURS PRN
Status: DISCONTINUED | OUTPATIENT
Start: 2023-01-01 | End: 2023-01-01

## 2023-01-01 RX ORDER — HYDROMORPHONE HYDROCHLORIDE 1 MG/ML
1 INJECTION, SOLUTION INTRAMUSCULAR; INTRAVENOUS; SUBCUTANEOUS EVERY 4 HOURS PRN
Status: DISCONTINUED | OUTPATIENT
Start: 2023-01-01 | End: 2023-01-01

## 2023-01-01 RX ORDER — DEXTROSE 40 %
30 GEL (GRAM) ORAL
Status: DISCONTINUED | OUTPATIENT
Start: 2023-01-01 | End: 2023-01-01

## 2023-01-01 RX ORDER — SODIUM CHLORIDE, SODIUM LACTATE, POTASSIUM CHLORIDE, CALCIUM CHLORIDE 600; 310; 30; 20 MG/100ML; MG/100ML; MG/100ML; MG/100ML
INJECTION, SOLUTION INTRAVENOUS CONTINUOUS
Status: DISCONTINUED | OUTPATIENT
Start: 2023-01-01 | End: 2023-01-01 | Stop reason: HOSPADM

## 2023-01-01 RX ORDER — LIDOCAINE HYDROCHLORIDE 10 MG/ML
1 INJECTION, SOLUTION EPIDURAL; INFILTRATION; INTRACAUDAL; PERINEURAL ONCE
Status: CANCELLED | OUTPATIENT
Start: 2023-01-01 | End: 2023-01-01

## 2023-01-01 RX ORDER — SUCCINYLCHOLINE CHLORIDE 20 MG/ML
INJECTION INTRAMUSCULAR; INTRAVENOUS
Status: DISCONTINUED | OUTPATIENT
Start: 2023-01-01 | End: 2023-01-01

## 2023-01-01 RX ORDER — OLANZAPINE 5 MG/1
10 TABLET, ORALLY DISINTEGRATING ORAL NIGHTLY
Status: DISCONTINUED | OUTPATIENT
Start: 2023-01-01 | End: 2023-01-01

## 2023-01-01 RX ORDER — NEOSTIGMINE METHYLSULFATE 1 MG/ML
INJECTION, SOLUTION INTRAVENOUS
Status: DISCONTINUED | OUTPATIENT
Start: 2023-01-01 | End: 2023-01-01

## 2023-01-01 RX ORDER — ONDANSETRON 2 MG/ML
4 INJECTION INTRAMUSCULAR; INTRAVENOUS EVERY 8 HOURS PRN
Status: DISCONTINUED | OUTPATIENT
Start: 2023-01-01 | End: 2023-01-01 | Stop reason: HOSPADM

## 2023-01-01 RX ORDER — FENTANYL CITRATE 50 UG/ML
INJECTION, SOLUTION INTRAMUSCULAR; INTRAVENOUS
Status: DISCONTINUED | OUTPATIENT
Start: 2023-01-01 | End: 2023-01-01

## 2023-01-01 RX ORDER — FENTANYL 75 UG/H
1 PATCH TRANSDERMAL
Status: DISCONTINUED | OUTPATIENT
Start: 2023-01-01 | End: 2023-01-01 | Stop reason: HOSPADM

## 2023-01-01 RX ORDER — PREGABALIN 50 MG/1
50 CAPSULE ORAL 2 TIMES DAILY
Qty: 60 CAPSULE | Refills: 0 | Status: SHIPPED | OUTPATIENT
Start: 2023-01-01 | End: 2023-01-01 | Stop reason: SINTOL

## 2023-01-01 RX ORDER — ENOXAPARIN SODIUM 100 MG/ML
40 INJECTION SUBCUTANEOUS EVERY 24 HOURS
Status: DISCONTINUED | OUTPATIENT
Start: 2023-01-01 | End: 2023-01-01 | Stop reason: HOSPADM

## 2023-01-01 RX ORDER — POLYETHYLENE GLYCOL 3350 17 G/17G
17 POWDER, FOR SOLUTION ORAL DAILY
Status: DISCONTINUED | OUTPATIENT
Start: 2023-01-01 | End: 2023-01-01

## 2023-01-01 RX ORDER — HYDROMORPHONE HYDROCHLORIDE 2 MG/ML
1 INJECTION, SOLUTION INTRAMUSCULAR; INTRAVENOUS; SUBCUTANEOUS
Status: COMPLETED | OUTPATIENT
Start: 2023-01-01 | End: 2023-01-01

## 2023-01-01 RX ORDER — OXYCODONE AND ACETAMINOPHEN 10; 325 MG/1; MG/1
TABLET ORAL
Status: ON HOLD | COMMUNITY
End: 2023-01-01 | Stop reason: SDUPTHER

## 2023-01-01 RX ORDER — HYDROMORPHONE HYDROCHLORIDE 2 MG/ML
0.2 INJECTION, SOLUTION INTRAMUSCULAR; INTRAVENOUS; SUBCUTANEOUS EVERY 5 MIN PRN
Status: DISCONTINUED | OUTPATIENT
Start: 2023-01-01 | End: 2023-01-01 | Stop reason: HOSPADM

## 2023-01-01 RX ORDER — HEPARIN 100 UNIT/ML
5 SYRINGE INTRAVENOUS ONCE AS NEEDED
Status: COMPLETED | OUTPATIENT
Start: 2023-01-01 | End: 2023-01-01

## 2023-01-01 RX ORDER — PROPOFOL 10 MG/ML
INJECTION, EMULSION INTRAVENOUS
Status: DISCONTINUED | OUTPATIENT
Start: 2023-01-01 | End: 2023-01-01

## 2023-01-01 RX ORDER — PANTOPRAZOLE SODIUM 40 MG/1
40 TABLET, DELAYED RELEASE ORAL DAILY
Status: DISCONTINUED | OUTPATIENT
Start: 2023-01-01 | End: 2023-01-01 | Stop reason: HOSPADM

## 2023-01-01 RX ORDER — PROCHLORPERAZINE 25 MG
25 SUPPOSITORY, RECTAL RECTAL EVERY 12 HOURS PRN
Qty: 3 SUPPOSITORY | Refills: 1 | Status: SHIPPED | OUTPATIENT
Start: 2023-01-01

## 2023-01-01 RX ORDER — NALOXONE HCL 0.4 MG/ML
0.4 VIAL (ML) INJECTION
Status: DISCONTINUED | OUTPATIENT
Start: 2023-01-01 | End: 2023-01-01

## 2023-01-01 RX ORDER — ONDANSETRON 8 MG/1
8 TABLET, ORALLY DISINTEGRATING ORAL EVERY 8 HOURS PRN
Status: DISCONTINUED | OUTPATIENT
Start: 2023-01-01 | End: 2023-01-01 | Stop reason: HOSPADM

## 2023-01-01 RX ORDER — OXYCODONE AND ACETAMINOPHEN 10; 325 MG/1; MG/1
1 TABLET ORAL ONCE AS NEEDED
Status: COMPLETED | OUTPATIENT
Start: 2023-01-01 | End: 2023-01-01

## 2023-01-01 RX ORDER — OXYCODONE HYDROCHLORIDE 5 MG/1
10 TABLET ORAL EVERY 6 HOURS PRN
Status: DISCONTINUED | OUTPATIENT
Start: 2023-01-01 | End: 2023-01-01 | Stop reason: HOSPADM

## 2023-01-01 RX ORDER — FENTANYL 12.5 UG/1
1 PATCH TRANSDERMAL
Qty: 5 PATCH | Refills: 0 | Status: SHIPPED | OUTPATIENT
Start: 2023-01-01 | End: 2023-01-01

## 2023-01-01 RX ORDER — LIDOCAINE HYDROCHLORIDE 10 MG/ML
INJECTION, SOLUTION EPIDURAL; INFILTRATION; INTRACAUDAL; PERINEURAL
Status: DISCONTINUED
Start: 2023-01-01 | End: 2023-01-01 | Stop reason: HOSPADM

## 2023-01-01 RX ORDER — ALPRAZOLAM 0.25 MG/1
0.25 TABLET ORAL 2 TIMES DAILY PRN
Status: DISCONTINUED | OUTPATIENT
Start: 2023-01-01 | End: 2023-01-01 | Stop reason: HOSPADM

## 2023-01-01 RX ORDER — IBUPROFEN 400 MG/1
400 TABLET ORAL EVERY 6 HOURS PRN
Status: DISCONTINUED | OUTPATIENT
Start: 2023-01-01 | End: 2023-01-01 | Stop reason: HOSPADM

## 2023-01-01 RX ORDER — SODIUM CHLORIDE 0.9 % (FLUSH) 0.9 %
10 SYRINGE (ML) INJECTION
Status: DISCONTINUED | OUTPATIENT
Start: 2023-01-01 | End: 2023-01-01 | Stop reason: HOSPADM

## 2023-01-01 RX ORDER — ONDANSETRON 2 MG/ML
8 INJECTION INTRAMUSCULAR; INTRAVENOUS EVERY 6 HOURS PRN
Status: DISCONTINUED | OUTPATIENT
Start: 2023-01-01 | End: 2023-01-01 | Stop reason: HOSPADM

## 2023-01-01 RX ORDER — ONDANSETRON 2 MG/ML
4 INJECTION INTRAMUSCULAR; INTRAVENOUS EVERY 6 HOURS PRN
Status: DISCONTINUED | OUTPATIENT
Start: 2023-01-01 | End: 2023-01-01

## 2023-01-01 RX ORDER — EPINEPHRINE 0.3 MG/.3ML
0.3 INJECTION SUBCUTANEOUS ONCE AS NEEDED
Status: CANCELLED | OUTPATIENT
Start: 2023-01-01

## 2023-01-01 RX ORDER — METRONIDAZOLE 500 MG/1
500 TABLET ORAL EVERY 8 HOURS
Qty: 42 TABLET | Refills: 0 | Status: ON HOLD | OUTPATIENT
Start: 2023-01-01 | End: 2023-01-01 | Stop reason: HOSPADM

## 2023-01-01 RX ORDER — LIDOCAINE HYDROCHLORIDE 10 MG/ML
INJECTION, SOLUTION EPIDURAL; INFILTRATION; INTRACAUDAL; PERINEURAL
Status: DISCONTINUED | OUTPATIENT
Start: 2023-01-01 | End: 2023-01-01 | Stop reason: HOSPADM

## 2023-01-01 RX ORDER — HYDROMORPHONE HYDROCHLORIDE 1 MG/ML
0.5 INJECTION, SOLUTION INTRAMUSCULAR; INTRAVENOUS; SUBCUTANEOUS EVERY 6 HOURS PRN
Status: DISCONTINUED | OUTPATIENT
Start: 2023-01-01 | End: 2023-01-01 | Stop reason: HOSPADM

## 2023-01-01 RX ORDER — BUPIVACAINE HYDROCHLORIDE 2.5 MG/ML
INJECTION, SOLUTION EPIDURAL; INFILTRATION; INTRACAUDAL
Status: DISCONTINUED
Start: 2023-01-01 | End: 2023-01-01 | Stop reason: HOSPADM

## 2023-01-01 RX ORDER — HYDROMORPHONE HYDROCHLORIDE 1 MG/ML
1 INJECTION, SOLUTION INTRAMUSCULAR; INTRAVENOUS; SUBCUTANEOUS
Status: DISCONTINUED | OUTPATIENT
Start: 2023-01-01 | End: 2023-01-01

## 2023-01-01 RX ORDER — BISACODYL 10 MG
10 SUPPOSITORY, RECTAL RECTAL ONCE
Status: COMPLETED | OUTPATIENT
Start: 2023-01-01 | End: 2023-01-01

## 2023-01-01 RX ORDER — HYDROMORPHONE HYDROCHLORIDE 2 MG/ML
2 INJECTION, SOLUTION INTRAMUSCULAR; INTRAVENOUS; SUBCUTANEOUS
Status: DISCONTINUED | OUTPATIENT
Start: 2023-01-01 | End: 2023-01-01

## 2023-01-01 RX ORDER — PHENYLEPHRINE HYDROCHLORIDE 10 MG/ML
INJECTION INTRAVENOUS
Status: DISCONTINUED | OUTPATIENT
Start: 2023-01-01 | End: 2023-01-01

## 2023-01-01 RX ORDER — OXYCODONE HYDROCHLORIDE 5 MG/1
10 TABLET ORAL EVERY 6 HOURS PRN
Status: DISCONTINUED | OUTPATIENT
Start: 2023-01-01 | End: 2023-01-01

## 2023-01-01 RX ORDER — BISACODYL 10 MG
10 SUPPOSITORY, RECTAL RECTAL DAILY PRN
Status: DISCONTINUED | OUTPATIENT
Start: 2023-01-01 | End: 2023-01-01 | Stop reason: HOSPADM

## 2023-01-01 RX ORDER — HYDROMORPHONE HYDROCHLORIDE 1 MG/ML
1 INJECTION, SOLUTION INTRAMUSCULAR; INTRAVENOUS; SUBCUTANEOUS ONCE
Status: COMPLETED | OUTPATIENT
Start: 2023-01-01 | End: 2023-01-01

## 2023-01-01 RX ORDER — HALOPERIDOL 5 MG/ML
0.5 INJECTION INTRAMUSCULAR EVERY 10 MIN PRN
Status: DISCONTINUED | OUTPATIENT
Start: 2023-01-01 | End: 2023-01-01 | Stop reason: HOSPADM

## 2023-01-01 RX ORDER — HEPARIN 100 UNIT/ML
SYRINGE INTRAVENOUS
Status: DISCONTINUED
Start: 2023-01-01 | End: 2023-01-01 | Stop reason: HOSPADM

## 2023-01-01 RX ORDER — LORAZEPAM 2 MG/ML
0.5 INJECTION INTRAMUSCULAR
Status: CANCELLED
Start: 2023-01-01

## 2023-01-01 RX ORDER — SODIUM CHLORIDE 0.9 % (FLUSH) 0.9 %
3 SYRINGE (ML) INJECTION
Status: DISCONTINUED | OUTPATIENT
Start: 2023-01-01 | End: 2023-01-01 | Stop reason: HOSPADM

## 2023-01-01 RX ORDER — ACETAMINOPHEN 325 MG/1
650 TABLET ORAL EVERY 4 HOURS PRN
Status: DISCONTINUED | OUTPATIENT
Start: 2023-01-01 | End: 2023-01-01 | Stop reason: HOSPADM

## 2023-01-01 RX ORDER — HYDROMORPHONE HYDROCHLORIDE 2 MG/ML
2 INJECTION, SOLUTION INTRAMUSCULAR; INTRAVENOUS; SUBCUTANEOUS
Status: DISCONTINUED | OUTPATIENT
Start: 2023-01-01 | End: 2023-01-01 | Stop reason: HOSPADM

## 2023-01-01 RX ORDER — ALPRAZOLAM 0.25 MG/1
0.25 TABLET ORAL 2 TIMES DAILY PRN
Qty: 60 TABLET | Refills: 1 | Status: SHIPPED | OUTPATIENT
Start: 2023-01-01 | End: 2023-01-01

## 2023-01-01 RX ORDER — OXYCODONE AND ACETAMINOPHEN 10; 325 MG/1; MG/1
1 TABLET ORAL EVERY 6 HOURS PRN
Qty: 28 TABLET | Refills: 0 | Status: CANCELLED | OUTPATIENT
Start: 2023-01-01 | End: 2023-01-01

## 2023-01-01 RX ORDER — MAGNESIUM SULFATE HEPTAHYDRATE 40 MG/ML
2 INJECTION, SOLUTION INTRAVENOUS ONCE
Status: COMPLETED | OUTPATIENT
Start: 2023-01-01 | End: 2023-01-01

## 2023-01-01 RX ORDER — OXYCODONE AND ACETAMINOPHEN 10; 325 MG/1; MG/1
1 TABLET ORAL EVERY 6 HOURS PRN
Qty: 20 TABLET | Refills: 0 | Status: SHIPPED | OUTPATIENT
Start: 2023-01-01 | End: 2023-01-01 | Stop reason: SDUPTHER

## 2023-01-01 RX ORDER — DEXAMETHASONE 4 MG/1
8 TABLET ORAL DAILY
Qty: 24 TABLET | Refills: 5 | Status: SHIPPED | OUTPATIENT
Start: 2023-01-01

## 2023-01-01 RX ORDER — AMOXICILLIN 250 MG
1 CAPSULE ORAL 2 TIMES DAILY PRN
Status: DISCONTINUED | OUTPATIENT
Start: 2023-01-01 | End: 2023-01-01 | Stop reason: HOSPADM

## 2023-01-01 RX ORDER — LANOLIN ALCOHOL/MO/W.PET/CERES
800 CREAM (GRAM) TOPICAL
Status: DISCONTINUED | OUTPATIENT
Start: 2023-01-01 | End: 2023-01-01 | Stop reason: HOSPADM

## 2023-01-01 RX ORDER — POTASSIUM CHLORIDE 7.45 MG/ML
10 INJECTION INTRAVENOUS
Status: COMPLETED | OUTPATIENT
Start: 2023-01-01 | End: 2023-01-01

## 2023-01-01 RX ORDER — HYDROMORPHONE HYDROCHLORIDE 4 MG/1
4 TABLET ORAL EVERY 4 HOURS PRN
Qty: 120 TABLET | Refills: 0 | Status: SHIPPED | OUTPATIENT
Start: 2023-01-01

## 2023-01-01 RX ORDER — ONDANSETRON 2 MG/ML
4 INJECTION INTRAMUSCULAR; INTRAVENOUS EVERY 12 HOURS PRN
Status: DISCONTINUED | OUTPATIENT
Start: 2023-01-01 | End: 2023-01-01 | Stop reason: HOSPADM

## 2023-01-01 RX ORDER — LACTULOSE 10 G/15ML
20 SOLUTION ORAL 3 TIMES DAILY
Qty: 600 ML | Refills: 2 | Status: SHIPPED | OUTPATIENT
Start: 2023-01-01

## 2023-01-01 RX ORDER — HEPARIN 100 UNIT/ML
SYRINGE INTRAVENOUS
Status: DISCONTINUED | OUTPATIENT
Start: 2023-01-01 | End: 2023-01-01 | Stop reason: HOSPADM

## 2023-01-01 RX ORDER — MORPHINE SULFATE 30 MG/1
30 TABLET, FILM COATED, EXTENDED RELEASE ORAL 3 TIMES DAILY
Qty: 90 TABLET | Refills: 0 | Status: ON HOLD | OUTPATIENT
Start: 2023-01-01 | End: 2023-01-01 | Stop reason: HOSPADM

## 2023-01-01 RX ORDER — OXYCODONE HYDROCHLORIDE 5 MG/1
10 TABLET ORAL EVERY 4 HOURS PRN
Status: DISCONTINUED | OUTPATIENT
Start: 2023-01-01 | End: 2023-01-01

## 2023-01-01 RX ORDER — SIMETHICONE 80 MG
1 TABLET,CHEWABLE ORAL 4 TIMES DAILY PRN
Status: DISCONTINUED | OUTPATIENT
Start: 2023-01-01 | End: 2023-01-01 | Stop reason: HOSPADM

## 2023-01-01 RX ORDER — DEXTROSE 40 %
30 GEL (GRAM) ORAL
Status: DISCONTINUED | OUTPATIENT
Start: 2023-01-01 | End: 2023-01-01 | Stop reason: HOSPADM

## 2023-01-01 RX ORDER — FENTANYL 50 UG/1
1 PATCH TRANSDERMAL
Qty: 10 PATCH | Refills: 0 | Status: SHIPPED | OUTPATIENT
Start: 2023-01-01 | End: 2023-01-01

## 2023-01-01 RX ORDER — HYDROMORPHONE HYDROCHLORIDE 2 MG/ML
1 INJECTION, SOLUTION INTRAMUSCULAR; INTRAVENOUS; SUBCUTANEOUS EVERY 4 HOURS PRN
Status: DISCONTINUED | OUTPATIENT
Start: 2023-01-01 | End: 2023-01-01

## 2023-01-01 RX ORDER — ACETAMINOPHEN 325 MG/1
650 TABLET ORAL EVERY 8 HOURS PRN
Status: DISCONTINUED | OUTPATIENT
Start: 2023-01-01 | End: 2023-01-01 | Stop reason: HOSPADM

## 2023-01-01 RX ORDER — SODIUM CHLORIDE 0.9 % (FLUSH) 0.9 %
10 SYRINGE (ML) INJECTION EVERY 12 HOURS PRN
Status: DISCONTINUED | OUTPATIENT
Start: 2023-01-01 | End: 2023-01-01 | Stop reason: HOSPADM

## 2023-01-01 RX ORDER — OXYCODONE AND ACETAMINOPHEN 10; 325 MG/1; MG/1
TABLET ORAL
Qty: 20 TABLET | Refills: 0 | Status: SHIPPED | OUTPATIENT
Start: 2023-01-01 | End: 2023-01-01 | Stop reason: SDUPTHER

## 2023-01-01 RX ORDER — HEPARIN 100 UNIT/ML
5 SYRINGE INTRAVENOUS ONCE
Status: COMPLETED | OUTPATIENT
Start: 2023-01-01 | End: 2023-01-01

## 2023-01-01 RX ORDER — PREGABALIN 25 MG/1
50 CAPSULE ORAL 2 TIMES DAILY
Status: DISCONTINUED | OUTPATIENT
Start: 2023-01-01 | End: 2023-01-01 | Stop reason: HOSPADM

## 2023-01-01 RX ORDER — DEXTROSE 40 %
15 GEL (GRAM) ORAL
Status: DISCONTINUED | OUTPATIENT
Start: 2023-01-01 | End: 2023-01-01 | Stop reason: HOSPADM

## 2023-01-01 RX ORDER — SCOLOPAMINE TRANSDERMAL SYSTEM 1 MG/1
1 PATCH, EXTENDED RELEASE TRANSDERMAL
Status: DISCONTINUED | OUTPATIENT
Start: 2023-01-01 | End: 2023-01-01 | Stop reason: HOSPADM

## 2023-01-01 RX ORDER — SODIUM CHLORIDE, SODIUM LACTATE, POTASSIUM CHLORIDE, CALCIUM CHLORIDE 600; 310; 30; 20 MG/100ML; MG/100ML; MG/100ML; MG/100ML
INJECTION, SOLUTION INTRAVENOUS CONTINUOUS PRN
Status: DISCONTINUED | OUTPATIENT
Start: 2023-01-01 | End: 2023-01-01

## 2023-01-01 RX ORDER — LIDOCAINE HYDROCHLORIDE 10 MG/ML
INJECTION, SOLUTION EPIDURAL; INFILTRATION; INTRACAUDAL; PERINEURAL
Status: DISCONTINUED | OUTPATIENT
Start: 2023-01-01 | End: 2023-01-01

## 2023-01-01 RX ORDER — GLUCAGON 1 MG
1 KIT INJECTION
Status: DISCONTINUED | OUTPATIENT
Start: 2023-01-01 | End: 2023-01-01

## 2023-01-01 RX ORDER — SODIUM CHLORIDE 0.9 % (FLUSH) 0.9 %
10 SYRINGE (ML) INJECTION EVERY 12 HOURS PRN
Status: DISCONTINUED | OUTPATIENT
Start: 2023-01-01 | End: 2023-01-01

## 2023-01-01 RX ORDER — OXYCODONE AND ACETAMINOPHEN 10; 325 MG/1; MG/1
1 TABLET ORAL EVERY 6 HOURS PRN
Qty: 60 TABLET | Refills: 0 | Status: ON HOLD | OUTPATIENT
Start: 2023-01-01 | End: 2023-01-01 | Stop reason: HOSPADM

## 2023-01-01 RX ORDER — FENTANYL 75 UG/H
1 PATCH TRANSDERMAL
Qty: 5 PATCH | Refills: 0 | Status: SHIPPED | OUTPATIENT
Start: 2023-01-01 | End: 2023-01-01

## 2023-01-01 RX ORDER — HYDROCODONE BITARTRATE AND ACETAMINOPHEN 5; 325 MG/1; MG/1
1 TABLET ORAL
Status: DISCONTINUED | OUTPATIENT
Start: 2023-01-01 | End: 2023-01-01 | Stop reason: HOSPADM

## 2023-01-01 RX ORDER — NALOXONE HYDROCHLORIDE 4 MG/.1ML
1 SPRAY NASAL ONCE
Qty: 2 EACH | Refills: 0 | Status: SHIPPED | OUTPATIENT
Start: 2023-01-01 | End: 2023-01-01

## 2023-01-01 RX ORDER — DIPHENHYDRAMINE HYDROCHLORIDE 50 MG/ML
25 INJECTION INTRAMUSCULAR; INTRAVENOUS EVERY 6 HOURS PRN
Status: DISCONTINUED | OUTPATIENT
Start: 2023-01-01 | End: 2023-01-01 | Stop reason: HOSPADM

## 2023-01-01 RX ORDER — FENTANYL 50 UG/1
1 PATCH TRANSDERMAL
Status: DISCONTINUED | OUTPATIENT
Start: 2023-01-01 | End: 2023-01-01 | Stop reason: HOSPADM

## 2023-01-01 RX ORDER — DEXMEDETOMIDINE HYDROCHLORIDE 100 UG/ML
INJECTION, SOLUTION INTRAVENOUS
Status: DISCONTINUED | OUTPATIENT
Start: 2023-01-01 | End: 2023-01-01

## 2023-01-01 RX ORDER — PROCHLORPERAZINE EDISYLATE 5 MG/ML
5 INJECTION INTRAMUSCULAR; INTRAVENOUS EVERY 6 HOURS PRN
Status: DISCONTINUED | OUTPATIENT
Start: 2023-01-01 | End: 2023-01-01 | Stop reason: HOSPADM

## 2023-01-01 RX ORDER — OXYCODONE HYDROCHLORIDE 5 MG/1
20 TABLET ORAL EVERY 4 HOURS PRN
Status: DISCONTINUED | OUTPATIENT
Start: 2023-01-01 | End: 2023-01-01

## 2023-01-01 RX ORDER — HYDROMORPHONE HYDROCHLORIDE 2 MG/1
4 TABLET ORAL EVERY 4 HOURS PRN
Status: DISCONTINUED | OUTPATIENT
Start: 2023-01-01 | End: 2023-01-01 | Stop reason: HOSPADM

## 2023-01-01 RX ORDER — AMOXICILLIN AND CLAVULANATE POTASSIUM 875; 125 MG/1; MG/1
1 TABLET, FILM COATED ORAL EVERY 12 HOURS
Status: DISCONTINUED | OUTPATIENT
Start: 2023-01-01 | End: 2023-01-01 | Stop reason: HOSPADM

## 2023-01-01 RX ORDER — SODIUM CHLORIDE 9 MG/ML
1000 INJECTION, SOLUTION INTRAVENOUS
Status: COMPLETED | OUTPATIENT
Start: 2023-01-01 | End: 2023-01-01

## 2023-01-01 RX ORDER — HYDROMORPHONE HYDROCHLORIDE 2 MG/ML
1 INJECTION, SOLUTION INTRAMUSCULAR; INTRAVENOUS; SUBCUTANEOUS
Status: DISCONTINUED | OUTPATIENT
Start: 2023-01-01 | End: 2023-01-01

## 2023-01-01 RX ORDER — MIDAZOLAM HYDROCHLORIDE 1 MG/ML
INJECTION INTRAMUSCULAR; INTRAVENOUS
Status: DISCONTINUED | OUTPATIENT
Start: 2023-01-01 | End: 2023-01-01

## 2023-01-01 RX ORDER — GLUCAGON 1 MG
1 KIT INJECTION
Status: DISCONTINUED | OUTPATIENT
Start: 2023-01-01 | End: 2023-01-01 | Stop reason: HOSPADM

## 2023-01-01 RX ORDER — HYDROMORPHONE HYDROCHLORIDE 1 MG/ML
1 INJECTION, SOLUTION INTRAMUSCULAR; INTRAVENOUS; SUBCUTANEOUS
Status: DISCONTINUED | OUTPATIENT
Start: 2023-01-01 | End: 2023-01-01 | Stop reason: CLARIF

## 2023-01-01 RX ORDER — LIDOCAINE HCL/PF 100 MG/5ML
SYRINGE (ML) INTRAVENOUS
Status: DISCONTINUED | OUTPATIENT
Start: 2023-01-01 | End: 2023-01-01

## 2023-01-01 RX ORDER — FENTANYL CITRATE 50 UG/ML
25 INJECTION, SOLUTION INTRAMUSCULAR; INTRAVENOUS EVERY 5 MIN PRN
Status: DISCONTINUED | OUTPATIENT
Start: 2023-01-01 | End: 2023-01-01 | Stop reason: HOSPADM

## 2023-01-01 RX ORDER — ONDANSETRON 8 MG/1
8 TABLET, ORALLY DISINTEGRATING ORAL EVERY 8 HOURS PRN
Qty: 60 TABLET | Refills: 5 | Status: SHIPPED | OUTPATIENT
Start: 2023-01-01

## 2023-01-01 RX ORDER — HYDROMORPHONE HYDROCHLORIDE 1 MG/ML
0.2 INJECTION, SOLUTION INTRAMUSCULAR; INTRAVENOUS; SUBCUTANEOUS EVERY 5 MIN PRN
Status: DISCONTINUED | OUTPATIENT
Start: 2023-01-01 | End: 2023-01-01 | Stop reason: HOSPADM

## 2023-01-01 RX ORDER — OLANZAPINE 10 MG/1
10 TABLET ORAL NIGHTLY
Qty: 30 TABLET | Refills: 0 | Status: SHIPPED | OUTPATIENT
Start: 2023-01-01 | End: 2024-03-21

## 2023-01-01 RX ORDER — OXYCODONE HYDROCHLORIDE 5 MG/1
10 TABLET ORAL
Status: DISCONTINUED | OUTPATIENT
Start: 2023-01-01 | End: 2023-01-01

## 2023-01-01 RX ORDER — PROCHLORPERAZINE MALEATE 5 MG
5 TABLET ORAL EVERY 6 HOURS PRN
Qty: 20 TABLET | Refills: 11 | Status: SHIPPED | OUTPATIENT
Start: 2023-01-01

## 2023-01-01 RX ORDER — CEFDINIR 300 MG/1
300 CAPSULE ORAL EVERY 12 HOURS
Qty: 28 CAPSULE | Refills: 0 | Status: ON HOLD | OUTPATIENT
Start: 2023-01-01 | End: 2023-01-01 | Stop reason: HOSPADM

## 2023-01-01 RX ORDER — ACETAMINOPHEN 325 MG/1
650 TABLET ORAL EVERY 4 HOURS PRN
Status: DISCONTINUED | OUTPATIENT
Start: 2023-01-01 | End: 2023-01-01 | Stop reason: SDUPTHER

## 2023-01-01 RX ORDER — ENOXAPARIN SODIUM 100 MG/ML
40 INJECTION SUBCUTANEOUS EVERY 24 HOURS
Status: DISCONTINUED | OUTPATIENT
Start: 2023-01-01 | End: 2023-01-01

## 2023-01-01 RX ORDER — OXYCODONE HYDROCHLORIDE 5 MG/1
10 TABLET ORAL EVERY 4 HOURS PRN
Status: DISCONTINUED | OUTPATIENT
Start: 2023-01-01 | End: 2023-01-01 | Stop reason: HOSPADM

## 2023-01-01 RX ORDER — HYDROMORPHONE HYDROCHLORIDE 4 MG/1
4 TABLET ORAL EVERY 4 HOURS PRN
Qty: 120 TABLET | Refills: 0 | Status: SHIPPED | OUTPATIENT
Start: 2023-01-01 | End: 2023-01-01 | Stop reason: SDUPTHER

## 2023-01-01 RX ORDER — SODIUM CHLORIDE 9 MG/ML
INJECTION, SOLUTION INTRAMUSCULAR; INTRAVENOUS; SUBCUTANEOUS
Status: DISCONTINUED | OUTPATIENT
Start: 2023-01-01 | End: 2023-01-01 | Stop reason: HOSPADM

## 2023-01-01 RX ORDER — FENTANYL 75 UG/H
1 PATCH TRANSDERMAL
COMMUNITY

## 2023-01-01 RX ORDER — FENTANYL 25 UG/1
1 PATCH TRANSDERMAL
Status: DISCONTINUED | OUTPATIENT
Start: 2023-01-01 | End: 2023-01-01

## 2023-01-01 RX ORDER — OXYCODONE HYDROCHLORIDE 10 MG/1
10 TABLET ORAL
Status: ON HOLD | COMMUNITY
Start: 2023-01-01 | End: 2023-01-01 | Stop reason: HOSPADM

## 2023-01-01 RX ORDER — DEXTROSE 40 %
15 GEL (GRAM) ORAL
Status: DISCONTINUED | OUTPATIENT
Start: 2023-01-01 | End: 2023-01-01

## 2023-01-01 RX ORDER — MORPHINE SULFATE 30 MG/1
30 TABLET, FILM COATED, EXTENDED RELEASE ORAL 3 TIMES DAILY
Status: DISCONTINUED | OUTPATIENT
Start: 2023-01-01 | End: 2023-01-01

## 2023-01-01 RX ORDER — LIDOCAINE HYDROCHLORIDE 10 MG/ML
1 INJECTION, SOLUTION EPIDURAL; INFILTRATION; INTRACAUDAL; PERINEURAL ONCE AS NEEDED
Status: COMPLETED | OUTPATIENT
Start: 2023-01-01 | End: 2023-01-01

## 2023-01-01 RX ORDER — MIRTAZAPINE 7.5 MG/1
7.5 TABLET, FILM COATED ORAL NIGHTLY
Qty: 30 TABLET | Refills: 0 | Status: SHIPPED | OUTPATIENT
Start: 2023-01-01 | End: 2023-01-01

## 2023-01-01 RX ORDER — PROMETHAZINE HYDROCHLORIDE 25 MG/1
25 TABLET ORAL EVERY 6 HOURS PRN
Status: DISCONTINUED | OUTPATIENT
Start: 2023-01-01 | End: 2023-01-01 | Stop reason: HOSPADM

## 2023-01-01 RX ORDER — LIDOCAINE HYDROCHLORIDE 20 MG/ML
INJECTION, SOLUTION EPIDURAL; INFILTRATION; INTRACAUDAL; PERINEURAL
Status: DISCONTINUED | OUTPATIENT
Start: 2023-01-01 | End: 2023-01-01

## 2023-01-01 RX ORDER — POLYETHYLENE GLYCOL 3350 17 G/17G
17 POWDER, FOR SOLUTION ORAL 2 TIMES DAILY
Status: DISCONTINUED | OUTPATIENT
Start: 2023-01-01 | End: 2023-01-01 | Stop reason: HOSPADM

## 2023-01-01 RX ORDER — MAG HYDROX/ALUMINUM HYD/SIMETH 200-200-20
30 SUSPENSION, ORAL (FINAL DOSE FORM) ORAL 2 TIMES DAILY
Status: DISCONTINUED | OUTPATIENT
Start: 2023-01-01 | End: 2023-01-01 | Stop reason: HOSPADM

## 2023-01-01 RX ORDER — POLYETHYLENE GLYCOL 3350 17 G/17G
17 POWDER, FOR SOLUTION ORAL DAILY
Status: DISCONTINUED | OUTPATIENT
Start: 2023-01-01 | End: 2023-01-01 | Stop reason: HOSPADM

## 2023-01-01 RX ORDER — PROCHLORPERAZINE EDISYLATE 5 MG/ML
5 INJECTION INTRAMUSCULAR; INTRAVENOUS EVERY 6 HOURS PRN
Status: DISCONTINUED | OUTPATIENT
Start: 2023-01-01 | End: 2023-01-01

## 2023-01-01 RX ORDER — MORPHINE SULFATE 30 MG/1
30 TABLET, FILM COATED, EXTENDED RELEASE ORAL EVERY 8 HOURS PRN
Status: DISCONTINUED | OUTPATIENT
Start: 2023-01-01 | End: 2023-01-01

## 2023-01-01 RX ADMIN — PROMETHAZINE HYDROCHLORIDE 25 MG: 25 INJECTION INTRAMUSCULAR; INTRAVENOUS at 03:03

## 2023-01-01 RX ADMIN — HYDROMORPHONE HYDROCHLORIDE 2 MG: 2 INJECTION INTRAMUSCULAR; INTRAVENOUS; SUBCUTANEOUS at 02:03

## 2023-01-01 RX ADMIN — OXYCODONE AND ACETAMINOPHEN 1 TABLET: 10; 325 TABLET ORAL at 11:01

## 2023-01-01 RX ADMIN — HYDROMORPHONE HYDROCHLORIDE 0.5 MG: 1 INJECTION, SOLUTION INTRAMUSCULAR; INTRAVENOUS; SUBCUTANEOUS at 10:01

## 2023-01-01 RX ADMIN — PIPERACILLIN SODIUM AND TAZOBACTAM SODIUM 4.5 G: 4; .5 INJECTION, POWDER, LYOPHILIZED, FOR SOLUTION INTRAVENOUS at 02:01

## 2023-01-01 RX ADMIN — HYDROMORPHONE HYDROCHLORIDE 1 MG: 1 INJECTION, SOLUTION INTRAMUSCULAR; INTRAVENOUS; SUBCUTANEOUS at 05:02

## 2023-01-01 RX ADMIN — ROCURONIUM BROMIDE 45 MG: 10 INJECTION, SOLUTION INTRAVENOUS at 02:01

## 2023-01-01 RX ADMIN — SODIUM CHLORIDE: 9 INJECTION, SOLUTION INTRAVENOUS at 07:03

## 2023-01-01 RX ADMIN — ALUMINUM HYDROXIDE, MAGNESIUM HYDROXIDE, AND DIMETHICONE 30 ML: 200; 20; 200 SUSPENSION ORAL at 08:03

## 2023-01-01 RX ADMIN — PIPERACILLIN SODIUM AND TAZOBACTAM SODIUM 4.5 G: 4; .5 INJECTION, POWDER, LYOPHILIZED, FOR SOLUTION INTRAVENOUS at 04:02

## 2023-01-01 RX ADMIN — MIDAZOLAM HYDROCHLORIDE 2 MG: 1 INJECTION, SOLUTION INTRAMUSCULAR; INTRAVENOUS at 01:04

## 2023-01-01 RX ADMIN — ROCURONIUM BROMIDE 25 MG: 10 INJECTION, SOLUTION INTRAVENOUS at 01:02

## 2023-01-01 RX ADMIN — PIPERACILLIN SODIUM AND TAZOBACTAM SODIUM 4.5 G: 4; .5 INJECTION, POWDER, LYOPHILIZED, FOR SOLUTION INTRAVENOUS at 09:02

## 2023-01-01 RX ADMIN — ONDANSETRON 8 MG: 8 TABLET, ORALLY DISINTEGRATING ORAL at 02:01

## 2023-01-01 RX ADMIN — DEXAMETHASONE SODIUM PHOSPHATE 8 MG: 4 INJECTION, SOLUTION INTRAMUSCULAR; INTRAVENOUS at 08:01

## 2023-01-01 RX ADMIN — CISPLATIN 50 MG: 1 INJECTION INTRAVENOUS at 02:03

## 2023-01-01 RX ADMIN — PROPOFOL 200 MG: 10 INJECTION, EMULSION INTRAVENOUS at 02:01

## 2023-01-01 RX ADMIN — LORAZEPAM 0.5 MG: 2 INJECTION INTRAMUSCULAR; INTRAVENOUS at 08:03

## 2023-01-01 RX ADMIN — HYDROMORPHONE HYDROCHLORIDE 4 MG: 2 TABLET ORAL at 02:03

## 2023-01-01 RX ADMIN — HYDROMORPHONE HYDROCHLORIDE 2 MG: 2 INJECTION INTRAMUSCULAR; INTRAVENOUS; SUBCUTANEOUS at 12:02

## 2023-01-01 RX ADMIN — HEPARIN 500 UNITS: 100 SYRINGE at 05:03

## 2023-01-01 RX ADMIN — FENTANYL 1 PATCH: 25 PATCH TRANSDERMAL at 03:02

## 2023-01-01 RX ADMIN — GEMCITABINE 2000 MG: 38 INJECTION, SOLUTION INTRAVENOUS at 11:02

## 2023-01-01 RX ADMIN — DEXTROSE 2 G: 50 INJECTION, SOLUTION INTRAVENOUS at 10:02

## 2023-01-01 RX ADMIN — HYDROMORPHONE HYDROCHLORIDE 2 MG: 2 INJECTION INTRAMUSCULAR; INTRAVENOUS; SUBCUTANEOUS at 11:03

## 2023-01-01 RX ADMIN — PIPERACILLIN SODIUM AND TAZOBACTAM SODIUM 4.5 G: 4; .5 INJECTION, POWDER, LYOPHILIZED, FOR SOLUTION INTRAVENOUS at 12:02

## 2023-01-01 RX ADMIN — IBUPROFEN 400 MG: 400 TABLET, FILM COATED ORAL at 02:01

## 2023-01-01 RX ADMIN — FENTANYL CITRATE 50 MCG: 50 INJECTION, SOLUTION INTRAMUSCULAR; INTRAVENOUS at 01:02

## 2023-01-01 RX ADMIN — SUGAMMADEX 200 MG: 100 INJECTION, SOLUTION INTRAVENOUS at 08:01

## 2023-01-01 RX ADMIN — OXYCODONE HYDROCHLORIDE 10 MG: 5 TABLET ORAL at 09:01

## 2023-01-01 RX ADMIN — DEXAMETHASONE SODIUM PHOSPHATE 0.25 MG: 4 INJECTION, SOLUTION INTRA-ARTICULAR; INTRALESIONAL; INTRAMUSCULAR; INTRAVENOUS; SOFT TISSUE at 08:03

## 2023-01-01 RX ADMIN — IRON SUCROSE 200 MG: 20 INJECTION, SOLUTION INTRAVENOUS at 02:03

## 2023-01-01 RX ADMIN — SODIUM CHLORIDE 1500 MG: 9 INJECTION, SOLUTION INTRAVENOUS at 09:03

## 2023-01-01 RX ADMIN — HYDROMORPHONE HYDROCHLORIDE 0.5 MG: 1 INJECTION, SOLUTION INTRAMUSCULAR; INTRAVENOUS; SUBCUTANEOUS at 08:01

## 2023-01-01 RX ADMIN — PIPERACILLIN SODIUM AND TAZOBACTAM SODIUM 4.5 G: 4; .5 INJECTION, POWDER, LYOPHILIZED, FOR SOLUTION INTRAVENOUS at 09:01

## 2023-01-01 RX ADMIN — MAGNESIUM SULFATE HEPTAHYDRATE 500 ML/HR: 500 INJECTION, SOLUTION INTRAMUSCULAR; INTRAVENOUS at 11:03

## 2023-01-01 RX ADMIN — FENTANYL CITRATE 100 MCG: 50 INJECTION, SOLUTION INTRAMUSCULAR; INTRAVENOUS at 02:01

## 2023-01-01 RX ADMIN — HYDROMORPHONE HYDROCHLORIDE 2 MG: 2 INJECTION INTRAMUSCULAR; INTRAVENOUS; SUBCUTANEOUS at 03:02

## 2023-01-01 RX ADMIN — SODIUM CHLORIDE 1000 ML: 9 INJECTION, SOLUTION INTRAVENOUS at 05:02

## 2023-01-01 RX ADMIN — HYDROMORPHONE HYDROCHLORIDE 2 MG: 2 INJECTION INTRAMUSCULAR; INTRAVENOUS; SUBCUTANEOUS at 06:02

## 2023-01-01 RX ADMIN — LIDOCAINE HYDROCHLORIDE 2 MG: 10 INJECTION, SOLUTION EPIDURAL; INFILTRATION; INTRACAUDAL; PERINEURAL at 07:01

## 2023-01-01 RX ADMIN — PIPERACILLIN SODIUM AND TAZOBACTAM SODIUM 4.5 G: 4; .5 INJECTION, POWDER, LYOPHILIZED, FOR SOLUTION INTRAVENOUS at 05:02

## 2023-01-01 RX ADMIN — HYDROMORPHONE HYDROCHLORIDE 4 MG: 2 TABLET ORAL at 12:02

## 2023-01-01 RX ADMIN — SUGAMMADEX 200 MG: 100 INJECTION, SOLUTION INTRAVENOUS at 03:01

## 2023-01-01 RX ADMIN — DEXAMETHASONE SODIUM PHOSPHATE 0.25 MG: 4 INJECTION, SOLUTION INTRA-ARTICULAR; INTRALESIONAL; INTRAMUSCULAR; INTRAVENOUS; SOFT TISSUE at 10:03

## 2023-01-01 RX ADMIN — FENTANYL CITRATE 50 MCG: 50 INJECTION, SOLUTION INTRAMUSCULAR; INTRAVENOUS at 08:01

## 2023-01-01 RX ADMIN — SODIUM CHLORIDE 1000 ML: 9 INJECTION, SOLUTION INTRAVENOUS at 03:03

## 2023-01-01 RX ADMIN — SODIUM CHLORIDE 1000 ML: 9 INJECTION, SOLUTION INTRAVENOUS at 02:03

## 2023-01-01 RX ADMIN — Medication 10 ML: at 01:03

## 2023-01-01 RX ADMIN — HYDROMORPHONE HYDROCHLORIDE 0.5 MG: 1 INJECTION, SOLUTION INTRAMUSCULAR; INTRAVENOUS; SUBCUTANEOUS at 04:01

## 2023-01-01 RX ADMIN — ENOXAPARIN SODIUM 40 MG: 40 INJECTION SUBCUTANEOUS at 04:03

## 2023-01-01 RX ADMIN — OXYCODONE HYDROCHLORIDE 20 MG: 5 TABLET ORAL at 11:02

## 2023-01-01 RX ADMIN — PIPERACILLIN SODIUM AND TAZOBACTAM SODIUM 4.5 G: 4; .5 INJECTION, POWDER, LYOPHILIZED, FOR SOLUTION INTRAVENOUS at 08:02

## 2023-01-01 RX ADMIN — CISPLATIN 50 MG: 1 INJECTION INTRAVENOUS at 01:02

## 2023-01-01 RX ADMIN — HYDROMORPHONE HYDROCHLORIDE 2 MG: 2 INJECTION INTRAMUSCULAR; INTRAVENOUS; SUBCUTANEOUS at 05:03

## 2023-01-01 RX ADMIN — ROCURONIUM BROMIDE 5 MG: 10 INJECTION, SOLUTION INTRAVENOUS at 02:01

## 2023-01-01 RX ADMIN — HYDROMORPHONE HYDROCHLORIDE 1 MG: 1 INJECTION, SOLUTION INTRAMUSCULAR; INTRAVENOUS; SUBCUTANEOUS at 04:02

## 2023-01-01 RX ADMIN — IOHEXOL 30 ML: 350 INJECTION, SOLUTION INTRAVENOUS at 10:03

## 2023-01-01 RX ADMIN — OXYCODONE HYDROCHLORIDE 10 MG: 5 TABLET ORAL at 07:02

## 2023-01-01 RX ADMIN — HYDROMORPHONE HYDROCHLORIDE 2 MG: 2 INJECTION INTRAMUSCULAR; INTRAVENOUS; SUBCUTANEOUS at 08:02

## 2023-01-01 RX ADMIN — DEXAMETHASONE SODIUM PHOSPHATE 8 MG: 4 INJECTION, SOLUTION INTRAMUSCULAR; INTRAVENOUS at 01:04

## 2023-01-01 RX ADMIN — PIPERACILLIN SODIUM AND TAZOBACTAM SODIUM 4.5 G: 4; .5 INJECTION, POWDER, LYOPHILIZED, FOR SOLUTION INTRAVENOUS at 03:02

## 2023-01-01 RX ADMIN — LIDOCAINE HYDROCHLORIDE 50 MG: 20 INJECTION, SOLUTION INTRAVENOUS at 01:04

## 2023-01-01 RX ADMIN — DEXMEDETOMIDINE HYDROCHLORIDE 4 MCG: 100 INJECTION, SOLUTION INTRAVENOUS at 11:02

## 2023-01-01 RX ADMIN — PROPOFOL 150 MG: 10 INJECTION, EMULSION INTRAVENOUS at 01:02

## 2023-01-01 RX ADMIN — SODIUM CHLORIDE, SODIUM LACTATE, POTASSIUM CHLORIDE, AND CALCIUM CHLORIDE: .6; .31; .03; .02 INJECTION, SOLUTION INTRAVENOUS at 12:02

## 2023-01-01 RX ADMIN — ENOXAPARIN SODIUM 40 MG: 40 INJECTION SUBCUTANEOUS at 06:03

## 2023-01-01 RX ADMIN — FOSAPREPITANT 150 MG: 150 INJECTION, POWDER, LYOPHILIZED, FOR SOLUTION INTRAVENOUS at 09:03

## 2023-01-01 RX ADMIN — SUCCINYLCHOLINE CHLORIDE 140 MG: 20 INJECTION, SOLUTION INTRAMUSCULAR; INTRAVENOUS; PARENTERAL at 01:02

## 2023-01-01 RX ADMIN — FENTANYL CITRATE 100 MCG: 50 INJECTION, SOLUTION INTRAMUSCULAR; INTRAVENOUS at 10:02

## 2023-01-01 RX ADMIN — SIMETHICONE 80 MG: 80 TABLET, CHEWABLE ORAL at 08:03

## 2023-01-01 RX ADMIN — PREGABALIN 50 MG: 25 CAPSULE ORAL at 08:03

## 2023-01-01 RX ADMIN — OXYCODONE HYDROCHLORIDE 10 MG: 5 TABLET ORAL at 10:01

## 2023-01-01 RX ADMIN — SODIUM CHLORIDE: 9 INJECTION, SOLUTION INTRAVENOUS at 11:02

## 2023-01-01 RX ADMIN — Medication 10 MG: at 09:02

## 2023-01-01 RX ADMIN — SODIUM CHLORIDE 1000 ML: 9 INJECTION, SOLUTION INTRAVENOUS at 01:03

## 2023-01-01 RX ADMIN — POLYETHYLENE GLYCOL 3350 17 G: 17 POWDER, FOR SOLUTION ORAL at 08:02

## 2023-01-01 RX ADMIN — DEXAMETHASONE SODIUM PHOSPHATE 8 MG: 4 INJECTION, SOLUTION INTRA-ARTICULAR; INTRALESIONAL; INTRAMUSCULAR; INTRAVENOUS; SOFT TISSUE at 01:02

## 2023-01-01 RX ADMIN — HYDROMORPHONE HYDROCHLORIDE 4 MG: 2 TABLET ORAL at 05:02

## 2023-01-01 RX ADMIN — SUGAMMADEX 200 MG: 100 INJECTION, SOLUTION INTRAVENOUS at 01:02

## 2023-01-01 RX ADMIN — PROPOFOL 200 MG: 10 INJECTION, EMULSION INTRAVENOUS at 08:01

## 2023-01-01 RX ADMIN — SODIUM CHLORIDE: 9 INJECTION, SOLUTION INTRAVENOUS at 01:03

## 2023-01-01 RX ADMIN — SODIUM CHLORIDE: 9 INJECTION, SOLUTION INTRAVENOUS at 12:02

## 2023-01-01 RX ADMIN — PIPERACILLIN SODIUM AND TAZOBACTAM SODIUM 4.5 G: 4; .5 INJECTION, POWDER, LYOPHILIZED, FOR SOLUTION INTRAVENOUS at 06:01

## 2023-01-01 RX ADMIN — POTASSIUM BICARBONATE 25 MEQ: 978 TABLET, EFFERVESCENT ORAL at 10:02

## 2023-01-01 RX ADMIN — SODIUM CHLORIDE: 9 INJECTION, SOLUTION INTRAVENOUS at 07:02

## 2023-01-01 RX ADMIN — HYDROMORPHONE HYDROCHLORIDE 2 MG: 2 INJECTION INTRAMUSCULAR; INTRAVENOUS; SUBCUTANEOUS at 09:02

## 2023-01-01 RX ADMIN — HYDROMORPHONE HYDROCHLORIDE 0.2 MG: 1 INJECTION, SOLUTION INTRAMUSCULAR; INTRAVENOUS; SUBCUTANEOUS at 09:01

## 2023-01-01 RX ADMIN — PIPERACILLIN SODIUM AND TAZOBACTAM SODIUM 4.5 G: 4; .5 INJECTION, POWDER, LYOPHILIZED, FOR SOLUTION INTRAVENOUS at 10:02

## 2023-01-01 RX ADMIN — SIMETHICONE 80 MG: 80 TABLET, CHEWABLE ORAL at 09:02

## 2023-01-01 RX ADMIN — IOHEXOL 30 ML: 350 INJECTION, SOLUTION INTRAVENOUS at 07:02

## 2023-01-01 RX ADMIN — MAGNESIUM SULFATE HEPTAHYDRATE 500 ML/HR: 500 INJECTION, SOLUTION INTRAMUSCULAR; INTRAVENOUS at 09:03

## 2023-01-01 RX ADMIN — PROPOFOL 150 MG: 10 INJECTION, EMULSION INTRAVENOUS at 10:02

## 2023-01-01 RX ADMIN — HEPARIN 500 UNITS: 100 SYRINGE at 03:03

## 2023-01-01 RX ADMIN — OXYCODONE HYDROCHLORIDE 20 MG: 5 TABLET ORAL at 07:02

## 2023-01-01 RX ADMIN — LIDOCAINE HYDROCHLORIDE 100 MG: 20 INJECTION INTRAVENOUS at 08:01

## 2023-01-01 RX ADMIN — SODIUM CHLORIDE 1000 ML: 9 INJECTION, SOLUTION INTRAVENOUS at 02:04

## 2023-01-01 RX ADMIN — Medication 80 MG: at 10:02

## 2023-01-01 RX ADMIN — HYDROMORPHONE HYDROCHLORIDE 2 MG: 2 INJECTION INTRAMUSCULAR; INTRAVENOUS; SUBCUTANEOUS at 07:03

## 2023-01-01 RX ADMIN — CEFAZOLIN 2 G: 2 INJECTION, POWDER, FOR SOLUTION INTRAMUSCULAR; INTRAVENOUS at 08:01

## 2023-01-01 RX ADMIN — LIDOCAINE HYDROCHLORIDE 50 MG: 20 INJECTION, SOLUTION EPIDURAL; INFILTRATION; INTRACAUDAL; PERINEURAL at 01:02

## 2023-01-01 RX ADMIN — PANTOPRAZOLE SODIUM 40 MG: 40 TABLET, DELAYED RELEASE ORAL at 08:01

## 2023-01-01 RX ADMIN — DEXAMETHASONE SODIUM PHOSPHATE 0.25 MG: 4 INJECTION, SOLUTION INTRA-ARTICULAR; INTRALESIONAL; INTRAMUSCULAR; INTRAVENOUS; SOFT TISSUE at 11:02

## 2023-01-01 RX ADMIN — PROPOFOL 150 MG: 10 INJECTION, EMULSION INTRAVENOUS at 03:02

## 2023-01-01 RX ADMIN — OXYCODONE HYDROCHLORIDE 10 MG: 5 TABLET ORAL at 03:02

## 2023-01-01 RX ADMIN — HEPARIN 500 UNITS: 100 SYRINGE at 01:03

## 2023-01-01 RX ADMIN — Medication 10 MG: at 12:02

## 2023-01-01 RX ADMIN — POTASSIUM CHLORIDE 10 MEQ: 7.46 INJECTION, SOLUTION INTRAVENOUS at 11:03

## 2023-01-01 RX ADMIN — HYDROMORPHONE HYDROCHLORIDE 1 MG: 2 INJECTION INTRAMUSCULAR; INTRAVENOUS; SUBCUTANEOUS at 03:03

## 2023-01-01 RX ADMIN — HYDROMORPHONE HYDROCHLORIDE 4 MG: 2 TABLET ORAL at 07:02

## 2023-01-01 RX ADMIN — MAGNESIUM SULFATE HEPTAHYDRATE 500 ML/HR: 500 INJECTION, SOLUTION INTRAMUSCULAR; INTRAVENOUS at 11:02

## 2023-01-01 RX ADMIN — PIPERACILLIN SODIUM AND TAZOBACTAM SODIUM 4.5 G: 4; .5 INJECTION, POWDER, LYOPHILIZED, FOR SOLUTION INTRAVENOUS at 05:01

## 2023-01-01 RX ADMIN — CEFEPIME 2 G: 2 INJECTION, POWDER, FOR SOLUTION INTRAVENOUS at 07:03

## 2023-01-01 RX ADMIN — ALUMINUM HYDROXIDE, MAGNESIUM HYDROXIDE, AND DIMETHICONE 30 ML: 200; 20; 200 SUSPENSION ORAL at 10:02

## 2023-01-01 RX ADMIN — SODIUM CHLORIDE: 9 INJECTION, SOLUTION INTRAVENOUS at 07:01

## 2023-01-01 RX ADMIN — LIDOCAINE HYDROCHLORIDE 100 MG: 10 INJECTION, SOLUTION EPIDURAL; INFILTRATION; INTRACAUDAL; PERINEURAL at 03:02

## 2023-01-01 RX ADMIN — LORAZEPAM 0.5 MG: 2 INJECTION INTRAMUSCULAR; INTRAVENOUS at 09:03

## 2023-01-01 RX ADMIN — ONDANSETRON 8 MG: 2 INJECTION, SOLUTION INTRAMUSCULAR; INTRAVENOUS at 01:04

## 2023-01-01 RX ADMIN — CISPLATIN 50 MG: 50 INJECTION, SOLUTION INTRAVENOUS at 12:03

## 2023-01-01 RX ADMIN — CEFEPIME 2 G: 2 INJECTION, POWDER, FOR SOLUTION INTRAVENOUS at 04:03

## 2023-01-01 RX ADMIN — SUCCINYLCHOLINE CHLORIDE 140 MG: 20 INJECTION, SOLUTION INTRAMUSCULAR; INTRAVENOUS at 02:01

## 2023-01-01 RX ADMIN — OXYCODONE HYDROCHLORIDE 10 MG: 5 TABLET ORAL at 06:01

## 2023-01-01 RX ADMIN — SUCCINYLCHOLINE CHLORIDE 160 MG: 20 INJECTION, SOLUTION INTRAMUSCULAR; INTRAVENOUS at 10:02

## 2023-01-01 RX ADMIN — IRON SUCROSE 200 MG: 20 INJECTION, SOLUTION INTRAVENOUS at 02:02

## 2023-01-01 RX ADMIN — FENTANYL CITRATE 100 MCG: 50 INJECTION, SOLUTION INTRAMUSCULAR; INTRAVENOUS at 01:04

## 2023-01-01 RX ADMIN — IOHEXOL 1000 ML: 9 SOLUTION ORAL at 09:04

## 2023-01-01 RX ADMIN — IOHEXOL 100 ML: 350 INJECTION, SOLUTION INTRAVENOUS at 04:02

## 2023-01-01 RX ADMIN — OXYCODONE HYDROCHLORIDE 10 MG: 5 TABLET ORAL at 05:01

## 2023-01-01 RX ADMIN — ROCURONIUM BROMIDE 50 MG: 10 INJECTION INTRAVENOUS at 08:01

## 2023-01-01 RX ADMIN — IRON SUCROSE 200 MG: 20 INJECTION, SOLUTION INTRAVENOUS at 03:03

## 2023-01-01 RX ADMIN — SODIUM CHLORIDE, POTASSIUM CHLORIDE, SODIUM LACTATE AND CALCIUM CHLORIDE: 600; 310; 30; 20 INJECTION, SOLUTION INTRAVENOUS at 10:02

## 2023-01-01 RX ADMIN — IRON SUCROSE 200 MG: 20 INJECTION, SOLUTION INTRAVENOUS at 01:03

## 2023-01-01 RX ADMIN — PIPERACILLIN SODIUM AND TAZOBACTAM SODIUM 4.5 G: 4; .5 INJECTION, POWDER, LYOPHILIZED, FOR SOLUTION INTRAVENOUS at 10:01

## 2023-01-01 RX ADMIN — ONDANSETRON 4 MG: 2 INJECTION, SOLUTION INTRAMUSCULAR; INTRAVENOUS at 11:02

## 2023-01-01 RX ADMIN — SODIUM CHLORIDE: 0.9 INJECTION, SOLUTION INTRAVENOUS at 01:04

## 2023-01-01 RX ADMIN — HYDROMORPHONE HYDROCHLORIDE 0.2 MG: 1 INJECTION, SOLUTION INTRAMUSCULAR; INTRAVENOUS; SUBCUTANEOUS at 10:01

## 2023-01-01 RX ADMIN — GEMCITABINE 1780 MG: 38 INJECTION, SOLUTION INTRAVENOUS at 11:03

## 2023-01-01 RX ADMIN — HYDROMORPHONE HYDROCHLORIDE 2 MG: 2 INJECTION INTRAMUSCULAR; INTRAVENOUS; SUBCUTANEOUS at 04:03

## 2023-01-01 RX ADMIN — IOHEXOL 75 ML: 350 INJECTION, SOLUTION INTRAVENOUS at 11:03

## 2023-01-01 RX ADMIN — DEXAMETHASONE SODIUM PHOSPHATE 4 MG: 4 INJECTION, SOLUTION INTRAMUSCULAR; INTRAVENOUS at 02:01

## 2023-01-01 RX ADMIN — SODIUM CHLORIDE, SODIUM LACTATE, POTASSIUM CHLORIDE, AND CALCIUM CHLORIDE: 600; 310; 30; 20 INJECTION, SOLUTION INTRAVENOUS at 10:02

## 2023-01-01 RX ADMIN — ONDANSETRON 4 MG: 2 INJECTION INTRAMUSCULAR; INTRAVENOUS at 10:03

## 2023-01-01 RX ADMIN — IOHEXOL 100 ML: 350 INJECTION, SOLUTION INTRAVENOUS at 11:01

## 2023-01-01 RX ADMIN — MIDAZOLAM HYDROCHLORIDE 2 MG: 1 INJECTION, SOLUTION INTRAMUSCULAR; INTRAVENOUS at 08:01

## 2023-01-01 RX ADMIN — ROCURONIUM BROMIDE 20 MG: 10 INJECTION INTRAVENOUS at 08:01

## 2023-01-01 RX ADMIN — IOHEXOL 100 ML: 350 INJECTION, SOLUTION INTRAVENOUS at 05:03

## 2023-01-01 RX ADMIN — PROPOFOL 10 MG: 10 INJECTION, EMULSION INTRAVENOUS at 01:04

## 2023-01-01 RX ADMIN — SUCCINYLCHOLINE CHLORIDE 160 MG: 20 INJECTION, SOLUTION INTRAMUSCULAR; INTRAVENOUS at 01:04

## 2023-01-01 RX ADMIN — SUCCINYLCHOLINE CHLORIDE 140 MG: 20 INJECTION, SOLUTION INTRAMUSCULAR; INTRAVENOUS; PARENTERAL at 03:02

## 2023-01-01 RX ADMIN — ROCURONIUM BROMIDE 10 MG: 10 INJECTION, SOLUTION INTRAVENOUS at 10:02

## 2023-01-01 RX ADMIN — HYDROMORPHONE HYDROCHLORIDE 1 MG: 1 INJECTION, SOLUTION INTRAMUSCULAR; INTRAVENOUS; SUBCUTANEOUS at 12:02

## 2023-01-01 RX ADMIN — HYDROMORPHONE HYDROCHLORIDE 2 MG: 2 INJECTION INTRAMUSCULAR; INTRAVENOUS; SUBCUTANEOUS at 08:03

## 2023-01-01 RX ADMIN — ONDANSETRON 4 MG: 2 INJECTION INTRAMUSCULAR; INTRAVENOUS at 04:02

## 2023-01-01 RX ADMIN — HYDROMORPHONE HYDROCHLORIDE 2 MG: 2 INJECTION INTRAMUSCULAR; INTRAVENOUS; SUBCUTANEOUS at 01:03

## 2023-01-01 RX ADMIN — PHENYLEPHRINE HYDROCHLORIDE 100 MCG: 10 INJECTION INTRAVENOUS at 11:02

## 2023-01-01 RX ADMIN — MAGNESIUM SULFATE HEPTAHYDRATE 2 G: 40 INJECTION, SOLUTION INTRAVENOUS at 09:02

## 2023-01-01 RX ADMIN — HYDROMORPHONE HYDROCHLORIDE 1 MG: 2 INJECTION, SOLUTION INTRAMUSCULAR; INTRAVENOUS; SUBCUTANEOUS at 10:02

## 2023-01-01 RX ADMIN — PROMETHAZINE HYDROCHLORIDE 12.5 MG: 25 INJECTION INTRAMUSCULAR; INTRAVENOUS at 04:02

## 2023-01-01 RX ADMIN — HYDROMORPHONE HYDROCHLORIDE 2 MG: 2 INJECTION INTRAMUSCULAR; INTRAVENOUS; SUBCUTANEOUS at 10:03

## 2023-01-01 RX ADMIN — ONDANSETRON 8 MG: 8 TABLET, ORALLY DISINTEGRATING ORAL at 06:02

## 2023-01-01 RX ADMIN — ONDANSETRON 4 MG: 2 INJECTION INTRAMUSCULAR; INTRAVENOUS at 08:01

## 2023-01-01 RX ADMIN — AMOXICILLIN AND CLAVULANATE POTASSIUM 1 TABLET: 875; 125 TABLET, FILM COATED ORAL at 01:02

## 2023-01-01 RX ADMIN — ROCURONIUM BROMIDE 30 MG: 10 INJECTION, SOLUTION INTRAVENOUS at 01:04

## 2023-01-01 RX ADMIN — SODIUM CHLORIDE 1000 ML: 0.9 INJECTION, SOLUTION INTRAVENOUS at 09:01

## 2023-01-01 RX ADMIN — DEXAMETHASONE SODIUM PHOSPHATE 8 MG: 4 INJECTION, SOLUTION INTRA-ARTICULAR; INTRALESIONAL; INTRAMUSCULAR; INTRAVENOUS; SOFT TISSUE at 11:02

## 2023-01-01 RX ADMIN — HEPARIN 500 UNITS: 100 SYRINGE at 03:04

## 2023-01-01 RX ADMIN — SODIUM CHLORIDE: 9 INJECTION, SOLUTION INTRAVENOUS at 04:03

## 2023-01-01 RX ADMIN — OXYCODONE HYDROCHLORIDE 20 MG: 5 TABLET ORAL at 08:02

## 2023-01-01 RX ADMIN — HYDROMORPHONE HYDROCHLORIDE 4 MG: 2 TABLET ORAL at 10:02

## 2023-01-01 RX ADMIN — SODIUM CHLORIDE 1500 MG: 9 INJECTION, SOLUTION INTRAVENOUS at 10:03

## 2023-01-01 RX ADMIN — CEFEPIME 2 G: 2 INJECTION, POWDER, FOR SOLUTION INTRAVENOUS at 08:03

## 2023-01-01 RX ADMIN — GEMCITABINE 2000 MG: 38 INJECTION, SOLUTION INTRAVENOUS at 12:03

## 2023-01-01 RX ADMIN — SODIUM CHLORIDE: 9 INJECTION, SOLUTION INTRAVENOUS at 10:03

## 2023-01-01 RX ADMIN — OXYCODONE HYDROCHLORIDE 10 MG: 5 TABLET ORAL at 02:01

## 2023-01-01 RX ADMIN — IOHEXOL 75 ML: 350 INJECTION, SOLUTION INTRAVENOUS at 08:02

## 2023-01-01 RX ADMIN — IOHEXOL 30 ML: 350 INJECTION, SOLUTION INTRAVENOUS at 11:01

## 2023-01-01 RX ADMIN — LIDOCAINE HYDROCHLORIDE 50 MG: 10 INJECTION, SOLUTION EPIDURAL; INFILTRATION; INTRACAUDAL; PERINEURAL at 02:01

## 2023-01-01 RX ADMIN — SODIUM CHLORIDE 1000 ML: 9 INJECTION, SOLUTION INTRAVENOUS at 12:03

## 2023-01-01 RX ADMIN — ROCURONIUM BROMIDE 5 MG: 10 INJECTION, SOLUTION INTRAVENOUS at 01:02

## 2023-01-01 RX ADMIN — LORAZEPAM 0.5 MG: 2 INJECTION INTRAMUSCULAR; INTRAVENOUS at 11:02

## 2023-01-01 RX ADMIN — SODIUM CHLORIDE 1000 ML: 9 INJECTION, SOLUTION INTRAVENOUS at 11:03

## 2023-01-01 RX ADMIN — ONDANSETRON 4 MG: 2 INJECTION INTRAMUSCULAR; INTRAVENOUS at 01:02

## 2023-01-01 RX ADMIN — SODIUM CHLORIDE: 0.9 INJECTION, SOLUTION INTRAVENOUS at 12:04

## 2023-01-01 RX ADMIN — HYDROMORPHONE HYDROCHLORIDE 2 MG: 2 INJECTION INTRAMUSCULAR; INTRAVENOUS; SUBCUTANEOUS at 06:03

## 2023-01-01 RX ADMIN — HEPARIN 500 UNITS: 100 SYRINGE at 03:02

## 2023-01-01 RX ADMIN — SODIUM CHLORIDE: 9 INJECTION, SOLUTION INTRAVENOUS at 02:02

## 2023-01-01 RX ADMIN — HEPARIN 500 UNITS: 100 SYRINGE at 02:03

## 2023-01-01 RX ADMIN — DEXAMETHASONE SODIUM PHOSPHATE 0.25 MG: 4 INJECTION, SOLUTION INTRA-ARTICULAR; INTRALESIONAL; INTRAMUSCULAR; INTRAVENOUS; SOFT TISSUE at 11:03

## 2023-01-01 RX ADMIN — PANTOPRAZOLE SODIUM 40 MG: 40 TABLET, DELAYED RELEASE ORAL at 09:01

## 2023-01-01 RX ADMIN — ONDANSETRON 8 MG: 2 INJECTION INTRAMUSCULAR; INTRAVENOUS at 05:03

## 2023-01-01 RX ADMIN — HYDROMORPHONE HYDROCHLORIDE 0.5 MG: 1 INJECTION, SOLUTION INTRAMUSCULAR; INTRAVENOUS; SUBCUTANEOUS at 01:01

## 2023-01-01 RX ADMIN — SODIUM CHLORIDE, SODIUM LACTATE, POTASSIUM CHLORIDE, AND CALCIUM CHLORIDE: .6; .31; .03; .02 INJECTION, SOLUTION INTRAVENOUS at 02:01

## 2023-01-01 RX ADMIN — ONDANSETRON 4 MG: 2 INJECTION INTRAMUSCULAR; INTRAVENOUS at 04:03

## 2023-01-01 RX ADMIN — CEFEPIME 2 G: 2 INJECTION, POWDER, FOR SOLUTION INTRAVENOUS at 12:03

## 2023-01-01 RX ADMIN — POTASSIUM CHLORIDE 10 MEQ: 7.46 INJECTION, SOLUTION INTRAVENOUS at 10:03

## 2023-01-01 RX ADMIN — MORPHINE SULFATE 30 MG: 30 TABLET, FILM COATED, EXTENDED RELEASE ORAL at 09:02

## 2023-01-01 RX ADMIN — GEMCITABINE 2000 MG: 38 INJECTION, SOLUTION INTRAVENOUS at 10:03

## 2023-01-01 RX ADMIN — HYDROMORPHONE HYDROCHLORIDE 2 MG: 2 INJECTION INTRAMUSCULAR; INTRAVENOUS; SUBCUTANEOUS at 12:03

## 2023-01-01 RX ADMIN — DIATRIZOATE MEGLUMINE AND DIATRIZOATE SODIUM 120 ML: 660; 100 LIQUID ORAL; RECTAL at 06:03

## 2023-01-01 RX ADMIN — SCOPOLAMINE 1 PATCH: 1 SYSTEM TRANSDERMAL at 05:03

## 2023-01-01 RX ADMIN — MAGNESIUM SULFATE HEPTAHYDRATE 500 ML/HR: 500 INJECTION, SOLUTION INTRAMUSCULAR; INTRAVENOUS at 12:03

## 2023-01-01 RX ADMIN — FENTANYL 1 PATCH: 50 PATCH, EXTENDED RELEASE TRANSDERMAL at 04:02

## 2023-01-01 RX ADMIN — FENTANYL 1 PATCH: 75 PATCH TRANSDERMAL at 09:03

## 2023-01-01 RX ADMIN — AMPICILLIN SODIUM AND SULBACTAM SODIUM 3 G: 2; 1 INJECTION, POWDER, FOR SOLUTION INTRAMUSCULAR; INTRAVENOUS at 01:04

## 2023-01-01 RX ADMIN — GLYCOPYRROLATE 0.8 MG: 0.2 INJECTION, SOLUTION INTRAMUSCULAR; INTRAVITREAL at 02:04

## 2023-01-01 RX ADMIN — NEOSTIGMINE METHYLSULFATE 5 MG: 1 INJECTION INTRAVENOUS at 02:04

## 2023-01-01 RX ADMIN — ONDANSETRON 8 MG: 8 TABLET, ORALLY DISINTEGRATING ORAL at 10:02

## 2023-01-01 RX ADMIN — HEPARIN 500 UNITS: 100 SYRINGE at 04:03

## 2023-01-01 RX ADMIN — HEPARIN 500 UNITS: 100 SYRINGE at 04:04

## 2023-01-01 RX ADMIN — ONDANSETRON 4 MG: 2 INJECTION INTRAMUSCULAR; INTRAVENOUS at 11:03

## 2023-01-01 RX ADMIN — ONDANSETRON 8 MG: 8 TABLET, ORALLY DISINTEGRATING ORAL at 11:02

## 2023-01-01 RX ADMIN — ONDANSETRON 4 MG: 2 INJECTION INTRAMUSCULAR; INTRAVENOUS at 10:02

## 2023-01-01 RX ADMIN — MIDAZOLAM 2 MG: 1 INJECTION INTRAMUSCULAR; INTRAVENOUS at 10:02

## 2023-01-01 RX ADMIN — GADOBUTROL 8 ML: 604.72 INJECTION INTRAVENOUS at 07:01

## 2023-01-01 RX ADMIN — ONDANSETRON 4 MG: 2 INJECTION, SOLUTION INTRAMUSCULAR; INTRAVENOUS at 02:01

## 2023-01-01 RX ADMIN — IOHEXOL 75 ML: 350 INJECTION, SOLUTION INTRAVENOUS at 10:04

## 2023-01-01 RX ADMIN — ONDANSETRON 8 MG: 8 TABLET, ORALLY DISINTEGRATING ORAL at 08:02

## 2023-01-01 RX ADMIN — HYDROMORPHONE HYDROCHLORIDE 2 MG: 2 INJECTION INTRAMUSCULAR; INTRAVENOUS; SUBCUTANEOUS at 04:02

## 2023-01-01 RX ADMIN — SODIUM CHLORIDE 500 ML: 0.9 INJECTION, SOLUTION INTRAVENOUS at 02:02

## 2023-01-01 RX ADMIN — SODIUM CHLORIDE, SODIUM LACTATE, POTASSIUM CHLORIDE, AND CALCIUM CHLORIDE: .6; .31; .03; .02 INJECTION, SOLUTION INTRAVENOUS at 03:02

## 2023-01-01 RX ADMIN — HYDROMORPHONE HYDROCHLORIDE 1 MG: 1 INJECTION, SOLUTION INTRAMUSCULAR; INTRAVENOUS; SUBCUTANEOUS at 08:02

## 2023-01-01 RX ADMIN — PROCHLORPERAZINE EDISYLATE 5 MG: 5 INJECTION INTRAMUSCULAR; INTRAVENOUS at 11:03

## 2023-01-01 RX ADMIN — CISPLATIN 45 MG: 50 INJECTION, SOLUTION INTRAVENOUS at 01:03

## 2023-01-01 RX ADMIN — SODIUM CHLORIDE: 0.9 INJECTION, SOLUTION INTRAVENOUS at 08:01

## 2023-01-03 PROBLEM — R50.9 FEVER: Status: ACTIVE | Noted: 2023-01-01

## 2023-01-03 NOTE — H&P
[facility] - Emergency Dept.  [facility]  History & Physical    Patient Name: Guevara Osullivan II  MRN: [id number]  Patient Class: IP- Inpatient  Admission Date: 1/3/2023  Attending Physician: Bob Martinez MD   Primary Care Provider: Dima Ch MD         Patient information was obtained from patient, spouse/SO and ER records.     Subjective:     Principal Problem:Fever    Chief Complaint:   Chief Complaint   Patient presents with    Fever     Several issues post opp from Dr. Fleming abdominal surgery. Still having a lot of pain, fever for 3 days, and weakness.         HPI: Mr. Osullivan is a 53 yo male with hx of Ulcerative colitis, primary sclerosing cholangitis, and colon cancer (1995) presented with fever of 102 q6 hours x 3 days. On 12/20/22, the pt had an ERCP performed by Dr. Teran (Gastroenterology) and had a biliary stent exchange. After the ERCP, the pt was placed on Amoxicillin BID and last took Amoxicillin yesterday.  Patient complains of generalized back, head, and abdominal pain that is chronic.  He denies changes in any of his symptoms and has had no N/V/D.  Dr. Carrillo has been consulted and is planning a procedure later today.  ID has also been consulted given is 18,000 white count and persistant fever.   Of note, patient has had a 60 lb weight loss in 4 months.Will start patient on zosyn and await further recommendations from ID. Will admit inpatient for fever work up and GI procedure.      Past Medical History:   Diagnosis Date    Cancer     COLON CANCER 1995    Digestive disorder     Hypertension     Primary sclerosing cholangitis     Ulcerative colitis     s/p colectomy with J- pouch       Past Surgical History:   Procedure Laterality Date    COLON SURGERY      Colectomy with J- pouch    ENDOSCOPIC ULTRASOUND OF UPPER GASTROINTESTINAL TRACT N/A 10/14/2022    Procedure: ULTRASOUND, UPPER GI TRACT,;  Surgeon: Vince Teran MD;  Location: [facility];  Service:  Endoscopy;  Laterality: N/A;  upper and linear    ENDOSCOPIC ULTRASOUND OF UPPER GASTROINTESTINAL TRACT N/A 12/13/2022    Procedure: ULTRASOUND, UPPER GI TRACT, ENDOSCOPIC;  Surgeon: Vince Teran MD;  Location: [facility];  Service: Endoscopy;  Laterality: N/A;    ERCP N/A 10/14/2022    Procedure: ERCP (ENDOSCOPIC RETROGRADE CHOLANGIOPANCREATOGRAPHY) with spyglass;  Surgeon: Vince Teran MD;  Location: [facility];  Service: Endoscopy;  Laterality: N/A;    ERCP N/A 12/13/2022    Procedure: ERCP (ENDOSCOPIC RETROGRADE CHOLANGIOPANCREATOGRAPHY);  Surgeon: Vince Teran MD;  Location: [facility];  Service: Endoscopy;  Laterality: N/A;    ERCP N/A 12/20/2022    Procedure: ERCP (ENDOSCOPIC RETROGRADE CHOLANGIOPANCREATOGRAPHY);  Surgeon: Vince Teran MD;  Location: [facility];  Service: Endoscopy;  Laterality: N/A;    ESOPHAGOGASTRODUODENOSCOPY N/A 12/20/2022    Procedure: EGD (ESOPHAGOGASTRODUODENOSCOPY);  Surgeon: Vince Teran MD;  Location: [facility];  Service: Endoscopy;  Laterality: N/A;    POUCHOSCOPY         Review of patient's allergies indicates:   Allergen Reactions    Ciprofloxacin Other (See Comments)     Pt previously had medication reaction; suffered achilles rupture     Meperidine Hives       No current facility-administered medications on file prior to encounter.     Current Outpatient Medications on File Prior to Encounter   Medication Sig    dicyclomine (BENTYL) 20 mg tablet Take 1 tablet (20 mg total) by mouth 4 (four) times daily before meals and nightly. PRN    oxyCODONE-acetaminophen (PERCOCET)  mg per tablet Take 1 tablet by mouth 3 (three) times daily as needed.    pantoprazole (PROTONIX) 40 MG tablet Take 40 mg by mouth once daily.    [DISCONTINUED] HYDROcodone-acetaminophen (NORCO)  mg per tablet Take 1 tablet by mouth every 12 (twelve) hours as needed for Pain.    [DISCONTINUED] oxyCODONE (ROXICODONE) 10 mg Tab  immediate release tablet Take 1 tablet (10 mg total) by mouth every 6 (six) hours as needed for Pain.     Family History    None       Tobacco Use    Smoking status: Never    Smokeless tobacco: Never   Substance and Sexual Activity    Alcohol use: Not Currently    Drug use: Never    Sexual activity: Not on file     Review of Systems   Constitutional:  Positive for appetite change, fatigue, fever and unexpected weight change. Negative for activity change and chills.   HENT:  Negative for congestion, drooling, ear discharge, ear pain, facial swelling, hearing loss and mouth sores.    Eyes:  Negative for photophobia, discharge and itching.   Respiratory:  Negative for apnea, cough, shortness of breath and stridor.    Cardiovascular:  Negative for chest pain, palpitations and leg swelling.   Gastrointestinal:  Positive for abdominal pain. Negative for abdominal distention, anal bleeding, blood in stool, constipation, diarrhea, nausea, rectal pain and vomiting.   Genitourinary:  Negative for difficulty urinating, dysuria, enuresis, flank pain, frequency, hematuria and urgency.   Musculoskeletal:  Positive for back pain. Negative for arthralgias, gait problem, joint swelling, myalgias and neck pain.   Neurological:  Negative for dizziness, seizures, syncope, facial asymmetry, speech difficulty, weakness, light-headedness, numbness and headaches.   Psychiatric/Behavioral:  Negative for agitation, behavioral problems, confusion, decreased concentration, dysphoric mood and hallucinations.    All other systems reviewed and are negative.  Objective:     Vital Signs (Most Recent):  Temp: 99.2 °F (37.3 °C) (01/03/23 0721)  Pulse: 101 (01/03/23 0720)  Resp: 18 (01/03/23 1312)  BP: 103/65 (01/03/23 0720)  SpO2: 97 % (01/03/23 0720) Vital Signs (24h Range):  Temp:  [99.2 °F (37.3 °C)] 99.2 °F (37.3 °C)  Pulse:  [101] 101  Resp:  [18] 18  SpO2:  [97 %] 97 %  BP: (103)/(65) 103/65        There is no height or weight on file to  calculate BMI.    Physical Exam  Vitals and nursing note reviewed.   Constitutional:       Appearance: Normal appearance.   HENT:      Head: Normocephalic and atraumatic.      Nose: Nose normal.      Mouth/Throat:      Mouth: Mucous membranes are moist.   Eyes:      Extraocular Movements: Extraocular movements intact.      Conjunctiva/sclera: Conjunctivae normal.   Cardiovascular:      Rate and Rhythm: Normal rate and regular rhythm.      Pulses: Normal pulses.      Heart sounds: Normal heart sounds.   Pulmonary:      Effort: Pulmonary effort is normal.      Breath sounds: Normal breath sounds.   Abdominal:      General: Abdomen is flat. Bowel sounds are normal. There is no distension.      Palpations: Abdomen is soft.      Tenderness: There is abdominal tenderness.   Musculoskeletal:         General: No swelling, tenderness or signs of injury.      Cervical back: Normal range of motion and neck supple.   Skin:     General: Skin is warm.      Capillary Refill: Capillary refill takes less than 2 seconds.      Coloration: Skin is not jaundiced or pale.   Neurological:      Mental Status: He is alert and oriented to person, place, and time. Mental status is at baseline.   Psychiatric:         Mood and Affect: Mood normal.         Behavior: Behavior normal.           Significant Labs: All pertinent labs within the past 24 hours have been reviewed.  Recent Lab Results  (Last 5 results in the past 24 hours)        01/03/23  1243   01/03/23  1035   01/03/23  0917   01/03/23  0908   01/03/23  0855        POC Molecular Influenza A Ag       Negative         POC Molecular Influenza B Ag       Negative         Albumin         3.0       Alkaline Phosphatase         375       ALT         63       Anion Gap         15       Appearance, UA   Clear             AST         43       Baso #         0.08       Basophil %         0.4       Bilirubin (UA)   Negative             BILIRUBIN TOTAL         1.7  Comment: For infants and  newborns, interpretation of results should be based  on gestational age, weight and in agreement with clinical  observations.    Premature Infant recommended reference ranges:  Up to 24 hours.............<8.0 mg/dL  Up to 48 hours............<12.0 mg/dL  3-5 days..................<15.0 mg/dL  6-29 days.................<15.0 mg/dL         BUN         10       Calcium         9.8       Chloride         95       CO2         25       Color, UA   Yellow             Creatinine         0.9       Differential Method         Automated       eGFR         >60       Eos #         0.0       Eosinophil %         0.1       Glucose         100       Glucose, UA   Negative             Gran # (ANC)         16.4       Gran %         86.3       Hematocrit         38.8       Hemoglobin         12.8       Immature Grans (Abs)         0.11  Comment: Mild elevation in immature granulocytes is non specific and   can be seen in a variety of conditions including stress response,   acute inflammation, trauma and pregnancy. Correlation with other   laboratory and clinical findings is essential.         Immature Granulocytes         0.6       Ketones, UA   Negative             Lactate, [place] 0.7  Comment: Falsely low lactic acid results can be found in samples   containing >=13.0 mg/dL total bilirubin and/or >=3.5 mg/dL   direct bilirubin.           1.0  Comment: Falsely low lactic acid results can be found in samples   containing >=13.0 mg/dL total bilirubin and/or >=3.5 mg/dL   direct bilirubin.         Leukocytes, UA   Negative             Lipase         11       Lymph #         0.7       Lymph %         3.4       MCH         29.2       MCHC         33.0       MCV         88       Mono #         1.8       Mono %         9.2       MPV         8.7       NITRITE UA   Negative             nRBC         0       Occult Blood UA   Negative             pH, UA   7.0             Platelets         347       Potassium         3.4       PROTEIN TOTAL          7.7       Protein, UA   Trace  Comment: Recommend a 24 hour urine protein or a urine   protein/creatinine ratio if globulin induced proteinuria is  clinically suspected.                Acceptable     Yes   Yes         RBC         4.39       RDW         12.9       SARS-CoV-2 RNA, Amplification, Qual     Negative           Sodium         135       Specific [place], UA   1.010             Specimen UA   Urine, Clean Catch             Troponin I         <0.006  Comment: The reference interval for Troponin I represents the 99th percentile   cutoff   for our facility and is consistent with 3rd generation assay   performance.         UROBILINOGEN UA   Negative             WBC         18.99                              Significant Imaging: I have reviewed all pertinent imaging results/findings within the past 24 hours.    Assessment/Plan:     * Fever  - ? Etiology  - continue empiric zosyn  - Dr. Dhillon consulted as patient had a recent stent exchange just prior to the onset of fever  - Dr. Garcia for evaluation as patient does not have an impressive US abdomen and therefore could be related to other sources  - C diff pending  - Covid and Flu negative  - CXR: normal study  - US abdomen: Complex fluid collection at the body/tail the pancreas.  Findings correspond to abnormality visualized on CT abdomen pelvis examination from 12/27/2022.  Consider further follow-up as warranted.   - NPO for MRCP this afternoon with Dr. Dhillon    Primary sclerosing cholangitis  - recently diagnosed  - No acute issues      Hyperlipidemia  - continue statin      Primary hypertension  - continue home meds      VTE Risk Mitigation (From admission, onward)         Ordered     IP VTE HIGH RISK PATIENT  Once         01/03/23 1131     Place sequential compression device  Until discontinued         01/03/23 1131                   Bob Martinez MD  Department of Hospital Medicine   [facility] - Emergency Dept.

## 2023-01-03 NOTE — PHARMACY MED REC
"Admission Medication History     The home medication history was taken by Carl Funk.    You may go to "Admission" then "Reconcile Home Medications" tabs to review and/or act upon these items.     The home medication list has been updated by the Pharmacy department.   Please read ALL comments highlighted in yellow.   Please address this information as you see fit.    Feel free to contact us if you have any questions or require assistance.      The medications listed below were removed from the home medication list. Please reorder if appropriate:  Patient reports no longer taking the following medication(s):  HYDROCODONE-ACETAMINOPHEN  MG PER TABLET  LISINOPRIL-HYDROCHLOROTHIAZIDE 20-12.5 MG PER TABLET  OXYCODONE IR 10 MG TABLET    Medications listed below were obtained from: Patient/family, [employer], and Patient's pharmacy  (Not in a hospital admission)      Potential issues to be addressed PRIOR TO DISCHARGE: NONE    Carl Funk, Lillian-Adv  Pharmacy Technician Specialist-Medication History  [facility] [phone]  Secure chat preferred     Current Outpatient Medications on File Prior to Encounter   Medication Sig Dispense Refill Last Dose    dicyclomine (BENTYL) 20 mg tablet Take 1 tablet (20 mg total) by mouth 4 (four) times daily before meals and nightly.  tablet 0 Past Week    oxyCODONE-acetaminophen (PERCOCET)  mg per tablet Take 1 tablet by mouth 3 (three) times daily as needed.   1/3/2023    pantoprazole (PROTONIX) 40 MG tablet Take 40 mg by mouth once daily.   Past Month    [DISCONTINUED] HYDROcodone-acetaminophen (NORCO)  mg per tablet Take 1 tablet by mouth every 12 (twelve) hours as needed for Pain.       [DISCONTINUED] oxyCODONE (ROXICODONE) 10 mg Tab immediate release tablet Take 1 tablet (10 mg total) by mouth every 6 (six) hours as needed for Pain. 10 tablet 0                            .        "

## 2023-01-03 NOTE — Clinical Note
Diagnosis: Fever [120661]   Admitting Provider:: GEETA MEJIA [[id number]]   Future Attending Provider: GEETA MEJIA [[id number]]   Reason for IP Medical Treatment  (Clinical interventions that can only be accomplished in the IP setting? ) :: Fever   Estimated Length of Stay:: 2 midnights   I certify that Inpatient services for greater than or equal to 2 midnights are medically necessary:: No   Plans for Post-Acute care--if anticipated (pick the single best option):: A. No post acute care anticipated at this time

## 2023-01-03 NOTE — ASSESSMENT & PLAN NOTE
- ? Etiology  - continue empiric zosyn  - Dr. Dhillon consulted as patient had a recent stent exchange just prior to the onset of fever  - Dr. Garcia for evaluation as patient does not have an impressive US abdomen and therefore could be related to other sources  - C diff pending  - Covid and Flu negative  - CXR: normal study  - US abdomen: Complex fluid collection at the body/tail the pancreas.  Findings correspond to abnormality visualized on CT abdomen pelvis examination from 12/27/2022.  Consider further follow-up as warranted.   - NPO for MRCP this afternoon with Dr. Dhillon

## 2023-01-03 NOTE — SUBJECTIVE & OBJECTIVE
Past Medical History:   Diagnosis Date    Cancer     COLON CANCER 1995    Digestive disorder     Hypertension     Primary sclerosing cholangitis     Ulcerative colitis     s/p colectomy with J- pouch       Past Surgical History:   Procedure Laterality Date    COLON SURGERY      Colectomy with J- pouch    ENDOSCOPIC ULTRASOUND OF UPPER GASTROINTESTINAL TRACT N/A 10/14/2022    Procedure: ULTRASOUND, UPPER GI TRACT,;  Surgeon: Vince Teran MD;  Location: [facility];  Service: Endoscopy;  Laterality: N/A;  upper and linear    ENDOSCOPIC ULTRASOUND OF UPPER GASTROINTESTINAL TRACT N/A 12/13/2022    Procedure: ULTRASOUND, UPPER GI TRACT, ENDOSCOPIC;  Surgeon: Vince Teran MD;  Location: [facility];  Service: Endoscopy;  Laterality: N/A;    ERCP N/A 10/14/2022    Procedure: ERCP (ENDOSCOPIC RETROGRADE CHOLANGIOPANCREATOGRAPHY) with spyglass;  Surgeon: Vince Teran MD;  Location: [facility];  Service: Endoscopy;  Laterality: N/A;    ERCP N/A 12/13/2022    Procedure: ERCP (ENDOSCOPIC RETROGRADE CHOLANGIOPANCREATOGRAPHY);  Surgeon: Vince Teran MD;  Location: [facility];  Service: Endoscopy;  Laterality: N/A;    ERCP N/A 12/20/2022    Procedure: ERCP (ENDOSCOPIC RETROGRADE CHOLANGIOPANCREATOGRAPHY);  Surgeon: Vince Teran MD;  Location: [facility];  Service: Endoscopy;  Laterality: N/A;    ESOPHAGOGASTRODUODENOSCOPY N/A 12/20/2022    Procedure: EGD (ESOPHAGOGASTRODUODENOSCOPY);  Surgeon: Vince Teran MD;  Location: [facility];  Service: Endoscopy;  Laterality: N/A;    POUCHOSCOPY         Review of patient's allergies indicates:   Allergen Reactions    Ciprofloxacin Other (See Comments)     Pt previously had medication reaction; suffered achilles rupture     Meperidine Hives     Family History    None       Tobacco Use    Smoking status: Never    Smokeless tobacco: Never   Substance and Sexual Activity    Alcohol use: Not Currently    Drug use: Never     Sexual activity: Not on file     Review of Systems   Constitutional:  Negative for fever.   HENT:  Negative for hearing loss.    Eyes:  Negative for visual disturbance.   Respiratory:  Negative for cough and shortness of breath.    Cardiovascular:  Negative for chest pain and palpitations.   Gastrointestinal:         As per HPI.   Genitourinary:  Negative for difficulty urinating, dysuria, frequency and hematuria.   Musculoskeletal:  Positive for arthralgias.   Skin:  Negative for color change and rash.   Neurological:  Negative for seizures, syncope and numbness.   Hematological:  Does not bruise/bleed easily.   Psychiatric/Behavioral:  The patient is not nervous/anxious.    Objective:     Vital Signs (Most Recent):  Temp: 99.2 °F (37.3 °C) (01/03/23 0721)  Pulse: 101 (01/03/23 0720)  Resp: 18 (01/03/23 1312)  BP: 103/65 (01/03/23 0720)  SpO2: 97 % (01/03/23 0720) Vital Signs (24h Range):  Temp:  [99.2 °F (37.3 °C)] 99.2 °F (37.3 °C)  Pulse:  [101] 101  Resp:  [18] 18  SpO2:  [97 %] 97 %  BP: (103)/(65) 103/65        There is no height or weight on file to calculate BMI.    No intake or output data in the 24 hours ending 01/03/23 1405    Lines/Drains/Airways       Peripheral Intravenous Line  Duration                  Peripheral IV - Single Lumen 01/03/23 0855 18 G Right Antecubital <1 day                    Physical Exam  Constitutional:       General: He is not in acute distress.     Appearance: Normal appearance. He is well-developed.   HENT:      Head: Normocephalic and atraumatic.   Eyes:      Extraocular Movements: Extraocular movements intact.   Cardiovascular:      Rate and Rhythm: Normal rate and regular rhythm.      Heart sounds: Normal heart sounds. No murmur heard.  Pulmonary:      Effort: Pulmonary effort is normal. No respiratory distress.      Breath sounds: Normal breath sounds. No wheezing.   Abdominal:      General: Bowel sounds are normal. There is no distension.      Palpations: Abdomen is soft.  There is no hepatomegaly or mass.      Tenderness: There is abdominal tenderness (generalized but worse in RUQ). There is no rebound.   Musculoskeletal:      Cervical back: Normal range of motion and neck supple.      Right lower leg: No edema.      Left lower leg: No edema.   Skin:     General: Skin is warm and dry.      Findings: No rash.   Neurological:      Mental Status: He is alert and oriented to person, place, and time.      Cranial Nerves: No cranial nerve deficit.   Psychiatric:         Behavior: Behavior normal.       Significant Labs:  CBC:   Recent Labs   Lab 01/03/23  0855   WBC 18.99*   HGB 12.8*   HCT 38.8*        CMP:   Recent Labs   Lab 01/03/23  0855      CALCIUM 9.8   ALBUMIN 3.0*   PROT 7.7   *   K 3.4*   CO2 25   CL 95   BUN 10   CREATININE 0.9   ALKPHOS 375*   ALT 63*   AST 43*   BILITOT 1.7*     Coagulation: No results for input(s): PT, INR, APTT in the last 48 hours.    Significant Imaging:  Imaging results within the past 24 hours have been reviewed.

## 2023-01-03 NOTE — ED PROVIDER NOTES
SCRIBE #1 NOTE: I, Dianne Armstrong/Kinsey Montoya, am scribing for, and in the presence of, Moses Ziegler MD. I have scribed the entire note.       History     Chief Complaint   Patient presents with    Fever     Several issues post opp from Dr. Fleming abdominal surgery. Still having a lot of pain, fever for 3 days, and weakness.      Review of patient's allergies indicates:   Allergen Reactions    Ciprofloxacin Other (See Comments)     Pt previously had medication reaction; suffered achilles rupture          History of Present Illness     HPI    1/3/2023, 8:37 AM  History obtained from the patient      History of Present Illness: Guevara Osullivan II is a 52 y.o. male patient with a PMHx of colon cancer in 1995, HTN, primary sclerosing cholangitis, pancreatic cyst, and ulcerative colitis who presents to the Emergency Department for evaluation of intermittent fevers (T max 102.5) which onset gradually 3 days ago. On 12/20/22, the pt had an ERCP performed by Dr. Teran (Gastroenterology) and had a biliary stent exchange. After the ERCP, the pt was placed on Amoxicillin BID and last took Amoxicillin yesterday. For the past 3 days, the pt has been having a fever, so he contacted Dr. Teran who advised the him to come to the ER for an evaluation. Pt states that his fevers last for 4 to 6 hours and are relieved by taking 1 to 2 tablets of Tylenol. Symptoms are constant and moderate in severity. No mitigating or exacerbating factors reported. Associated sxs include loss of appetite, nausea, diaphoresis, chills, and chronic abdominal pain that is not worse than normal. Patient denies any vomiting, diarrhea, cough, congestion, dysuria, weakness, dizziness, numbness, SOB, CP, and all other sxs at this time. No further complaints or concerns at this time.       Arrival mode: Personal vehicle    PCP: Dima Ch MD        Past Medical History:  Past Medical History:   Diagnosis Date    Cancer     COLON CANCER  1995    Digestive disorder     Hypertension     Primary sclerosing cholangitis     Ulcerative colitis     s/p colectomy with J- pouch       Past Surgical History:  Past Surgical History:   Procedure Laterality Date    COLON SURGERY      Colectomy with J- pouch    ENDOSCOPIC ULTRASOUND OF UPPER GASTROINTESTINAL TRACT N/A 10/14/2022    Procedure: ULTRASOUND, UPPER GI TRACT,;  Surgeon: Vince Teran MD;  Location: [facility];  Service: Endoscopy;  Laterality: N/A;  upper and linear    ENDOSCOPIC ULTRASOUND OF UPPER GASTROINTESTINAL TRACT N/A 12/13/2022    Procedure: ULTRASOUND, UPPER GI TRACT, ENDOSCOPIC;  Surgeon: Vince Teran MD;  Location: [facility];  Service: Endoscopy;  Laterality: N/A;    ERCP N/A 10/14/2022    Procedure: ERCP (ENDOSCOPIC RETROGRADE CHOLANGIOPANCREATOGRAPHY) with spyglass;  Surgeon: Vince Teran MD;  Location: [facility];  Service: Endoscopy;  Laterality: N/A;    ERCP N/A 12/13/2022    Procedure: ERCP (ENDOSCOPIC RETROGRADE CHOLANGIOPANCREATOGRAPHY);  Surgeon: Vince Teran MD;  Location: [facility];  Service: Endoscopy;  Laterality: N/A;    ERCP N/A 12/20/2022    Procedure: ERCP (ENDOSCOPIC RETROGRADE CHOLANGIOPANCREATOGRAPHY);  Surgeon: Vince Teran MD;  Location: [facility];  Service: Endoscopy;  Laterality: N/A;    ESOPHAGOGASTRODUODENOSCOPY N/A 12/20/2022    Procedure: EGD (ESOPHAGOGASTRODUODENOSCOPY);  Surgeon: Vince Teran MD;  Location: [facility];  Service: Endoscopy;  Laterality: N/A;    POUCHOSCOPY           Family History:  No family history on file.    Social History:  Social History     Tobacco Use    Smoking status: Never    Smokeless tobacco: Never   Substance and Sexual Activity    Alcohol use: Not Currently    Drug use: Never    Sexual activity: Not on file        Review of Systems     Review of Systems   Constitutional:  Positive for appetite change (loss), chills, diaphoresis and fever (T max 102.5).    HENT:  Negative for congestion and sore throat.    Respiratory:  Negative for cough and shortness of breath.    Cardiovascular:  Negative for chest pain.   Gastrointestinal:  Positive for abdominal pain (chronic and not worse than normal) and nausea. Negative for diarrhea and vomiting.   Genitourinary:  Negative for dysuria.   Musculoskeletal:  Negative for back pain.   Skin:  Negative for rash.   Neurological:  Negative for dizziness, weakness and numbness.   Hematological:  Does not bruise/bleed easily.   All other systems reviewed and are negative.     Physical Exam     Initial Vitals   BP Pulse Resp Temp SpO2   01/03/23 0720 01/03/23 0720 01/03/23 0720 01/03/23 0721 01/03/23 0720   103/65 101 18 99.2 °F (37.3 °C) 97 %      MAP       --                 Physical Exam  Nursing Notes and Vital Signs Reviewed.  Constitutional: Patient is in no acute distress. Well-developed and well-nourished.  Head: Atraumatic. Normocephalic.  Eyes: PERRL. EOM intact. Conjunctivae are not pale. No scleral icterus.  ENT: Mucous membranes are moist. Oropharynx is clear and symmetric.    Neck: Supple. Full ROM. No lymphadenopathy.  Cardiovascular: Regular rate. Regular rhythm. No murmurs, rubs, or gallops. Distal pulses are 2+ and symmetric.  Pulmonary/Chest: No respiratory distress. Clear to auscultation bilaterally. No wheezing or rales.  Abdominal: There is a RUQ mass with tenderness which the pt states is known to him as a pancreatic cyst. Well-healed old surgical scars on the abdomen.  Genitourinary: No CVA tenderness  Musculoskeletal: Moves all extremities. No obvious deformities. No edema. No calf tenderness.  Skin: Warm and dry.  Neurological:  Alert, awake, and appropriate.  Normal speech.  No acute focal neurological deficits are appreciated.  Psychiatric: Normal affect. Good eye contact. Appropriate in content.     ED Course   Critical Care    Date/Time: 1/3/2023 5:02 PM  Performed by: Moses Ziegler MD  Authorized by:  Monica Martinez MD   Direct patient critical care time: 5 minutes  Additional history critical care time: 6 minutes  Ordering / reviewing critical care time: 4 minutes  Documentation critical care time: 3 minutes  Consulting other physicians critical care time: 6 minutes  Consult with family critical care time: 4 minutes  Other critical care time: 3 minutes  Total critical care time (exclusive of procedural time) : 31 minutes  Critical care time was exclusive of separately billable procedures and treating other patients and teaching time.  Critical care was necessary to treat or prevent imminent or life-threatening deterioration of the following conditions: Intrabdominal infection requiring IV antibiotics and specialist consultation for procedure.  Critical care was time spent personally by me on the following activities: blood draw for specimens, development of treatment plan with patient or surrogate, discussions with consultants, interpretation of cardiac output measurements, evaluation of patient's response to treatment, examination of patient, obtaining history from patient or surrogate, ordering and performing treatments and interventions, ordering and review of laboratory studies, ordering and review of radiographic studies, pulse oximetry, re-evaluation of patient's condition and review of old charts.      ED Vital Signs:  Vitals:    01/03/23 0720 01/03/23 0721   BP: 103/65    Pulse: 101    Resp: 18    Temp:  99.2 °F (37.3 °C)   TempSrc: Oral Oral   SpO2: 97%        Abnormal Lab Results:  Labs Reviewed   CBC W/ AUTO DIFFERENTIAL - Abnormal; Notable for the following components:       Result Value    WBC 18.99 (*)     RBC 4.39 (*)     Hemoglobin 12.8 (*)     Hematocrit 38.8 (*)     MPV 8.7 (*)     Immature Granulocytes 0.6 (*)     Gran # (ANC) 16.4 (*)     Immature Grans (Abs) 0.11 (*)     Lymph # 0.7 (*)     Mono # 1.8 (*)     Gran % 86.3 (*)     Lymph % 3.4 (*)     All other components within normal limits    COMPREHENSIVE METABOLIC PANEL - Abnormal; Notable for the following components:    Sodium 135 (*)     Potassium 3.4 (*)     Albumin 3.0 (*)     Total Bilirubin 1.7 (*)     Alkaline Phosphatase 375 (*)     AST 43 (*)     ALT 63 (*)     All other components within normal limits   CULTURE, BLOOD   CULTURE, BLOOD   LACTIC ACID, PLASMA   LIPASE   TROPONIN I   URINALYSIS, REFLEX TO URINE CULTURE   LACTIC ACID, PLASMA   POCT INFLUENZA A/B MOLECULAR   SARS-COV-2 RDRP GENE    Narrative:     This test utilizes isothermal nucleic acid amplification technology to detect the SARS-CoV-2 RdRp nucleic acid segment. The analytical sensitivity (limit of detection) is 500 copies/swab.     A POSITIVE result is indicative of the presence of SARS-CoV-2 RNA; clinical correlation with patient history and other diagnostic information is necessary to determine patient infection status.    A NEGATIVE result means that SARS-CoV-2 nucleic acids are not present above the limit of detection. A NEGATIVE result should be treated as presumptive. It does not rule out the possibility of COVID-19 and should not be the sole basis for treatment decisions. If COVID-19 is strongly suspected based on clinical and exposure history, re-testing using an alternate molecular assay should be considered.     This test is only for use under the Food and Drug Administration s Emergency Use Authorization (EUA).     Commercial kits are provided by [employer]. Performance characteristics of the EUA have been independently verified by [facility] Department of Pathology and Laboratory Medicine.   _________________________________________________________________   The authorized Fact Sheet for Healthcare Providers and the authorized Fact Sheet for Patients of the ID NOW COVID-19 are available on the FDA website:    https://www.fda.gov/media/768398/download      https://www.fda.gov/media/625637/download           All Lab Results:  Results for orders  placed or performed during the hospital encounter of 01/03/23   CBC auto differential   Result Value Ref Range    WBC 18.99 (H) 3.90 - 12.70 K/uL    RBC 4.39 (L) 4.60 - 6.20 M/uL    Hemoglobin 12.8 (L) 14.0 - 18.0 g/dL    Hematocrit 38.8 (L) 40.0 - 54.0 %    MCV 88 82 - 98 fL    MCH 29.2 27.0 - 31.0 pg    MCHC 33.0 32.0 - 36.0 g/dL    RDW 12.9 11.5 - 14.5 %    Platelets 347 150 - 450 K/uL    MPV 8.7 (L) 9.2 - 12.9 fL    Immature Granulocytes 0.6 (H) 0.0 - 0.5 %    Gran # (ANC) 16.4 (H) 1.8 - 7.7 K/uL    Immature Grans (Abs) 0.11 (H) 0.00 - 0.04 K/uL    Lymph # 0.7 (L) 1.0 - 4.8 K/uL    Mono # 1.8 (H) 0.3 - 1.0 K/uL    Eos # 0.0 0.0 - 0.5 K/uL    Baso # 0.08 0.00 - 0.20 K/uL    nRBC 0 0 /100 WBC    Gran % 86.3 (H) 38.0 - 73.0 %    Lymph % 3.4 (L) 18.0 - 48.0 %    Mono % 9.2 4.0 - 15.0 %    Eosinophil % 0.1 0.0 - 8.0 %    Basophil % 0.4 0.0 - 1.9 %    Differential Method Automated    Comprehensive metabolic panel   Result Value Ref Range    Sodium 135 (L) 136 - 145 mmol/L    Potassium 3.4 (L) 3.5 - 5.1 mmol/L    Chloride 95 95 - 110 mmol/L    CO2 25 23 - 29 mmol/L    Glucose 100 70 - 110 mg/dL    BUN 10 6 - 20 mg/dL    Creatinine 0.9 0.5 - 1.4 mg/dL    Calcium 9.8 8.7 - 10.5 mg/dL    Total Protein 7.7 6.0 - 8.4 g/dL    Albumin 3.0 (L) 3.5 - 5.2 g/dL    Total Bilirubin 1.7 (H) 0.1 - 1.0 mg/dL    Alkaline Phosphatase 375 (H) 55 - 135 U/L    AST 43 (H) 10 - 40 U/L    ALT 63 (H) 10 - 44 U/L    Anion Gap 15 8 - 16 mmol/L    eGFR >60 >60 mL/min/1.73 m^2   Lactic acid, plasma #1   Result Value Ref Range    Lactate (Lactic Acid) 1.0 0.5 - 2.2 mmol/L   Lipase   Result Value Ref Range    Lipase 11 4 - 60 U/L   Troponin I   Result Value Ref Range    Troponin I <0.006 0.000 - 0.026 ng/mL   POCT Influenza A/B Molecular   Result Value Ref Range    POC Molecular Influenza A Ag Negative Negative, Not Reported    POC Molecular Influenza B Ag Negative Negative, Not Reported     Acceptable Yes    POCT COVID-19 Rapid  Screening   Result Value Ref Range    POC Rapid COVID Negative Negative     Acceptable Yes          Imaging Results:  Imaging Results              US Abdomen Limited (Final result)  Result time 01/03/23 09:56:41      Final result by Blas Coyle MD (01/03/23 09:56:41)                   Impression:      Enlarged liver with fatty infiltration and no suspicious focal lesion.    Mildly dilated common bile duct with a stent in place.    Complex fluid collection at the body/tail the pancreas.  Findings correspond to abnormality visualized on CT abdomen pelvis examination from 12/27/2022.  Consider further follow-up as warranted.      Electronically signed by: Blas Coyle  Date:    01/03/2023  Time:    09:56               Narrative:    EXAMINATION:  US ABDOMEN LIMITED    CLINICAL HISTORY:  RUQ pain;.    TECHNIQUE:  Limited ultrasound of the right upper quadrant of the abdomen including pancreas, liver, gallbladder, common bile duct was performed.    COMPARISON:  CT abdomen pelvis 12/27/2022.    FINDINGS:  Liver: Enlarged measuring 21.1 cm. Fatty liver infiltration. No focal hepatic lesions.  Periportal edema present.    Gallbladder: Moderately distended.  No calculi, wall thickening, or pericholecystic fluid.  No sonographic Saucedo's sign.    Biliary system: The common duct is mildly dilated, measuring 8 mm.  Stent visualized.  No intrahepatic ductal dilatation.    Pancreas: 10.3 cm complex cystic focus at the pancreatic body/tail.    Miscellaneous: No ascites.  Unremarkable appearance of the right kidney.                                       X-Ray Chest AP Portable (Final result)  Result time 01/03/23 09:07:19      Final result by ARNOLDO Delgado Sr., MD (01/03/23 09:07:19)                   Impression:      Normal study.      Electronically signed by: Blas Delgado MD  Date:    01/03/2023  Time:    09:07               Narrative:    EXAMINATION:  XR CHEST AP PORTABLE    CLINICAL  HISTORY:  Sepsis;    COMPARISON:  None    FINDINGS:  The size of the heart is normal. The lungs are clear. There is no pneumothorax.  The costophrenic angles are sharp.                                       The EKG was ordered, reviewed, and independently interpreted by the ED provider.  Interpretation time: 8:38  Rate: 88 BPM  Rhythm: normal sinus rhythm  Interpretation: No acute ST changes. No STEMI.         The Emergency Provider reviewed the vital signs and test results, which are outlined above.     ED Discussion     10:16 AM: Discussed pt's case with Dr. Osuna (Gastroenterology) who recommends admission to Hospital Medicine and continuing zosyn.    10:25 AM: Discussed case with Monique Ghotra NP (Hospital Medicine). Dr. Martinez agrees with current care and management of pt and accepts admission.   Admitting Service: Hospital Medicine   Admitting Physician: Dr. Martinez  Admit to: Inpatient Med Surg    10:26 AM: Re-evaluated pt. I have discussed test results, shared treatment plan, and the need for admission with patient and family at bedside. Pt and family express understanding at this time and agree with all information. All questions answered. Pt and family have no further questions or concerns at this time. Pt is ready for admit.             Medical Decision Making:   History:   Old Medical Records: I decided to obtain old medical records.  Old Records Summarized: records from clinic visits and records from previous admission(s).  Clinical Tests:   Lab Tests: Ordered and Reviewed  Radiological Study: Ordered and Reviewed  Medical Tests: Ordered and Reviewed  Other:   I have discussed this case with another health care provider.       <> Summary of the Discussion: See above         ED Medication(s):  Medications   sodium chloride 0.9% bolus 1,000 mL 1,000 mL (1,000 mLs Intravenous New Bag 1/3/23 0951)   piperacillin-tazobactam (ZOSYN) 4.5 g in dextrose 5 % in water (D5W) 5 % 100 mL IVPB (MB+) (4.5 g  Intravenous New Bag 1/3/23 0952)       New Prescriptions    No medications on file               Scribe Attestation:   Scribe #1: I performed the above scribed service and the documentation accurately describes the services I performed. I attest to the accuracy of the note.     Attending:   Physician Attestation Statement for Scribe #1: I, Moses Ziegler MD, personally performed the services described in this documentation, as scribed by Dianne Armstrong/Kinsey Montoya, in my presence, and it is both accurate and complete.           Clinical Impression       ICD-10-CM ICD-9-CM   1. Primary sclerosing cholangitis  K83.01 576.1   2. Fever  R50.9 780.60   3. S/P ERCP  Z98.890 V45.89       Disposition:   Disposition: Admitted  Condition: Fair       Moses Ziegler MD  01/03/23 1700

## 2023-01-03 NOTE — CONSULTS
[facility] - Emergency Dept.  Gastroenterology  Consult Note    Patient Name: Guevara Osullivan II  MRN: [id number]  Admission Date: 1/3/2023  Hospital Length of Stay: 0 days  Code Status: Full Code   Attending Provider: Bob Martinez MD   Consulting Provider: Reynaldo Karimi PA-C  Primary Care Physician: Dima Ch MD  Principal Problem:Fever    Inpatient consult to Gastroenterology  Consult performed by: Reynaldo Karimi PA-C  Consult ordered by: Bob Martinez MD  Reason for consult: Fever; recent ERCP        Subjective:     HPI:  The patient has a history of UC and PSC. He reported to the ER with a three day history of fever, decreased appetite and worsening pain. He had an episode of nausea today but no vomiting. He denies resp symptoms. No change in voiding but urine is darker. He has been taking Miralax for constipation with good results. The patient has a significant GI history - see below.  ER work up revealed WBC count 18.99. Temp 99. T. Bili up to 1.7 but other LFTs stable with baseline. BUN and creatinine normal. Lactate normal. UA and CXR unremarkable. Flu and covid testing negative. The patient was started on antibiotics and made NPO. RUQ showed mildly dilated common bile duct with a stent in place. Complex fluid collection at the body/tail the pancreas.  Findings correspond to abnormality visualized on CT abdomen pelvis examination from 12/27/2022. Enlarged liver with fatty infiltration and no suspicious focal lesion.     GI History  Patient with h/o UC and colon cancer (1995) s/p colectomy w/ J pouch  He was referred to Dr. Teran for EUS/ERCP after he was found to have elevated LFTs and abnormal MRI (09/2022).    EUS/ERCP (10/14/22): stricture consistent with PSC, CBD stent placed, brushings negative for malignancy.    Hosp (10/17/22): admitted for post-ERCP pancreatitis    CA 19-9 (11/02/22) 1181.2    CT w&w/o (12/08/22): Abnormal infiltrative soft tissue attenuation around the base of the  gallbladder and tameka hepatis region, increased compared to 10/16/2022 suspicious for infiltrative malignancy likely cholangiocarcinoma. Additionally, there are scattered nodules in the peritoneum and mesentery suspicious for peritoneal carcinomatosis. 12 x 10 x 6.7 cm heterogeneous somewhat loculated rim enhancing fluid collection abutting the body of the pancreas suspicious for walled-off peripancreatic necrosis    EUS (12/13/22): note of pancreatic walled off necrosis. Cystogastrostomy performed with placement of axial stent. ERCP not done due to duodenal narrowing from external compression.   CT w/ (12/13/22): Status post cyst gastrostomy tube placement into the large pancreatic cystic lesion possibly pseudocyst or walled-off necrosis. Stable soft tissue attenuation lesion near the tameka hepatis possibly fibrosis or neoplasm. Mild distention the gallbladder and wall thickening. Periportal edema and mild intrahepatic biliary ductal dilation despite patient status post biliary stenting.    EGD/ERCP (12/20/22): pancreatic endoscopic necrosecetomy; duodenal stricture biopsied and dilated. ERCP with stent exchange and dilation of distal biliary stricture with stent placement.    UGI (12/21/22): Rugal fold thickening within the stomach suggestive of gastritis.  Cyst gastrostomy noted with communication of the collection within the lesser sac.  There was some delay of contrast and narrowing within the duodenum Wiliam just beyond the duodenal bulb consistent with a duodenal stricture.  Contrast did pass the stricture into the duodenum    CT w/ (12/27/22): Similar appearance of an ill-defined area of hypoattenuation in the region of the tameka hepatis again suspicious for infiltrative mass/malignancy. Interval increase in size of several, irregular spiculated peritoneal nodules as compared to the priors. Continued distension of the gallbladder with common bile duct stent in place. Interval decrease in size of an air in  fluid collection within or adjacent to the pancreas status post cyst gastrostomy.      Past Medical History:   Diagnosis Date    Cancer     COLON CANCER 1995    Digestive disorder     Hypertension     Primary sclerosing cholangitis     Ulcerative colitis     s/p colectomy with J- pouch       Past Surgical History:   Procedure Laterality Date    COLON SURGERY      Colectomy with J- pouch    ENDOSCOPIC ULTRASOUND OF UPPER GASTROINTESTINAL TRACT N/A 10/14/2022    Procedure: ULTRASOUND, UPPER GI TRACT,;  Surgeon: Vince Teran MD;  Location: [facility];  Service: Endoscopy;  Laterality: N/A;  upper and linear    ENDOSCOPIC ULTRASOUND OF UPPER GASTROINTESTINAL TRACT N/A 12/13/2022    Procedure: ULTRASOUND, UPPER GI TRACT, ENDOSCOPIC;  Surgeon: Vince Teran MD;  Location: [facility];  Service: Endoscopy;  Laterality: N/A;    ERCP N/A 10/14/2022    Procedure: ERCP (ENDOSCOPIC RETROGRADE CHOLANGIOPANCREATOGRAPHY) with spyglass;  Surgeon: Vince Teran MD;  Location: [facility];  Service: Endoscopy;  Laterality: N/A;    ERCP N/A 12/13/2022    Procedure: ERCP (ENDOSCOPIC RETROGRADE CHOLANGIOPANCREATOGRAPHY);  Surgeon: Vince Teran MD;  Location: [facility];  Service: Endoscopy;  Laterality: N/A;    ERCP N/A 12/20/2022    Procedure: ERCP (ENDOSCOPIC RETROGRADE CHOLANGIOPANCREATOGRAPHY);  Surgeon: Vince Teran MD;  Location: [facility];  Service: Endoscopy;  Laterality: N/A;    ESOPHAGOGASTRODUODENOSCOPY N/A 12/20/2022    Procedure: EGD (ESOPHAGOGASTRODUODENOSCOPY);  Surgeon: Vince Teran MD;  Location: [facility];  Service: Endoscopy;  Laterality: N/A;    POUCHOSCOPY         Review of patient's allergies indicates:   Allergen Reactions    Ciprofloxacin Other (See Comments)     Pt previously had medication reaction; suffered achilles rupture     Meperidine Hives     Family History    None       Tobacco Use    Smoking status: Never     Smokeless tobacco: Never   Substance and Sexual Activity    Alcohol use: Not Currently    Drug use: Never    Sexual activity: Not on file     Review of Systems   Constitutional:  Negative for fever.   HENT:  Negative for hearing loss.    Eyes:  Negative for visual disturbance.   Respiratory:  Negative for cough and shortness of breath.    Cardiovascular:  Negative for chest pain and palpitations.   Gastrointestinal:         As per HPI.   Genitourinary:  Negative for difficulty urinating, dysuria, frequency and hematuria.   Musculoskeletal:  Positive for arthralgias.   Skin:  Negative for color change and rash.   Neurological:  Negative for seizures, syncope and numbness.   Hematological:  Does not bruise/bleed easily.   Psychiatric/Behavioral:  The patient is not nervous/anxious.    Objective:     Vital Signs (Most Recent):  Temp: 99.2 °F (37.3 °C) (01/03/23 0721)  Pulse: 101 (01/03/23 0720)  Resp: 18 (01/03/23 1312)  BP: 103/65 (01/03/23 0720)  SpO2: 97 % (01/03/23 0720) Vital Signs (24h Range):  Temp:  [99.2 °F (37.3 °C)] 99.2 °F (37.3 °C)  Pulse:  [101] 101  Resp:  [18] 18  SpO2:  [97 %] 97 %  BP: (103)/(65) 103/65        There is no height or weight on file to calculate BMI.    No intake or output data in the 24 hours ending 01/03/23 1405    Lines/Drains/Airways       Peripheral Intravenous Line  Duration                  Peripheral IV - Single Lumen 01/03/23 0855 18 G Right Antecubital <1 day                    Physical Exam  Constitutional:       General: He is not in acute distress.     Appearance: Normal appearance. He is well-developed.   HENT:      Head: Normocephalic and atraumatic.   Eyes:      Extraocular Movements: Extraocular movements intact.   Cardiovascular:      Rate and Rhythm: Normal rate and regular rhythm.      Heart sounds: Normal heart sounds. No murmur heard.  Pulmonary:      Effort: Pulmonary effort is normal. No respiratory distress.      Breath sounds: Normal breath sounds. No  wheezing.   Abdominal:      General: Bowel sounds are normal. There is no distension.      Palpations: Abdomen is soft. There is no hepatomegaly or mass.      Tenderness: There is abdominal tenderness (generalized but worse in RUQ). There is no rebound.   Musculoskeletal:      Cervical back: Normal range of motion and neck supple.      Right lower leg: No edema.      Left lower leg: No edema.   Skin:     General: Skin is warm and dry.      Findings: No rash.   Neurological:      Mental Status: He is alert and oriented to person, place, and time.      Cranial Nerves: No cranial nerve deficit.   Psychiatric:         Behavior: Behavior normal.       Significant Labs:  CBC:   Recent Labs   Lab 01/03/23  0855   WBC 18.99*   HGB 12.8*   HCT 38.8*        CMP:   Recent Labs   Lab 01/03/23  0855      CALCIUM 9.8   ALBUMIN 3.0*   PROT 7.7   *   K 3.4*   CO2 25   CL 95   BUN 10   CREATININE 0.9   ALKPHOS 375*   ALT 63*   AST 43*   BILITOT 1.7*     Coagulation: No results for input(s): PT, INR, APTT in the last 48 hours.    Significant Imaging:  Imaging results within the past 24 hours have been reviewed.    Assessment/Plan:     * Fever  Agree with general fever work up.   UA and CXR unremarkable.   BCx pending.   Empiric antibiotics started.   Monitor fever and WBC count.  Will get MRCP today and consider advanced endoscopic procedure tomorrow.     Primary sclerosing cholangitis  MRCP today and possible advanced endoscopy tomorrow.         Thank you for your consult. I will follow-up with patient. Please contact us if you have any additional questions.    Reynaldo Karimi PA-C  Gastroenterology  [facility] - Emergency Dept.

## 2023-01-03 NOTE — HPI
Mr. Osullivan is a 53 yo male with hx of Ulcerative colitis, primary sclerosing cholangitis, and colon cancer presented with fever of 102 q6 hours x 3 days. On 12/20/22, the pt had an ERCP performed by Dr. Teran (Gastroenterology) and had a biliary stent exchange. After the ERCP, the pt was placed on Amoxicillin BID and last took Amoxicillin yesterday.  Patient complains of generalized back, head, and abdominal pain that is chronic.  He denies changes in any of his symptoms and has had no N/V/D.  Dr. Carrillo has been consulted and is planning a procedure later today.  ID has also been consulted given is 18,000 white count and persistant fever.  Of note, patient has had a 60 lb weight loss in 4 months.  Will start patient on zosyn and await further recommendations from ID. Will admit inpatient for fever work up and GI procedure.

## 2023-01-03 NOTE — SUBJECTIVE & OBJECTIVE
Past Medical History:   Diagnosis Date    Cancer     COLON CANCER 1995    Digestive disorder     Hypertension     Primary sclerosing cholangitis     Ulcerative colitis     s/p colectomy with J- pouch       Past Surgical History:   Procedure Laterality Date    COLON SURGERY      Colectomy with J- pouch    ENDOSCOPIC ULTRASOUND OF UPPER GASTROINTESTINAL TRACT N/A 10/14/2022    Procedure: ULTRASOUND, UPPER GI TRACT,;  Surgeon: Vince Teran MD;  Location: [facility];  Service: Endoscopy;  Laterality: N/A;  upper and linear    ENDOSCOPIC ULTRASOUND OF UPPER GASTROINTESTINAL TRACT N/A 12/13/2022    Procedure: ULTRASOUND, UPPER GI TRACT, ENDOSCOPIC;  Surgeon: Vince Teran MD;  Location: [facility];  Service: Endoscopy;  Laterality: N/A;    ERCP N/A 10/14/2022    Procedure: ERCP (ENDOSCOPIC RETROGRADE CHOLANGIOPANCREATOGRAPHY) with spyglass;  Surgeon: Vince Teran MD;  Location: [facility];  Service: Endoscopy;  Laterality: N/A;    ERCP N/A 12/13/2022    Procedure: ERCP (ENDOSCOPIC RETROGRADE CHOLANGIOPANCREATOGRAPHY);  Surgeon: Vince Teran MD;  Location: [facility];  Service: Endoscopy;  Laterality: N/A;    ERCP N/A 12/20/2022    Procedure: ERCP (ENDOSCOPIC RETROGRADE CHOLANGIOPANCREATOGRAPHY);  Surgeon: Vince Teran MD;  Location: [facility];  Service: Endoscopy;  Laterality: N/A;    ESOPHAGOGASTRODUODENOSCOPY N/A 12/20/2022    Procedure: EGD (ESOPHAGOGASTRODUODENOSCOPY);  Surgeon: Vince Teran MD;  Location: [facility];  Service: Endoscopy;  Laterality: N/A;    POUCHOSCOPY         Review of patient's allergies indicates:   Allergen Reactions    Ciprofloxacin Other (See Comments)     Pt previously had medication reaction; suffered achilles rupture     Meperidine Hives       No current facility-administered medications on file prior to encounter.     Current Outpatient Medications on File Prior to Encounter   Medication Sig    dicyclomine (BENTYL) 20  mg tablet Take 1 tablet (20 mg total) by mouth 4 (four) times daily before meals and nightly. PRN    oxyCODONE-acetaminophen (PERCOCET)  mg per tablet Take 1 tablet by mouth 3 (three) times daily as needed.    pantoprazole (PROTONIX) 40 MG tablet Take 40 mg by mouth once daily.    [DISCONTINUED] HYDROcodone-acetaminophen (NORCO)  mg per tablet Take 1 tablet by mouth every 12 (twelve) hours as needed for Pain.    [DISCONTINUED] oxyCODONE (ROXICODONE) 10 mg Tab immediate release tablet Take 1 tablet (10 mg total) by mouth every 6 (six) hours as needed for Pain.     Family History    None       Tobacco Use    Smoking status: Never    Smokeless tobacco: Never   Substance and Sexual Activity    Alcohol use: Not Currently    Drug use: Never    Sexual activity: Not on file     Review of Systems   Constitutional:  Positive for appetite change, fatigue, fever and unexpected weight change. Negative for activity change and chills.   HENT:  Negative for congestion, drooling, ear discharge, ear pain, facial swelling, hearing loss and mouth sores.    Eyes:  Negative for photophobia, discharge and itching.   Respiratory:  Negative for apnea, cough, shortness of breath and stridor.    Cardiovascular:  Negative for chest pain, palpitations and leg swelling.   Gastrointestinal:  Positive for abdominal pain. Negative for abdominal distention, anal bleeding, blood in stool, constipation, diarrhea, nausea, rectal pain and vomiting.   Genitourinary:  Negative for difficulty urinating, dysuria, enuresis, flank pain, frequency, hematuria and urgency.   Musculoskeletal:  Positive for back pain. Negative for arthralgias, gait problem, joint swelling, myalgias and neck pain.   Neurological:  Negative for dizziness, seizures, syncope, facial asymmetry, speech difficulty, weakness, light-headedness, numbness and headaches.   Psychiatric/Behavioral:  Negative for agitation, behavioral problems, confusion, decreased concentration,  dysphoric mood and hallucinations.    All other systems reviewed and are negative.  Objective:     Vital Signs (Most Recent):  Temp: 99.2 °F (37.3 °C) (01/03/23 0721)  Pulse: 101 (01/03/23 0720)  Resp: 18 (01/03/23 1312)  BP: 103/65 (01/03/23 0720)  SpO2: 97 % (01/03/23 0720) Vital Signs (24h Range):  Temp:  [99.2 °F (37.3 °C)] 99.2 °F (37.3 °C)  Pulse:  [101] 101  Resp:  [18] 18  SpO2:  [97 %] 97 %  BP: (103)/(65) 103/65        There is no height or weight on file to calculate BMI.    Physical Exam  Vitals and nursing note reviewed.   Constitutional:       Appearance: Normal appearance.   HENT:      Head: Normocephalic and atraumatic.      Nose: Nose normal.      Mouth/Throat:      Mouth: Mucous membranes are moist.   Eyes:      Extraocular Movements: Extraocular movements intact.      Conjunctiva/sclera: Conjunctivae normal.   Cardiovascular:      Rate and Rhythm: Normal rate and regular rhythm.      Pulses: Normal pulses.      Heart sounds: Normal heart sounds.   Pulmonary:      Effort: Pulmonary effort is normal.      Breath sounds: Normal breath sounds.   Abdominal:      General: Abdomen is flat. Bowel sounds are normal. There is no distension.      Palpations: Abdomen is soft.      Tenderness: There is abdominal tenderness.   Musculoskeletal:         General: No swelling, tenderness or signs of injury.      Cervical back: Normal range of motion and neck supple.   Skin:     General: Skin is warm.      Capillary Refill: Capillary refill takes less than 2 seconds.      Coloration: Skin is not jaundiced or pale.   Neurological:      Mental Status: He is alert and oriented to person, place, and time. Mental status is at baseline.   Psychiatric:         Mood and Affect: Mood normal.         Behavior: Behavior normal.           Significant Labs: All pertinent labs within the past 24 hours have been reviewed.  Recent Lab Results  (Last 5 results in the past 24 hours)        01/03/23  1243   01/03/23  1035    01/03/23  0917   01/03/23  0908   01/03/23  0855        POC Molecular Influenza A Ag       Negative         POC Molecular Influenza B Ag       Negative         Albumin         3.0       Alkaline Phosphatase         375       ALT         63       Anion Gap         15       Appearance, UA   Clear             AST         43       Baso #         0.08       Basophil %         0.4       Bilirubin (UA)   Negative             BILIRUBIN TOTAL         1.7  Comment: For infants and newborns, interpretation of results should be based  on gestational age, weight and in agreement with clinical  observations.    Premature Infant recommended reference ranges:  Up to 24 hours.............<8.0 mg/dL  Up to 48 hours............<12.0 mg/dL  3-5 days..................<15.0 mg/dL  6-29 days.................<15.0 mg/dL         BUN         10       Calcium         9.8       Chloride         95       CO2         25       Color, UA   Yellow             Creatinine         0.9       Differential Method         Automated       eGFR         >60       Eos #         0.0       Eosinophil %         0.1       Glucose         100       Glucose, UA   Negative             Gran # (ANC)         16.4       Gran %         86.3       Hematocrit         38.8       Hemoglobin         12.8       Immature Grans (Abs)         0.11  Comment: Mild elevation in immature granulocytes is non specific and   can be seen in a variety of conditions including stress response,   acute inflammation, trauma and pregnancy. Correlation with other   laboratory and clinical findings is essential.         Immature Granulocytes         0.6       Ketones, UA   Negative             Lactate, Beltran 0.7  Comment: Falsely low lactic acid results can be found in samples   containing >=13.0 mg/dL total bilirubin and/or >=3.5 mg/dL   direct bilirubin.           1.0  Comment: Falsely low lactic acid results can be found in samples   containing >=13.0 mg/dL total bilirubin and/or >=3.5 mg/dL    direct bilirubin.         Leukocytes, UA   Negative             Lipase         11       Lymph #         0.7       Lymph %         3.4       MCH         29.2       MCHC         33.0       MCV         88       Mono #         1.8       Mono %         9.2       MPV         8.7       NITRITE UA   Negative             nRBC         0       Occult Blood UA   Negative             pH, UA   7.0             Platelets         347       Potassium         3.4       PROTEIN TOTAL         7.7       Protein, UA   Trace  Comment: Recommend a 24 hour urine protein or a urine   protein/creatinine ratio if globulin induced proteinuria is  clinically suspected.                Acceptable     Yes   Yes         RBC         4.39       RDW         12.9       SARS-CoV-2 RNA, Amplification, Qual     Negative           Sodium         135       Specific Harmon, UA   1.010             Specimen UA   Urine, Clean Catch             Troponin I         <0.006  Comment: The reference interval for Troponin I represents the 99th percentile   cutoff   for our facility and is consistent with 3rd generation assay   performance.         UROBILINOGEN UA   Negative             WBC         18.99                              Significant Imaging: I have reviewed all pertinent imaging results/findings within the past 24 hours.

## 2023-01-03 NOTE — TELEPHONE ENCOUNTER
Before calling patient did chart review which revealed his admission due to fever post-GI procedure. Notified Dr. Vanegas and Nikunj that I will continue to monitor chart and call patient once discharged about surgical oncology consult.

## 2023-01-03 NOTE — ASSESSMENT & PLAN NOTE
Agree with general fever work up.   UA and CXR unremarkable.   BCx pending.   Empiric antibiotics started.   Monitor fever and WBC count.  Will get MRCP today and consider advanced endoscopic procedure tomorrow.

## 2023-01-03 NOTE — HPI
The patient has a history of UC and PSC. He reported to the ER with a three day history of fever, decreased appetite and worsening pain. He had an episode of nausea today but no vomiting. He denies resp symptoms. No change in voiding but urine is darker. He has been taking Miralax for constipation with good results. The patient has a significant GI history - see below.  ER work up revealed WBC count 18.99. Temp 99. T. Bili up to 1.7 but other LFTs stable with baseline. BUN and creatinine normal. Lactate normal. UA and CXR unremarkable. Flu and covid testing negative. The patient was started on antibiotics and made NPO. RUQ showed mildly dilated common bile duct with a stent in place. Complex fluid collection at the body/tail the pancreas.  Findings correspond to abnormality visualized on CT abdomen pelvis examination from 12/27/2022. Enlarged liver with fatty infiltration and no suspicious focal lesion.     GI History  Patient with h/o UC and colon cancer (1995) s/p colectomy w/ J pouch  He was referred to Dr. Teran for EUS/ERCP after he was found to have elevated LFTs and abnormal MRI (09/2022).    EUS/ERCP (10/14/22): stricture consistent with PSC, CBD stent placed, brushings negative for malignancy.    Hosp (10/17/22): admitted for post-ERCP pancreatitis    CA 19-9 (11/02/22) 1181.2    CT w&w/o (12/08/22): Abnormal infiltrative soft tissue attenuation around the base of the gallbladder and tameka hepatis region, increased compared to 10/16/2022 suspicious for infiltrative malignancy likely cholangiocarcinoma. Additionally, there are scattered nodules in the peritoneum and mesentery suspicious for peritoneal carcinomatosis. 12 x 10 x 6.7 cm heterogeneous somewhat loculated rim enhancing fluid collection abutting the body of the pancreas suspicious for walled-off peripancreatic necrosis    EUS (12/13/22): note of pancreatic walled off necrosis. Cystogastrostomy performed with placement of axial stent. ERCP not  done due to duodenal narrowing from external compression.   CT w/ (12/13/22): Status post cyst gastrostomy tube placement into the large pancreatic cystic lesion possibly pseudocyst or walled-off necrosis. Stable soft tissue attenuation lesion near the tameka hepatis possibly fibrosis or neoplasm. Mild distention the gallbladder and wall thickening. Periportal edema and mild intrahepatic biliary ductal dilation despite patient status post biliary stenting.    EGD/ERCP (12/20/22): pancreatic endoscopic necrosecetomy; duodenal stricture biopsied and dilated. ERCP with stent exchange and dilation of distal biliary stricture with stent placement.    UGI (12/21/22): Rugal fold thickening within the stomach suggestive of gastritis.  Cyst gastrostomy noted with communication of the collection within the lesser sac.  There was some delay of contrast and narrowing within the duodenum Wiliam just beyond the duodenal bulb consistent with a duodenal stricture.  Contrast did pass the stricture into the duodenum    CT w/ (12/27/22): Similar appearance of an ill-defined area of hypoattenuation in the region of the tameka hepatis again suspicious for infiltrative mass/malignancy. Interval increase in size of several, irregular spiculated peritoneal nodules as compared to the priors. Continued distension of the gallbladder with common bile duct stent in place. Interval decrease in size of an air in fluid collection within or adjacent to the pancreas status post cyst gastrostomy.

## 2023-01-03 NOTE — TELEPHONE ENCOUNTER
----- Message from Ankur Vanegas MD sent at 12/28/2022  1:22 PM CST -----  Regarding: [facility]  Onur take a look at this case Toy asked me to review. UC sp colectomy w PSC, suspicious distal biliary stricture and mass but brushings negative. CA 19-9 >1000. Almost certainly cholangio, but has a few findings on CT that are pretty convincing for peritoneal disease. Just trying to get him a diagnosis. Laparoscopy or maybe IR for this rafael umbilical nodule?    Marifer can we get him into [facility] next?

## 2023-01-04 PROBLEM — R50.9 FEVER: Status: RESOLVED | Noted: 2023-01-01 | Resolved: 2023-01-01

## 2023-01-04 PROBLEM — K83.09 CHOLANGITIS: Status: ACTIVE | Noted: 2023-01-01

## 2023-01-04 NOTE — CONSULTS
[facility])  Infectious Disease  Consult Note    Patient Name: Guevara Osullivan II  MRN: [id number]  Admission Date: 1/3/2023  Hospital Length of Stay: 1 days  Attending Physician: Monica Martinez MD  Primary Care Provider: Dima Ch MD     Isolation Status: No active isolations    Patient information was obtained from patient, past medical records and ER records.      Consults  Assessment/Plan:     * Fever  1. Mild intrahepatic biliary ductal dilatation.  Irregular appearance of the common bile duct with severe luminal narrowing of the proximal common bile duct.   Biliary ducts grossly unchanged since prior CT abdomen and pelvis 12/27/2022.  2. Ill-defined, enhancing soft tissue within the tameka hepatis surrounds the extrahepatic biliary ducts and may represent residual postoperative inflammatory changes.  Enhancing tumor remains a possibility.  3. 1.8 cm nonenhancing fluid collection within the tameka hepatis, unchanged since prior CT 12/27/2022.  4. 14.8 cm complex pancreatic or peripancreatic fluid collection of the body and tail.    Will follow GI , now on Zosyn - will monitor CBC in AM     Primary sclerosing cholangitis  GI follow up    Hyperlipidemia  Will follow primary team      Primary hypertension  BP control as per primary team        Thank you for your consult. I will follow-up with patient. Please contact us if you have any additional questions.    Dima Garcia MD, [facility]  Infectious Disease  [facility])    Subjective:     Principal Problem: Fever    HPI:   52 year old with history of  Ulcerative colitis, primary sclerosing cholangitis, and colon cancer (1995)  who was admitted with fever for 3 days . T max 102.  She had ERCP done -12/20/22-ERCP -biliary stent exchange. She was given Amoxil .   Component      Latest Ref Rng & Units 1/4/2023 1/3/2023 12/21/2022   WBC      3.90 - 12.70 K/uL 14.73 (H) 18.99 (H) 7.44     Imaging test -  MRI of the abdomen -  1.  Mild intrahepatic biliary ductal dilatation.  Irregular appearance of the common bile duct with severe luminal narrowing of the proximal common bile duct.  Common bile duct stent reported by history but is not definitively visualized by MRI.  Biliary ducts grossly unchanged since prior CT abdomen and pelvis 12/27/2022.  2. Ill-defined, enhancing soft tissue within the tameka hepatis surrounds the extrahepatic biliary ducts and may represent residual postoperative inflammatory changes.  Enhancing tumor remains a possibility.  3. 1.8 cm nonenhancing fluid collection within the tameka hepatis, unchanged since prior CT 12/27/2022.  4. 14.8 cm complex pancreatic or peripancreatic fluid collection of the body and tail.  No significant change since 12/27/2022.  5. Trace ascites surrounding the liver, improved.    CT scan of the abdomen- 12/27/22  Similar appearance of an ill-defined area of hypoattenuation in the region of the tameka hepatis again suspicious for infiltrative mass/malignancy.   Interval increase in size of several, irregular spiculated peritoneal nodules as compared to the priors.   Continued distension of the gallbladder with common bile duct stent in place.   Interval decrease in size of an air in fluid collection within or adjacent to the pancreas status post cyst gastrostomy.  No new peripancreatic fluid collections.   Trace ascites in the right upper quadrant.    He was seen at bedside .            Past Medical History:   Diagnosis Date    Cancer     COLON CANCER 1995    Digestive disorder     Hypertension     Primary sclerosing cholangitis     Ulcerative colitis     s/p colectomy with J- pouch       Past Surgical History:   Procedure Laterality Date    COLON SURGERY      Colectomy with J- pouch    ENDOSCOPIC ULTRASOUND OF UPPER GASTROINTESTINAL TRACT N/A 10/14/2022    Procedure: ULTRASOUND, UPPER GI TRACT,;  Surgeon: Vince Teran MD;  [facility];  Service: Endoscopy;   Laterality: N/A;  upper and linear    ENDOSCOPIC ULTRASOUND OF UPPER GASTROINTESTINAL TRACT N/A 12/13/2022    Procedure: ULTRASOUND, UPPER GI TRACT, ENDOSCOPIC;  Surgeon: Vince Teran MD;  Location: [facility];  Service: Endoscopy;  Laterality: N/A;    ERCP N/A 10/14/2022    Procedure: ERCP (ENDOSCOPIC RETROGRADE CHOLANGIOPANCREATOGRAPHY) with spyglass;  Surgeon: Vince Teran MD;  Location: [facility] ENDO;  Service: Endoscopy;  Laterality: N/A;    ERCP N/A 12/13/2022    Procedure: ERCP (ENDOSCOPIC RETROGRADE CHOLANGIOPANCREATOGRAPHY);  Surgeon: Vince Teran MD;  Location: [facility];  Service: Endoscopy;  Laterality: N/A;    ERCP N/A 12/20/2022    Procedure: ERCP (ENDOSCOPIC RETROGRADE CHOLANGIOPANCREATOGRAPHY);  Surgeon: Vince Teran MD;  Location: [facility];  Service: Endoscopy;  Laterality: N/A;    ESOPHAGOGASTRODUODENOSCOPY N/A 12/20/2022    Procedure: EGD (ESOPHAGOGASTRODUODENOSCOPY);  Surgeon: Vince Teran MD;  Location: [facility];  Service: Endoscopy;  Laterality: N/A;    POUCHOSCOPY         Review of patient's allergies indicates:   Allergen Reactions    Ciprofloxacin Other (See Comments)     Pt previously had medication reaction; suffered achilles rupture     Meperidine Hives       Medications:  Medications Prior to Admission   Medication Sig    dicyclomine (BENTYL) 20 mg tablet Take 1 tablet (20 mg total) by mouth 4 (four) times daily before meals and nightly. PRN    oxyCODONE-acetaminophen (PERCOCET)  mg per tablet Take 1 tablet by mouth 3 (three) times daily as needed.    pantoprazole (PROTONIX) 40 MG tablet Take 40 mg by mouth once daily.     Antibiotics (From admission, onward)      Start     Stop Route Frequency Ordered    01/03/23 1230  piperacillin-tazobactam (ZOSYN) 4.5 g in dextrose 5 % in water (D5W) 5 % 100 mL IVPB (MB+)         -- IV Every 8 hours (non-standard times) 01/03/23 1131          Antifungals (From admission,  onward)      None          Antivirals (From admission, onward)      None             Immunization History   Administered Date(s) Administered    COVID-19, MRNA, LN-S, PF (MODERNA FULL 0.5 ML DOSE) 03/11/2021, 04/08/2021       Family History    None       Social History     Socioeconomic History    Marital status: Single   Tobacco Use    Smoking status: Never    Smokeless tobacco: Never   Substance and Sexual Activity    Alcohol use: Not Currently    Drug use: Never     Review of Systems   Constitutional:  Negative for activity change, appetite change, chills, diaphoresis and fatigue.   HENT:  Negative for congestion, dental problem, ear discharge, ear pain and facial swelling.    Eyes:  Negative for pain, discharge and itching.   Respiratory:  Negative for apnea, cough, chest tightness and shortness of breath.    Cardiovascular:  Negative for chest pain and leg swelling.   Gastrointestinal:  Positive for abdominal pain. Negative for abdominal distention.   Endocrine: Negative for cold intolerance, heat intolerance and polydipsia.   Genitourinary:  Negative for difficulty urinating, dysuria and enuresis.   Musculoskeletal:  Negative for arthralgias and back pain.   Skin:  Negative for color change and pallor.   Allergic/Immunologic: Negative for environmental allergies and food allergies.   Neurological:  Negative for dizziness, facial asymmetry, light-headedness and headaches.   Hematological:  Negative for adenopathy. Does not bruise/bleed easily.   Psychiatric/Behavioral:  Negative for agitation and behavioral problems.    Objective:     Vital Signs (Most Recent):  Temp: 98.1 °F (36.7 °C) (01/04/23 1344)  Pulse: 98 (01/04/23 1344)  Resp: 18 (01/04/23 1344)  BP: (!) 143/98 (01/04/23 1353)  SpO2: 100 % (01/04/23 1344)   Vital Signs (24h Range):  Temp:  [97.9 °F (36.6 °C)-100.4 °F (38 °C)] 98.1 °F (36.7 °C)  Pulse:  [] 98  Resp:  [15-20] 18  SpO2:  [93 %-100 %] 100 %  BP: (108-143)/(62-98) 143/98      Weight: 81.6 kg (179 lb 14.3 oz)  Body mass index is 24.4 kg/m².    Estimated Creatinine Clearance: 115.9 mL/min (based on SCr of 0.8 mg/dL).    Physical Exam  Vitals and nursing note reviewed.   HENT:      Head: Normocephalic and atraumatic.   Eyes:      Pupils: Pupils are equal, round, and reactive to light.   Neck:      Thyroid: No thyromegaly.   Cardiovascular:      Rate and Rhythm: Normal rate and regular rhythm.   Pulmonary:      Effort: Pulmonary effort is normal. No respiratory distress.      Breath sounds: No wheezing.   Abdominal:      General: Bowel sounds are normal.      Palpations: Abdomen is soft.      Tenderness: There is abdominal tenderness.   Musculoskeletal:      Cervical back: Normal range of motion and neck supple.       Significant Labs: Blood Culture:   Recent Labs   Lab 10/16/22  1848 10/16/22  1849 01/03/23  0856   LABBLOO No growth after 5 days. No growth after 5 days. No Growth to date  No Growth to date     BMP:   Recent Labs   Lab 01/04/23  0557   GLU 90      K 3.9   CL 98   CO2 28   BUN 10   CREATININE 0.8   CALCIUM 9.2     CBC:   Recent Labs   Lab 01/03/23  0855 01/04/23  0557   WBC 18.99* 14.73*   HGB 12.8* 11.2*   HCT 38.8* 34.1*    329     All pertinent labs within the past 24 hours have been reviewed.    Significant Imaging: I have reviewed all pertinent imaging results/findings within the past 24 hours.

## 2023-01-04 NOTE — PROGRESS NOTES
[facility] Medicine  Progress Note    Patient Name: Guevara Osullivan II  MRN: [id number]  Patient Class: IP- Inpatient   Admission Date: 1/3/2023  Length of Stay: 1 days  Attending Physician: Monica Martinez MD  Primary Care Provider: Dima Ch MD        Subjective:     Principal Problem:Cholangitis      HPI:  Mr. Osullivan is a 51 yo male with hx of Ulcerative colitis, primary sclerosing cholangitis, and colon cancer presented with fever of 102 q6 hours x 3 days. On 12/20/22, the pt had an ERCP performed by Dr. Teran (Gastroenterology) and had a biliary stent exchange. After the ERCP, the pt was placed on Amoxicillin BID and last took Amoxicillin yesterday.  Patient complains of generalized back, head, and abdominal pain that is chronic.  He denies changes in any of his symptoms and has had no N/V/D.  Dr. Carrillo has been consulted and is planning a procedure later today.  ID has also been consulted given is 18,000 white count and persistant fever.  Of note, patient has had a 60 lb weight loss in 4 months.  Will start patient on zosyn and await further recommendations from ID. Will admit inpatient for fever work up and GI procedure.      Overview/Hospital Course:  Admitted for fever likely biliary pathology, improving with IV abx. MRCP shows mild intrahepatic biliary ductal dilatation/cholangitis, GI consulted and ERCP 1/4      Review of Systems   Constitutional:  Positive for appetite change, fatigue, fever and unexpected weight change. Negative for activity change and chills.   HENT:  Negative for congestion, drooling, ear discharge, ear pain, facial swelling, hearing loss and mouth sores.    Eyes:  Negative for photophobia, discharge and itching.   Respiratory:  Negative for apnea, cough, shortness of breath and stridor.    Cardiovascular:  Negative for chest pain, palpitations and leg swelling.   Gastrointestinal:  Positive for abdominal pain. Negative for abdominal  distention, anal bleeding, blood in stool, constipation, diarrhea, nausea, rectal pain and vomiting.   Genitourinary:  Negative for difficulty urinating, dysuria, enuresis, flank pain, frequency, hematuria and urgency.   Musculoskeletal:  Positive for back pain. Negative for arthralgias, gait problem, joint swelling, myalgias and neck pain.   Neurological:  Negative for dizziness, seizures, syncope, facial asymmetry, speech difficulty, weakness, light-headedness, numbness and headaches.   Psychiatric/Behavioral:  Negative for agitation, behavioral problems, confusion, decreased concentration, dysphoric mood and hallucinations.    All other systems reviewed and are negative.  Objective:     Vital Signs (Most Recent):  Temp: 98.2 °F (36.8 °C) (01/04/23 1536)  Pulse: 109 (01/04/23 1545)  Resp: 18 (01/04/23 1545)  BP: 136/87 (01/04/23 1545)  SpO2: 97 % (01/04/23 1545) Vital Signs (24h Range):  Temp:  [97.9 °F (36.6 °C)-100.4 °F (38 °C)] 98.2 °F (36.8 °C)  Pulse:  [] 109  Resp:  [15-20] 18  SpO2:  [93 %-100 %] 97 %  BP: (108-143)/(64-98) 136/87     Weight: 81.6 kg (179 lb 14.3 oz)  Body mass index is 24.4 kg/m².    Intake/Output Summary (Last 24 hours) at 1/4/2023 1551  Last data filed at 1/4/2023 1515  Gross per 24 hour   Intake 450 ml   Output --   Net 450 ml      Physical Exam  Constitutional:       General: He is not in acute distress.     Appearance: Normal appearance. He is well-developed.   HENT:      Head: Normocephalic and atraumatic.   Eyes:      Extraocular Movements: Extraocular movements intact.   Cardiovascular:      Rate and Rhythm: Normal rate and regular rhythm.      Heart sounds: Normal heart sounds. No murmur heard.  Pulmonary:      Effort: Pulmonary effort is normal. No respiratory distress.      Breath sounds: Normal breath sounds. No wheezing.   Abdominal:      General: Bowel sounds are normal. There is no distension.      Palpations: Abdomen is soft. There is no hepatomegaly or mass.       Tenderness: There is abdominal tenderness (generalized but worse in RUQ). There is no rebound.   Musculoskeletal:      Cervical back: Normal range of motion and neck supple.      Right lower leg: No edema.      Left lower leg: No edema.   Skin:     General: Skin is warm and dry.      Findings: No rash.   Neurological:      Mental Status: He is alert and oriented to person, place, and time.      Cranial Nerves: No cranial nerve deficit.   Psychiatric:         Behavior: Behavior normal.       Significant Labs: All pertinent labs within the past 24 hours have been reviewed.    Significant Imaging: I have reviewed all pertinent imaging results/findings within the past 24 hours.    MRCP:  1. Mild intrahepatic biliary ductal dilatation.  Irregular appearance of the common bile duct with severe luminal narrowing of the proximal common bile duct.  Common bile duct stent reported by history but is not definitively visualized by MRI.  Biliary ducts grossly unchanged since prior CT abdomen and pelvis 12/27/2022.  2. Ill-defined, enhancing soft tissue within the tameka hepatis surrounds the extrahepatic biliary ducts and may represent residual postoperative inflammatory changes.  Enhancing tumor remains a possibility.  3. 1.8 cm nonenhancing fluid collection within the tameka hepatis, unchanged since prior CT 12/27/2022.  4. 14.8 cm complex pancreatic or peripancreatic fluid collection of the body and tail.  No significant change since 12/27/2022.  5. Trace ascites surrounding the liver, improved      Assessment/Plan:      * Cholangitis  - continue empiric zosyn  - Dr. Dhillon consulted as patient had a recent stent exchange just prior to the onset of fever  - Dr. Garcia for evaluation as patient does not have an impressive US abdomen and therefore could be related to other sources  - US abdomen: Complex fluid collection at the body/tail the pancreas.  Findings correspond to abnormality visualized on CT abdomen pelvis  examination from 12/27/2022.  Consider further follow-up as warranted.   - MRCP above  - ERCP 1/4      Primary sclerosing cholangitis  - recently diagnosed  - No acute issues      Hyperlipidemia  - continue statin      Primary hypertension  - continue home meds      VTE Risk Mitigation (From admission, onward)         Ordered     IP VTE HIGH RISK PATIENT  Once         01/03/23 1131     Place sequential compression device  Until discontinued         01/03/23 1131                Discharge Planning   APRIL:      Code Status: Full Code   Is the patient medically ready for discharge?:     Reason for patient still in hospital (select all that apply): Patient trending condition, Treatment, Imaging and Consult recommendations                     Monica Martinez MD  Department of Hospital Medicine   [facility] - Telemetry ([facility])

## 2023-01-04 NOTE — ASSESSMENT & PLAN NOTE
1. Mild intrahepatic biliary ductal dilatation.  Irregular appearance of the common bile duct with severe luminal narrowing of the proximal common bile duct.   Biliary ducts grossly unchanged since prior CT abdomen and pelvis 12/27/2022.  2. Ill-defined, enhancing soft tissue within the tameka hepatis surrounds the extrahepatic biliary ducts and may represent residual postoperative inflammatory changes.  Enhancing tumor remains a possibility.  3. 1.8 cm nonenhancing fluid collection within the tameka hepatis, unchanged since prior CT 12/27/2022.  4. 14.8 cm complex pancreatic or peripancreatic fluid collection of the body and tail.    Will follow GI , now on Zosyn - will monitor CBC in AM

## 2023-01-04 NOTE — ANESTHESIA PROCEDURE NOTES
Intubation    Date/Time: 1/4/2023 2:25 PM  Performed by: Franklin León CRNA  Authorized by: Jose J Abdullahi II, MD     Intubation:     Induction:  Intravenous    Intubated:  Postinduction    Mask Ventilation:  Easy mask    Attempts:  1    Attempted By:  CRNA    Method of Intubation:  Video laryngoscopy    Blade:  Palacio 3    Laryngeal View Grade: Grade I - full view of cords      Difficult Airway Encountered?: No      Complications:  None    Airway Device:  Oral endotracheal tube    Airway Device Size:  8.0    Style/Cuff Inflation:  Cuffed (inflated to minimal occlusive pressure)    Tube secured:  23    Secured at:  The lips    Placement Verified By:  Capnometry    Complicating Factors:  None    Findings Post-Intubation:  BS equal bilateral and atraumatic/condition of teeth unchanged

## 2023-01-04 NOTE — [employer]ATION PATIENT INSTRUCTIONS
Discharge Summary/Instructions after an Endoscopic Procedure  Patient Name: Guevara Osullivan  Patient MRN: [id number]  Patient YOB: 1970 Wednesday, January 4, 2023 Vince Teran MD  Dear patient,  As a result of recent federal legislation (The Federal Cures Act), you may   receive lab or pathology results from your procedure in your [facility]   account before your physician is able to contact you. Your physician or   their representative will relay the results to you with their   recommendations at their soonest availability.  Thank you,  RESTRICTIONS:  During your procedure today, you received medications for sedation.  These   medications may affect your judgment, balance and coordination.  Therefore,   for 24 hours, you have the following restrictions:   - DO NOT drive a car, operate machinery, make legal/financial decisions,   sign important papers or drink alcohol.    ACTIVITY:  Today: no heavy lifting, straining or running due to procedural   sedation/anesthesia.  The following day: return to full activity including work.  DIET:  Eat and drink normally unless instructed otherwise.     TREATMENT FOR COMMON SIDE EFFECTS:  - Mild abdominal pain, nausea, belching, bloating or excessive gas:  rest,   eat lightly and use a heating pad.  - Sore Throat: treat with throat lozenges and/or gargle with warm salt   water.  - Because air was used during the procedure, expelling large amounts of air   from your rectum or belching is normal.  - If a bowel prep was taken, you may not have a bowel movement for 1-3 days.    This is normal.  SYMPTOMS TO WATCH FOR AND REPORT TO YOUR PHYSICIAN:  1. Abdominal pain or bloating, other than gas cramps.  2. Chest pain.  3. Back pain.  4. Signs of infection such as: chills or fever occurring within 24 hours   after the procedure.  5. Rectal bleeding, which would show as bright red, maroon, or black stools.   (A tablespoon of blood from the rectum is not serious,  especially if   hemorrhoids are present.)  6. Vomiting.  7. Weakness or dizziness.  GO DIRECTLY TO THE NEAREST EMERGENCY ROOM IF YOU HAVE ANY OF THE FOLLOWING:      Difficulty breathing              Chills and/or fever over 101 F   Persistent vomiting and/or vomiting blood   Severe abdominal pain   Severe chest pain   Black, tarry stools   Bleeding- more than one tablespoon   Any other symptom or condition that you feel may need urgent attention  Your doctor recommends these additional instructions:  If any biopsies were taken, your doctors clinic will contact you in 1 to 2   weeks with any results.  - [facility].   - Clear liquid diet.   - Continue present medications.   - Continue Zosyn  CT abdomen tomorrow with contrast  For questions, problems or results please call your physician Vince Teran MD at Work:  [phone]  If you have any questions about the above instructions, call the GI   department at [phone] or call the endoscopy unit at [phone]   from 7am until 3 pm.  [facility], EMERGENCY ROOM PHONE NUMBER:   [phone]  IF A COMPLICATION OR EMERGENCY SITUATION ARISES AND YOU ARE UNABLE TO REACH   YOUR PHYSICIAN - GO DIRECTLY TO THE EMERGENCY ROOM.  I have read or have had read to me these discharge instructions for my   procedure and have received a written copy.  I understand these   instructions and will follow-up with my physician if I have any questions.     __________________________________       _____________________________________  Nurse Signature                                          Patient/Designated   Responsible Party Signature  MD Vince Eduardo MD  1/4/2023 5:01:04 PM  This report has been verified and signed electronically.  Dear patient,  As a result of recent federal legislation (The Federal Cures Act), you may   receive lab or pathology results from your  procedure in your [facility]   account before your physician is able to contact you. Your physician or   their representative will relay the results to you with their   recommendations at their soonest availability.  Thank you,  [employer]OVATION

## 2023-01-04 NOTE — HOSPITAL COURSE
Admitted for cholangitis. ERCP was performed that showed clogging at the cyst gastrostomy stent with pancreatic necrotic debris.  Stent is dilated followed by endoscopic necrosectomy with removal of most of the endoscope pancreatic necrosis endoscopically and drainage of pus present. Duodenal stricture was seen at the bulb that was dilated as well.  On fluoroscopy the biliary stent was in place.      The axios metal stent placement cyst gastrostomy was removed and 2 double pigtail stents replaced at the cyst gastrostomy.      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    Stable for d/c with 2 week course of Abx and outpatient f/u- GI and surgical oncology as there is concern for primary biliary malignancy based on imaging and elevated CA 19-9

## 2023-01-04 NOTE — ASSESSMENT & PLAN NOTE
- continue empiric zosyn  - Dr. Dhillon consulted as patient had a recent stent exchange just prior to the onset of fever  - Dr. Garcia for evaluation as patient does not have an impressive US abdomen and therefore could be related to other sources  - US abdomen: Complex fluid collection at the body/tail the pancreas.  Findings correspond to abnormality visualized on CT abdomen pelvis examination from 12/27/2022.  Consider further follow-up as warranted.   - MRCP above  - ERCP 1/4

## 2023-01-04 NOTE — TRANSFER OF CARE
Anesthesia Transfer of Care Note    Patient: Guevara Osullivan II    Procedure(s) Performed: Procedure(s) (LRB):  ERCP (ENDOSCOPIC RETROGRADE CHOLANGIOPANCREATOGRAPHY) (N/A)  EGD (ESOPHAGOGASTRODUODENOSCOPY) (N/A)    Patient location: GI    Anesthesia Type: general    Transport from OR: Transported from OR on room air with adequate spontaneous ventilation    Post pain: adequate analgesia    Post assessment: no apparent anesthetic complications and tolerated procedure well    Post vital signs: stable    Level of consciousness: awake, responds to stimulation and alert    Nausea/Vomiting: no nausea/vomiting    Complications: none    Transfer of care protocol was followed      Last vitals:   Visit Vitals  /72 (BP Location: Left arm, Patient Position: Lying)   Pulse 107   Temp 36.8 °C (98.2 °F) (Temporal)   Resp 18   Ht 6' (1.829 m)   Wt 81.6 kg (179 lb 14.3 oz)   SpO2 98%   BMI 24.40 kg/m²

## 2023-01-04 NOTE — ANESTHESIA PREPROCEDURE EVALUATION
01/04/2023  Guevara Osullivan II is a 52 y.o., male.      Pre-op Assessment    I have reviewed the Patient Summary Reports.     I have reviewed the Nursing Notes. I have reviewed the NPO Status.   I have reviewed the Medications.     Review of Systems  Anesthesia Hx:  No problems with previous Anesthesia  Denies Family Hx of Anesthesia complications.   Denies Personal Hx of Anesthesia complications.   Social:  Non-Smoker    Hematology/Oncology:  Hematology Normal       -- Cancer in past history:    EENT/Dental:EENT/Dental Normal   Cardiovascular:   Exercise tolerance: good Hypertension ECG has been reviewed.  Functional Capacity good / => 4 METS  Hypertension    Pulmonary:  Pulmonary Normal    Renal/:  Renal/ Normal     Hepatic/GI:   PUD, Liver Disease, Primary sclerosing cholangitis and pancreatic cyst   Musculoskeletal:  Musculoskeletal Normal    Neurological:  Neurology Normal    Endocrine:  Endocrine Normal    Dermatological:  Skin Normal    Psych:  Psychiatric Normal           Physical Exam  General: Well nourished, Cooperative, Alert and Oriented    Airway:  Mallampati: II   Mouth Opening: Normal  TM Distance: Normal  Tongue: Normal  Neck ROM: Normal ROM    Dental:  Intact  Pt denies loose or removable dentition  Heart:  Rate: Normal  Rhythm: Regular Rhythm        Anesthesia Plan  Type of Anesthesia, risks & benefits discussed:    Anesthesia Type: Gen ETT  Intra-op Monitoring Plan: Standard ASA Monitors  Post Op Pain Control Plan: multimodal analgesia  Induction:  IV  Informed Consent: Informed consent signed with the Patient and all parties understand the risks and agree with anesthesia plan.  All questions answered.   ASA Score: 3  Day of Surgery Review of History & Physical: H&P Update referred to the surgeon/provider.I have interviewed and examined the patient. I have reviewed the patient's  H&P dated: There are no significant changes.     Ready For Surgery From Anesthesia Perspective.     .

## 2023-01-04 NOTE — HPI
52 year old with history of  Ulcerative colitis, primary sclerosing cholangitis, and colon cancer (1995)  who was admitted with fever for 3 days . T max 102.  She had ERCP done -12/20/22-ERCP -biliary stent exchange. She was given Amoxil .   Component      Latest Ref Rng & Units 1/4/2023 1/3/2023 12/21/2022   WBC      3.90 - 12.70 K/uL 14.73 (H) 18.99 (H) 7.44     Imaging test -  MRI of the abdomen -  1. Mild intrahepatic biliary ductal dilatation.  Irregular appearance of the common bile duct with severe luminal narrowing of the proximal common bile duct.  Common bile duct stent reported by history but is not definitively visualized by MRI.  Biliary ducts grossly unchanged since prior CT abdomen and pelvis 12/27/2022.  2. Ill-defined, enhancing soft tissue within the tameka hepatis surrounds the extrahepatic biliary ducts and may represent residual postoperative inflammatory changes.  Enhancing tumor remains a possibility.  3. 1.8 cm nonenhancing fluid collection within the tameka hepatis, unchanged since prior CT 12/27/2022.  4. 14.8 cm complex pancreatic or peripancreatic fluid collection of the body and tail.  No significant change since 12/27/2022.  5. Trace ascites surrounding the liver, improved.    CT scan of the abdomen- 12/27/22  Similar appearance of an ill-defined area of hypoattenuation in the region of the tameka hepatis again suspicious for infiltrative mass/malignancy.   Interval increase in size of several, irregular spiculated peritoneal nodules as compared to the priors.   Continued distension of the gallbladder with common bile duct stent in place.   Interval decrease in size of an air in fluid collection within or adjacent to the pancreas status post cyst gastrostomy.  No new peripancreatic fluid collections.   Trace ascites in the right upper quadrant.    He was seen at bedside .

## 2023-01-04 NOTE — PROGRESS NOTES
Endoscopy was performed that showed clogging at the cyst gastrostomy stent with pancreatic necrotic debris.  Stent is dilated followed by endoscopic necrosectomy with removal of most of the endoscope pancreatic necrosis endoscopically and drainage of pus present.  Duodenal stricture was seen at the bulb that was dilated as well.  On fluoroscopy the biliary stent was in place.      The axios metal stent placement cyst gastrostomy was removed and 2 double pigtail stents replaced at the cyst gastrostomy.      The patient will need repeat CT abdomen tomorrow morning to assess improvement in the cyst.  Continue antibiotics with Zosyn.  Can advance diet to liquids and then to regular diet as tolerated.  Repeat labs including CBC and liver enzymes tomorrow.    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    Vince Teran MD  Gastroenterology  Director of Advanced Endoscopy at [facility]

## 2023-01-04 NOTE — CARE UPDATE
Chart reviewed     Consult for FUO-  hx of Ulcerative colitis, primary sclerosing cholangitis, and colon cancer (1995)   T max -102 for 3 days   -.12/20/22, the pt had an ERCP performed by Dr. Teran (Gastroenterology) and had a biliary stent exchange.   Previous medication - Amoxil  -CT scan-   12/21/22  Similar appearance of an ill-defined area of hypoattenuation in the region of the tameka hepatis again suspicious for infiltrative mass/malignancy.     Interval increase in size of several, irregular spiculated peritoneal nodules as compared to the priors.     Continued distension of the gallbladder with common bile duct stent in place.     Interval decrease in size of an air in fluid collection within or adjacent to the pancreas status post cyst gastrostomy.  No new peripancreatic fluid collections.     CBC -  Component      Latest Ref Rng & Units 1/3/2023 12/21/2022 12/14/2022   WBC      3.90 - 12.70 K/uL 18.99 (H) 7.44 12.34   Plan -  Will use Zosyn for now  Follow blood cultures  CBC in AM

## 2023-01-04 NOTE — ANESTHESIA POSTPROCEDURE EVALUATION
Anesthesia Post Evaluation    Patient: Guevara Osullivan II    Procedure(s) Performed: Procedure(s) (LRB):  ERCP (ENDOSCOPIC RETROGRADE CHOLANGIOPANCREATOGRAPHY) (N/A)  EGD (ESOPHAGOGASTRODUODENOSCOPY) (N/A)    Final Anesthesia Type: general      Patient location during evaluation: GI PACU  Patient participation: Yes- Able to Participate  Level of consciousness: awake and alert  Post-procedure vital signs: reviewed and not stable  Pain management: adequate  Airway patency: patent  SHI mitigation strategies: Preoperative initiation of continuous positive airway pressure (CPAP) or non-invasive positive pressure ventilation (NIPPV)  PONV status at discharge: No PONV  Anesthetic complications: no      Cardiovascular status: blood pressure returned to baseline  Respiratory status: unassisted, spontaneous ventilation and room air  Hydration status: euvolemic  Follow-up not needed.          Vitals Value Taken Time   /72 01/04/23 1536   Temp 36.8 °C (98.2 °F) 01/04/23 1536   Pulse 107 01/04/23 1536   Resp 18 01/04/23 1536   SpO2 98 % 01/04/23 1536         No case tracking events are documented in the log.      Pain/Margo Score: Pain Rating Prior to Med Admin: 8 (1/4/2023  9:48 AM)

## 2023-01-04 NOTE — H&P
PRE PROCEDURE H&P    Patient Name: Guevara Osullivan II  MRN: [id number]  : 1970  Date of Procedure:  2023  Referring Physician: Self, Aaareferral  Primary Physician: Dima Ch MD  Procedure Physician: Vince Teran MD       Planned Procedure: ERCP  Diagnosis: Cholangitis  Chief Complaint: Same as above    HPI: Patient is an 52 y.o. male is here for the above.     ERCP is currently recommended. The risks, benefits and alternatives of the procedure were discussed with the patient in detail. Benefits include removal of stones, stents, debri, sludge; dilation; placement of a stent; biopsies. Risks include bleeding (0.3-2%), pancreatitis (3%), cholangitis (0.5-3%), perforation (0.08-0.6%), cholecystitis (0.5%), sedation related adverse events. Educational material of the biliary anatomy has been provided today for educational purposes. Other risks include, risks of adverse reaction to sedation requiring the use of reversal agents, bleeding requiring blood transfusion, perforation requiring surgical intervention, technical failure, aspiration leading to respiratory distress and respiratory failure resulting in endotracheal intubation and mechanical ventilation including death. Anesthesia is utilized for this procedure, it is up to the anesthesiologist to determine airway safety including elective endotracheal intubation. Questions were answered, the patient agree to proceed. There was no language barriers.       Past Medical History:   Past Medical History:   Diagnosis Date    Cancer     COLON CANCER     Digestive disorder     Hypertension     Primary sclerosing cholangitis     Ulcerative colitis     s/p colectomy with J- pouch        Past Surgical History:  Past Surgical History:   Procedure Laterality Date    COLON SURGERY      Colectomy with J- pouch    ENDOSCOPIC ULTRASOUND OF UPPER GASTROINTESTINAL TRACT N/A 10/14/2022    Procedure: ULTRASOUND, UPPER GI TRACT,;  Surgeon: Vince  HENRI Teran MD;  Location: [facility];  Service: Endoscopy;  Laterality: N/A;  upper and linear    ENDOSCOPIC ULTRASOUND OF UPPER GASTROINTESTINAL TRACT N/A 12/13/2022    Procedure: ULTRASOUND, UPPER GI TRACT, ENDOSCOPIC;  Surgeon: Vince Teran MD;  Location: [facility];  Service: Endoscopy;  Laterality: N/A;    ERCP N/A 10/14/2022    Procedure: ERCP (ENDOSCOPIC RETROGRADE CHOLANGIOPANCREATOGRAPHY) with spyglass;  Surgeon: Vince Tearn MD;  Location: [facility];  Service: Endoscopy;  Laterality: N/A;    ERCP N/A 12/13/2022    Procedure: ERCP (ENDOSCOPIC RETROGRADE CHOLANGIOPANCREATOGRAPHY);  Surgeon: Vince Teran MD;  Location: [facility];  Service: Endoscopy;  Laterality: N/A;    ERCP N/A 12/20/2022    Procedure: ERCP (ENDOSCOPIC RETROGRADE CHOLANGIOPANCREATOGRAPHY);  Surgeon: Vince Teran MD;  Location: [facility];  Service: Endoscopy;  Laterality: N/A;    ESOPHAGOGASTRODUODENOSCOPY N/A 12/20/2022    Procedure: EGD (ESOPHAGOGASTRODUODENOSCOPY);  Surgeon: Vince Teran MD;  Location: [facility];  Service: Endoscopy;  Laterality: N/A;    POUCHOSCOPY          Home Medications:  Prior to Admission medications    Medication Sig Start Date End Date Taking? Authorizing Provider   dicyclomine (BENTYL) 20 mg tablet Take 1 tablet (20 mg total) by mouth 4 (four) times daily before meals and nightly. PRN 12/14/22 1/13/23 Yes Reynaldo Karimi PA-C   oxyCODONE-acetaminophen (PERCOCET)  mg per tablet Take 1 tablet by mouth 3 (three) times daily as needed. 12/29/22  Yes Historical Provider   pantoprazole (PROTONIX) 40 MG tablet Take 40 mg by mouth once daily.   Yes Historical Provider        Allergies:  Review of patient's allergies indicates:   Allergen Reactions    Ciprofloxacin Other (See Comments)     Pt previously had medication reaction; suffered achilles rupture     Meperidine Hives        Social History:   Social History     Socioeconomic History    Marital  status: Single   Tobacco Use    Smoking status: Never    Smokeless tobacco: Never   Substance and Sexual Activity    Alcohol use: Not Currently    Drug use: Never       Family History:  History reviewed. No pertinent family history.    ROS: No acute cardiac events, no acute respiratory complaints.     Physical Exam (all patients):    BP (!) 143/98   Pulse 98   Temp 98.1 °F (36.7 °C)   Resp 18   Ht 6' (1.829 m)   Wt 81.6 kg (179 lb 14.3 oz)   SpO2 100%   BMI 24.40 kg/m²   Lungs: Clear to auscultation bilaterally, respirations unlabored  Heart: Regular rate and rhythm, S1 and S2 normal, no obvious murmurs  Abdomen:         Soft, non-tender, bowel sounds normal, no masses, no organomegaly    Lab Results   Component Value Date    WBC 14.73 (H) 01/04/2023    MCV 89 01/04/2023    RDW 12.9 01/04/2023     01/04/2023    GLU 90 01/04/2023    HGBA1C 5.5 10/20/2021    BUN 10 01/04/2023     01/04/2023    K 3.9 01/04/2023    CL 98 01/04/2023        SEDATION PLAN: per anesthesia      History reviewed, vital signs satisfactory, cardiopulmonary status satisfactory, sedation options, risks and plans have been discussed with the patient  All their questions were answered and the patient agrees to the sedation procedures as planned and the patient is deemed an appropriate candidate for the sedation as planned.    Procedure explained to patient, informed consent obtained and placed in chart.    Vince Teran  1/4/2023  2:02 PM

## 2023-01-04 NOTE — SUBJECTIVE & OBJECTIVE
Past Medical History:   Diagnosis Date    Cancer     COLON CANCER 1995    Digestive disorder     Hypertension     Primary sclerosing cholangitis     Ulcerative colitis     s/p colectomy with J- pouch       Past Surgical History:   Procedure Laterality Date    COLON SURGERY      Colectomy with J- pouch    ENDOSCOPIC ULTRASOUND OF UPPER GASTROINTESTINAL TRACT N/A 10/14/2022    Procedure: ULTRASOUND, UPPER GI TRACT,;  Surgeon: Vince Teran MD;  Location: [facility];  Service: Endoscopy;  Laterality: N/A;  upper and linear    ENDOSCOPIC ULTRASOUND OF UPPER GASTROINTESTINAL TRACT N/A 12/13/2022    Procedure: ULTRASOUND, UPPER GI TRACT, ENDOSCOPIC;  Surgeon: Vince Teran MD;  Location: [facility];  Service: Endoscopy;  Laterality: N/A;    ERCP N/A 10/14/2022    Procedure: ERCP (ENDOSCOPIC RETROGRADE CHOLANGIOPANCREATOGRAPHY) with spyglass;  Surgeon: Vince Teran MD;  Location: [facility];  Service: Endoscopy;  Laterality: N/A;    ERCP N/A 12/13/2022    Procedure: ERCP (ENDOSCOPIC RETROGRADE CHOLANGIOPANCREATOGRAPHY);  Surgeon: Vince Teran MD;  Location: [facility];  Service: Endoscopy;  Laterality: N/A;    ERCP N/A 12/20/2022    Procedure: ERCP (ENDOSCOPIC RETROGRADE CHOLANGIOPANCREATOGRAPHY);  Surgeon: Vince Teran MD;  Location: [facility];  Service: Endoscopy;  Laterality: N/A;    ESOPHAGOGASTRODUODENOSCOPY N/A 12/20/2022    Procedure: EGD (ESOPHAGOGASTRODUODENOSCOPY);  Surgeon: Vince Teran MD;  Location: [facility];  Service: Endoscopy;  Laterality: N/A;    POUCHOSCOPY         Review of patient's allergies indicates:   Allergen Reactions    Ciprofloxacin Other (See Comments)     Pt previously had medication reaction; suffered achilles rupture     Meperidine Hives       Medications:  Medications Prior to Admission   Medication Sig    dicyclomine (BENTYL) 20 mg tablet Take 1 tablet (20 mg total) by mouth 4 (four) times daily before meals and  nightly. PRN    oxyCODONE-acetaminophen (PERCOCET)  mg per tablet Take 1 tablet by mouth 3 (three) times daily as needed.    pantoprazole (PROTONIX) 40 MG tablet Take 40 mg by mouth once daily.     Antibiotics (From admission, onward)      Start     Stop Route Frequency Ordered    01/03/23 1230  piperacillin-tazobactam (ZOSYN) 4.5 g in dextrose 5 % in water (D5W) 5 % 100 mL IVPB (MB+)         -- IV Every 8 hours (non-standard times) 01/03/23 1131          Antifungals (From admission, onward)      None          Antivirals (From admission, onward)      None             Immunization History   Administered Date(s) Administered    COVID-19, MRNA, LN-S, PF (MODERNA FULL 0.5 ML DOSE) 03/11/2021, 04/08/2021       Family History    None       Social History     Socioeconomic History    Marital status: Single   Tobacco Use    Smoking status: Never    Smokeless tobacco: Never   Substance and Sexual Activity    Alcohol use: Not Currently    Drug use: Never     Review of Systems   Constitutional:  Negative for activity change, appetite change, chills, diaphoresis and fatigue.   HENT:  Negative for congestion, dental problem, ear discharge, ear pain and facial swelling.    Eyes:  Negative for pain, discharge and itching.   Respiratory:  Negative for apnea, cough, chest tightness and shortness of breath.    Cardiovascular:  Negative for chest pain and leg swelling.   Gastrointestinal:  Positive for abdominal pain. Negative for abdominal distention.   Endocrine: Negative for cold intolerance, heat intolerance and polydipsia.   Genitourinary:  Negative for difficulty urinating, dysuria and enuresis.   Musculoskeletal:  Negative for arthralgias and back pain.   Skin:  Negative for color change and pallor.   Allergic/Immunologic: Negative for environmental allergies and food allergies.   Neurological:  Negative for dizziness, facial asymmetry, light-headedness and headaches.   Hematological:  Negative for adenopathy. Does not  bruise/bleed easily.   Psychiatric/Behavioral:  Negative for agitation and behavioral problems.    Objective:     Vital Signs (Most Recent):  Temp: 98.1 °F (36.7 °C) (01/04/23 1344)  Pulse: 98 (01/04/23 1344)  Resp: 18 (01/04/23 1344)  BP: (!) 143/98 (01/04/23 1353)  SpO2: 100 % (01/04/23 1344)   Vital Signs (24h Range):  Temp:  [97.9 °F (36.6 °C)-100.4 °F (38 °C)] 98.1 °F (36.7 °C)  Pulse:  [] 98  Resp:  [15-20] 18  SpO2:  [93 %-100 %] 100 %  BP: (108-143)/(62-98) 143/98     Weight: 81.6 kg (179 lb 14.3 oz)  Body mass index is 24.4 kg/m².    Estimated Creatinine Clearance: 115.9 mL/min (based on SCr of 0.8 mg/dL).    Physical Exam  Vitals and nursing note reviewed.   HENT:      Head: Normocephalic and atraumatic.   Eyes:      Pupils: Pupils are equal, round, and reactive to light.   Neck:      Thyroid: No thyromegaly.   Cardiovascular:      Rate and Rhythm: Normal rate and regular rhythm.   Pulmonary:      Effort: Pulmonary effort is normal. No respiratory distress.      Breath sounds: No wheezing.   Abdominal:      General: Bowel sounds are normal.      Palpations: Abdomen is soft.      Tenderness: There is abdominal tenderness.   Musculoskeletal:      Cervical back: Normal range of motion and neck supple.       Significant Labs: Blood Culture:   Recent Labs   Lab 10/16/22  1848 10/16/22  1849 01/03/23  0856   LABBLOO No growth after 5 days. No growth after 5 days. No Growth to date  No Growth to date     BMP:   Recent Labs   Lab 01/04/23  0557   GLU 90      K 3.9   CL 98   CO2 28   BUN 10   CREATININE 0.8   CALCIUM 9.2     CBC:   Recent Labs   Lab 01/03/23  0855 01/04/23  0557   WBC 18.99* 14.73*   HGB 12.8* 11.2*   HCT 38.8* 34.1*    329     All pertinent labs within the past 24 hours have been reviewed.    Significant Imaging: I have reviewed all pertinent imaging results/findings within the past 24 hours.

## 2023-01-04 NOTE — SUBJECTIVE & OBJECTIVE
Review of Systems   Constitutional:  Positive for appetite change, fatigue, fever and unexpected weight change. Negative for activity change and chills.   HENT:  Negative for congestion, drooling, ear discharge, ear pain, facial swelling, hearing loss and mouth sores.    Eyes:  Negative for photophobia, discharge and itching.   Respiratory:  Negative for apnea, cough, shortness of breath and stridor.    Cardiovascular:  Negative for chest pain, palpitations and leg swelling.   Gastrointestinal:  Positive for abdominal pain. Negative for abdominal distention, anal bleeding, blood in stool, constipation, diarrhea, nausea, rectal pain and vomiting.   Genitourinary:  Negative for difficulty urinating, dysuria, enuresis, flank pain, frequency, hematuria and urgency.   Musculoskeletal:  Positive for back pain. Negative for arthralgias, gait problem, joint swelling, myalgias and neck pain.   Neurological:  Negative for dizziness, seizures, syncope, facial asymmetry, speech difficulty, weakness, light-headedness, numbness and headaches.   Psychiatric/Behavioral:  Negative for agitation, behavioral problems, confusion, decreased concentration, dysphoric mood and hallucinations.    All other systems reviewed and are negative.  Objective:     Vital Signs (Most Recent):  Temp: 98.2 °F (36.8 °C) (01/04/23 1536)  Pulse: 109 (01/04/23 1545)  Resp: 18 (01/04/23 1545)  BP: 136/87 (01/04/23 1545)  SpO2: 97 % (01/04/23 1545) Vital Signs (24h Range):  Temp:  [97.9 °F (36.6 °C)-100.4 °F (38 °C)] 98.2 °F (36.8 °C)  Pulse:  [] 109  Resp:  [15-20] 18  SpO2:  [93 %-100 %] 97 %  BP: (108-143)/(64-98) 136/87     Weight: 81.6 kg (179 lb 14.3 oz)  Body mass index is 24.4 kg/m².    Intake/Output Summary (Last 24 hours) at 1/4/2023 1551  Last data filed at 1/4/2023 1515  Gross per 24 hour   Intake 450 ml   Output --   Net 450 ml      Physical Exam  Constitutional:       General: He is not in acute distress.     Appearance: Normal  appearance. He is well-developed.   HENT:      Head: Normocephalic and atraumatic.   Eyes:      Extraocular Movements: Extraocular movements intact.   Cardiovascular:      Rate and Rhythm: Normal rate and regular rhythm.      Heart sounds: Normal heart sounds. No murmur heard.  Pulmonary:      Effort: Pulmonary effort is normal. No respiratory distress.      Breath sounds: Normal breath sounds. No wheezing.   Abdominal:      General: Bowel sounds are normal. There is no distension.      Palpations: Abdomen is soft. There is no hepatomegaly or mass.      Tenderness: There is abdominal tenderness (generalized but worse in RUQ). There is no rebound.   Musculoskeletal:      Cervical back: Normal range of motion and neck supple.      Right lower leg: No edema.      Left lower leg: No edema.   Skin:     General: Skin is warm and dry.      Findings: No rash.   Neurological:      Mental Status: He is alert and oriented to person, place, and time.      Cranial Nerves: No cranial nerve deficit.   Psychiatric:         Behavior: Behavior normal.       Significant Labs: All pertinent labs within the past 24 hours have been reviewed.    Significant Imaging: I have reviewed all pertinent imaging results/findings within the past 24 hours.    MRCP:  1. Mild intrahepatic biliary ductal dilatation.  Irregular appearance of the common bile duct with severe luminal narrowing of the proximal common bile duct.  Common bile duct stent reported by history but is not definitively visualized by MRI.  Biliary ducts grossly unchanged since prior CT abdomen and pelvis 12/27/2022.  2. Ill-defined, enhancing soft tissue within the tameka hepatis surrounds the extrahepatic biliary ducts and may represent residual postoperative inflammatory changes.  Enhancing tumor remains a possibility.  3. 1.8 cm nonenhancing fluid collection within the tameka hepatis, unchanged since prior CT 12/27/2022.  4. 14.8 cm complex pancreatic or peripancreatic fluid  collection of the body and tail.  No significant change since 12/27/2022.  5. Trace ascites surrounding the liver, improved

## 2023-01-05 NOTE — DISCHARGE SUMMARY
[facility] Medicine  Discharge Summary      Patient Name: Guevara Osullivan II  MRN: [id number]  [facility]: [id number]  Patient Class: IP- Inpatient  Admission Date: 1/3/2023  Hospital Length of Stay: 2 days  Discharge Date and Time:  01/05/2023 2:22 PM  Attending Physician: Monica Martinez MD   Discharging Provider: Monica Martinez MD  Primary Care Provider: Dima Ch MD    Primary Care Team: [facility] MEDICINE     HPI:   Mr. Osullivan is a 51 yo male with hx of Ulcerative colitis, primary sclerosing cholangitis, and colon cancer presented with fever of 102 q6 hours x 3 days. On 12/20/22, the pt had an ERCP performed by Dr. Teran (Gastroenterology) and had a biliary stent exchange. After the ERCP, the pt was placed on Amoxicillin BID and last took Amoxicillin yesterday.  Patient complains of generalized back, head, and abdominal pain that is chronic.  He denies changes in any of his symptoms and has had no N/V/D.  Dr. Carrillo has been consulted and is planning a procedure later today.  ID has also been consulted given is 18,000 white count and persistant fever.  Of note, patient has had a 60 lb weight loss in 4 months.  Will start patient on zosyn and await further recommendations from ID. Will admit inpatient for fever work up and GI procedure.      Procedure(s) (LRB):  ERCP (ENDOSCOPIC RETROGRADE CHOLANGIOPANCREATOGRAPHY) (N/A)  EGD (ESOPHAGOGASTRODUODENOSCOPY) (N/A)      Hospital Course:   Admitted for cholangitis. ERCP was performed that showed clogging at the cyst gastrostomy stent with pancreatic necrotic debris.  Stent is dilated followed by endoscopic necrosectomy with removal of most of the endoscope pancreatic necrosis endoscopically and drainage of pus present. Duodenal stricture was seen at the bulb that was dilated as well.  On fluoroscopy the biliary stent was in place.      The axios metal stent placement cyst gastrostomy was removed and 2 double pigtail stents  replaced at the cyst gastrostomy.      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    Stable for d/c with 2 week course of Abx and outpatient f/u- GI and surgical oncology as there is concern for primary biliary malignancy based on imaging and elevated CA 19-9       Goals of Care Treatment Preferences:  Code Status: Full Code      Consults:   Consults (From admission, onward)          Status Ordering Provider     Inpatient consult to Infectious Diseases  Once        Provider:  Dima Garcia MD, FIDSA    Acknowledged GEETA MEJIA     Inpatient consult to Gastroenterology  Once        Provider:  (Not yet assigned)    Completed GEETA MEJIA            * Cholangitis  - continue empiric zosyn  - Dr. Dhillon consulted as patient had a recent stent exchange just prior to the onset of fever  - Dr. Garcia for evaluation as patient does not have an impressive US abdomen and therefore could be related to other sources  - US abdomen: Complex fluid collection at the body/tail the pancreas.  Findings correspond to abnormality visualized on CT abdomen pelvis examination from 12/27/2022.  Consider further follow-up as warranted.   - MRCP above  - ERCP 1/4      Pancreatic cyst        Primary sclerosing cholangitis  - recently diagnosed  - No acute issues        Final Active Diagnoses:    Diagnosis Date Noted POA    PRINCIPAL PROBLEM:  Cholangitis [K83.09] 01/04/2023 Yes    Pancreatic cyst [K86.2] 12/14/2022 Yes    Hyperlipidemia [E78.5] 10/16/2022 Yes    Primary sclerosing cholangitis [K83.01] 10/16/2022 Yes    Primary hypertension [I10] 10/16/2022 Yes      Problems Resolved During this Admission:    Diagnosis Date Noted Date Resolved POA    Fever [R50.9] 01/03/2023 01/04/2023 Yes       Discharged Condition: stable    Disposition: Home or Self Care    Follow Up:   Follow-up Information       Dima Ch MD Follow up in 1 week(s).     Specialty: Internal Medicine  Contact information:  [address]  [place] [address]  [phone]                           Patient Instructions:      Diet Adult Regular     Activity as tolerated       Significant Diagnostic Studies: Labs: All labs within the past 24 hours have been reviewed    Pending Diagnostic Studies:       None           Medications:  Reconciled Home Medications:      Medication List        START taking these medications      cefdinir 300 MG capsule  Commonly known as: OMNICEF  Take 1 capsule (300 mg total) by mouth every 12 (twelve) hours. for 14 days     metroNIDAZOLE 500 MG tablet  Commonly known as: FLAGYL  Take 1 tablet (500 mg total) by mouth every 8 (eight) hours. for 14 days            CONTINUE taking these medications      pantoprazole 40 MG tablet  Commonly known as: PROTONIX  Take 40 mg by mouth once daily.            STOP taking these medications      dicyclomine 20 mg tablet  Commonly known as: BENTYL     oxyCODONE-acetaminophen  mg per tablet  Commonly known as: PERCOCET              Indwelling Lines/Drains at time of discharge:   Lines/Drains/Airways       None                   Time spent on the discharge of patient: 40 minutes         Monica Martinez MD  Department of Hospital Medicine  [facility] - Telemetry ([facility])

## 2023-01-05 NOTE — TELEPHONE ENCOUNTER
Called patient today as he is waiting to be discharged from inpatient unit. Reviewed appt Monday with Silvia Cortes in the surgical oncology dept at [facility]. Emailed map of where to go to patient's email per his request, along with my contact information. Encouraged him to call with any questions/concerns.

## 2023-01-05 NOTE — PLAN OF CARE
[facility] - Telemetry (Hospital)  Initial Discharge Assessment    Anticipated DC dispo: Home   Prior Level of Function: Lives at home with s/o, independent with needs  PCP: Dima Ch MD     Comments: No CM needs for discharge    Pt's whiteboard updated to reflect discharge disposition and SW contact information. SW to continue following.      Primary Care Provider: Dima Ch MD    Admission Diagnosis: Primary sclerosing cholangitis [K83.01]  Fever [R50.9]  S/P ERCP [Z98.890]    Admission Date: 1/3/2023  Expected Discharge Date:     Discharge Barriers Identified: None    Payor: [facility] / Plan: [facility] CHOICE PLUS / Product Type: Commercial /     Extended Emergency Contact Information  Primary Emergency Contact: Kylah Méndez  Mobile Phone: [phone]  Relation: Significant other  Preferred language: English   needed? No    Discharge Plan A: Home  Discharge Plan B: Home      [id number] - [place] [address] [address]  [address] [address] [address]  Phone: [phone] Fax: [phone]      Initial Assessment (most recent)       Adult Discharge Assessment - 01/05/23 0850          Discharge Assessment    Assessment Type Discharge Planning Assessment     Confirmed/corrected address, phone number and insurance Yes     Confirmed Demographics Correct on Facesheet     Source of Information patient     Communicated APRIL with patient/caregiver Date not available/Unable to determine     Reason For Admission Cholangitis     People in Home significant other     Facility Arrived From: Home     Do you expect to return to your current living situation? Yes     Do you have help at home or someone to help you manage your care at home? Yes     Who are your caregiver(s) and their phone number(s)? Kylah s/o @ [phone]     Prior to hospitilization cognitive status: Alert/Oriented     Current cognitive status: Alert/Oriented     Equipment Currently Used at  Home none     Readmission within 30 days? No     Patient currently being followed by outpatient case management? No     Do you currently have service(s) that help you manage your care at home? No     Do you take prescription medications? Yes     Do you have prescription coverage? Yes     Do you have any problems affording any of your prescribed medications? No     Is the patient taking medications as prescribed? yes     Who is going to help you get home at discharge? Pt's s/o     How do you get to doctors appointments? car, drives self     Are you on dialysis? No     Do you take coumadin? No     Discharge Plan A Home     Discharge Plan B Home     DME Needed Upon Discharge  none     Discharge Plan discussed with: Patient     Discharge Barriers Identified None

## 2023-01-05 NOTE — NURSING
Received lying in bed watching tv, resp even and unlabored, assessment per flowsheet, c/o abdominal pain 4/10. Bed low, call light within reach. Will continue to monitor.

## 2023-01-05 NOTE — PLAN OF CARE
[facility] - Telemetry (Hospital)  Discharge Final Note    Primary Care Provider: Dima Ch MD    Expected Discharge Date: 1/5/2023    Final Discharge Note (most recent)       Final Note - 01/05/23 1456          Final Note    Assessment Type Final Discharge Note     Anticipated Discharge Disposition Home or Self Care        Post-Acute Status    Discharge Delays None known at this time                   Pt to discharge home. No CM needs for discharge. [facility] and will need to schedule follow up.     Important Message from Medicare             Contact Info       Dima Ch MD   Specialty: Internal Medicine   Relationship: PCP - General    [facility]  [address]  [facility] [address]   Phone: [phone]       Next Steps: Follow up in 1 week(s)

## 2023-01-09 PROBLEM — R63.4 UNINTENTIONAL WEIGHT LOSS: Status: ACTIVE | Noted: 2023-01-01

## 2023-01-09 PROBLEM — K83.8 DILATED BILE DUCT: Status: ACTIVE | Noted: 2023-01-01

## 2023-01-09 PROBLEM — A41.9 SEPSIS: Status: RESOLVED | Noted: 2022-01-01 | Resolved: 2023-01-01

## 2023-01-09 NOTE — PROGRESS NOTES
Pt is here accompanied by his wife.  Pt signed sx consent form. Informed pt his sx is therese for Wednesday 1/18/23 sx time 8:30 am. Informed sx time is subject to change and a RN will give him a call 1-2 days before to confirm the sx time.  Informed pt to be at the surgery center at the [facility]  on  [address] [facility] 2nd floor 2 hours prior to his surgery time  Surgery instruction provided:   Do not to eat or drink after midnight. No chewing gum or sucking candy   PAT Nurse will give pt a call to go thru pt medications list the day before sx date  Take a shower the night before and the morning of the surgery day with antibacterial soap and not to apply powder, deodorant and body lotion after shower  Will need someone to take him to the hospital and drive him home after the surgery. Pt states his wife will be with him  Leave all  valuable belongings at home.  Remove all body piercing, denture and contact lens  Wear button front clothing/lose clothing  Post op care:   Informed pt to keep dressing/wound clean and dry.  Informed pt to avoid any activities that require pulling, pushing, raising arm overhead, and heavy lifting. Avoid strenuous and vigorous activities.   Informed pt to call our office if he has any questions or concern. Telephone number provided  Pt verbalized understanding all of the above.

## 2023-01-09 NOTE — PROGRESS NOTES
"  Encounter Date:  2023    Patient ID: Guevara Osullivan II  Age:  52 y.o. :  1970       Chief Complaint   Patient presents with    Consult     History:    Mr. Osullivan is a 52 y.o. male who presents with distal CBD stricture, s/p stent placement.    Referred by: Dr. Teran    He has experienced several months of abd pain, jaundice with weight loss, first presented to  General in 10/2022, when pancreatic stent placed. He had repeat ERCP x 2 last month, suffered post procedure pancreatitis with pancreatic pseudocyst drainage and biliary stent exchange. Mr. Osullivan was admitted to [facility] with [facility]angitis, underwent ERCP which identified clogged cyst gastrectomy stent with pancreatic necrotic debris and duodenal bulb stricture.   Today he reports constant, persistent abd pain from mid-epigastric to R flank. He describes as "relentless", "I live pain pill to pain pill." He has no appetite r/t this abd pain, tolerating small portions of jello, oatmeal, fruit, homemade protein shake. Afebrile. Denies CP, SOB.   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   Last colonoscopy 2022 per Dr. Chente Santa in [place].   Personal hx of colon CA in , s/p colectomy with J pouch, no chemotherapy or radiation.    Prior abd surgery: colectomy with J pouch  No family hx of CA  Not currently taking anticoagulation  Never smoked    Data:     Radiology:  Dr. Calvin and I personally reviewed these images:   2023: CT A/P:  1. There has been interval placement of a drain from the fluid collection adjacent to the pancreas to the stomach.  There is gas within the fluid collection between the stomach and the pancreas.  This ill-defined fluid collection measures 5.9 cm in craniocaudal dimension by 4.8 cm in AP dimension by 6.4 cm in medial-lateral dimension on the current examination.  On the prior examination it measured 4.4 cm in craniocaudal dimension by 4.6 cm in AP dimension by 5.8 cm in " medial-lateral dimension.  This is consistent with the patient's history of an abscess.  There may be secondary gastritis and pancreatitis.  2. The liver is enlarged. It measures 22.3 cm in craniocaudal dimension.  3. The spleen is enlarged. It measures 15.0 cm in length.  4. There is a persistent 7 mm osseous density adjacent to the anterior inferior aspect of the L3 vertebral body.  This is characteristic of a limbus vertebra.  5. There are mild degenerative changes between L2 and L3.    1/4/2023: Endoscopy:  EGD per Dr. Teran  The one Axios stent was removed from the cystgastrostomy with a Raptor grasping device. The cystgastrostomy tract was dilated with a 15-16.5-18 mm x 5.5 cm CRE balloon (to a maximum balloon size of 15 mm) passed in the pseudocyst under direct vision. The cyst was partially filled with black necrotic tissue that was pasty and adherent to the cyst wall. Necrosectomy was performed with a rat-toothed forceps, requiring multiple intubations of the cyst. At the conclusion of the procedure, a small amount of necrotic tissue was found within the pseudocyst on direct vision. Two 7 Bulgarian x 5 cm double pigtail stents were inserted across the cystgastrostomy tract.   The exam of the stomach was otherwise normal.   An acquired moderate stenosis was found in the duodenal bulb and was traversed after dilation. A TTS dilator was passed through the scope. Dilation with a 15-16.5-18 mm balloon dilator was performed to 18 mm.    12/2022: ERCP  - Appearance consistent with PSC.   - Distal CBD stricture of 3 cm that was brushed and biopsied. Followed by dilation to up to 8.5 Fr.   - Bilairy stent exchange for a 7 fr 7 cm biliary stent.    12/2022: Pathology:    A. Duodenum, stricture, fine needle aspiration: Peptic duodenitis  B. Bile duct, stricture, fine needle aspiration: Focally crushed atypical biliary epithelium. See comment.   C. Common bile duct, stricture, brushing: Atypical epithelial cells.      COMMENT Thank you for allowing me to review this case. I agree that the bile duct stricture biopsy and cytology show some atypical cells. While the findings are insufficient for an unequivocal diagnosis, an underlying malignancy cannot be entirely excluded. Hence, if clinically indicated, additional sampling, including a dedicated fresh specimen (PreservCyt vial) for biliary tract malignancy fluorescence in-situ hybridization testing is recommended. I shared this case with my colleague, Dr. Veronica (pancreatobiliary pathology working group), who concurs      Labs:    CA 19-9 = 1181.2, tbili 1.3    Lab Results   Component Value Date    WBC 11.43 01/05/2023    HGB 11.7 (L) 01/05/2023    HCT 35.7 (L) 01/05/2023    MCV 88 01/05/2023     01/05/2023         Chemistry        Component Value Date/Time     01/04/2023 0557    K 3.9 01/04/2023 0557    CL 98 01/04/2023 0557    CO2 28 01/04/2023 0557    BUN 10 01/04/2023 0557    CREATININE 0.8 01/04/2023 0557    GLU 90 01/04/2023 0557        Component Value Date/Time    CALCIUM 9.2 01/04/2023 0557    ALKPHOS 349 (H) 01/05/2023 0310    AST 31 01/05/2023 0310    ALT 46 (H) 01/05/2023 0310    BILITOT 0.7 01/05/2023 0310        Lab Results   Component Value Date    HGBA1C 5.5 10/20/2021       Past Medical History:   Diagnosis Date    Cancer     COLON CANCER 1995    Digestive disorder     Hypertension     Primary sclerosing cholangitis     Ulcerative colitis     s/p colectomy with J- pouch     Past Surgical History:   Procedure Laterality Date    COLON SURGERY      Colectomy with J- pouch    ENDOSCOPIC ULTRASOUND OF UPPER GASTROINTESTINAL TRACT N/A 10/14/2022    Procedure: ULTRASOUND, UPPER GI TRACT,;  Surgeon: Vince Teran MD;  Location: [facility];  Service: Endoscopy;  Laterality: N/A;  upper and linear    ENDOSCOPIC ULTRASOUND OF UPPER GASTROINTESTINAL TRACT N/A 12/13/2022    Procedure: ULTRASOUND, UPPER GI TRACT, ENDOSCOPIC;  Surgeon: Vince ÁLVAREZ  MD Jeffry;  Location: [facility];  Service: Endoscopy;  Laterality: N/A;    ERCP N/A 10/14/2022    Procedure: ERCP (ENDOSCOPIC RETROGRADE CHOLANGIOPANCREATOGRAPHY) with spyglass;  Surgeon: Vince Teran MD;  Location: [facility];  Service: Endoscopy;  Laterality: N/A;    ERCP N/A 12/13/2022    Procedure: ERCP (ENDOSCOPIC RETROGRADE CHOLANGIOPANCREATOGRAPHY);  Surgeon: Vince Teran MD;  Location: [facility];  Service: Endoscopy;  Laterality: N/A;    ERCP N/A 12/20/2022    Procedure: ERCP (ENDOSCOPIC RETROGRADE CHOLANGIOPANCREATOGRAPHY);  Surgeon: Vince Teran MD;  Location: [facility];  Service: Endoscopy;  Laterality: N/A;    ERCP N/A 1/4/2023    Procedure: ERCP (ENDOSCOPIC RETROGRADE CHOLANGIOPANCREATOGRAPHY);  Surgeon: Vince Teran MD;  Location: [facility];  Service: Endoscopy;  Laterality: N/A;  room 1    ESOPHAGOGASTRODUODENOSCOPY N/A 12/20/2022    Procedure: EGD (ESOPHAGOGASTRODUODENOSCOPY);  Surgeon: Vince Teran MD;  Location: [facility];  Service: Endoscopy;  Laterality: N/A;    ESOPHAGOGASTRODUODENOSCOPY N/A 1/4/2023    Procedure: EGD (ESOPHAGOGASTRODUODENOSCOPY);  Surgeon: Vince Teran MD;  Location: [facility];  Service: Endoscopy;  Laterality: N/A;    POUCHOSCOPY       Current Outpatient Medications on File Prior to Visit   Medication Sig Dispense Refill    metroNIDAZOLE (FLAGYL) 500 MG tablet Take 1 tablet (500 mg total) by mouth every 8 (eight) hours. for 14 days 42 tablet 0    oxyCODONE-acetaminophen (PERCOCET)  mg per tablet oxycodone-acetaminophen 10 mg-325 mg tablet   TAKE 1 TABLET BY MOUTH THREE TIMES DAILY AS NEEDED      cefdinir (OMNICEF) 300 MG capsule Take 1 capsule (300 mg total) by mouth every 12 (twelve) hours. for 14 days (Patient not taking: Reported on 1/9/2023) 28 capsule 0    pantoprazole (PROTONIX) 40 MG tablet Take 40 mg by mouth once daily.       No current facility-administered medications on file  prior to visit.     Review of patient's allergies indicates:   Allergen Reactions    Ciprofloxacin Other (See Comments)     Pt previously had medication reaction; suffered achilles rupture     Meperidine Hives     Family History:  His family history is not on file.     Social History:   reports that he has never smoked. He has never used smokeless tobacco. He reports that he does not currently use alcohol. He reports that he does not use drugs.     ROS:    Pertinent positive/negatives detailed in HPI, all other systems negative.     Physical Exam:  BP (!) 152/94   Pulse 78   Temp 97.7 °F (36.5 °C)   Ht 6' (1.829 m)   Wt 78.6 kg (173 lb 4.5 oz)   SpO2 100%   BMI 23.50 kg/m²     Constitutional:  Non-toxic, no acute distress.    Eyes:  Sclerae anicteric, gaze symmetrical  Neck:  Trachea midline, FROM  Resp:  Even and unlabored resp  CV:  no edema  Abd:  Soft, non-tender, no masses, no ascites, no superficial varices  Musculoskeletal:  Ambulatory, normal gait, no muscle wasting  Neuro:  No gross deficits  Psych:  Awake, alert, oriented.  Answers and asks questions appropriately      ICD-10-CM ICD-9-CM    1. Dilated bile duct  K83.8 576.8 CEA      Cancer Antigen 19-9      Prealbumin      Comprehensive Metabolic Panel      CBC Auto Differential      AMYLASE      Lipase      2. Unintentional weight loss  R63.4 783.21         Plan:  This gentleman presented with abd pain, jaundice and weight loss in 11/2022. CA 19-9 elevated, ERCP with bile duct placement. He suffered post procedure pancreatitis with pseudocyst. Discussed possible causes of bile duct stricture including an underlying malignancy. Obtain labs today including amylase, lipase and tumor markers. Recommend diag lap with washings. Reviewed risks/ benefits/ alternatives as well as possible complications. He wishes to proceed, consents obtained. Preop instructions provided per RN. Tentatively scheduled for 1/18/23.   Encouraged to continue protein supplements  and oral intake as tolerated.   He and his wife asked several questions which were answered to their satisfaction.     Pt seen in conjunction with Dr. Calvin today.         Silvia Cortes NP  Upper GI / Hepatobiliary Surgical Oncology  [facility] [place], [place]  Office: [phone]  Fax: [phone]     I spent a total of 65 minutes on the day of the visit.This includes face to face time and non-face to face time preparing to see the patient (eg, review of tests), obtaining and/or reviewing separately obtained history, documenting clinical information in the electronic or other health record, independently interpreting results and communicating results to the patient/family/caregiver, or care coordinator.    Addendum:  CA 19-9 = 4071.4    Lab Results   Component Value Date    CEA 5.8 (H) 01/09/2023     Lab Results   Component Value Date    PREALBUMIN 17 (L) 01/09/2023     Lab Results   Component Value Date    AMYLASE 60 01/09/2023     Lab Results   Component Value Date    LIPASE 26 01/09/2023     Lab Results   Component Value Date    WBC 7.43 01/09/2023    HGB 13.8 (L) 01/09/2023    HCT 42.8 01/09/2023    MCV 89 01/09/2023     (H) 01/09/2023       Chemistry        Component Value Date/Time     01/09/2023 1145    K 4.0 01/09/2023 1145     01/09/2023 1145    CO2 30 (H) 01/09/2023 1145    BUN 6 01/09/2023 1145    CREATININE 0.9 01/09/2023 1145    GLU 95 01/09/2023 1145        Component Value Date/Time    CALCIUM 9.6 01/09/2023 1145    ALKPHOS 502 (H) 01/09/2023 1145    AST 52 (H) 01/09/2023 1145    ALT 65 (H) 01/09/2023 1145    BILITOT 0.7 01/09/2023 1145

## 2023-01-13 NOTE — TELEPHONE ENCOUNTER
Placed call to pt and informed pt his sx is confirmed for 1/18/23 and sx time 8 am and pt has to be at the hospital 2 hrs prior to his sx time. Pt states he is aware of his pre op sx instructions that was given to him last week.   Informed pt to call our office if he hs any questions or concern. Telephone number provided.

## 2023-01-17 NOTE — PRE-PROCEDURE INSTRUCTIONS
PreOp Instructions given:   - Verbal medication information (what to hold and what to take)   - NPO guidelines   - Arrival place directions given; time to be given the day before procedure by the   Surgeon's Office [address] TO [facility]  - Bathing with antibacterial soap   - Don't wear any jewelry or bring any valuables AM of surgery   - No makeup or moisturizer to face   - No perfume/cologne, powder, lotions or aftershave   Pt. verbalized understanding.   Pt denies any h/o Anesthesia/Sedation complications or side effects.

## 2023-01-18 PROBLEM — K66.9: Status: ACTIVE | Noted: 2023-01-01

## 2023-01-18 NOTE — ANESTHESIA PREPROCEDURE EVALUATION
01/18/2023  Guevara Osullivan II is a 52 y.o., male.      Pre-op Assessment    I have reviewed the Patient Summary Reports.     I have reviewed the Nursing Notes. I have reviewed the NPO Status.   I have reviewed the Medications.     Review of Systems  Anesthesia Hx:  No problems with previous Anesthesia  Denies Family Hx of Anesthesia complications.   Denies Personal Hx of Anesthesia complications.   Social:  Non-Smoker    Hematology/Oncology:  Hematology Normal       -- Cancer in past history:    EENT/Dental:EENT/Dental Normal   Cardiovascular:   Exercise tolerance: good Hypertension ECG has been reviewed.  Functional Capacity good / => 4 METS  Hypertension    Pulmonary:  Pulmonary Normal    Renal/:  Renal/ Normal     Hepatic/GI:   PUD, Liver Disease, Primary sclerosing cholangitis and pancreatic cyst   Musculoskeletal:  Musculoskeletal Normal    Neurological:  Neurology Normal    Endocrine:  Endocrine Normal    Dermatological:  Skin Normal    Psych:  Psychiatric Normal           Physical Exam  General: Well nourished, Cooperative, Alert and Oriented    Airway:  Mallampati: II   Mouth Opening: Normal  TM Distance: Normal  Tongue: Normal  Neck ROM: Normal ROM    Dental:  Intact  Pt denies loose or removable dentition  Heart:  Rate: Normal  Rhythm: Regular Rhythm        Anesthesia Plan  Type of Anesthesia, risks & benefits discussed:    Anesthesia Type: Gen ETT  Intra-op Monitoring Plan: Standard ASA Monitors  Post Op Pain Control Plan: multimodal analgesia  Induction:  IV  Informed Consent: Informed consent signed with the Patient and all parties understand the risks and agree with anesthesia plan.  All questions answered.   ASA Score: 3  Day of Surgery Review of History & Physical: H&P Update referred to the surgeon/provider.    Ready For Surgery From Anesthesia Perspective.     .

## 2023-01-18 NOTE — OP NOTE
[facility]  Surgical Oncology  Operative Note           Date of Procedure: 1/18/2023   Time: 0800    Procedure: Procedure(s) (LRB):  LAPAROSCOPY, DIAGNOSTIC WITH WASHING (N/A)  BIOPSY, PERITONEUM (N/A)  LAVAGE, PERITONEAL, (N/A)     Surgeon(s) and Role:     * Onur Calvin Jr., MD - Primary  Assisting Surgeon: None      Pre-Operative Diagnosis: Dilated bile duct [K83.8]  Biliary stricture, concern for malignancy    Post-Operative Diagnosis:   Significant peritoneal disease appreciated, concern for metastatic malignancy      Anesthesia: General    Procedure:  Diagnostic laparoscopy  Laparoscopic peritoneal biopsy x3  Peritoneal washings    Operative Findings:   Heavy burden of peritoneal disease appreciated in all four quadrants, on bowel serosa and on gallbladder and liver capsule.  Peritoneal biopsies taken in right upper quadrant, at the midline and left upper quadrant.  No frozen sections were sent  300 cc of peritoneal washings were sent for cytology    Indications:  Guevara Osullivan II is a 52 y.o. year old male with biliary stricture requiring ERCP with stent placement. Unfortunately biopsy/brushings have been negative or atypical without definitive diagnosis. He has had post ERCP pancreatitis with large pancreatic pseudocyst requring endoscopic drainage. I saw him a few weeks ago and his CA 19-9 was in the 4000s. After review of imaging I am taking him to the OR for diagnostic laparoscopy in an attempt to obtain a tissue diagnosis.     Risks and benefits were reviewed including bleeding, infection, pain, scar, damage to surrounding structures, cardiovascular and pulmonary complications, need for additional procedures, death, and imponderables.  He expressed understanding and gave informed consent to proceed.    Procedure in Detail:  After informed consent was obtained and the patient was properly identified the patient was taken to the operating room and placed in the supine position.   After the uneventful induction of general anesthesia, the patient's arms were tucked and his abdomen was prepped and draped in standard surgical fashion.  A time-out was performed according to the [facility] guidelines.  Using a Veress needle at Loco's point, the abdomen was insufflated to 15 mmHg.  A 5 mm Visiport was used to gain access into the abdomen in the left lower quadrant under direct visualization.  Once this port was placed the camera was placed into the abdomen through the port and the site of the Veress needle was inspected.  There was no injury or bleeding therefore with a Veress needle was removed.  An additional port was placed in the left lower quadrant closer to the midline and a diagnostic laparoscopy ensued.  Upon inspection of the peritoneal cavity, significant peritoneal disease and studding was appreciated in all 4 quadrants.  This was most predominant in the right upper quadrant along the diaphragm the anterior peritoneal surface the surface of the liver and gallbladder.  There was additional peritoneal studding in the left upper quadrant along the falciform diaphragm and in the pelvis.  There were numerous adhesions of the small bowel to the anterior midline as the patient had had a midline laparotomy in the past.  Along these adhesions, numerous peritoneal implants were also appreciated.  Pictures were taken and these were saved directly to the patient's medical record.  Using the laparoscopic mono, peritoneal biopsies were taken in the right upper quadrant, lower midline, and in the left upper quadrant a representative peritoneal implants.  These were sent for final pathology.  Hemostasis was obtained following the biopsies using cautery on the endoscopic mono.  Once hemostasis was confirmed, 500 cc of saline were instilled into the abdomen in the right and left upper quadrants, this fluid was lavaged and removed using a suction .  300 cc of peritoneal washings  were sent to pathology for cytologic analysis.  The abdomen was again inspected, all hemostasis was confirmed.  The air was desufflated and ports were removed.  Skin incisions were closed with 4-0 Monocryl suture.  A Dermabond was used for sterile dressing.  All lap needle and sponge counts were correct x2.      Portions of the record were created with [employer] voice recognition software. This may lead to occasional typographical errors due to the inherent limitations of the software. Read the chart carefully and recognize, using context, where substitutions have occurred. Please do not hesitate to contact me directly if clarification is needed.    Implants: * No implants in log *    Drains: None    Estimated Blood Loss (EBL):  Minimal    Specimens:   Specimen (24h ago, onward)       Start     Ordered    01/18/23 0845  Cytology, Fluid/Wash/Brush  Once        Question Answer Comment   Source: Peritoneal/abdominal/pelvic wash    Clinical Information: Suspected Carcinoma    Specific Site: Peritoneum    Other Requests: Peritoneal Washings for Cytology    Release to patient Immediate        01/18/23 0845 01/18/23 0836  Specimen to Pathology, Surgery General Surgery  Once        Comments: Pre-op Diagnosis: Dilated bile duct [K83.8]Procedure(s):LAPAROSCOPY, DIAGNOSTIC WITH WASHING Number of specimens: 3Name of specimens: 1. Midline abdominal wall biopsy - perm2. Right upper quadrant peritoneal biopsy - perm3. Left upper quadrant peritoneal biopsy - perm     References:    Click here for ordering Quick Tip   Question Answer Comment   Procedure Type: General Surgery    Specimen Class: Known or suspected malignancy    Which provider would you like to cc? BARB HARRIS JR    Release to patient Immediate        01/18/23 0845                            Condition: Stable    Disposition: PACU - hemodynamically stable.    Attestation: I was present and scrubbed for the entire procedure including all described  portions above.             Onur Calvin Jr, MD      Surgical Oncology      1/18/2023, 12:38 PM

## 2023-01-18 NOTE — DISCHARGE SUMMARY
[facility] - [facility] (2nd Fl)  Discharge Note  Short Stay    Procedure(s) (LRB):  LAPAROSCOPY, DIAGNOSTIC WITH WASHING (N/A)  BIOPSY, PERITONEUM (N/A)  LAVAGE, PERITONEAL, (N/A)      OUTCOME: Patient tolerated treatment/procedure well without complication and is now ready for discharge.    DISPOSITION: Home or Self Care    FINAL DIAGNOSIS:  Biliary obstruction, peritoneal disease concerning for malignancy    FOLLOWUP: In clinic in 1-2 weeks to discuss biopsy results and for a postop check    DISCHARGE INSTRUCTIONS: No heavy lifting greater than 10 lbs for 2 weeks. Ok to shower 1/19/2023, do not submerge incision underwater for 2 weeks. Diet as tolerated.     Clinical Reference Documents Added to Patient Instructions         Document    LAPAROSCOPY (ENGLISH)            TIME SPENT ON DISCHARGE: 15 minutes

## 2023-01-18 NOTE — TRANSFER OF CARE
Anesthesia Transfer of Care Note    Patient: Guevara Osullivan II    Procedure(s) Performed: Procedure(s) (LRB):  LAPAROSCOPY, DIAGNOSTIC WITH WASHING (N/A)  BIOPSY, PERITONEUM (N/A)  LAVAGE, PERITONEAL, (N/A)    Patient location: [facility]    Anesthesia Type: general    Transport from OR: Transported from OR on room air with adequate spontaneous ventilation    Post pain: adequate analgesia    Post assessment: no apparent anesthetic complications and tolerated procedure well    Post vital signs: stable    Level of consciousness: responds to stimulation and sedated    Nausea/Vomiting: no nausea/vomiting    Complications: none    Transfer of care protocol was followed      Last vitals:   Visit Vitals  BP (!) 146/85 (BP Location: Left arm, Patient Position: Lying)   Pulse 80   Temp 36.7 °C (98.1 °F) (Oral)   Resp 20   Ht 6' (1.829 m)   Wt 77.1 kg (170 lb)   SpO2 100%   BMI 23.06 kg/m²

## 2023-01-18 NOTE — H&P
H&P completed on 1/9/23 has been reviewed, the patient has been examined and:  I concur with the findings and no changes have occurred since H&P was written.    Proceed with diagnostic laparoscopy with washings           Onur Calvin Jr, MD      Surgical Oncology      1/18/2023, 7:24 AM

## 2023-01-18 NOTE — ANESTHESIA POSTPROCEDURE EVALUATION
Anesthesia Post Evaluation    Patient: Guevara Osullivan II    Procedure(s) Performed: Procedure(s) (LRB):  LAPAROSCOPY, DIAGNOSTIC WITH WASHING (N/A)  BIOPSY, PERITONEUM (N/A)  LAVAGE, PERITONEAL, (N/A)    Final Anesthesia Type: general      Patient location during evaluation: PACU  Patient participation: Yes- Able to Participate  Level of consciousness: awake and alert  Post-procedure vital signs: reviewed and stable  Pain management: adequate  Airway patency: patent    PONV status at discharge: No PONV  Anesthetic complications: no      Cardiovascular status: blood pressure returned to baseline  Respiratory status: unassisted  Hydration status: euvolemic  Follow-up not needed.          Vitals Value Taken Time   /88 01/18/23 1132   Temp 36.6 °C (97.9 °F) 01/18/23 1130   Pulse 77 01/18/23 1133   Resp 16 01/18/23 1133   SpO2 98 % 01/18/23 1131   Vitals shown include unvalidated device data.      No case tracking events are documented in the log.      Pain/Margo Score: Pain Rating Prior to Med Admin: 6 (1/18/2023 11:03 AM)  Margo Score: 10 (1/18/2023  9:30 AM)

## 2023-01-18 NOTE — DISCHARGE SUMMARY
[facility] ([facility])  Surgery Department  Operative Note    SUMMARY     Date of Procedure: 1/18/2023     Procedure: Procedure(s) (LRB):  LAPAROSCOPY, DIAGNOSTIC WITH WASHING (N/A)  BIOPSY, PERITONEUM (N/A)  LAVAGE, PERITONEAL, (N/A)     Surgeon(s) and Role:     * Onur Calvin Jr., MD - Primary    Assisting Surgeon: None    Pre-Operative Diagnosis: Dilated bile duct [K83.8]    Post-Operative Diagnosis: Post-Op Diagnosis Codes:     * Dilated bile duct [K83.8]  Peritoneal disease  Anesthesia: General    Operative Findings (including complications, if any): Diffuse peritoneal disease, biopsies taken    Description of Technical Procedures: see formal op note    Significant Surgical Tasks Conducted by the Assistant(s), if Applicable: None    Estimated Blood Loss (EBL): 5 cc           Implants: * No implants in log *    Specimens:   Specimen (24h ago, onward)       Start     Ordered    01/18/23 0845  Cytology, Fluid/Wash/Brush  Once        Question Answer Comment   Source: Peritoneal/abdominal/pelvic wash    Clinical Information: Suspected Carcinoma    Specific Site: Peritoneum    Other Requests: Peritoneal Washings for Cytology    Release to patient Immediate        01/18/23 0845    01/18/23 0836  Specimen to Pathology, Surgery General Surgery  Once        Comments: Pre-op Diagnosis: Dilated bile duct [K83.8]Procedure(s):LAPAROSCOPY, DIAGNOSTIC WITH WASHING Number of specimens: 3Name of specimens: 1. Midline abdominal wall biopsy - perm2. Right upper quadrant peritoneal biopsy - perm3. Left upper quadrant peritoneal biopsy - perm     References:    Click here for ordering Quick Tip   Question Answer Comment   Procedure Type: General Surgery    Specimen Class: Known or suspected malignancy    Which provider would you like to cc? ONUR CALVIN JR    Release to patient Immediate        01/18/23 0845                            Condition: Good    Disposition: PACU - hemodynamically stable.    Attestation: I was  present and scrubbed for the entire procedure.           Onur Calvin Jr, MD      Surgical Oncology      1/18/2023, 9:43 AM

## 2023-01-27 NOTE — PROGRESS NOTES
"      Encounter Date:  2023    Patient ID: Guevara Osullivan II  Age:  52 y.o. :  1970    Chief Complaint:  followup of metastatic pancreas cancer     2022: presented with abd pain, jaundice, wt loss  2022 - 2023: ERCP with bile duct stent; post procedure pancreatitis with pseudocyst  2023: s/p diag lap    Interval History:  Mr. Osullivan returns to clinic with his wife from [place], [place]. He was previously seen in clinic 23 with Dr. Calvin who then performed diag lap with peritoneal bx and washings on 23. CA 19-9 elevated >4000. At surgery, there was significant peritoneal disease in all 4 quadrants, on bowel serosa, liver capsule and gallbladder. All 3 biopsies and washings pathology positive for adenocarcinoma.   He has an appt tomorrow with med onc, Dr. Shell, in .   He continues to experience significant abd pain that radiates to R flank, "half relief" with Percocet 10/325 mg every 5 hours. Decreased appetite, minimal po intake but denies N/V. Trying to drink 2 homemade protein shakes daily. C/o constipation, some relief with Miralax. Activity limited by abd pain, no longer working.     New Data:  Imaging: Dr. Calvin and I personally reviewed the following images:   2023: CT A/P:  1. There has been interval placement of a drain from the fluid collection adjacent to the pancreas to the stomach.  There is gas within the fluid collection between the stomach and the pancreas.  This ill-defined fluid collection measures 5.9 cm in craniocaudal dimension by 4.8 cm in AP dimension by 6.4 cm in medial-lateral dimension on the current examination.  On the prior examination it measured 4.4 cm in craniocaudal dimension by 4.6 cm in AP dimension by 5.8 cm in medial-lateral dimension.  This is consistent with the patient's history of an abscess.  There may be secondary gastritis and pancreatitis.  2. The liver is enlarged. It measures 22.3 cm in craniocaudal dimension.  3. The " spleen is enlarged. It measures 15.0 cm in length.  4. There is a persistent 7 mm osseous density adjacent to the anterior inferior aspect of the L3 vertebral body.  This is characteristic of a limbus vertebra.  5. There are mild degenerative changes between L2 and L3.    Labs:  1/9/2023:      Lab Results   Component Value Date    CEA 5.8 (H) 01/09/2023       Chemistry        Component Value Date/Time     01/09/2023 1145    K 4.0 01/09/2023 1145     01/09/2023 1145    CO2 30 (H) 01/09/2023 1145    BUN 6 01/09/2023 1145    CREATININE 0.9 01/09/2023 1145    GLU 95 01/09/2023 1145        Component Value Date/Time    CALCIUM 9.6 01/09/2023 1145    ALKPHOS 502 (H) 01/09/2023 1145    AST 52 (H) 01/09/2023 1145    ALT 65 (H) 01/09/2023 1145    BILITOT 0.7 01/09/2023 1145        Lab Results   Component Value Date    WBC 7.43 01/09/2023    HGB 13.8 (L) 01/09/2023    HCT 42.8 01/09/2023    MCV 89 01/09/2023     (H) 01/09/2023     Lab Results   Component Value Date    PREALBUMIN 17 (L) 01/09/2023 1/18/2023: Pathology:    1. Abdominal wall, midline (punch biopsy): Invasive adenocarcinoma, well differentiated. See comment.   2. Right upper quadrant peritoneum (biopsy): Invasive adenocarcinoma, well differentiated   3. Left upper quadrant peritoneum (biopsy): Invasive adenocarcinoma, well differentiated     Immunohistochemistry (IHC) Testing for Mismatch Repair (MMR) Proteins: MLH1,MSH2, MSH6, PMS2 - Intact nuclear expression Background nonneoplastic tissue/internal control with intact nuclear expressio    Peritoneal washing, cytology: - Positive for malignant cells, adenocarcinoma, see comment.   COMMENT: The specimen shows clusters of malignant epithelial cells with vaque gland formation. Immunostains show tumor cells to be positive for CK7 and negative for CDX2, CK20, and CK5 6. This is consistent with adenocarcinoma. Correlate with concurrent performed biopsy ([facility]-). Case is reviewed by Dr. DE LA GARZA  Sandra who agrees with the above diagnosis.    Past Medical History:   Diagnosis Date    Cancer     COLON CANCER 1995    Digestive disorder     Hypertension     Primary sclerosing cholangitis     Ulcerative colitis     s/p colectomy with J- pouch     Past Surgical History:   Procedure Laterality Date    ABDOMINAL WASHOUT N/A 1/18/2023    Procedure: LAVAGE, PERITONEAL,;  Surgeon: Onur Calvin Jr., MD;  Location: [facility] OR [address] [place] [address];  Service: General;  Laterality: N/A;    BIOPSY OF PERITONEUM N/A 1/18/2023    Procedure: BIOPSY, PERITONEUM;  Surgeon: Onur Calvin Jr., MD;  Location: [facility] OR [address] [place] [address];  Service: General;  Laterality: N/A;    COLON SURGERY      Colectomy with J- pouch    DIAGNOSTIC LAPAROSCOPY N/A 1/18/2023    Procedure: LAPAROSCOPY, DIAGNOSTIC WITH WASHING;  Surgeon: Onur Calvin Jr., MD;  Location: [facility] OR [address] [place] [address];  Service: General;  Laterality: N/A;    ENDOSCOPIC ULTRASOUND OF UPPER GASTROINTESTINAL TRACT N/A 10/14/2022    Procedure: ULTRASOUND, UPPER GI TRACT,;  Surgeon: Vince Teran MD;  Location: [facility];  Service: Endoscopy;  Laterality: N/A;  upper and linear    ENDOSCOPIC ULTRASOUND OF UPPER GASTROINTESTINAL TRACT N/A 12/13/2022    Procedure: ULTRASOUND, UPPER GI TRACT, ENDOSCOPIC;  Surgeon: Vince Teran MD;  Location: [facility];  Service: Endoscopy;  Laterality: N/A;    ERCP N/A 10/14/2022    Procedure: ERCP (ENDOSCOPIC RETROGRADE CHOLANGIOPANCREATOGRAPHY) with spyglass;  Surgeon: Vince Teran MD;  Location: [facility];  Service: Endoscopy;  Laterality: N/A;    ERCP N/A 12/13/2022    Procedure: ERCP (ENDOSCOPIC RETROGRADE CHOLANGIOPANCREATOGRAPHY);  Surgeon: Vince Teran MD;  Location: [facility];  Service: Endoscopy;  Laterality: N/A;    ERCP N/A 12/20/2022    Procedure: ERCP (ENDOSCOPIC RETROGRADE CHOLANGIOPANCREATOGRAPHY);  Surgeon: Vince Teran MD;  Location: [facility];  Service: Endoscopy;  Laterality: N/A;    ERCP N/A  1/4/2023    Procedure: ERCP (ENDOSCOPIC RETROGRADE CHOLANGIOPANCREATOGRAPHY);  Surgeon: Vince Teran MD;  Location: [facility];  Service: Endoscopy;  Laterality: N/A;  room 1    ESOPHAGOGASTRODUODENOSCOPY N/A 12/20/2022    Procedure: EGD (ESOPHAGOGASTRODUODENOSCOPY);  Surgeon: Vince Teran MD;  Location: [facility];  Service: Endoscopy;  Laterality: N/A;    ESOPHAGOGASTRODUODENOSCOPY N/A 1/4/2023    Procedure: EGD (ESOPHAGOGASTRODUODENOSCOPY);  Surgeon: Vince Teran MD;  Location: [facility];  Service: Endoscopy;  Laterality: N/A;    POUCHOSCOPY       Current Outpatient Medications on File Prior to Visit   Medication Sig Dispense Refill    oxyCODONE-acetaminophen (PERCOCET)  mg per tablet Take 1 tablet by mouth every 6 (six) hours as needed for Pain. 20 tablet 0     No current facility-administered medications on file prior to visit.     Review of patient's allergies indicates:   Allergen Reactions    Vicryl [sutures, polyglycolic acid] Other (See Comments)     Wound dehiscence after achilles sx    Ciprofloxacin Other (See Comments)     Pt previously had medication reaction; suffered achilles rupture     Meperidine Hives       Family History:  His family history is not on file.     Social History:   reports that he has never smoked. He has never used smokeless tobacco. He reports that he does not currently use alcohol. He reports that he does not use drugs.     ROS:    Pertinent positive/negatives detailed in HPI, all other systems negative.     Physical Exam:  /77   Pulse 86   Temp 98.6 °F (37 °C)   Ht 6' (1.829 m)   Wt 78 kg (171 lb 14.4 oz)   SpO2 100%   BMI 23.31 kg/m²     Constitutional:  Non-toxic, no acute distress.  Eyes:  Sclerae anicteric, gaze symmetrical  Neck:  Trachea midline,  FROM  Resp:  Easy work of breathing  CV:  Regular pulse  Abd:  Soft, non-tender, no masses, no ascites  Musculoskeletal:  Ambulatory, normal gait, no muscle wasting.   Extremities are symmetrical without lymphedema.  Neuro:  No gross deficits  Psych:  Awake, alert, oriented.  Answers and asks questions appropriately      ICD-10-CM ICD-9-CM    1. Metastatic adenocarcinoma  C79.9 199.1       2. Unintentional weight loss  R63.4 783.21       Plan   This gentleman presented with abd pain, jaundice and weight loss in 11/2022. CA 19-9 elevated, ERCP with bile duct placement. He suffered post procedure pancreatitis with pseudocyst. Underwent diag lap which revealed extensive peritoneal disease. Reviewed tumor marker result and pathology of bx and washing with him and his wife. This represents stage IV cancer. Discussed tx option including systemic tx, which would be palliative in nature. There is no surgical option. Briefly reviewed chemotherapy and introduced concept of port in case he would benefit one; however he may have a local general surgeon place port if needed, but Dr. Calvin is willing and available as well.   He will also benefit from improved pain control, consider longer acting Rx. Discussed importance of maintaining bowel regimen. Reviewed nutrition and hydration benefits to enhance his ability to tolerate upcoming tx should he decide to pursue therapy.   He and his wife were given ample opportunity to ask questions which were answered to their satisfaction.     RTC prn.     Pt seen in conjunction with Dr. Calvin today.             Silvia Cortes NP  Upper GI / Hepatobiliary Surgical Oncology  [facility] [place], [place]  Office: [phone]  Fax: [phone]      CC: Dr. Shell

## 2023-01-27 NOTE — TELEPHONE ENCOUNTER
Left detailed voicemail informing patient that his 2/20/23 appointment will need to be cancelled as Dr. Wang is not in the office that day.     I will send a portal message as well.

## 2023-01-31 PROBLEM — K85.90 POST-ERCP ACUTE PANCREATITIS: Status: RESOLVED | Noted: 2022-01-01 | Resolved: 2023-01-01

## 2023-01-31 PROBLEM — K83.09 CHOLANGITIS: Status: RESOLVED | Noted: 2023-01-01 | Resolved: 2023-01-01

## 2023-01-31 PROBLEM — K91.89 POST-ERCP ACUTE PANCREATITIS: Status: RESOLVED | Noted: 2022-01-01 | Resolved: 2023-01-01

## 2023-01-31 PROBLEM — C17.9: Status: ACTIVE | Noted: 2023-01-01

## 2023-01-31 PROBLEM — C79.9 METASTATIC ADENOCARCINOMA: Status: ACTIVE | Noted: 2023-01-01

## 2023-01-31 PROBLEM — R64 CACHEXIA: Status: ACTIVE | Noted: 2023-01-01

## 2023-01-31 PROBLEM — C76.2 ABDOMINAL CARCINOMATOSIS: Status: ACTIVE | Noted: 2023-01-01

## 2023-01-31 NOTE — NURSING
Called to exam room by Dr. Shell to meet with pt and wife, newly dx pancreatic cancer. Explained my role as NN and gave them my card. Discussed myrisk lab draw, possible port placement, chemotherapy planning process. Also told them general process for [facility], getting tx there vs here and coordinating tx given here if ordered by [facility]. Together we scheduled CT scan 2/2, was also able to schedule gen surgery consult for port 2/2, all at .  Encouraged them to call [facility] for visit as they will need the same results we're waiting on. Explained multidisciplinary TB and possibility of starting tx within the next 7-10 days while awaiting genetic results which can take 14-21 days, but will schedule f/u with Dr. Shell post TB, same day. Walked to lab with pt, tech karly myrisk tube also. Wife became emotional during visit, was able to provide encouragement to both. We discussed roll of palliative care, answered their questions concerning family, working. Pt was able to p/u one script was told ms saldaña will be in tomorrow am, states he is ok coming back tomorrow to . All questions answered, they voiced understanding all information given and know to call with a  Oncology Navigation   Intake  Date of Diagnosis:  (negative path)  Cancer Type: GI  Internal / External Referral: Internal  Date of Referral: 01/19/23  Initial Nurse Navigator Contact: 01/19/23  Referral to Initial Contact Timeline (days): 0  Date Worked: 01/31/23  First Appointment Available: 01/06/23  Appointment Date: 01/09/23  First Available Date vs. Scheduled Date (days): 3     Treatment  Current Status: Staging work-up    Surgical Oncologist: Nikunj  Consult Date: 01/09/23    Medical Oncologist: Dr. Shell  Consult Date: 01/31/23       Procedures: CT  Biopsy Schedule Date: 12/20/22  CT Schedule Date: 02/02/23  EUS / EGD: EUS- 12/13/2022  MRI Schedule Date: 01/03/23             Support Systems: Spouse/significant other     Acuity  Stage: 2  ECOG:  1  Comorbidities in Medical History: 2  Support: 0  Transportation: 0  Verbalizes the need for more education: 1  Navigation Acuity: 6     Follow Up  Follow up in about 3 days (around 2/3/2023) for TB recs.     ny questions/ concerns.

## 2023-01-31 NOTE — PROGRESS NOTES
Vanessa orourkeg sent with contact info. Myrisk labeleld and packaged appropriately, will hand carry to [facility] drop box, trk# [phone].  Oncology Navigation   Intake  Date of Diagnosis:  (negative path)  Cancer Type: GI  Internal / External Referral: Internal  Date of Referral: 23  Initial Nurse Navigator Contact: 23  Referral to Initial Contact Timeline (days): 0  Date Worked: 23  First Appointment Available: 23  Appointment Date: 23  First Available Date vs. Scheduled Date (days): 3     Treatment  Current Status: Staging work-up    Surgical Oncologist: Nikunj  Consult Date: 23    Medical Oncologist: Dr. Shell  Consult Date: 23       Procedures: CT  Biopsy Schedule Date: 22  CT Schedule Date: 23  EUS / EGD: EUS- 2022  MRI Schedule Date: 23             Support Systems: Spouse/significant other     Acuity  Stage: 2  ECO  Comorbidities in Medical History: 2  Support: 0  Transportation: 0  Verbalizes the need for more education: 1  Navigation Acuity: 6     Follow Up  No follow-ups on file.

## 2023-01-31 NOTE — PROGRESS NOTES
Primary peritoneal carcinomatosis etiology undetermined.  As to site of origin Subjective:       Patient ID: Guevara Osullivan II is a 52 y.o. male.    Chief Complaint: Results and Colon Cancer    HPI:  52-year-old male referred for evaluation of intra-abdominal carcinomatosis.  Patient has a colectomy with J-pouch creation in 1995 for history of colon cancer.  I have asked to see the patient for further evaluation.  He is accompanied by his wife 70 lb weight loss over the last 6 months ECOG status 1 practicing     Past Medical History:   Diagnosis Date    Cancer     COLON CANCER 1995    Digestive disorder     Hypertension     Primary sclerosing cholangitis     Ulcerative colitis     s/p colectomy with J- pouch     History reviewed. No pertinent family history.  Social History     Socioeconomic History    Marital status: Single   Tobacco Use    Smoking status: Never    Smokeless tobacco: Never   Substance and Sexual Activity    Alcohol use: Not Currently    Drug use: Never     Past Surgical History:   Procedure Laterality Date    ABDOMINAL WASHOUT N/A 1/18/2023    Procedure: LAVAGE, PERITONEAL,;  Surgeon: Onur Calvin Jr., MD;  Location: [address];  Service: General;  Laterality: N/A;    BIOPSY OF PERITONEUM N/A 1/18/2023    Procedure: BIOPSY, PERITONEUM;  Surgeon: Onur Calvin Jr., MD;  Location: [facility] OR [address] [place] [address];  Service: General;  Laterality: N/A;    COLON SURGERY      Colectomy with J- pouch    DIAGNOSTIC LAPAROSCOPY N/A 1/18/2023    Procedure: LAPAROSCOPY, DIAGNOSTIC WITH WASHING;  Surgeon: Onur Calvin Jr., MD;  Location: [address];  Service: General;  Laterality: N/A;    ENDOSCOPIC ULTRASOUND OF UPPER GASTROINTESTINAL TRACT N/A 10/14/2022    Procedure: ULTRASOUND, UPPER GI TRACT,;  Surgeon: Vince Teran MD;  Location: [facility] ENDO;  Service: Endoscopy;  Laterality: N/A;  upper and linear    ENDOSCOPIC ULTRASOUND OF UPPER GASTROINTESTINAL TRACT N/A 12/13/2022     Procedure: ULTRASOUND, UPPER GI TRACT, ENDOSCOPIC;  Surgeon: Vince Teran MD;  Location: [facility];  Service: Endoscopy;  Laterality: N/A;    ERCP N/A 10/14/2022    Procedure: ERCP (ENDOSCOPIC RETROGRADE CHOLANGIOPANCREATOGRAPHY) with spyglass;  Surgeon: Vince Teran MD;  Location: [facility];  Service: Endoscopy;  Laterality: N/A;    ERCP N/A 12/13/2022    Procedure: ERCP (ENDOSCOPIC RETROGRADE CHOLANGIOPANCREATOGRAPHY);  Surgeon: Vince Teran MD;  Location: [facility];  Service: Endoscopy;  Laterality: N/A;    ERCP N/A 12/20/2022    Procedure: ERCP (ENDOSCOPIC RETROGRADE CHOLANGIOPANCREATOGRAPHY);  Surgeon: Vince Teran MD;  Location: [facility];  Service: Endoscopy;  Laterality: N/A;    ERCP N/A 1/4/2023    Procedure: ERCP (ENDOSCOPIC RETROGRADE CHOLANGIOPANCREATOGRAPHY);  Surgeon: Vince Teran MD;  Location: [facility];  Service: Endoscopy;  Laterality: N/A;  room 1    ESOPHAGOGASTRODUODENOSCOPY N/A 12/20/2022    Procedure: EGD (ESOPHAGOGASTRODUODENOSCOPY);  Surgeon: Vince Teran MD;  Location: [facility];  Service: Endoscopy;  Laterality: N/A;    ESOPHAGOGASTRODUODENOSCOPY N/A 1/4/2023    Procedure: EGD (ESOPHAGOGASTRODUODENOSCOPY);  Surgeon: Vince Teran MD;  Location: [facility];  Service: Endoscopy;  Laterality: N/A;    POUCHOSCOPY         Labs:  Lab Results   Component Value Date    WBC 7.43 01/09/2023    HGB 13.8 (L) 01/09/2023    HCT 42.8 01/09/2023    MCV 89 01/09/2023     (H) 01/09/2023     BMP  Lab Results   Component Value Date     01/09/2023    K 4.0 01/09/2023     01/09/2023    CO2 30 (H) 01/09/2023    BUN 6 01/09/2023    CREATININE 0.9 01/09/2023    CALCIUM 9.6 01/09/2023    ANIONGAP 10 01/09/2023     Lab Results   Component Value Date    ALT 65 (H) 01/09/2023    AST 52 (H) 01/09/2023     (H) 12/02/2022    ALKPHOS 502 (H) 01/09/2023    BILITOT 0.7 01/09/2023       Lab Results   Component  Value Date    IRON 51 11/02/2022    TIBC 330 11/02/2022    FERRITIN 296 11/02/2022     No results found for: [id number]  No results found for: FOLATE  No results found for: TSH      Review of Systems   Constitutional:  Positive for activity change, appetite change, fatigue and unexpected weight change. Negative for chills, diaphoresis and fever.   HENT:  Negative for congestion, dental problem, drooling, ear discharge, ear pain, facial swelling, hearing loss, mouth sores, nosebleeds, postnasal drip, rhinorrhea, sinus pressure, sneezing, sore throat, tinnitus, trouble swallowing and voice change.    Eyes:  Negative for photophobia, pain, discharge, redness, itching and visual disturbance.   Respiratory:  Negative for apnea, cough, choking, chest tightness, shortness of breath, wheezing and stridor.    Cardiovascular:  Negative for chest pain, palpitations and leg swelling.   Gastrointestinal:  Positive for abdominal distention and abdominal pain. Negative for anal bleeding, blood in stool, constipation, diarrhea, nausea, rectal pain and vomiting.   Endocrine: Negative for cold intolerance, heat intolerance, polydipsia, polyphagia and polyuria.   Genitourinary:  Negative for decreased urine volume, difficulty urinating, dysuria, enuresis, flank pain, frequency, genital sores, hematuria, penile discharge, penile pain, penile swelling, scrotal swelling, testicular pain and urgency.   Musculoskeletal:  Negative for arthralgias, back pain, gait problem, joint swelling, myalgias, neck pain and neck stiffness.   Skin:  Negative for color change, pallor, rash and wound.   Allergic/Immunologic: Negative for environmental allergies, food allergies and immunocompromised state.   Neurological:  Negative for dizziness, tremors, seizures, syncope, facial asymmetry, speech difficulty, weakness, light-headedness, numbness and headaches.   Hematological:  Negative for adenopathy. Does not bruise/bleed easily.    Psychiatric/Behavioral:  Positive for dysphoric mood. Negative for agitation, behavioral problems, confusion, decreased concentration, hallucinations, self-injury, sleep disturbance and suicidal ideas. The patient is nervous/anxious. The patient is not hyperactive.      Objective:      Physical Exam  Vitals reviewed.   Constitutional:       General: He is not in acute distress.     Appearance: He is well-developed. He is not diaphoretic.   HENT:      Head: Normocephalic.      Right Ear: External ear normal.      Left Ear: External ear normal.      Nose: Nose normal.      Right Sinus: No maxillary sinus tenderness or frontal sinus tenderness.      Left Sinus: No maxillary sinus tenderness or frontal sinus tenderness.      Mouth/Throat:      Pharynx: No oropharyngeal exudate.   Eyes:      General: Lids are normal. No scleral icterus.        Right eye: No discharge.         Left eye: No discharge.      Extraocular Movements:      Right eye: Normal extraocular motion.      Left eye: Normal extraocular motion.      Conjunctiva/sclera:      Right eye: Right conjunctiva is not injected. No hemorrhage.     Left eye: Left conjunctiva is not injected. No hemorrhage.     Pupils: Pupils are equal, round, and reactive to light.   Neck:      Thyroid: No thyromegaly.      Vascular: No JVD.      Trachea: No tracheal deviation.   Cardiovascular:      Rate and Rhythm: Normal rate.   Pulmonary:      Effort: Pulmonary effort is normal. No respiratory distress.      Breath sounds: No stridor.   Abdominal:      General: Bowel sounds are normal. There is distension.      Palpations: Abdomen is soft. There is no hepatomegaly, splenomegaly or mass.      Tenderness: There is no abdominal tenderness.   Musculoskeletal:         General: No tenderness. Normal range of motion.      Cervical back: Normal range of motion and neck supple.   Lymphadenopathy:      Head:      Right side of head: No posterior auricular or occipital adenopathy.       Left side of head: No posterior auricular or occipital adenopathy.      Cervical: No cervical adenopathy.      Right cervical: No superficial, deep or posterior cervical adenopathy.     Left cervical: No superficial, deep or posterior cervical adenopathy.      Upper Body:      Right upper body: No supraclavicular adenopathy.      Left upper body: No supraclavicular adenopathy.   Skin:     General: Skin is dry.      Findings: No erythema or rash.      Nails: There is no clubbing.   Neurological:      Mental Status: He is alert and oriented to person, place, and time.      Cranial Nerves: No cranial nerve deficit.      Motor: Weakness present.      Coordination: Coordination normal.   Psychiatric:         Behavior: Behavior normal.         Thought Content: Thought content normal.         Judgment: Judgment normal.           Assessment:      1. Primary peritoneal adenocarcinoma    2. Primary hypertension    3. Metastatic adenocarcinoma    4. Pure hypertriglyceridemia    5. Pancreatic cyst    6. Unintentional weight loss    7. 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 Abdominal carcinomatosis    9. Cachexia           Plan:     History of intra-abdominal carcinomatosis.  Etiology undetermined.  At this time patient had colon carcinoma in 1995 patient denies germ line testing done.  Will proceed with germ line testing today in addition will proceed with next generation sequencing through [facility] on both primary tumor as peripheral blood at this time will present at multidisciplinary tumor conference.  I have told the patient unless a molecular marker is found long-term prognosis is dependent on response to chemotherapy.  Most likely will treat with regimen consistent with probable either FOLFIRINOX or FOLFOX for either pancreatic and or GI malignancy origin.  Will discuss in multidisciplinary conference.  Also placed early palliative care discussed need for advanced care planning.  Started patient on MS  Contin 30 mg Q 12 hours escalating to Q 8 hours after 48 hours with Percocet for breakthrough pain nurse navigation present.  Discussed implications and answered questions in difficult situation article from up-to-date on cancer of unknown primary followed to patient for review and is more information entails will discuss further.  Referral placed for the [facility]; family ask whether not referral should be made to [facility] I told him not perfectly happy do that.  The patient has a very unusual malignancy with colon carcinoma at age 23.  Would recommend as many people to review case to offer patient best possible course of action.  Coordination once multidisciplinary tumor conference presentation has been made         Preston Shell Jr, MD FACP

## 2023-02-01 PROBLEM — D50.0 IRON DEFICIENCY ANEMIA DUE TO CHRONIC BLOOD LOSS: Status: ACTIVE | Noted: 2023-01-01

## 2023-02-01 NOTE — PLAN OF CARE
START ON PATHWAY REGIMEN - Pancreatic Adenocarcinoma    [id number]        Oxaliplatin (Eloxatin)       Leucovorin       Irinotecan (Camptosar)       Fluorouracil           Additional Orders: The use of growth factor was mandated in some, but   not all mFOLFIRINOX studies; please follow institutional protocol/physician   discretion regarding use of growth factor.    **Always confirm dose/schedule in your pharmacy ordering system**    Patient Characteristics:  Metastatic Disease, First Line, PS = 0,1, BRCA1/2 and PALB2  Mutation   Absent/Unknown  Therapeutic Status: Metastatic Disease  Line of Therapy: First Line  ECOG Performance Status: 1  BRCA1/2 Mutation Status: Awaiting Test Results  PALB2 Mutation Status: Awaiting Test Results  Intent of Therapy:  Non-Curative / Palliative Intent, Discussed with Patient

## 2023-02-01 NOTE — TELEPHONE ENCOUNTER
Your iron deficient which we will treat with iron.  The tumor markers demonstrate a markedly elevated CA 19 9 which is not totally specific for pancreatic origin does have a tendency to be elevated.  It also seen elevated in intra-abdominal disease general but mainly pancreatic origin we will discuss

## 2023-02-01 NOTE — TELEPHONE ENCOUNTER
----- Message from Delia Linares sent at 2/1/2023  1:16 PM CST -----  Contact: Chente Santa is calling from in regards to pt.  would like to speak with provider in regards to Pt care.Scope Biopsy. Please adv      Confirmed contact below:   Contact Name:Dr. Chente Santa   Phone Number: [phone]    
Returned call to Dr Santa office and spoke to Tere Informed her we have faxed  op notes and patho report to [phone].   
Clear

## 2023-02-02 NOTE — TELEPHONE ENCOUNTER
----- Message from Leonard Lee sent at 2/2/2023  4:35 PM CST -----  Contact: Guevara Waterman is calling to see if he can get an earlier appointment than his in April due to pain he is experiencing. Please call him at [phone]

## 2023-02-02 NOTE — PROGRESS NOTES
Palliative Referral Nurse Note     Nurse attempt to reach pt for scheduling, no answer, voice  message left,  Apt scheduled and palliative letter and apt mailed to pt

## 2023-02-02 NOTE — H&P (VIEW-ONLY)
History & Physical    SUBJECTIVE:     History of Present Illness:  Patient is a 52 y.o. male referred for Port-A-Cath for chemotherapy secondary to metastatic adenocarcinoma.  No previous Port-A-Cath or central line.  He is right-handed.    No chief complaint on file.      Review of patient's allergies indicates:   Allergen Reactions    Vicryl [sutures, polyglycolic acid] Other (See Comments)     Wound dehiscence after achilles sx    Ciprofloxacin Other (See Comments)     Pt previously had medication reaction; suffered achilles rupture     Meperidine Hives       Current Outpatient Medications   Medication Sig Dispense Refill    morphine (MS CONTIN) 30 MG 12 hr tablet Take 1 tablet (30 mg total) by mouth 3 (three) times daily. 90 tablet 0    oxyCODONE-acetaminophen (PERCOCET)  mg per tablet Take 1 tablet by mouth every 6 (six) hours as needed for Pain. 60 tablet 0     No current facility-administered medications for this visit.       Past Medical History:   Diagnosis Date    Cancer     COLON CANCER 1995    Digestive disorder     Hypertension     Primary sclerosing cholangitis     Ulcerative colitis     s/p colectomy with J- pouch     Past Surgical History:   Procedure Laterality Date    ABDOMINAL WASHOUT N/A 1/18/2023    Procedure: LAVAGE, PERITONEAL,;  Surgeon: Onur Calvin Jr., MD;  Location: [address];  Service: General;  Laterality: N/A;    BIOPSY OF PERITONEUM N/A 1/18/2023    Procedure: BIOPSY, PERITONEUM;  Surgeon: Onur Calvin Jr., MD;  Location: [address];  Service: General;  Laterality: N/A;    COLON SURGERY      Colectomy with J- pouch    DIAGNOSTIC LAPAROSCOPY N/A 1/18/2023    Procedure: LAPAROSCOPY, DIAGNOSTIC WITH WASHING;  Surgeon: Onur Calvin Jr., MD;  Location: [address];  Service: General;  Laterality: N/A;    ENDOSCOPIC ULTRASOUND OF UPPER GASTROINTESTINAL TRACT N/A 10/14/2022    Procedure: ULTRASOUND, UPPER GI TRACT,;  Surgeon: Vince Teran MD;   Location: [facility];  Service: Endoscopy;  Laterality: N/A;  upper and linear    ENDOSCOPIC ULTRASOUND OF UPPER GASTROINTESTINAL TRACT N/A 12/13/2022    Procedure: ULTRASOUND, UPPER GI TRACT, ENDOSCOPIC;  Surgeon: Vince Teran MD;  Location: [facility];  Service: Endoscopy;  Laterality: N/A;    ERCP N/A 10/14/2022    Procedure: ERCP (ENDOSCOPIC RETROGRADE CHOLANGIOPANCREATOGRAPHY) with spyglass;  Surgeon: Vince Teran MD;  Location: [facility];  Service: Endoscopy;  Laterality: N/A;    ERCP N/A 12/13/2022    Procedure: ERCP (ENDOSCOPIC RETROGRADE CHOLANGIOPANCREATOGRAPHY);  Surgeon: Vince Teran MD;  Location: [facility];  Service: Endoscopy;  Laterality: N/A;    ERCP N/A 12/20/2022    Procedure: ERCP (ENDOSCOPIC RETROGRADE CHOLANGIOPANCREATOGRAPHY);  Surgeon: Vince Teran MD;  Location: [facility];  Service: Endoscopy;  Laterality: N/A;    ERCP N/A 1/4/2023    Procedure: ERCP (ENDOSCOPIC RETROGRADE CHOLANGIOPANCREATOGRAPHY);  Surgeon: Vince Teran MD;  Location: [facility];  Service: Endoscopy;  Laterality: N/A;  room 1    ESOPHAGOGASTRODUODENOSCOPY N/A 12/20/2022    Procedure: EGD (ESOPHAGOGASTRODUODENOSCOPY);  Surgeon: Vince Teran MD;  Location: [facility];  Service: Endoscopy;  Laterality: N/A;    ESOPHAGOGASTRODUODENOSCOPY N/A 1/4/2023    Procedure: EGD (ESOPHAGOGASTRODUODENOSCOPY);  Surgeon: Vince Teran MD;  Location: [facility];  Service: Endoscopy;  Laterality: N/A;    POUCHOSCOPY       History reviewed. No pertinent family history.  Social History     Tobacco Use    Smoking status: Never    Smokeless tobacco: Never   Substance Use Topics    Alcohol use: Not Currently    Drug use: Never        Review of Systems:  Review of Systems   Constitutional:  Negative for chills, fever and unexpected weight change.   HENT:  Negative for congestion.    Eyes:  Negative for visual disturbance.   Respiratory:  Negative for shortness of  breath.    Cardiovascular:  Negative for chest pain.   Gastrointestinal:  Negative for abdominal distention, abdominal pain, constipation, nausea, rectal pain and vomiting.   Genitourinary:  Negative for dysuria.   Musculoskeletal:  Negative for arthralgias.   Skin:  Negative for rash.   Neurological:  Negative for light-headedness.   Hematological:  Negative for adenopathy.     OBJECTIVE:     Vital Signs (Most Recent)  Temp: 98 °F (36.7 °C) (02/02/23 0855)  Pulse: 78 (02/02/23 0855)  BP: 117/78 (02/02/23 0855)  6' (1.829 m)  77.6 kg (171 lb 1.2 oz)     Physical Exam:  Physical Exam  Vitals reviewed.   Constitutional:       Appearance: He is well-developed.   HENT:      Head: Normocephalic and atraumatic.   Cardiovascular:      Rate and Rhythm: Normal rate and regular rhythm.   Pulmonary:      Effort: Pulmonary effort is normal.      Breath sounds: Normal breath sounds.   Abdominal:      General: Bowel sounds are normal. There is no distension.      Palpations: Abdomen is soft.      Tenderness: There is no abdominal tenderness.   Musculoskeletal:      Cervical back: Normal range of motion and neck supple.   Skin:     General: Skin is warm and dry.   Neurological:      Mental Status: He is alert and oriented to person, place, and time.         ASSESSMENT/PLAN:     52-year-old male with metastatic adenocarcinoma    PLAN:Plan     Port placement 2/9/23  Preop: CBC, BMP; EKG reviewed  Risks and benefits discussed with patient including: Pain, bleeding, infection, injury to vessels, pneumothorax, need for further procedure

## 2023-02-02 NOTE — TELEPHONE ENCOUNTER
Nurse returned call to pt he knows he will be placed on wait list to be seen sooner. He knows to reach out to his oncologist for support until he can be worked in to palliative, pt is aware and understands, nurse will notify pt as soon as time slot come available.

## 2023-02-03 PROBLEM — Z90.49 S/P TOTAL COLECTOMY: Status: ACTIVE | Noted: 2023-01-01

## 2023-02-03 NOTE — PROGRESS NOTES
Subjective:       Patient ID: Guevara Osullivan II is a 52 y.o. male.    Chief Complaint: Results, Cancer, and Pancreatic Cancer    HPI:  52-year-old male seen after multidisciplinary tumor conference this morning where case was presented in presence of Surgical Oncology Medical Oncology Radiation Oncology in review of past ECOG status 1    Past Medical History:   Diagnosis Date    Cancer     COLON CANCER 1995    Digestive disorder     Hypertension     Primary sclerosing cholangitis     Ulcerative colitis     s/p colectomy with J- pouch     History reviewed. No pertinent family history.  Social History     Socioeconomic History    Marital status:    Tobacco Use    Smoking status: Never    Smokeless tobacco: Never   Substance and Sexual Activity    Alcohol use: Not Currently    Drug use: Never     Past Surgical History:   Procedure Laterality Date    ABDOMINAL WASHOUT N/A 1/18/2023    Procedure: LAVAGE, PERITONEAL,;  Surgeon: Onur Calvin Jr., MD;  Location: [facility] OR [address] [place] [address];  Service: General;  Laterality: N/A;    BIOPSY OF PERITONEUM N/A 1/18/2023    Procedure: BIOPSY, PERITONEUM;  Surgeon: Onur Calvin Jr., MD;  Location: [facility] OR [address] [place] [address];  Service: General;  Laterality: N/A;    COLON SURGERY      Colectomy with J- pouch    DIAGNOSTIC LAPAROSCOPY N/A 1/18/2023    Procedure: LAPAROSCOPY, DIAGNOSTIC WITH WASHING;  Surgeon: Onur Calvin Jr., MD;  Location: [facility] OR [address] [place] [address];  Service: General;  Laterality: N/A;    ENDOSCOPIC ULTRASOUND OF UPPER GASTROINTESTINAL TRACT N/A 10/14/2022    Procedure: ULTRASOUND, UPPER GI TRACT,;  Surgeon: Vince Teran MD;  Location: [facility];  Service: Endoscopy;  Laterality: N/A;  upper and linear    ENDOSCOPIC ULTRASOUND OF UPPER GASTROINTESTINAL TRACT N/A 12/13/2022    Procedure: ULTRASOUND, UPPER GI TRACT, ENDOSCOPIC;  Surgeon: Vince Teran MD;  Location: [facility];  Service: Endoscopy;  Laterality: N/A;    ERCP N/A 10/14/2022    Procedure:  ERCP (ENDOSCOPIC RETROGRADE CHOLANGIOPANCREATOGRAPHY) with spyglass;  Surgeon: Vince Teran MD;  Location: [facility];  Service: Endoscopy;  Laterality: N/A;    ERCP N/A 12/13/2022    Procedure: ERCP (ENDOSCOPIC RETROGRADE CHOLANGIOPANCREATOGRAPHY);  Surgeon: Vince Teran MD;  Location: [facility];  Service: Endoscopy;  Laterality: N/A;    ERCP N/A 12/20/2022    Procedure: ERCP (ENDOSCOPIC RETROGRADE CHOLANGIOPANCREATOGRAPHY);  Surgeon: Vince Teran MD;  Location: [facility];  Service: Endoscopy;  Laterality: N/A;    ERCP N/A 1/4/2023    Procedure: ERCP (ENDOSCOPIC RETROGRADE CHOLANGIOPANCREATOGRAPHY);  Surgeon: Vince Teran MD;  Location: [facility];  Service: Endoscopy;  Laterality: N/A;  room 1    ESOPHAGOGASTRODUODENOSCOPY N/A 12/20/2022    Procedure: EGD (ESOPHAGOGASTRODUODENOSCOPY);  Surgeon: Vince Teran MD;  Location: [facility];  Service: Endoscopy;  Laterality: N/A;    ESOPHAGOGASTRODUODENOSCOPY N/A 1/4/2023    Procedure: EGD (ESOPHAGOGASTRODUODENOSCOPY);  Surgeon: Vince Teran MD;  Location: [facility];  Service: Endoscopy;  Laterality: N/A;    POUCHOSCOPY         Labs:  Lab Results   Component Value Date    WBC 9.59 01/31/2023    HGB 12.1 (L) 01/31/2023    HCT 37.5 (L) 01/31/2023    MCV 90 01/31/2023     01/31/2023     BMP  Lab Results   Component Value Date     01/31/2023    K 4.4 01/31/2023    CL 99 01/31/2023    CO2 28 01/31/2023    BUN 11 01/31/2023    CREATININE 0.7 01/31/2023    CALCIUM 9.6 01/31/2023    ANIONGAP 14 01/31/2023     Lab Results   Component Value Date    ALT 72 (H) 01/31/2023    AST 49 (H) 01/31/2023     (H) 12/02/2022    ALKPHOS 527 (H) 01/31/2023    BILITOT 0.8 01/31/2023       Lab Results   Component Value Date    IRON 24 (L) 01/31/2023    TIBC 275 01/31/2023    FERRITIN 230 01/31/2023     No results found for: AEALIDAI67  No results found for: FOLATE  No results found for: TSH      Review  of Systems   Psychiatric/Behavioral:  Positive for dysphoric mood. The patient is nervous/anxious.      Objective:      Physical Exam  Constitutional:       Appearance: Normal appearance.           Assessment:      1. Abdominal carcinomatosis    2. Metastatic adenocarcinoma    3. Primary hypertension    4. Pure hypertriglyceridemia    5. Unintentional weight loss    6. S/P total colectomy           Plan:     The patient location is:  Home  The chief complaint leading to consultation is:  Intra-abdominal carcinomatosis    Visit type: audiovisual    Face to Face time with patient: 25 minutes of total time spent on the encounter, which includes face to face time and non-face to face time preparing to see the patient (eg, review of tests), Obtaining and/or reviewing separately obtained history, Documenting clinical information in the electronic or other health record, Independently interpreting results (not separately reported) and communicating results to the patient/family/caregiver, or Care coordination (not separately reported).         Each patient to whom he or she provides medical services by telemedicine is:  (1) informed of the relationship between the physician and patient and the respective role of any other health care provider with respect to management of the patient; and (2) notified that he or she may decline to receive medical services by telemedicine and may withdraw from such care at any time.    Notes:    Extensive conversation reviewed information presented most recent CT chest abdomen pelvis demonstrates fullness in pancreatic head.  In addition CA 19 9 is markedly elevated CEA is low.  Felt by group that represents most likely intra-abdominal carcinomatosis secondary to pancreatic carcinoma Tempus on material pending and germ line testing pending as well.  At this time we have talked about with a near total colectomy with ileo proctectomy that concern over use of irinotecan for increased risk of  diarrhea would be concerning.  At this time would recommend starting patient on gemcitabine and Abraxane.  Answered questions by patient has will ask that case be considered at precision Medicine program to see whether not he would qualify for research trial within [facility].  Discussed implications with him port placement next week and initiation of therapy when available      Preston Shell Jr, MD FACP

## 2023-02-03 NOTE — PLAN OF CARE
DISCONTINUE ON PATHWAY REGIMEN - Pancreatic Adenocarcinoma    [id number]        Oxaliplatin (Eloxatin)       Leucovorin       Irinotecan (Camptosar)       Fluorouracil           Additional Orders: The use of growth factor was mandated in some, but   not all mFOLFIRINOX studies; please follow institutional protocol/physician   discretion regarding use of growth factor.    **Always confirm dose/schedule in your pharmacy ordering system**    REASON: Other Reason  PRIOR TREATMENT: PANOS98    START ON PATHWAY REGIMEN - Pancreatic Adenocarcinoma    PANOS51        Nab-paclitaxel (protein bound) (Abraxane)       Gemcitabine     **Always confirm dose/schedule in your pharmacy ordering system**    Patient Characteristics:  Metastatic Disease, First Line, PS = 0,1, BRCA1/2 and PALB2  Mutation   Absent/Unknown  Therapeutic Status: Metastatic Disease  Line of Therapy: First Line  ECOG Performance Status: 1  BRCA1/2 Mutation Status: Awaiting Test Results  PALB2 Mutation Status: Awaiting Test Results  Intent of Therapy:  Non-Curative / Palliative Intent, Discussed with Patient

## 2023-02-03 NOTE — PROGRESS NOTES
Tumor Board Documentation      Guevara Osullivan II was presented by Preston Shell MD at our Tumor Board on 2/3/2023, which included representatives from Medical Oncology, Hematology, Surgical Oncology, Radiation Oncology, Pathology, Navigation, Genetics, Radiology, Gastrointestinal, Pulmonology.    Guevara currently presents as a current patient with Pancreatic cancer, with history of the following treatments: None.    Additionally, we reviewed previous medical and familial history, history of present illness, and recent lab results along with all available histopathologic and imaging studies. The tumor board considered available treatment options and made the following recommendations:  Chemotherapy     Pharmacy to review most effective systemic therapy options r/t pt having total colectomy. Once reviewed will plan to proceed with treatment.    The following procedures/referrals were also placed: No orders of the defined types were placed in this encounter.      Clinical Trial Status: Await additional information     National site-specific guidelines were discussed with respect to the case.    Tumor board is a meeting of clinicians from various specialty areas who evaluate and discuss patients for whom a multidisciplinary approach is being considered. Final determinations in the plan of care are those of the provider(s). The responsibility for follow up of recommendations given during tumor board is that of the provider.     Preston Shell MD

## 2023-02-04 NOTE — PROGRESS NOTES
Met with pt and wife while pt was waiting for CT scan, told them will meet with them after gen surg visit. Went to gen surg to see when pt would like to f/u with Dr. Shell to discuss TB results. They agreed to have a virtual visit 2/3 @ 840am. Told them that Dr. Shell will send me his orders after speaking with them, I in turn will reach out to them to schedule. States they will wait to hear from me and informed me that port is being placed . No other questions at this time, they have my call back number.   Oncology Navigation   Intake  Date of Diagnosis:  (negative path)  Cancer Type: GI  Internal / External Referral: Internal  Date of Referral: 23  Initial Nurse Navigator Contact: 23  Referral to Initial Contact Timeline (days): 0  Date Worked: 23  First Appointment Available: 23  Appointment Date: 23  First Available Date vs. Scheduled Date (days): 3     Treatment  Current Status: Staging work-up  Date Presented to Tumor Board: 23    Surgical Oncologist: Nikunj  Consult Date: 23    Medical Oncologist: Dr. Shell  Consult Date: 23       Procedures: CT  Biopsy Schedule Date: 22  CT Schedule Date: 23  EUS / EGD: EUS- 2022  MRI Schedule Date: 23             Support Systems: Spouse/significant other     Acuity  Stage: 2  Systemic Treatment - predicted or initiated: Chemotherapy Regimen with Multiple drugs (+1)  ECO  Comorbidities in Medical History: 2  Support: 0  Transportation: 0  Verbalizes the need for more education: 1  Navigation Acuity: 0     Follow Up  Follow up in 1 day (on 2/3/2023) for Review TB recommendations.

## 2023-02-08 NOTE — Clinical Note
Dr. Shell,  Thank you for sending this patient for review at the [facility] Tumor Board. This patient is potentially eligible for one of our late phase trials, [employer]. We can set up a visit to better evaluate the patient, but there is a chance this might be cholangiocarcinoma based on a quick review of the patient's imaging and chart.   Thank You, Shi Riggins NP

## 2023-02-08 NOTE — TELEPHONE ENCOUNTER
Called pt to let him know Dr. Shell would like to see him either in person or by vv to discuss clinical trials TB. Pt asked for a vv Friday, having port placed tomorrow. Offered 2/10 @ 1020am vv, pt agreeable. Notified Dr. Shell via in basket of appt date/time.    ----- Message from Preston Shell MD sent at 2/8/2023  2:36 PM CST -----  Please set up an appointment for me to meet with them to review the results of the clinical trials navigation team  ----- Message -----  From: Enzo Douglas MD  Sent: 2/8/2023   1:47 PM CST  To: Preston Shell MD, Shi Riggins NP    Hi Dr. Shell,  We discussed this patient today at our clinical trials tumor board.  After review, it seems that he may actually have an extrahepatic cholangiocarcinoma rather than pancreatic primary.  This probably makes sense in the context of him having ulcerative colitis and recently having PSC.  So unfortunately he wouldn't be eligible for our front-line panc study.  However, I would probably recommend offering him gemcitabine + cisplatin rather than gemcitabine + abraxane.  Thanks!  Lupillo

## 2023-02-08 NOTE — PROGRESS NOTES
[facility] Precision [facility] Therapies Program Tumor Board    Date: 2/8/2023    Patient Name: Guevara Osullivan II    MRN: [id number]    Diagnosis: Metastatic Pancreatic Cancer -  Diagnosed in Jan 2023.    Referring Provider: Dr. Shell    Present PCTP Providers:     Dr. Danny Armstrong, Dr. Wesley Hutchinson, Dr. Enzo Douglas, Shi Riggins, JOHN    Patient Summary:  Pathology:    Has failed       Current treatment(s):    ECOG: Restricted in physically strenuous activity but ambulatory and able to carry out work of a light or sedentary nature, e.g., light house work, office work    Molecular Workup:            Board Recommendations:    Standard of care recommendations:     Trial recommendations: Late phase: [employer] - screen for eligibility. Schedule intake visit. Early phase: [employer] - not eligible. Needs to fail gemcitabine.

## 2023-02-08 NOTE — ANESTHESIA PREPROCEDURE EVALUATION
02/08/2023  Guevara Osullivan II is a 52 y.o., male.    Past Medical History:   Diagnosis Date    Cancer     COLON CANCER 1995    Digestive disorder     Hypertension     Primary sclerosing cholangitis     Ulcerative colitis     s/p colectomy with J- pouch       Past Surgical History:   Procedure Laterality Date    ABDOMINAL WASHOUT N/A 1/18/2023    Procedure: LAVAGE, PERITONEAL,;  Surgeon: Onur Calvin Jr., MD;  [facility];  Service: General;  Laterality: N/A;    BIOPSY OF PERITONEUM N/A 1/18/2023    Procedure: BIOPSY, PERITONEUM;  Surgeon: Onur Calvin Jr., MD;  [facility];  Service: General;  Laterality: N/A;    COLON SURGERY      Colectomy with J- pouch    DIAGNOSTIC LAPAROSCOPY N/A 1/18/2023    Procedure: LAPAROSCOPY, DIAGNOSTIC WITH WASHING;  Surgeon: Onur Calvin Jr., MD;  [facility];  Service: General;  Laterality: N/A;    ENDOSCOPIC ULTRASOUND OF UPPER GASTROINTESTINAL TRACT N/A 10/14/2022    Procedure: ULTRASOUND, UPPER GI TRACT,;  Surgeon: Vince Teran MD;  Location: [facility];  Service: Endoscopy;  Laterality: N/A;  upper and linear    ENDOSCOPIC ULTRASOUND OF UPPER GASTROINTESTINAL TRACT N/A 12/13/2022    Procedure: ULTRASOUND, UPPER GI TRACT, ENDOSCOPIC;  Surgeon: Vince Teran MD;  Location: [facility];  Service: Endoscopy;  Laterality: N/A;    ERCP N/A 10/14/2022    Procedure: ERCP (ENDOSCOPIC RETROGRADE CHOLANGIOPANCREATOGRAPHY) with spyglass;  Surgeon: Vince Teran MD;  Location: [facility];  Service: Endoscopy;  Laterality: N/A;    ERCP N/A 12/13/2022    Procedure: ERCP (ENDOSCOPIC RETROGRADE CHOLANGIOPANCREATOGRAPHY);  Surgeon: Vince Teran MD;  Location: [facility];  Service: Endoscopy;  Laterality: N/A;    ERCP N/A 12/20/2022    Procedure: ERCP (ENDOSCOPIC RETROGRADE  CHOLANGIOPANCREATOGRAPHY);  Surgeon: Vince Teran MD;  Location: [facility];  Service: Endoscopy;  Laterality: N/A;    ERCP N/A 1/4/2023    Procedure: ERCP (ENDOSCOPIC RETROGRADE CHOLANGIOPANCREATOGRAPHY);  Surgeon: Vince Teran MD;  Location: [facility];  Service: Endoscopy;  Laterality: N/A;  room 1    ESOPHAGOGASTRODUODENOSCOPY N/A 12/20/2022    Procedure: EGD (ESOPHAGOGASTRODUODENOSCOPY);  Surgeon: Vince Teran MD;  Location: [facility];  Service: Endoscopy;  Laterality: N/A;    ESOPHAGOGASTRODUODENOSCOPY N/A 1/4/2023    Procedure: EGD (ESOPHAGOGASTRODUODENOSCOPY);  Surgeon: Vince Teran MD;  Location: [facility];  Service: Endoscopy;  Laterality: N/A;    POUCHOSCOPY         Pre-op Assessment    I have reviewed the Patient Summary Reports.     I have reviewed the Nursing Notes. I have reviewed the NPO Status.   I have reviewed the Medications.     Review of Systems  Anesthesia Hx:  No problems with previous Anesthesia Both DL and VL used in past GA's.  Grade 1 view with both. History of prior surgery of interest to airway management or planning: Previous anesthesia: General Airway issues documented on chart review include mask, easy  Denies Family Hx of Anesthesia complications.   Denies Personal Hx of Anesthesia complications.   Social:  Non-Smoker    Hematology/Oncology:  Hematology Normal        Cardiovascular:   Hypertension hyperlipidemia    Pulmonary:  Pulmonary Normal    Renal/:  Renal/ Normal     Hepatic/GI:   PUD, Liver Disease, Ulcerative colitis.  Colon CA, remote.  Pancreatic CA with carcinomatosis.  Constipation due to pain meds. Hepatic/GI Symptoms:  Esophageal / Stomach Disorders Peptic Ulcer Disease, Gastric Conditions:  Liver Disease    Neurological:  Neurology Normal    Psych:  Psychiatric Normal           Physical Exam  General: Alert and Oriented  Uncomfortable due to pain.  Airway:  Mallampati: II   Mouth Opening: Normal  TM Distance:  Normal  Tongue: Normal  Neck ROM: Normal ROM    Dental:  Intact    Chest/Lungs:  Clear to auscultation, Normal Respiratory Rate    Heart:  Rate: Normal  Rhythm: Regular Rhythm        Anesthesia Plan  Type of Anesthesia, risks & benefits discussed:    Anesthesia Type: Gen ETT  Intra-op Monitoring Plan: Standard ASA Monitors  Post Op Pain Control Plan: multimodal analgesia and IV/PO Opioids PRN  Induction:  IV  Airway Plan: Direct  Informed Consent: Informed consent signed with the Patient and all parties understand the risks and agree with anesthesia plan.  All questions answered.   ASA Score: 3  Day of Surgery Review of History & Physical: H&P Update referred to the surgeon/provider.    Ready For Surgery From Anesthesia Perspective.     .

## 2023-02-08 NOTE — TELEPHONE ENCOUNTER
Called and spoke with the patient  about the following:     Your Surgery arrival time is at 0900 on 2/9/2023 at [facility] location.   The address is [address] [place]  [address].      Only one adult (over 18) is to accompany you to surgery, unless it is a Pediatric patient, then 2 adults are encouraged to accompany them to the surgery center.     Your ride MUST STAY the entire time until you are discharged.      Please come to the main lobby and be prepared to show your photo ID and insurance card.      Nothing to eat or drink after midnight, unless you were instructed to take specific medications discussed with the Pre-admit Nurse.      Please call [phone] or [phone] with any questions or concerns.      Thanks.

## 2023-02-08 NOTE — PROGRESS NOTES
Called and spoke with pt and wife to schedule chemo education and start date. Pt states he has appt with [facility] Monday,  and would like to get their opinion before proceeding with tx plan. Told them absolutely and if the plan they recommend can be given here we will coordinate with them. Told them [facility] uses Epic emr and are able to view Dr. Shell' notes. Asked them to call me after the visit to let me know their plans so that we can plan to start tx asap if they decide to receive tx here in [place]. Tx plan drug names given to pt per his request. They have no further questions at this time but have my number should that change.   Oncology Navigation   Intake  Date of Diagnosis:  (negative path)  Cancer Type: GI  Internal / External Referral: Internal  Date of Referral: 23  Initial Nurse Navigator Contact: 23  Referral to Initial Contact Timeline (days): 0  Date Worked: 23  First Appointment Available: 23  Appointment Date: 23  First Available Date vs. Scheduled Date (days): 3  Reason for Treatment Delay: -- (seeking 2nd opinion @ [facility])     Treatment  Current Status: Staging work-up  Date Presented to Tumor Board: 23    Surgical Oncologist: Nikunj  Consult Date: 23    Medical Oncologist: Dr. Shell  Consult Date: 23  Chemotherapy: Planned  Chemotherapy Regimen: Gemcitabine/Abraxane       Procedures: Port / PICC  Biopsy Schedule Date: 22  CT Schedule Date: 23  EUS / EGD: EUS- 2022  MRI Schedule Date: 23  Port / PICC Schedule Date: 23             Support Systems: Family members     Acuity  Stage: 2  Systemic Treatment - predicted or initiated: Chemotherapy Regimen with Multiple drugs (+1)  ECO  Comorbidities in Medical History: 2  Support: 0  Transportation: 0  Verbalizes the need for more education: 1  Navigation Acuity: 0     Follow Up  Follow up in about 9 days (around 2023) for possible return from [facility] to discuss tx plans.

## 2023-02-09 PROBLEM — C48.2 PRIMARY PERITONEAL ADENOCARCINOMA: Status: ACTIVE | Noted: 2023-01-01

## 2023-02-09 NOTE — ANESTHESIA PROCEDURE NOTES
Intubation    Date/Time: 2/9/2023 10:44 AM  Performed by: Vic Boo CRNA  Authorized by: Leslye Mohr MD     Intubation:     Induction:  Intravenous    Intubated:  Postinduction    Mask Ventilation:  Not attempted    Attempts:  1    Attempted By:  CRNA    Method of Intubation:  Direct    Blade:  Kendrick 2    Laryngeal View Grade: Grade I - full view of cords      Difficult Airway Encountered?: No      Complications:  None    Airway Device:  Oral endotracheal tube    Airway Device Size:  7.0    Inflation Amount (mL):  6    Tube secured:  22    Secured at:  The lips    Placement Verified By:  Capnometry    Complicating Factors:  None    Findings Post-Intubation:  BS equal bilateral  Notes:      RSI w/ Cricoid pressure due to pt nauseated and constipated

## 2023-02-09 NOTE — TRANSFER OF CARE
Anesthesia Transfer of Care Note    Patient: Guevara Osullivan II    Procedure(s) Performed: Procedure(s) (LRB):  [id number] (N/A)    Patient location: PACU    Anesthesia Type: general    Transport from OR: Transported from OR on room air with adequate spontaneous ventilation    Post pain: adequate analgesia    Post assessment: no apparent anesthetic complications and tolerated procedure well    Post vital signs: stable    Level of consciousness: sedated    Nausea/Vomiting: no nausea/vomiting    Complications: none    Transfer of care protocol was followed      Last vitals:   Visit Vitals  /84 (BP Location: Right arm, Patient Position: Sitting)   Pulse 85   Temp 36.2 °C (97.1 °F) (Temporal)   Ht 6' (1.829 m)   Wt 75.8 kg (167 lb 1.7 oz)   SpO2 98%   BMI 22.66 kg/m²

## 2023-02-09 NOTE — DISCHARGE SUMMARY
[facility]  Discharge Note  Short Stay    Procedure(s) (LRB):  [id number] (N/A)      OUTCOME: Patient tolerated treatment/procedure well without complication and is now ready for discharge.    DISPOSITION: Home or Self Care    FINAL DIAGNOSIS:  Primary peritoneal adenocarcinoma    FOLLOWUP: In clinic    DISCHARGE INSTRUCTIONS:    Discharge Procedure Orders   Diet general     Call MD for:  temperature >100.4     Call MD for:  persistent nausea and vomiting     Call MD for:  severe uncontrolled pain     Call MD for:  difficulty breathing, headache or visual disturbances     Call MD for:  redness, tenderness, or signs of infection (pain, swelling, redness, odor or green/yellow discharge around incision site)     Call MD for:  hives     Call MD for:  persistent dizziness or light-headedness     Call MD for:  extreme fatigue     Remove dressing in 48 hours     Activity as tolerated     Shower on day dressing removed (No bath)         Clinical Reference Documents Added to Patient Instructions         Document    [facility] DISCHARGE INSTRUCTIONS (ENGLISH)            TIME SPENT ON DISCHARGE: 30 minutes

## 2023-02-09 NOTE — ANESTHESIA POSTPROCEDURE EVALUATION
Anesthesia Post Evaluation    Patient: Guevara Osulliavn II    Procedure(s) Performed: Procedure(s) (LRB):  [id number] (Left)    Final Anesthesia Type: general      Patient location during evaluation: PACU  Patient participation: Yes- Able to Participate  Level of consciousness: awake and alert and oriented  Post-procedure vital signs: reviewed and stable  Pain management: adequate  Airway patency: patent    PONV status at discharge: No PONV  Anesthetic complications: no      Cardiovascular status: blood pressure returned to baseline, stable and hemodynamically stable  Respiratory status: unassisted  Hydration status: euvolemic  Follow-up not needed.          Vitals Value Taken Time   /81 02/09/23 1220   Temp 36.7 °C (98.1 °F) 02/09/23 1142   Pulse 88 02/09/23 1220   Resp 14 02/09/23 1220   SpO2 97 % 02/09/23 1220   Vitals shown include unvalidated device data.      No case tracking events are documented in the log.      Pain/Margo Score: Margo Score: 9 (2/9/2023 12:12 PM)

## 2023-02-09 NOTE — PLAN OF CARE
Pt noted to have blanchable redness to sacrum during transfer from stretcher to OR bed.  VERONICA Boo CRNA suctioned patient's stomach post procedure prior to extubation and 300 ml of cloudy, brownish green liquid which smelled like stool was obtained.  Dr. Martínez was notifed and this information was given to PACU RN during handoff report

## 2023-02-09 NOTE — TELEPHONE ENCOUNTER
----- Message from Didi Quarles sent at 2/9/2023 11:25 AM CST -----  Contact: Ann with [employer]  Ann with [employer] would like to consult with a nurse in regards to information about the patients sex at birth. Please call back at [phone] ext [phone]. Thanks KD

## 2023-02-09 NOTE — TELEPHONE ENCOUNTER
Returned Ann phone call. Informed her that patient is listed on all documents in chart as Male. She verbalized understanding and had no other issues at this time.

## 2023-02-10 NOTE — PROGRESS NOTES
Subjective:       Patient ID: Guevara Osullivan II is a 52 y.o. male.    Chief Complaint: Results and Chemotherapy    HPI:  52-year-old male returns for video visit patient was presented at precision Medicine conference with recommendations to consider changing treatment regimen from oxaliplatin gemcitabine cisplatin gemcitabine for possible consideration of cholangiocarcinoma.  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  No other actionable mutations present.  Awaiting results of germ line testing ECOG status 2 increasing abdominal discomfort    Past Medical History:   Diagnosis Date    Cancer     COLON CANCER 1995    Digestive disorder     Hypertension     Primary sclerosing cholangitis     Ulcerative colitis     s/p colectomy with J- pouch     History reviewed. No pertinent family history.  Social History     Socioeconomic History    Marital status:    Tobacco Use    Smoking status: Never     Passive exposure: Never    Smokeless tobacco: Never   Substance and Sexual Activity    Alcohol use: Not Currently    Drug use: Yes     Types: Marijuana     Comment: medical marijuana     Past Surgical History:   Procedure Laterality Date    ABDOMINAL WASHOUT N/A 1/18/2023    Procedure: LAVAGE, PERITONEAL,;  Surgeon: Onur Calvin Jr., MD;  Location: [facility] OR [address] [place] [address];  Service: General;  Laterality: N/A;    BIOPSY OF PERITONEUM N/A 1/18/2023    Procedure: BIOPSY, PERITONEUM;  Surgeon: Onur Calvin Jr., MD;  Location: [facility] OR [address] [place] [address];  Service: General;  Laterality: N/A;    COLON SURGERY      Colectomy with J- pouch    DIAGNOSTIC LAPAROSCOPY N/A 1/18/2023    Procedure: LAPAROSCOPY, DIAGNOSTIC WITH WASHING;  Surgeon: Onur Calvin Jr., MD;  Location: [address];  Service: General;  Laterality: N/A;    ENDOSCOPIC ULTRASOUND OF UPPER GASTROINTESTINAL TRACT N/A 10/14/2022    Procedure: ULTRASOUND, UPPER GI TRACT,;  Surgeon: Vince Teran MD;  Location: [facility]  ENDO;  Service: Endoscopy;  Laterality: N/A;  upper and linear    ENDOSCOPIC ULTRASOUND OF UPPER GASTROINTESTINAL TRACT N/A 12/13/2022    Procedure: ULTRASOUND, UPPER GI TRACT, ENDOSCOPIC;  Surgeon: Vince Teran MD;  Location: [facility];  Service: Endoscopy;  Laterality: N/A;    ERCP N/A 10/14/2022    Procedure: ERCP (ENDOSCOPIC RETROGRADE CHOLANGIOPANCREATOGRAPHY) with spyglass;  Surgeon: Vince Teran MD;  Location: [facility];  Service: Endoscopy;  Laterality: N/A;    ERCP N/A 12/13/2022    Procedure: ERCP (ENDOSCOPIC RETROGRADE CHOLANGIOPANCREATOGRAPHY);  Surgeon: Vince Teran MD;  Location: [facility];  Service: Endoscopy;  Laterality: N/A;    ERCP N/A 12/20/2022    Procedure: ERCP (ENDOSCOPIC RETROGRADE CHOLANGIOPANCREATOGRAPHY);  Surgeon: Vince Teran MD;  Location: [facility];  Service: Endoscopy;  Laterality: N/A;    ERCP N/A 1/4/2023    Procedure: ERCP (ENDOSCOPIC RETROGRADE CHOLANGIOPANCREATOGRAPHY);  Surgeon: Vince Teran MD;  Location: [facility];  Service: Endoscopy;  Laterality: N/A;  room 1    ESOPHAGOGASTRODUODENOSCOPY N/A 12/20/2022    Procedure: EGD (ESOPHAGOGASTRODUODENOSCOPY);  Surgeon: Vince Teran MD;  Location: [facility];  Service: Endoscopy;  Laterality: N/A;    ESOPHAGOGASTRODUODENOSCOPY N/A 1/4/2023    Procedure: EGD (ESOPHAGOGASTRODUODENOSCOPY);  Surgeon: Vince Teran MD;  Location: [facility];  Service: Endoscopy;  Laterality: N/A;    POUCHOSCOPY         Labs:  Lab Results   Component Value Date    WBC 9.59 01/31/2023    HGB 12.1 (L) 01/31/2023    HCT 37.5 (L) 01/31/2023    MCV 90 01/31/2023     01/31/2023     BMP  Lab Results   Component Value Date     01/31/2023    K 4.4 01/31/2023    CL 99 01/31/2023    CO2 28 01/31/2023    BUN 11 01/31/2023    CREATININE 0.7 01/31/2023    CALCIUM 9.6 01/31/2023    ANIONGAP 14 01/31/2023     Lab Results   Component Value Date    ALT 72 (H)  01/31/2023    AST 49 (H) 01/31/2023     (H) 12/02/2022    ALKPHOS 527 (H) 01/31/2023    BILITOT 0.8 01/31/2023       Lab Results   Component Value Date    IRON 24 (L) 01/31/2023    TIBC 275 01/31/2023    FERRITIN 230 01/31/2023     No results found for: [id number]  No results found for: FOLATE  No results found for: TSH      Review of Systems   Constitutional:  Positive for fatigue and unexpected weight change. Negative for activity change, appetite change, chills, diaphoresis and fever.   HENT:  Negative for congestion, dental problem, drooling, ear discharge, ear pain, facial swelling, hearing loss, mouth sores, nosebleeds, postnasal drip, rhinorrhea, sinus pressure, sneezing, sore throat, tinnitus, trouble swallowing and voice change.    Eyes:  Negative for photophobia, pain, discharge, redness, itching and visual disturbance.   Respiratory:  Negative for apnea, cough, choking, chest tightness, shortness of breath, wheezing and stridor.    Cardiovascular:  Negative for chest pain, palpitations and leg swelling.   Gastrointestinal:  Positive for abdominal distention, abdominal pain and nausea. Negative for anal bleeding, blood in stool, constipation, diarrhea, rectal pain and vomiting.   Endocrine: Negative for cold intolerance, heat intolerance, polydipsia, polyphagia and polyuria.   Genitourinary:  Negative for decreased urine volume, difficulty urinating, dysuria, enuresis, flank pain, frequency, genital sores, hematuria, penile discharge, penile pain, penile swelling, scrotal swelling, testicular pain and urgency.   Musculoskeletal:  Negative for arthralgias, back pain, gait problem, joint swelling, myalgias, neck pain and neck stiffness.   Skin:  Negative for color change, pallor, rash and wound.   Allergic/Immunologic: Negative for environmental allergies, food allergies and immunocompromised state.   Neurological:  Positive for weakness. Negative for dizziness, tremors, seizures, syncope, facial  asymmetry, speech difficulty, light-headedness, numbness and headaches.   Hematological:  Negative for adenopathy. Does not bruise/bleed easily.   Psychiatric/Behavioral:  Positive for dysphoric mood. Negative for agitation, behavioral problems, confusion, decreased concentration, hallucinations, self-injury, sleep disturbance and suicidal ideas. The patient is nervous/anxious. The patient is not hyperactive.      Objective:      Physical Exam  Constitutional:       Appearance: Normal appearance. He is ill-appearing.           Assessment:      1. Abdominal carcinomatosis    2. Chronic idiopathic constipation    3. Metastatic adenocarcinoma    4. 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 Primary peritoneal adenocarcinoma           Plan:   The patient location is:  Home  The chief complaint leading to consultation is:  Cancer    Visit type: audiovisual    Face to Face time with patient: 25 minutes of total time spent on the encounter, which includes face to face time and non-face to face time preparing to see the patient (eg, review of tests), Obtaining and/or reviewing separately obtained history, Documenting clinical information in the electronic or other health record, Independently interpreting results (not separately reported) and communicating results to the patient/family/caregiver, or Care coordination (not separately reported).         Each patient to whom he or she provides medical services by telemedicine is:  (1) informed of the relationship between the physician and patient and the respective role of any other health care provider with respect to management of the patient; and (2) notified that he or she may decline to receive medical services by telemedicine and may withdraw from such care at any time.    Notes:    Reviewed information today have change as per recommendations of conference to cisplatin gemcitabine in treatment of metastatic cholangiocarcinoma.  From oxaliplatin  gemcitabine for presumptive diagnosis of pancreatic cancer.  Awaiting results of germ line testing.  DNA on peripheral blood as well as on material from [facility] demonstrated no actionable mutation.  MSI stable as well as MDM2 potential mutation in possible phase 1 study.  Will defer final treatment recommendations until after visit MD [facility] MediPort has been placed.  Prescriptions for lactulose as nausea medicines given continue with current treatment recommendations awaiting review nurse navigation team on board      Med Onc Chart Routing      Follow up with physician 1 week. Return after visit from [facility] orders placed for cisplatin gemcitabine   Follow up with [facility]    Infusion scheduling note    Injection scheduling note Orders placed with cisplatin gemcitabine   Labs    Imaging    Pharmacy appointment    Other referrals         Preston Shell Jr, MD FACP

## 2023-02-10 NOTE — PLAN OF CARE
DISCONTINUE ON PATHWAY REGIMEN - Pancreatic Adenocarcinoma    [id number]        Nab-paclitaxel (protein bound) (Abraxane)       Gemcitabine     **Always confirm dose/schedule in your pharmacy ordering system**    REASON: Other Reason  PRIOR TREATMENT: [id number]  TREATMENT RESPONSE: Unable to Evaluate    Pancreatic Adenocarcinoma - No Medical Intervention - Off Treatment.    Patient Characteristics:  Metastatic Disease, First Line, PS = 0,1, BRCA1/2 and PALB2  Mutation   Absent/Unknown  Therapeutic Status: Metastatic Disease  Line of Therapy: First Line  ECOG Performance Status: 1  BRCA1/2 Mutation Status: Awaiting Test Results  PALB2 Mutation Status: Awaiting Test Results

## 2023-02-14 NOTE — PROGRESS NOTES
Called and spoke with pt and wife this am. Pt currently admitted @ [facility] but expects to be d/c home today. Would like to start chemo in  asap, initial plan was a 2/13 appt @ [facility] but was hospitalized over the weekend so no longer interested in going at this time. Scheduled chemo education, audiogram, lab and MD follow up 2/15 starting @ 1245 all at the [place] location. Scheduled pt to start chemo 12/16 @ 10am at the [place] also. Asked them to sign CHRYSTAL while @  [facility] and that I'm requesting pt scan from ED be sent to us via power share (faxed request to [phone]). Told them chemo chair time would be ~ 6hours, can eat non fried, non greasy breakfast and bring snacks/food with them and/or go out to get food. Also said nurse will go into detail at teaching. Explained reason for baseline hearing test, iron ordered by Dr. Shell and next weeks appts would be scheduled prior to them leaving on Thursday. We discussed calling me with questions, concerns, possibly needing fluids a few days after tx. Both voiced understanding, have no other questions at this time but have my number in case. Notified SW and FC of new pt start. Also notified nurse educator of urgent new teaching add.   Oncology Navigation   Intake  Date of Diagnosis:  (negative path)  Cancer Type: GI  Internal / External Referral: Internal  Date of Referral: 01/19/23  Initial Nurse Navigator Contact: 01/19/23  Referral to Initial Contact Timeline (days): 0  Date Worked: 02/14/23  First Appointment Available: 01/06/23  Appointment Date: 01/09/23  First Available Date vs. Scheduled Date (days): 3  Reason for Treatment Delay: -- (seeking 2nd opinion @ [facility])     Treatment  Current Status: Staging work-up  Date Presented to Tumor Board: 02/03/23    Surgical Oncologist: Nikunj  Consult Date: 01/09/23    Medical Oncologist: Dr. Shell  Consult Date: 01/31/23  Chemotherapy: Planned  Chemotherapy Regimen: Gemcitabine/Cisplatin       Procedures: Port / PICC  Biopsy Schedule  Date: 22  CT Schedule Date: 23  EUS / EGD: EUS- 2022  MRI Schedule Date: 23  Port / PICC Schedule Date: 23             Support Systems: Family members     Acuity  Stage: 2  Systemic Treatment - predicted or initiated: Chemotherapy Regimen with Multiple drugs (+1)  ECO  Comorbidities in Medical History: 2  Support: 0  Transportation: 0  Verbalizes the need for more education: 1  Navigation Acuity: 0     Follow Up  Follow up in 2 days (on 2023) for C1D1 chemo.

## 2023-02-15 PROBLEM — D84.821 IMMUNODEFICIENCY DUE TO CHEMOTHERAPY: Status: ACTIVE | Noted: 2023-01-01

## 2023-02-15 PROBLEM — Z79.899 IMMUNODEFICIENCY DUE TO CHEMOTHERAPY: Status: ACTIVE | Noted: 2023-01-01

## 2023-02-15 PROBLEM — T45.1X5A IMMUNODEFICIENCY DUE TO CHEMOTHERAPY: Status: ACTIVE | Noted: 2023-01-01

## 2023-02-15 NOTE — PROGRESS NOTES
Referring Provider: Preston Shell MD    Guevara Osullivan II was seen 02/15/2023 for an audiological evaluation due to ototoxic medications. He has a history of noise exposure with use of hearing protection. There is a family history of age-related hearing loss. Patient denied difficulties hearing, tinnitus, and vertigo.    Otoscopy revealed clear canals with visualization of the tympanic membrane in both ears. Tympanograms were Type A for the right ear and Type A for the left ear. Audiometry revealed a mild sensorineural hearing loss through 500 Hz rising to normal hearing sensitivity through 3000 Hz sloping to a mild sensorineural hearing loss at 4000 Hz to normal hearing sensitivity at 4418-2399 Hz for the right ear, and mild sensorineural hearing loss through 750 Hz rising to normal hearing sensitivity through 2000 Hz sloping to a mild sensorineural hearing loss at 1947-5747 Hz to normal hearing sensitivity at 6002-4959 Hz for the left ear. Speech Reception Thresholds were  20 dBHL for the right ear and 15 dBHL for the left ear. Word recognition scores were excellent for the right ear and excellent for the left ear.    Distortion Product Otoacoustic Emissions (DPOAE'S) were evaluated from 1.5-12 kHz in both ears. Present OAEs are consistent at normal outer hair cell function at those frequencies.   Right Ear: PRESENT @ 1.5 -3 kHz  Left Ear:  PRESENT @ all frequencies except 4 kHz    Patient was counseled on the above findings.    Recommendations:  Repeat audiological evaluation in 3-6 months to monitor, or sooner if needed.

## 2023-02-15 NOTE — PROGRESS NOTES
Subjective:       Patient ID: Guevara Osullivan II is a 53 y.o. male.    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  With recommendations as such.  Results of next generation sequencing as well as germ line testing pending ECOG status 1 recent source day hospitalization for bowel obstruction at [facility]    Past Medical History:   Diagnosis Date    Cancer     COLON CANCER 1995    Digestive disorder     Hypertension     Primary sclerosing cholangitis     Ulcerative colitis     s/p colectomy with J- pouch     History reviewed. No pertinent family history.  Social History     Socioeconomic History    Marital status:    Tobacco Use    Smoking status: Never     Passive exposure: Never    Smokeless tobacco: Never   Substance and Sexual Activity    Alcohol use: Not Currently    Drug use: Yes     Types: Marijuana     Comment: medical marijuana     Past Surgical History:   Procedure Laterality Date    ABDOMINAL WASHOUT N/A 1/18/2023    Procedure: LAVAGE, PERITONEAL,;  Surgeon: Onur Calvin Jr., MD;  Location: [address];  Service: General;  Laterality: N/A;    BIOPSY OF PERITONEUM N/A 1/18/2023    Procedure: BIOPSY, PERITONEUM;  Surgeon: Onur Calvin Jr., MD;  Location: [address];  Service: General;  Laterality: N/A;    COLON SURGERY      Colectomy with J- pouch    DIAGNOSTIC LAPAROSCOPY N/A 1/18/2023    Procedure: LAPAROSCOPY, DIAGNOSTIC WITH WASHING;  Surgeon: Onur Calvin Jr., MD;  Location: [address];  Service: General;  Laterality: N/A;    ENDOSCOPIC ULTRASOUND OF UPPER GASTROINTESTINAL TRACT N/A 10/14/2022    Procedure: ULTRASOUND, UPPER GI TRACT,;  Surgeon: Vince Teran MD;  Location[facility] ENDO;  Service: Endoscopy;  Laterality: N/A;  upper and linear     ENDOSCOPIC ULTRASOUND OF UPPER GASTROINTESTINAL TRACT N/A 12/13/2022    Procedure: ULTRASOUND, UPPER GI TRACT, ENDOSCOPIC;  Surgeon: Vince Teran MD;  Location: [facility];  Service: Endoscopy;  Laterality: N/A;    ERCP N/A 10/14/2022    Procedure: ERCP (ENDOSCOPIC RETROGRADE CHOLANGIOPANCREATOGRAPHY) with spyglass;  Surgeon: Vince Teran MD;  Location: [facility];  Service: Endoscopy;  Laterality: N/A;    ERCP N/A 12/13/2022    Procedure: ERCP (ENDOSCOPIC RETROGRADE CHOLANGIOPANCREATOGRAPHY);  Surgeon: Vince Teran MD;  Location: [facility];  Service: Endoscopy;  Laterality: N/A;    ERCP N/A 12/20/2022    Procedure: ERCP (ENDOSCOPIC RETROGRADE CHOLANGIOPANCREATOGRAPHY);  Surgeon: Vince Teran MD;  Location: [facility];  Service: Endoscopy;  Laterality: N/A;    ERCP N/A 1/4/2023    Procedure: ERCP (ENDOSCOPIC RETROGRADE CHOLANGIOPANCREATOGRAPHY);  Surgeon: Vince Teran MD;  Location: [facility];  Service: Endoscopy;  Laterality: N/A;  room 1    ESOPHAGOGASTRODUODENOSCOPY N/A 12/20/2022    Procedure: EGD (ESOPHAGOGASTRODUODENOSCOPY);  Surgeon: Vince Teran MD;  Location: [facility];  Service: Endoscopy;  Laterality: N/A;    ESOPHAGOGASTRODUODENOSCOPY N/A 1/4/2023    Procedure: EGD (ESOPHAGOGASTRODUODENOSCOPY);  Surgeon: Vince Teran MD;  Location: [facility];  Service: Endoscopy;  Laterality: N/A;    INSERTION OF TUNNELED CENTRAL VENOUS CATHETER (CVC) WITH SUBCUTANEOUS PORT Left 2/9/2023    Procedure: [id number];  Surgeon: Wojciech Martínez MD;  Location: [facility] OR;  Service: General;  Laterality: Left;    POUCHOSCOPY         Labs:  Lab Results   Component Value Date    WBC 7.96 02/15/2023    HGB 12.7 (L) 02/15/2023    HCT 38.3 (L) 02/15/2023    MCV 87 02/15/2023     02/15/2023     BMP  Lab Results   Component Value Date     02/15/2023    K 3.9 02/15/2023    CL 97 02/15/2023    CO2 30 (H) 02/15/2023     BUN 6 02/15/2023    CREATININE 0.7 02/15/2023    CALCIUM 9.3 02/15/2023    ANIONGAP 11 02/15/2023     Lab Results   Component Value Date    ALT 82 (H) 02/15/2023    AST 58 (H) 02/15/2023     (H) 12/02/2022    ALKPHOS 535 (H) 02/15/2023    BILITOT 0.8 02/15/2023       Lab Results   Component Value Date    IRON 24 (L) 01/31/2023    TIBC 275 01/31/2023    FERRITIN 230 01/31/2023     No results found for: XCPONNES19  No results found for: FOLATE  No results found for: TSH      Review of Systems   Constitutional:  Positive for activity change, appetite change, fatigue and unexpected weight change. Negative for chills, diaphoresis and fever.   HENT:  Negative for congestion, dental problem, drooling, ear discharge, ear pain, facial swelling, hearing loss, mouth sores, nosebleeds, postnasal drip, rhinorrhea, sinus pressure, sneezing, sore throat, tinnitus, trouble swallowing and voice change.    Eyes:  Negative for photophobia, pain, discharge, redness, itching and visual disturbance.   Respiratory:  Negative for apnea, cough, choking, chest tightness, shortness of breath, wheezing and stridor.    Cardiovascular:  Negative for chest pain, palpitations and leg swelling.   Gastrointestinal:  Positive for abdominal distention and abdominal pain. Negative for anal bleeding, blood in stool, constipation, diarrhea, nausea, rectal pain and vomiting.   Endocrine: Negative for cold intolerance, heat intolerance, polydipsia, polyphagia and polyuria.   Genitourinary:  Negative for decreased urine volume, difficulty urinating, dysuria, enuresis, flank pain, frequency, genital sores, hematuria, penile discharge, penile pain, penile swelling, scrotal swelling, testicular pain and urgency.   Musculoskeletal:  Negative for arthralgias, back pain, gait problem, joint swelling, myalgias, neck pain and neck stiffness.   Skin:  Negative for color change, pallor, rash and wound.   Allergic/Immunologic: Negative for environmental  allergies, food allergies and immunocompromised state.   Neurological:  Positive for weakness. Negative for dizziness, tremors, seizures, syncope, facial asymmetry, speech difficulty, light-headedness, numbness and headaches.   Hematological:  Negative for adenopathy. Does not bruise/bleed easily.   Psychiatric/Behavioral:  Positive for dysphoric mood. Negative for agitation, behavioral problems, confusion, decreased concentration, hallucinations, self-injury, sleep disturbance and suicidal ideas. The patient is nervous/anxious. The patient is not hyperactive.      Objective:      Physical Exam  Vitals reviewed.   Constitutional:       General: He is not in acute distress.     Appearance: He is well-developed. He is ill-appearing. He is not diaphoretic.   HENT:      Head: Normocephalic.      Right Ear: External ear normal.      Left Ear: External ear normal.      Nose: Nose normal.      Right Sinus: No maxillary sinus tenderness or frontal sinus tenderness.      Left Sinus: No maxillary sinus tenderness or frontal sinus tenderness.      Mouth/Throat:      Pharynx: No oropharyngeal exudate.   Eyes:      General: Lids are normal. No scleral icterus.        Right eye: No discharge.         Left eye: No discharge.      Extraocular Movements:      Right eye: Normal extraocular motion.      Left eye: Normal extraocular motion.      Conjunctiva/sclera:      Right eye: Right conjunctiva is not injected. No hemorrhage.     Left eye: Left conjunctiva is not injected. No hemorrhage.     Pupils: Pupils are equal, round, and reactive to light.   Neck:      Thyroid: No thyromegaly.      Vascular: No JVD.      Trachea: No tracheal deviation.   Cardiovascular:      Rate and Rhythm: Normal rate.   Pulmonary:      Effort: Pulmonary effort is normal. No respiratory distress.      Breath sounds: No stridor.   Abdominal:      General: Bowel sounds are normal. There is distension.      Palpations: Abdomen is soft. There is no  hepatomegaly, splenomegaly or mass.      Tenderness: There is abdominal tenderness.   Musculoskeletal:         General: No tenderness. Normal range of motion.      Cervical back: Normal range of motion and neck supple.   Lymphadenopathy:      Head:      Right side of head: No posterior auricular or occipital adenopathy.      Left side of head: No posterior auricular or occipital adenopathy.      Cervical: No cervical adenopathy.      Right cervical: No superficial, deep or posterior cervical adenopathy.     Left cervical: No superficial, deep or posterior cervical adenopathy.      Upper Body:      Right upper body: No supraclavicular adenopathy.      Left upper body: No supraclavicular adenopathy.   Skin:     General: Skin is dry.      Findings: No erythema or rash.      Nails: There is no clubbing.   Neurological:      Mental Status: He is alert and oriented to person, place, and time.      Cranial Nerves: No cranial nerve deficit.      Motor: Weakness present.      Coordination: Coordination abnormal.      Gait: Gait abnormal.   Psychiatric:         Behavior: Behavior normal.         Thought Content: Thought content normal.         Judgment: Judgment normal.           Assessment:      1. Primary peritoneal adenocarcinoma    2. 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 Metastatic adenocarcinoma    4. Abdominal carcinomatosis    5. Immunodeficiency due to chemotherapy           Plan:     Extensive conversation with family will begin treatment with cisplatin gemcitabine.  Durvalumab has been added if insurance will approve.  But will not wait until.  Discussed therapeutic options article from up-to-date on metastatic Enoc cholangiocarcinoma sent waiting results of germ line testing as well as next generation sequencing for modification.  Strongly urged family if they wish to proceed with consultation at [facility].  We will review in referred to palliative Care, nutrition,.  Reiterated again  needs take MS Contin every 8 hours along with breakthrough medicine in bowel prophylaxis discussed implications of answered questions with family nurse navigation present as well as chemotherapy instructions.  Total time 40 minutes      Med Onc Chart Routing      Follow up with physician 1 week. I will see weekly   Follow up with [facility]    Infusion scheduling note    Injection scheduling note Weekly cisplatin gemcitabine 3 out of every 4 weeks hopefully will add durvalumab   Labs CBC, CMP and magnesium   Lab interval:     Imaging    Pharmacy appointment    Other referrals         Preston Shell Jr, MD FACP

## 2023-02-16 NOTE — DISCHARGE INSTRUCTIONS
THANKS FOR ALLOWING ME TO CARE FOR YOU TODAY!!! ~Siria          THANKS FOR CHOOSING [facility]!!!      [facility]  [address] [facility]  [id number] [place] [address] Drive  [phone] phone     [phone] fax  Hours of Operation: Monday- Friday 8:00am- 5:00pm  After hours phone  [phone]  Hematology / Oncology Physicians on call      CARYN [address] Dr[address] Dr., JOHN Gasca, JOHN Mahoney, JOHN Harper, LIZET    Please call with any concerns regarding your appointment today.       YOU HAVE STARTED ON CHEMOTHERAPY. IF YOU EXPERIENCE ANY OF THE FOLLOWING PROBLEMS, CALL THE OFFICE IMMEDIATELY.    *FEVER .4 OR GREATER    *CHILLS, ESPECIALLY SHAKING CHILLS, OR SWEATING    *A SEVERE COUGH OR SORE THROAT, OR SINUS PAIN/     PRESSURE    *REDNESS, SWELLING, OR TENDERNESS AROUND A WOUND,     SORE, PIMPLE, RECTAL AREA, OR IV SITE    *SORES OR ULCERS IN THE MOUTH    *BLISTERS ON THE LIPS OR SKIN    *FREQUENT URGENCY TO URINATE OR A BURNING FEELING   WHEN YOU URINATE    *BLOOD IN THE URINE OR STOOL    *ANY UNEXPLAINED BRUISING OR PROLONGED BLEEDING,     (NOSEBLEEDS OR BLEEDING GUMS)    *LOOSE BOWEL MOVEMENTS THAT DO NOT RESPOND TO     IMODIUM OR MORE THAN THREE TIMES A DAY    *VOMITING UNRESPONSIVE TO ANTINAUSEA MEDICINE    *ANY UNUSUAL PHYSICAL SYMPTOMS THAT BEGAN AFTER     CHEMOTHERAPY    DURING WEEKDAYS, CALL AND ASK TO SPEAK DIRECTLY TO A NURSE.  AT OTHER TIMES, CALL THE OFFICE PHONE NUMBER; THE ANSWERING SERVICE WILL CONTACT THE ON-CALL PHYSICIAN.  SOMEONE IS AVAILABLE 24 HOURS A DAY, SEVEN DAYS A WEEK.       WAYS TO HELP PREVENT INFECTION        WASH YOUR HANDS OFTEN DURING THE DAY, ESPECIALLY BEFORE YOU EAT, AFTER USING THE BATHROOM, AND AFTER TOUCHING ANIMALS    STAY AWAY FROM PEOPLE WHO HAVE ILLNESSES YOU CAN CATCH; SUCH AS COLDS, FLU, CHICKEN POX    TRY TO AVOID CROWDS    STAY AWAY FROM CHILDREN WHO RECENTLY HAVE  RECEIVED LIVE VIRUS VACCINES    MAINTAIN GOOD MOUTH CARE    DO NOT SQUEEZE OR SCRATCH PIMPLES    CLEAN CUTS & SCRAPES RIGHT AWAY AND DAILY UNTIL HEALED WITH WARM WATER, SOAP & AN ANTISEPTIC    AVOID CONTACT WITH LITTER BOXES, BIRD CAGES, & FISH TANKS    AVOID STANDING WATER, IE., BIRD BATHS, FLOWER POTS/VASES, OR HUMIDIFIERS    WEAR GLOVES WHEN GARDENING OR CLEANING UP AFTER OTHERS, ESPECIALLY BABIES & SMALL CHILDREN    DO NOT EAT RAW FISH, SEAFOOD, MEAT, OR EGGS

## 2023-02-17 NOTE — PROGRESS NOTES
Met with patient and wife in person____) to discuss upcoming systemic therapy initiation. Discussed patient diagnosis including staging information. Also discussed that therapy regimen prescribed involves the following drugs: __Cisplatin and Gemcitabine_, and timeline of therapy administration. Went over what to expect on first day of treatment, including what to bring with you, visitor policy, and infusion suite guidelines. Also discussed supportive and shared services available to patient, including social work, financial counseling, oncology nutrition, and oncology psychology.      Covered with patient and wife potential side effects and symptom management. Reviewed supportive medications that will be given before, during, and after treatment and provided written instructions for all.  Also stressed that other side effects are possible outside of those listed. Spent additional time on signs of infection, infection prevention, and proper nutrition/hydration.    Education provided to patient and wife via [facility] specific drug information and chemotherapy education binder___). Also provided contact information for clinic and information related to [facility] communication. Discussed communication process for after-hours needs and some common scenarios in which patient and wife should call provider for guidance vs. immediately report to the emergency room.     Finally, patient and wife were given my contact information. Encouraged patient and wife to call with any questions, concerns, or needs. Patient and wife verbalized understanding of all above information.

## 2023-02-17 NOTE — PROGRESS NOTES
[facility] was consulted to assist with pt. referral to [facility] for boost assistance. Clinic staff assisted with obtaining MD signature. [facility] faxed referral to [facility] (fax#[phone]) today.     10:14 am - [facility] attempted to reach  pt. ([phone]) but received no telephone pickup. Hudson was able to leave a  for a call back. Hudson to send pt. [employer] message.

## 2023-02-17 NOTE — PROGRESS NOTES
Received call from pt wife states pt had a rough night with nausea, unable to eat. States he had zofran dissolvable in the past, would like that called in to local pharmacy. Hasn't been able to eat to take the dex, told her to encourage him to eat a small amount of something, the dex helps with nausea. States she will tell him that and try to get him to eat a piece of toast. Told her will ask Dr. Shell to call in zofran under the tongue and if his symptoms persist, consider taking him to the ED, especially over the weekend. She voiced understanding all information. In basket msg to Dr. Shell for zofran script to local pharmacy sent.   Oncology Navigation   Intake  Date of Diagnosis:  (negative path)  Cancer Type: GI  Internal / External Referral: Internal  Date of Referral: 23  Initial Nurse Navigator Contact: 23  Referral to Initial Contact Timeline (days): 0  Date Worked: 23  First Appointment Available: 23  Appointment Date: 23  First Available Date vs. Scheduled Date (days): 3  Reason for Treatment Delay: -- (seeking 2nd opinion @ [facility])     Treatment  Current Status: Staging work-up  Date Presented to Tumor Board: 23    Surgical Oncologist: Nikunj  Consult Date: 23    Medical Oncologist: Dr. Shell  Consult Date: 23  Chemotherapy: Planned  Chemotherapy Regimen: Gemcitabine/Cisplatin       Procedures: Port / PICC  Biopsy Schedule Date: 22  CT Schedule Date: 23  EUS / EGD: EUS- 2022  MRI Schedule Date: 23  Port / PICC Schedule Date: 23             Support Systems: Family members     Acuity  Stage: 2  Systemic Treatment - predicted or initiated: Chemotherapy Regimen with Multiple drugs (+1)  ECO  Comorbidities in Medical History: 2  Support: 0  Transportation: 0  Verbalizes the need for more education: 1  Navigation Acuity: 0     Follow Up  Follow up in 5 days (on 2023) for labs/MD f/u.

## 2023-02-20 PROBLEM — K65.1 INTRA-ABDOMINAL ABSCESS: Status: ACTIVE | Noted: 2023-01-01

## 2023-02-20 NOTE — PROGRESS NOTES
Subjective:       Patient ID: Guevara Osullivan II is a 53 y.o. male.    Chief Complaint: Results, Pain, and Cancer    HPI:  53-year-old male history of intra-abdominal carcinomatosis.  Patient is cycle 1 day 4 of cisplatin gemcitabine.  Patient states that he did relatively well after treatment to dexamethasone.  But now called this morning in we scheduled a video visit for review.  Patient reporting increasing abdominal pain and discomfort.  ECOG status 2 accompanied by wife    Past Medical History:   Diagnosis Date    Cancer     COLON CANCER 1995    Digestive disorder     Hypertension     Primary sclerosing cholangitis     Ulcerative colitis     s/p colectomy with J- pouch     History reviewed. No pertinent family history.  Social History     Socioeconomic History    Marital status:    Tobacco Use    Smoking status: Never     Passive exposure: Never    Smokeless tobacco: Never   Substance and Sexual Activity    Alcohol use: Not Currently    Drug use: Yes     Types: Marijuana     Comment: medical marijuana     Past Surgical History:   Procedure Laterality Date    ABDOMINAL WASHOUT N/A 1/18/2023    Procedure: LAVAGE, PERITONEAL,;  Surgeon: Onur Calvin Jr., MD;  Location: [address];  Service: General;  Laterality: N/A;    BIOPSY OF PERITONEUM N/A 1/18/2023    Procedure: BIOPSY, PERITONEUM;  Surgeon: Onur Calvin Jr., MD;  Location: [facility] OR [address] [place] [address];  Service: General;  Laterality: N/A;    COLON SURGERY      Colectomy with J- pouch    DIAGNOSTIC LAPAROSCOPY N/A 1/18/2023    Procedure: LAPAROSCOPY, DIAGNOSTIC WITH WASHING;  Surgeon: Onur Calvin Jr., MD;  Location: [address];  Service: General;  Laterality: N/A;    ENDOSCOPIC ULTRASOUND OF UPPER GASTROINTESTINAL TRACT N/A 10/14/2022    Procedure: ULTRASOUND, UPPER GI TRACT,;  Surgeon: Vince Teran MD;  Location: [facility] ENDO;  Service: Endoscopy;  Laterality: N/A;  upper and linear    ENDOSCOPIC ULTRASOUND OF UPPER  GASTROINTESTINAL TRACT N/A 12/13/2022    Procedure: ULTRASOUND, UPPER GI TRACT, ENDOSCOPIC;  Surgeon: Vince Teran MD;  Location: [facility];  Service: Endoscopy;  Laterality: N/A;    ERCP N/A 10/14/2022    Procedure: ERCP (ENDOSCOPIC RETROGRADE CHOLANGIOPANCREATOGRAPHY) with spyglass;  Surgeon: Vince Teran MD;  Location: [facility];  Service: Endoscopy;  Laterality: N/A;    ERCP N/A 12/13/2022    Procedure: ERCP (ENDOSCOPIC RETROGRADE CHOLANGIOPANCREATOGRAPHY);  Surgeon: Vince Teran MD;  Location: [facility];  Service: Endoscopy;  Laterality: N/A;    ERCP N/A 12/20/2022    Procedure: ERCP (ENDOSCOPIC RETROGRADE CHOLANGIOPANCREATOGRAPHY);  Surgeon: Vince Teran MD;  Location: [facility];  Service: Endoscopy;  Laterality: N/A;    ERCP N/A 1/4/2023    Procedure: ERCP (ENDOSCOPIC RETROGRADE CHOLANGIOPANCREATOGRAPHY);  Surgeon: Vince Teran MD;  Location: [facility];  Service: Endoscopy;  Laterality: N/A;  room 1    ESOPHAGOGASTRODUODENOSCOPY N/A 12/20/2022    Procedure: EGD (ESOPHAGOGASTRODUODENOSCOPY);  Surgeon: Vince Teran MD;  Location: [facility];  Service: Endoscopy;  Laterality: N/A;    ESOPHAGOGASTRODUODENOSCOPY N/A 1/4/2023    Procedure: EGD (ESOPHAGOGASTRODUODENOSCOPY);  Surgeon: Vince Teran MD;  Location: [facility];  Service: Endoscopy;  Laterality: N/A;    INSERTION OF TUNNELED CENTRAL VENOUS CATHETER (CVC) WITH SUBCUTANEOUS PORT Left 2/9/2023    Procedure: [id number]-CATH;  Surgeon: Wojciech Martínez MD;  Location: [facility];  Service: General;  Laterality: Left;    POUCHOSCOPY         Labs:  Lab Results   Component Value Date    WBC 7.96 02/15/2023    HGB 12.7 (L) 02/15/2023    HCT 38.3 (L) 02/15/2023    MCV 87 02/15/2023     02/15/2023     BMP  Lab Results   Component Value Date     02/15/2023    K 3.9 02/15/2023    CL 97 02/15/2023    CO2 30 (H) 02/15/2023    BUN 6 02/15/2023    CREATININE 0.7 02/15/2023     CALCIUM 9.3 02/15/2023    ANIONGAP 11 02/15/2023     Lab Results   Component Value Date    ALT 82 (H) 02/15/2023    AST 58 (H) 02/15/2023     (H) 12/02/2022    ALKPHOS 535 (H) 02/15/2023    BILITOT 0.8 02/15/2023       Lab Results   Component Value Date    IRON 24 (L) 01/31/2023    TIBC 275 01/31/2023    FERRITIN 230 01/31/2023     No results found for: FKFEOTGH43  No results found for: FOLATE  No results found for: TSH      Review of Systems   Constitutional:  Positive for activity change, appetite change, fatigue and unexpected weight change. Negative for chills, diaphoresis and fever.   HENT:  Negative for congestion, dental problem, drooling, ear discharge, ear pain, facial swelling, hearing loss, mouth sores, nosebleeds, postnasal drip, rhinorrhea, sinus pressure, sneezing, sore throat, tinnitus, trouble swallowing and voice change.    Eyes:  Negative for photophobia, pain, discharge, redness, itching and visual disturbance.   Respiratory:  Negative for apnea, cough, choking, chest tightness, shortness of breath, wheezing and stridor.    Cardiovascular:  Negative for chest pain, palpitations and leg swelling.   Gastrointestinal:  Positive for abdominal distention and abdominal pain. Negative for anal bleeding, blood in stool, constipation, diarrhea, nausea, rectal pain and vomiting.   Endocrine: Negative for cold intolerance, heat intolerance, polydipsia, polyphagia and polyuria.   Genitourinary:  Negative for decreased urine volume, difficulty urinating, dysuria, enuresis, flank pain, frequency, genital sores, hematuria, penile discharge, penile pain, penile swelling, scrotal swelling, testicular pain and urgency.   Musculoskeletal:  Negative for arthralgias, back pain, gait problem, joint swelling, myalgias, neck pain and neck stiffness.   Skin:  Negative for color change, pallor, rash and wound.   Allergic/Immunologic: Negative for environmental allergies, food allergies and immunocompromised  state.   Neurological:  Positive for weakness. Negative for dizziness, tremors, seizures, syncope, facial asymmetry, speech difficulty, light-headedness, numbness and headaches.   Hematological:  Negative for adenopathy. Does not bruise/bleed easily.   Psychiatric/Behavioral:  Positive for dysphoric mood. Negative for agitation, behavioral problems, confusion, decreased concentration, hallucinations, self-injury, sleep disturbance and suicidal ideas. The patient is nervous/anxious. The patient is not hyperactive.      Objective:      Physical Exam  Constitutional:       General: He is in acute distress.      Appearance: Normal appearance. He is ill-appearing.   Abdominal:      General: There is distension.      Tenderness: There is abdominal tenderness.           Assessment:      1. 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 Metastatic adenocarcinoma    3. Primary peritoneal adenocarcinoma    4. Immunodeficiency due to chemotherapy    5. Primary hypertension    6. Pure hypertriglyceridemia           Plan:   The patient location is:  Home  The chief complaint leading to consultation is:  Abdominal pain    Visit type: audiovisual    Face to Face time with patient: 25 minutes of total time spent on the encounter, which includes face to face time and non-face to face time preparing to see the patient (eg, review of tests), Obtaining and/or reviewing separately obtained history, Documenting clinical information in the electronic or other health record, Independently interpreting results (not separately reported) and communicating results to the patient/family/caregiver, or Care coordination (not separately reported).         Each patient to whom he or she provides medical services by telemedicine is:  (1) informed of the relationship between the physician and patient and the respective role of any other health care provider with respect to management of the patient; and (2) notified that he or she may  decline to receive medical services by telemedicine and may withdraw from such care at any time.    Notes:    Reviewed information with patient wife.  At this time I do not know what is causing his rapid deterioration whether he has a bowel obstruction, perforated viscus, or impaction.  At this time I would recommend emergency room evaluation I have called and spoken to charge nurse.  Of [facility] emergency room to notify.  The patient obviously has a extensive amount of intra-abdominal carcinomatosis.  He is currently on MS Contin 30 mg q.8 hours with oxycodone in between.  May need admission to the hospital for pain control and evaluation of abdominal carcinomatosis.        Preston Shell Jr, MD FACP

## 2023-02-20 NOTE — ED PROVIDER NOTES
SCRIBE #1 NOTE: I, Tiffanie White, am scribing for, and in the presence of, Ankur Wolff MD. I have scribed the entire note.       History     Chief Complaint   Patient presents with    Abdominal Pain     ABD pain, On Chemo, worsening pain since then, Sent by Dr Shell to R/O SBO     Review of patient's allergies indicates:   Allergen Reactions    Vicryl [sutures, polyglycolic acid] Other (See Comments)     Wound dehiscence after achilles sx    Ciprofloxacin Other (See Comments)     Pt previously had medication reaction; suffered achilles rupture     Meperidine Hives         History of Present Illness     HPI    2/20/2023, 4:12 PM  History obtained from the patient      History of Present Illness: Guevara Osullivan II is a 53 y.o. male patient with a PMHx of cancer and HTN who presents to the Emergency Department for evaluation of abd pain which onset gradually a few days PTA. Pt is currently on chemotherapy and abd pain has significantly worsened since starting chemo. Symptoms are constant and moderate in severity. No mitigating or exacerbating factors reported. No associated sxs reported. Patient denies any fever, congestion, cough, headache, and all other sxs at this time. No Prior Tx reported. No further complaints or concerns at this time.       Arrival mode: Personal vehicle  PCP: Dima Ch MD        Past Medical History:  Past Medical History:   Diagnosis Date    Cancer     COLON CANCER 1995    Digestive disorder     Hypertension     Primary sclerosing cholangitis     Ulcerative colitis     s/p colectomy with J- pouch       Past Surgical History:  Past Surgical History:   Procedure Laterality Date    ABDOMINAL WASHOUT N/A 1/18/2023    Procedure: LAVAGE, PERITONEAL,;  Surgeon: Onur Calvin Jr., MD;  Location: [facility] OR [address] [place] [address];  Service: General;  Laterality: N/A;    BIOPSY OF PERITONEUM N/A 1/18/2023    Procedure: BIOPSY, PERITONEUM;  Surgeon: Onur Calvin Jr., MD;  Location: [facility] OR  2ND FLR;  Service: General;  Laterality: N/A;    COLON SURGERY      Colectomy with J- pouch    DIAGNOSTIC LAPAROSCOPY N/A 1/18/2023    Procedure: LAPAROSCOPY, DIAGNOSTIC WITH WASHING;  Surgeon: Onur Calvin Jr., MD;  Location: [facility] OR 2ND FLR;  Service: General;  Laterality: N/A;    ENDOSCOPIC ULTRASOUND OF UPPER GASTROINTESTINAL TRACT N/A 10/14/2022    Procedure: ULTRASOUND, UPPER GI TRACT,;  Surgeon: Vince Teran MD;  Location: [facility];  Service: Endoscopy;  Laterality: N/A;  upper and linear    ENDOSCOPIC ULTRASOUND OF UPPER GASTROINTESTINAL TRACT N/A 12/13/2022    Procedure: ULTRASOUND, UPPER GI TRACT, ENDOSCOPIC;  Surgeon: Vince Terna MD;  Location: [facility];  Service: Endoscopy;  Laterality: N/A;    ERCP N/A 10/14/2022    Procedure: ERCP (ENDOSCOPIC RETROGRADE CHOLANGIOPANCREATOGRAPHY) with spyglass;  Surgeon: Vince Teran MD;  Location: [facility];  Service: Endoscopy;  Laterality: N/A;    ERCP N/A 12/13/2022    Procedure: ERCP (ENDOSCOPIC RETROGRADE CHOLANGIOPANCREATOGRAPHY);  Surgeon: Vince Teran MD;  Location: [facility];  Service: Endoscopy;  Laterality: N/A;    ERCP N/A 12/20/2022    Procedure: ERCP (ENDOSCOPIC RETROGRADE CHOLANGIOPANCREATOGRAPHY);  Surgeon: Vince Teran MD;  Location: [facility];  Service: Endoscopy;  Laterality: N/A;    ERCP N/A 1/4/2023    Procedure: ERCP (ENDOSCOPIC RETROGRADE CHOLANGIOPANCREATOGRAPHY);  Surgeon: Vince Teran MD;  Location: [facility];  Service: Endoscopy;  Laterality: N/A;  room 1    ESOPHAGOGASTRODUODENOSCOPY N/A 12/20/2022    Procedure: EGD (ESOPHAGOGASTRODUODENOSCOPY);  Surgeon: Vince Teran MD;  Location: [facility];  Service: Endoscopy;  Laterality: N/A;    ESOPHAGOGASTRODUODENOSCOPY N/A 1/4/2023    Procedure: EGD (ESOPHAGOGASTRODUODENOSCOPY);  Surgeon: Vince Teran MD;  Location: [facility];  Service: Endoscopy;  Laterality: N/A;    INSERTION OF TUNNELED  CENTRAL VENOUS CATHETER (CVC) WITH SUBCUTANEOUS PORT Left 2/9/2023    Procedure: [id number]-CATH;  Surgeon: Wojciech Martníez MD;  Location: [facility] OR;  Service: General;  Laterality: Left;    POUCHOSCOPY           Family History:  No family history on file.    Social History:  Social History     Tobacco Use    Smoking status: Never     Passive exposure: Never    Smokeless tobacco: Never   Substance and Sexual Activity    Alcohol use: Not Currently    Drug use: Yes     Types: Marijuana     Comment: medical marijuana    Sexual activity: Not on file        Review of Systems     Review of Systems   Constitutional:  Negative for fever.   HENT:  Negative for congestion and sore throat.    Respiratory:  Negative for cough and shortness of breath.    Cardiovascular:  Negative for chest pain.   Gastrointestinal:  Positive for abdominal pain. Negative for nausea.   Genitourinary:  Negative for dysuria.   Musculoskeletal:  Negative for back pain.   Skin:  Negative for rash.   Neurological:  Negative for weakness and headaches.   Hematological:  Does not bruise/bleed easily.   All other systems reviewed and are negative.     Physical Exam     Initial Vitals [02/20/23 1415]   BP Pulse Resp Temp SpO2   117/66 91 18 98.3 °F (36.8 °C) 97 %      MAP       --          Physical Exam  Nursing Notes and Vital Signs Reviewed.  Constitutional: Patient is in no acute distress. Well-developed and well-nourished.  Head: Atraumatic. Normocephalic.  Eyes: PERRL. EOM intact. Conjunctivae are not pale. No scleral icterus.  ENT: Mucous membranes are moist. Oropharynx is clear and symmetric.    Neck: Supple. Full ROM. No lymphadenopathy.  Cardiovascular: Regular rate. Regular rhythm. No murmurs, rubs, or gallops. Distal pulses are 2+ and symmetric.  Pulmonary/Chest: No respiratory distress. Clear to auscultation bilaterally. No wheezing or rales.  Abdominal: Soft and non-distended.  There is no tenderness.  No rebound, guarding, or rigidity. Good  bowel sounds.  Genitourinary: No CVA tenderness  Musculoskeletal: Moves all extremities. No obvious deformities. No edema. No calf tenderness.  Skin: Warm and dry.  Neurological:  Alert, awake, and appropriate.  Normal speech.  No acute focal neurological deficits are appreciated.  Psychiatric: Normal affect. Good eye contact. Appropriate in content.     ED Course   Procedures  ED Vital Signs:  Vitals:    02/20/23 1415 02/20/23 1600 02/20/23 1629 02/20/23 1700   BP: 117/66 108/71  111/72   Pulse: 91 77  78   Resp: 18  20 12   Temp: 98.3 °F (36.8 °C)      TempSrc: Oral      SpO2: 97% 96%  97%   Height: 6' (1.829 m)       02/20/23 1727   BP:    Pulse:    Resp: 18   Temp:    TempSrc:    SpO2:    Height:        Abnormal Lab Results:  Labs Reviewed   CBC W/ AUTO DIFFERENTIAL - Abnormal; Notable for the following components:       Result Value    WBC 2.07 (*)     RBC 4.13 (*)     Hemoglobin 12.0 (*)     Hematocrit 36.2 (*)     MPV 8.7 (*)     Gran # (ANC) 1.3 (*)     Lymph # 0.7 (*)     Mono # 0.0 (*)     Mono % 1.4 (*)     All other components within normal limits   COMPREHENSIVE METABOLIC PANEL - Abnormal; Notable for the following components:    Albumin 3.3 (*)     Total Bilirubin 1.4 (*)     Alkaline Phosphatase 704 (*)      (*)      (*)     All other components within normal limits   LIPASE   URINALYSIS, REFLEX TO URINE CULTURE        All Lab Results:  Results for orders placed or performed during the hospital encounter of 02/20/23   CBC auto differential   Result Value Ref Range    WBC 2.07 (L) 3.90 - 12.70 K/uL    RBC 4.13 (L) 4.60 - 6.20 M/uL    Hemoglobin 12.0 (L) 14.0 - 18.0 g/dL    Hematocrit 36.2 (L) 40.0 - 54.0 %    MCV 88 82 - 98 fL    MCH 29.1 27.0 - 31.0 pg    MCHC 33.1 32.0 - 36.0 g/dL    RDW 14.0 11.5 - 14.5 %    Platelets 324 150 - 450 K/uL    MPV 8.7 (L) 9.2 - 12.9 fL    Immature Granulocytes 0.5 0.0 - 0.5 %    Gran # (ANC) 1.3 (L) 1.8 - 7.7 K/uL    Immature Grans (Abs) 0.01 0.00 - 0.04  K/uL    Lymph # 0.7 (L) 1.0 - 4.8 K/uL    Mono # 0.0 (L) 0.3 - 1.0 K/uL    Eos # 0.0 0.0 - 0.5 K/uL    Baso # 0.03 0.00 - 0.20 K/uL    nRBC 0 0 /100 WBC    Gran % 63.8 38.0 - 73.0 %    Lymph % 31.9 18.0 - 48.0 %    Mono % 1.4 (L) 4.0 - 15.0 %    Eosinophil % 1.0 0.0 - 8.0 %    Basophil % 1.4 0.0 - 1.9 %    Differential Method Automated    Comprehensive metabolic panel   Result Value Ref Range    Sodium 136 136 - 145 mmol/L    Potassium 3.6 3.5 - 5.1 mmol/L    Chloride 96 95 - 110 mmol/L    CO2 27 23 - 29 mmol/L    Glucose 91 70 - 110 mg/dL    BUN 9 6 - 20 mg/dL    Creatinine 0.7 0.5 - 1.4 mg/dL    Calcium 9.0 8.7 - 10.5 mg/dL    Total Protein 7.4 6.0 - 8.4 g/dL    Albumin 3.3 (L) 3.5 - 5.2 g/dL    Total Bilirubin 1.4 (H) 0.1 - 1.0 mg/dL    Alkaline Phosphatase 704 (H) 55 - 135 U/L     (H) 10 - 40 U/L     (H) 10 - 44 U/L    Anion Gap 13 8 - 16 mmol/L    eGFR >60 >60 mL/min/1.73 m^2   Lipase   Result Value Ref Range    Lipase 4 4 - 60 U/L        Imaging Results:  Imaging Results              CT Abdomen Pelvis With Contrast (Final result)  Result time 02/20/23 16:44:02      Final result by ARNOLDO Delgado Sr., MD (02/20/23 16:44:02)                   Impression:      1. There are inflammatory changes in and adjacent to the body of the pancreas. There is a small gas and fluid collection adjacent to the body of the pancreas that measures 25 mm in craniocaudal dimension by 18 mm in AP dimension by 18 mm in medial-lateral dimension. A drain is noted between this fluid collection and the body of the stomach.  This is characteristic of an abscess with associated pancreatitis.  2. The liver is enlarged. It measures 19.9 cm in craniocaudal dimension. There is intrahepatic biliary ductal dilatation. A biliary stent remains in place.  3. There are multiple surgical changes associated with a partial colectomy. The appendix is not visualized.  There is no definite current gastrointestinal obstruction.  If additional  imaging evaluation is clinically indicated, I recommend consideration of a barium small bowel follow-through study.  4. The spleen is enlarged. It measures 14.4 cm in length.  5. There is a small amount of ascites. There is no pneumoperitoneum.  6. There are mild degenerative changes between L2 and L3.  7. There is a 9 mm osseous density adjacent to the anterior inferior aspect of the L3 vertebral body.  This is characteristic of a limbus vertebra.  All CT scans at this facility use dose modulation, iterative reconstruction, and/or weight base dosing when appropriate to reduce radiation dose when appropriate to reduce radiation dose to as low as reasonably achievable.      Electronically signed by: Blas Delgado MD  Date:    02/20/2023  Time:    16:44               Narrative:    EXAMINATION:  CT ABDOMEN PELVIS WITH CONTRAST    CLINICAL HISTORY:  Bowel obstruction suspected;    TECHNIQUE:  Standard abdomen and pelvis CT protocol with IV contrast was performed.  100 mL of Omnipaque 350 contrast material was used for this examination.  There was no oral contrast administered.    COMPARISON:  02/02/2023    FINDINGS:  Finding: The size of the heart is within normal limits. The lungs are clear. There is no pneumothorax or pleural effusion.    The liver is enlarged.  It measures 19.9 cm in craniocaudal dimension.  There is intrahepatic biliary ductal dilatation.  A biliary stent remains in place.  The gallbladder is well distended.  There are inflammatory changes in and adjacent to the body of the pancreas.  There is a small gas and fluid collection adjacent to the body of the pancreas that measures 25 mm in craniocaudal dimension by 18 mm in AP dimension by 18 mm in medial-lateral dimension.  A drain is noted between this fluid collection and the body of the stomach.  The spleen is enlarged.  It measures 14.4 cm in length.  The adrenals and left kidney are normal in appearance.  Incidental note is made of a 8 mm  cortically based oval shaped area of hypodensity in the posterior aspect of the superior pole of the right kidney.  There is no hydronephrosis or nephrolithiasis.  The ureters and the urinary bladder are normal in appearance.  There is a mild amount of calcification within the prostate.  There are multiple surgical changes associated with a partial colectomy.  The appendix is not visualized.  There is no definite current gastrointestinal obstruction.  There is a small amount of ascites.  There is no pneumoperitoneum.  There are mild degenerative changes between L2 and L3.  There is a 9 mm osseous density adjacent to the anterior inferior aspect of the L3 vertebral body.                                                The Emergency Provider reviewed the vital signs and test results, which are outlined above.     ED Discussion       5:38 PM: Discussed case with Adela Riley DO ([facility]). Dr. Riley agrees with current care and management of pt and accepts admission.   Admitting Service: Hospital Medicine  Admitting Physician: Dr. Riley  Admit to: observation   5:39 PM: Re-evaluated pt. I have discussed test results, shared treatment plan, and the need for admission with patient and family at bedside. Pt and family express understanding at this time and agree with all information. All questions answered. Pt and family have no further questions or concerns at this time. Pt is ready for admit.        Medical Decision Making:   Initial Assessment:   Intra-abdominal carcinomatosis.  Comes in with abdominal pain.  Had recent SBO.    Differential Diagnosis:   SBO, cancer pain, abdominal pain  Clinical Tests:   Lab Tests: Ordered and Reviewed  Radiological Study: Ordered and Reviewed  ED Management:  Labs and imaging reviewed by me.  Patient still in pain and not wanting to eat.  Will give more IV pain meds and discussed admission with patient and family, and they are in agreement  Other:   I have discussed this  case with another health care provider.       <> Summary of the Discussion: Case discussed with Dr. Armstrong () will admit to their service.           ED Medication(s):  Medications   promethazine (PHENERGAN) 12.5 mg in dextrose 5 % (D5W) 50 mL IVPB (0 mg Intravenous Stopped 2/20/23 1648)   HYDROmorphone injection 1 mg (1 mg Intravenous Given 2/20/23 1629)   iohexoL (OMNIPAQUE 350) injection 100 mL (100 mLs Intravenous Given 2/20/23 1616)   HYDROmorphone injection 1 mg (1 mg Intravenous Given 2/20/23 1727)   0.9%  NaCl infusion (1,000 mLs Intravenous New Bag 2/20/23 1728)       New Prescriptions    No medications on file               Scribe Attestation:   Scribe #1: I performed the above scribed service and the documentation accurately describes the services I performed. I attest to the accuracy of the note.     Attending:   Physician Attestation Statement for Scribe #1: I, Ankur Wolff MD, personally performed the services described in this documentation, as scribed by Tiffanie White, in my presence, and it is both accurate and complete.           Clinical Impression       ICD-10-CM ICD-9-CM   1. Generalized abdominal pain  R10.84 789.07   2. Pancreatic cyst  K86.2 577.2   3. Primary peritoneal adenocarcinoma  C48.2 158.9   4. Abdominal carcinomatosis  C76.2 195.2       Disposition:   Disposition: Placed in Observation  Condition: Fair      Ankur Wolff MD  02/20/23 [id number]

## 2023-02-20 NOTE — FIRST PROVIDER EVALUATION
Medical screening examination initiated.  I have conducted a focused provider triage encounter, findings are as follows:    Brief history of present illness:  53-year-old male presents to the ER for evaluation of lower abdominal pain.  Patient currently a chemo patient    There were no vitals filed for this visit.    Pertinent physical exam:  Uncomfortable    Brief workup plan:  Labs, imaging as needed    Preliminary workup initiated; this workup will be continued and followed by the physician or advanced practice provider that is assigned to the patient when roomed.

## 2023-02-20 NOTE — HPI
Guevara Osullivan II is a 53 y.o. male with past medical history of Cancer, Digestive disorder, Hypertension, Primary sclerosing cholangitis, and Ulcerative colitis who presented to ED on 2/20/2023 with worsening abdominal pain.  Patient with primary peritoneal adenocarcinoma, history of intra-abdominal carcinomatosis.  Had recent admission at [facility] for SBO.  He is on cycle 1 day 4 of cisplatin gemcitabine started on 02/16.  He had follow-up appointment with Dr. Shell today and reported significant worsening abdominal pain, discomfort and nausea.  Reports that he has been unable to eat.  Subsequently was encouraged by Dr. Shell to go to ED for further evaluation.  Workup in ED significant for CT of abdomen and pelvis which showed inflammatory changes characteristic of an abscess with associated pancreatitis, no definite current gastrointestinal obstruction.  Admitted to hospital medicine service for further evaluation and treatment.  GI consulted.  They recommend IR consultation to evaluate for possible drainage of abscess    PCP: Dima Ch MD    SDM:  Spouse    CODE STATUS:  Full code

## 2023-02-21 NOTE — PLAN OF CARE
[facility] - Med Surg  Initial Discharge Assessment       Primary Care Provider: Dima Ch MD    Admission Diagnosis: Pancreatic cyst [K86.2]  Generalized abdominal pain [R10.84]  Intra-abdominal abscess [K65.1]  Abdominal carcinomatosis [C76.2]  Chest pain [R07.9]  Primary peritoneal adenocarcinoma [C48.2]    Admission Date: 2/20/2023  Expected Discharge Date: Per Attending     Discharge Barriers Identified: None    Payor: [employer] / Plan: [facility] CHOICE PLUS / Product Type: Commercial /     Extended Emergency Contact Information  Primary Emergency Contact: Leticia Osullivan  Address: [address] [place] [address] [place] [address] [place]  Mobile Phone: [phone]  Relation: Spouse    Discharge Plan A: Home with family  Discharge Plan B: Home Health      [facility] [address] [address]  Phone: [phone] Fax: [phone]    [id number] - [facility] [id number] [place] [address] AT [address]  [address] [facility] [address]  [facility] [address]  Phone: [phone] Fax: [phone]      Initial Assessment (most recent)       Adult Discharge Assessment - 02/21/23 0919          Discharge Assessment    Assessment Type Discharge Planning Assessment     Confirmed/corrected address, phone number and insurance Yes     Confirmed Demographics Correct on Facesheet     Source of Information patient;family     When was your last doctors appointment? --   1 year ago    Communicated APRIL with patient/caregiver Date not available/Unable to determine     Reason For Admission Intra-abdominal abscess     People in Home spouse     Do you expect to return to your current living situation? Yes     Do you have help at home or someone to help you manage your care at home? Yes     Who are your caregiver(s) and their phone number(s)? Pt's spouse     Prior to hospitilization cognitive status: Alert/Oriented     Current cognitive status: Alert/Oriented     Equipment  Currently Used at Home none     Readmission within 30 days? No     Patient currently being followed by outpatient case management? No     Do you currently have service(s) that help you manage your care at home? No     Do you take prescription medications? Yes     Do you have prescription coverage? Yes     Coverage [facility] Choice Plus     Do you have any problems affording any of your prescribed medications? No     Is the patient taking medications as prescribed? yes     Who is going to help you get home at discharge? Pt's spouse     How do you get to doctors appointments? family or friend will provide     Are you on dialysis? No     Do you take coumadin? No     Discharge Plan A Home with family     Discharge Plan B Home Health     DME Needed Upon Discharge  none     Discharge Plan discussed with: Spouse/sig other;Patient     Discharge Barriers Identified None                 No needs at this time.     Pt currently has chemotherapy.

## 2023-02-21 NOTE — SUBJECTIVE & OBJECTIVE
Past Medical History:   Diagnosis Date    Cancer     COLON CANCER 1995    Digestive disorder     Hypertension     Primary sclerosing cholangitis     Ulcerative colitis     s/p colectomy with J- pouch       Past Surgical History:   Procedure Laterality Date    ABDOMINAL WASHOUT N/A 1/18/2023    Procedure: LAVAGE, PERITONEAL,;  Surgeon: Onur Calvin Jr., MD;  [facility];  Service: General;  Laterality: N/A;    BIOPSY OF PERITONEUM N/A 1/18/2023    Procedure: BIOPSY, PERITONEUM;  Surgeon: Onur Calvin Jr., MD;  [facility];  Service: General;  Laterality: N/A;    COLON SURGERY      Colectomy with J- pouch    DIAGNOSTIC LAPAROSCOPY N/A 1/18/2023    Procedure: LAPAROSCOPY, DIAGNOSTIC WITH WASHING;  Surgeon: Onur Calvin Jr., MD;  [facility];  Service: General;  Laterality: N/A;    ENDOSCOPIC ULTRASOUND OF UPPER GASTROINTESTINAL TRACT N/A 10/14/2022    Procedure: ULTRASOUND, UPPER GI TRACT,;  Surgeon: Vince Teran MD;  Location: [facility];  Service: Endoscopy;  Laterality: N/A;  upper and linear    ENDOSCOPIC ULTRASOUND OF UPPER GASTROINTESTINAL TRACT N/A 12/13/2022    Procedure: ULTRASOUND, UPPER GI TRACT, ENDOSCOPIC;  Surgeon: Vince Teran MD;  Location: [facility];  Service: Endoscopy;  Laterality: N/A;    ERCP N/A 10/14/2022    Procedure: ERCP (ENDOSCOPIC RETROGRADE CHOLANGIOPANCREATOGRAPHY) with spyglass;  Surgeon: Vince Teran MD;  Location: [facility];  Service: Endoscopy;  Laterality: N/A;    ERCP N/A 12/13/2022    Procedure: ERCP (ENDOSCOPIC RETROGRADE CHOLANGIOPANCREATOGRAPHY);  Surgeon: Vince Teran MD;  Location: [facility];  Service: Endoscopy;  Laterality: N/A;    ERCP N/A 12/20/2022    Procedure: ERCP (ENDOSCOPIC RETROGRADE CHOLANGIOPANCREATOGRAPHY);  Surgeon: Vince Teran MD;  Location: [facility];  Service: Endoscopy;  Laterality: N/A;    ERCP N/A 1/4/2023    Procedure: ERCP (ENDOSCOPIC  RETROGRADE CHOLANGIOPANCREATOGRAPHY);  Surgeon: Vince Teran MD;  Location: [facility];  Service: Endoscopy;  Laterality: N/A;  room 1    ESOPHAGOGASTRODUODENOSCOPY N/A 12/20/2022    Procedure: EGD (ESOPHAGOGASTRODUODENOSCOPY);  Surgeon: Vince Teran MD;  Location: [facility];  Service: Endoscopy;  Laterality: N/A;    ESOPHAGOGASTRODUODENOSCOPY N/A 1/4/2023    Procedure: EGD (ESOPHAGOGASTRODUODENOSCOPY);  Surgeon: Vince Teran MD;  Location: [facility];  Service: Endoscopy;  Laterality: N/A;    INSERTION OF TUNNELED CENTRAL VENOUS CATHETER (CVC) WITH SUBCUTANEOUS PORT Left 2/9/2023    Procedure: [id number];  Surgeon: Wojciech Martínez MD;  Location: [facility] OR;  Service: General;  Laterality: Left;    POUCHOSCOPY         Review of patient's allergies indicates:   Allergen Reactions    Vicryl [sutures, polyglycolic acid] Other (See Comments)     Wound dehiscence after achilles sx    Ciprofloxacin Other (See Comments)     Pt previously had medication reaction; suffered achilles rupture     Meperidine Hives       No current facility-administered medications on file prior to encounter.     Current Outpatient Medications on File Prior to Encounter   Medication Sig    morphine (MS CONTIN) 30 MG 12 hr tablet Take 1 tablet (30 mg total) by mouth 3 (three) times daily.    ondansetron (ZOFRAN-ODT) 8 MG TbDL Take 1 tablet (8 mg total) by mouth every 8 (eight) hours as needed (nausea/vomiting).    oxyCODONE-acetaminophen (PERCOCET)  mg per tablet Take 1 tablet by mouth every 6 (six) hours as needed for Pain.    ALPRAZolam (XANAX) 0.25 MG tablet Take 1 tablet (0.25 mg total) by mouth 2 (two) times daily as needed for Anxiety.    dexAMETHasone (DECADRON) 4 MG Tab Take 2 tablets (8 mg total) by mouth once daily. Take as directed on days 2 and 3 of your chemotherapy cycle.    lactulose (CHRONULAC) 20 gram/30 mL Soln Take 30 mLs (20 g total) by mouth 3 (three) times daily.     LIDOcaine-prilocaine (EMLA) cream Apply to affected area once as directed    oxyCODONE (ROXICODONE) 10 mg Tab immediate release tablet Take 10 mg by mouth 5 (five) times daily.    prochlorperazine (COMPAZINE) 5 MG tablet Take 1 tablet (5 mg total) by mouth every 6 (six) hours as needed for Nausea.     Family History    None       Tobacco Use    Smoking status: Never     Passive exposure: Never    Smokeless tobacco: Never   Substance and Sexual Activity    Alcohol use: Not Currently    Drug use: Yes     Types: Marijuana     Comment: medical marijuana    Sexual activity: Not on file     Review of Systems   Constitutional:  Positive for activity change and appetite change. Negative for fever.   Respiratory:  Negative for shortness of breath.    Cardiovascular:  Negative for chest pain and palpitations.   Gastrointestinal:  Positive for abdominal pain and nausea. Negative for constipation and vomiting.   Genitourinary:  Negative for difficulty urinating.   All other systems reviewed and are negative.  Objective:     Vital Signs (Most Recent):  Temp: 98.3 °F (36.8 °C) (02/20/23 1415)  Pulse: 78 (02/20/23 1700)  Resp: 18 (02/20/23 1727)  BP: 111/72 (02/20/23 1700)  SpO2: 97 % (02/20/23 1700) Vital Signs (24h Range):  Temp:  [98.3 °F (36.8 °C)] 98.3 °F (36.8 °C)  Pulse:  [77-91] 78  Resp:  [12-20] 18  SpO2:  [96 %-97 %] 97 %  BP: (108-117)/(66-72) 111/72        Body mass index is 22.28 kg/m².    Physical Exam  Vitals and nursing note reviewed. Exam conducted with a chaperone present.   Constitutional:       General: He is not in acute distress.     Appearance: He is ill-appearing.   HENT:      Head: Normocephalic and atraumatic.      Right Ear: External ear normal.      Left Ear: External ear normal.      Nose: Nose normal.      Mouth/Throat:      Mouth: Mucous membranes are dry.   Eyes:      Extraocular Movements: Extraocular movements intact.      Pupils: Pupils are equal, round, and reactive to light.   Cardiovascular:       Rate and Rhythm: Normal rate and regular rhythm.   Pulmonary:      Effort: Pulmonary effort is normal. No respiratory distress.      Breath sounds: No wheezing.   Abdominal:      Palpations: Abdomen is soft.      Tenderness: There is abdominal tenderness. There is no guarding or rebound.      Comments: Diffuse tenderness on palpation, worse on right upper quadrant   Genitourinary:     Comments: Deferred  Musculoskeletal:      Cervical back: Neck supple.   Skin:     General: Skin is warm and dry.   Neurological:      Mental Status: He is alert and oriented to person, place, and time. Mental status is at baseline.   Psychiatric:         Mood and Affect: Affect is blunt.         Behavior: Behavior is cooperative.         CRANIAL NERVES     CN III, IV, VI   Pupils are equal, round, and reactive to light.     Significant Labs: All pertinent labs within the past 24 hours have been reviewed.  Blood Culture: No results for input(s): LABBLOO in the last 48 hours.  CBC:   Recent Labs   Lab 02/20/23  1503   WBC 2.07*   HGB 12.0*   HCT 36.2*        CMP:   Recent Labs   Lab 02/20/23  1503      K 3.6   CL 96   CO2 27   GLU 91   BUN 9   CREATININE 0.7   CALCIUM 9.0   PROT 7.4   ALBUMIN 3.3*   BILITOT 1.4*   ALKPHOS 704*   *   *   ANIONGAP 13     Lipase:   Recent Labs   Lab 02/20/23  1503   LIPASE 4       Significant Imaging: I have reviewed all pertinent imaging results/findings within the past 24 hours.

## 2023-02-21 NOTE — HPI
The patient has a history of UC, PSC and intra-abdominal carcinomatosis. He is followed by Dr. Shell. First chemo was 02/16/23. The patient felt bad after but by day three began to have worsening abdominal pain. The pain is located in epigastric region and RUQ. He also has some lower abdominal pain. It is associated with nausea but no vomiting. He contacted Dr. Christensen's office yesterday and was instructed to present to the ER for evaluation. The patient had been in [facility] around the 12th with a bowel obstruction so there was concern for this again. CT w/ contrast showed a small gas and fluid collection adjacent to the body of the pancreas that measures 25 mm in craniocaudal dimension by 18 mm in AP dimension by 18 mm in medial-lateral dimension. A drain is noted between this fluid collection and the body of the stomach.  This is characteristic of an abscess with associated pancreatitis. There is intrahepatic biliary ductal dilatation. A biliary stent remains in place. WBC count 20.7, Hgb 12, , , T. Bili 1.4, , rest of chemistry panel is ok. UA unremarkable. Vitals stable. Patient was started on Zosyn. He had a good BM today he attributed to the oral contrast. No other issues with BMs in the last few days. Denies overt bleeding.       GI History  Patient with h/o UC and colon cancer (1995) s/p colectomy w/ J pouch  He was referred to Dr. Teran for EUS/ERCP after he was found to have elevated LFTs and abnormal MRI (09/2022).     EUS/ERCP (10/14/22): stricture consistent with PSC, CBD stent placed, brushings negative for malignancy.     Hosp (10/17/22): admitted for post-ERCP pancreatitis     CA 19-9 (11/02/22) 1181.2     CT w&w/o (12/08/22): Abnormal infiltrative soft tissue attenuation around the base of the gallbladder and tameka hepatis region, increased compared to 10/16/2022 suspicious for infiltrative malignancy likely cholangiocarcinoma. Additionally, there are scattered nodules in  the peritoneum and mesentery suspicious for peritoneal carcinomatosis. 12 x 10 x 6.7 cm heterogeneous somewhat loculated rim enhancing fluid collection abutting the body of the pancreas suspicious for walled-off peripancreatic necrosis     EUS (12/13/22): note of pancreatic walled off necrosis. Cystogastrostomy performed with placement of axial stent. ERCP not done due to duodenal narrowing from external compression.   CT w/ (12/13/22): Status post cyst gastrostomy tube placement into the large pancreatic cystic lesion possibly pseudocyst or walled-off necrosis. Stable soft tissue attenuation lesion near the tameka hepatis possibly fibrosis or neoplasm. Mild distention the gallbladder and wall thickening. Periportal edema and mild intrahepatic biliary ductal dilation despite patient status post biliary stenting.     EGD/ERCP (12/20/22): pancreatic endoscopic necrosecetomy; duodenal stricture biopsied and dilated. ERCP with stent exchange and dilation of distal biliary stricture with stent placement.    UGI (12/21/22): Rugal fold thickening within the stomach suggestive of gastritis.  Cyst gastrostomy noted with communication of the collection within the lesser sac.  There was some delay of contrast and narrowing within the duodenum Wiliam just beyond the duodenal bulb consistent with a duodenal stricture.  Contrast did pass the stricture into the duodenum     CT w/ (12/27/22): Similar appearance of an ill-defined area of hypoattenuation in the region of the tameka hepatis again suspicious for infiltrative mass/malignancy. Interval increase in size of several, irregular spiculated peritoneal nodules as compared to the priors. Continued distension of the gallbladder with common bile duct stent in place. Interval decrease in size of an air in fluid collection within or adjacent to the pancreas status post cyst gastrostomy.

## 2023-02-21 NOTE — ASSESSMENT & PLAN NOTE
Intra-abdominal abscess secondary to underlying malignancy.  Agree with attempts at stent tomorrow to see if some pressure can be with drawn from his abdominal distention.  Patient does appear more comfortable today than when I saw patient on video visit yesterday

## 2023-02-21 NOTE — CONSULTS
Chart reviewed by Dr. Silva.       ASSESSMENT/PLAN:    Pancreatic abscess    Area of concern appears to have a drain in it and the abscess is too small for percutaneous drain.  Please reconsult if patient condition worsens or if intervention is requested.          Thank you for the consult.

## 2023-02-21 NOTE — ASSESSMENT & PLAN NOTE
Follows with Dr. Shell  Recently started on cisplatin gemcitabine (cycle 1 on 2/16, cycle 2 due 2/23)  Pain control  Consulted Palliative medicine- initiated fentanyl patch  Nausea control

## 2023-02-21 NOTE — ASSESSMENT & PLAN NOTE
CT of abdomen pelvis showed findings characteristic of an abscess with associated pancreatitis  Patient with abdominal carcinomatosis, peritoneal adenocarcinoma on chemotherapy.  Had prior ERCP with stent placement followed by stent exchange by Dr. Conte for cholangitis  Blood cultures pending  Start empiric antibiotic treatment IV Zosyn  GI and Interventional Radiology consultation  Multimodal pain control  Antiemetics p.r.n.  Start clear liquid diet, advanced as tolerated  Monitor CBC and fever curve

## 2023-02-21 NOTE — SUBJECTIVE & OBJECTIVE
Past Medical History:   Diagnosis Date    Cancer     COLON CANCER 1995    Digestive disorder     Hypertension     Primary sclerosing cholangitis     Ulcerative colitis     s/p colectomy with J- pouch       Past Surgical History:   Procedure Laterality Date    ABDOMINAL WASHOUT N/A 1/18/2023    Procedure: LAVAGE, PERITONEAL,;  Surgeon: Onur Calvin Jr., MD;  [facility];  Service: General;  Laterality: N/A;    BIOPSY OF PERITONEUM N/A 1/18/2023    Procedure: BIOPSY, PERITONEUM;  Surgeon: Onur Calvin Jr., MD;  [facility];  Service: General;  Laterality: N/A;    COLON SURGERY      Colectomy with J- pouch    DIAGNOSTIC LAPAROSCOPY N/A 1/18/2023    Procedure: LAPAROSCOPY, DIAGNOSTIC WITH WASHING;  Surgeon: Onur Calvin Jr., MD;  [facility];  Service: General;  Laterality: N/A;    ENDOSCOPIC ULTRASOUND OF UPPER GASTROINTESTINAL TRACT N/A 10/14/2022    Procedure: ULTRASOUND, UPPER GI TRACT,;  Surgeon: Vince Teran MD;  Location: [facility];  Service: Endoscopy;  Laterality: N/A;  upper and linear    ENDOSCOPIC ULTRASOUND OF UPPER GASTROINTESTINAL TRACT N/A 12/13/2022    Procedure: ULTRASOUND, UPPER GI TRACT, ENDOSCOPIC;  Surgeon: Vince Teran MD;  Location: [facility];  Service: Endoscopy;  Laterality: N/A;    ERCP N/A 10/14/2022    Procedure: ERCP (ENDOSCOPIC RETROGRADE CHOLANGIOPANCREATOGRAPHY) with spyglass;  Surgeon: Vince Teran MD;  Location: [facility];  Service: Endoscopy;  Laterality: N/A;    ERCP N/A 12/13/2022    Procedure: ERCP (ENDOSCOPIC RETROGRADE CHOLANGIOPANCREATOGRAPHY);  Surgeon: Vince Teran MD;  Location: [facility];  Service: Endoscopy;  Laterality: N/A;    ERCP N/A 12/20/2022    Procedure: ERCP (ENDOSCOPIC RETROGRADE CHOLANGIOPANCREATOGRAPHY);  Surgeon: Vince Teran MD;  Location: [facility];  Service: Endoscopy;  Laterality: N/A;    ERCP N/A 1/4/2023    Procedure: ERCP (ENDOSCOPIC  RETROGRADE CHOLANGIOPANCREATOGRAPHY);  Surgeon: Vince Teran MD;  Location: [facility];  Service: Endoscopy;  Laterality: N/A;  room 1    ESOPHAGOGASTRODUODENOSCOPY N/A 12/20/2022    Procedure: EGD (ESOPHAGOGASTRODUODENOSCOPY);  Surgeon: Vince Teran MD;  Location: [facility];  Service: Endoscopy;  Laterality: N/A;    ESOPHAGOGASTRODUODENOSCOPY N/A 1/4/2023    Procedure: EGD (ESOPHAGOGASTRODUODENOSCOPY);  Surgeon: Vince Teran MD;  Location: [facility];  Service: Endoscopy;  Laterality: N/A;    INSERTION OF TUNNELED CENTRAL VENOUS CATHETER (CVC) WITH SUBCUTANEOUS PORT Left 2/9/2023    Procedure: [id number];  Surgeon: Wojciech Martínez MD;  Location: [facility] OR;  Service: General;  Laterality: Left;    POUCHOSCOPY         Review of patient's allergies indicates:   Allergen Reactions    Vicryl [sutures, polyglycolic acid] Other (See Comments)     Wound dehiscence after achilles sx    Ciprofloxacin Other (See Comments)     Pt previously had medication reaction; suffered achilles rupture     Meperidine Hives     Family History    None       Tobacco Use    Smoking status: Never     Passive exposure: Never    Smokeless tobacco: Never   Substance and Sexual Activity    Alcohol use: Not Currently    Drug use: Yes     Types: Marijuana     Comment: medical marijuana    Sexual activity: Not on file     Review of Systems   Constitutional:  Negative for fever.   HENT:  Negative for hearing loss.    Eyes:  Negative for visual disturbance.   Respiratory:  Negative for cough and shortness of breath.    Cardiovascular:  Negative for chest pain and palpitations.   Gastrointestinal:         As per HPI.   Genitourinary:  Negative for difficulty urinating, dysuria, frequency and hematuria.   Musculoskeletal:  Negative for arthralgias and back pain.   Skin:  Negative for color change and rash.   Neurological:  Negative for seizures, syncope, numbness and headaches.   Hematological:  Does not  bruise/bleed easily.   Psychiatric/Behavioral:  The patient is not nervous/anxious.    Objective:     Vital Signs (Most Recent):  Temp: 97.8 °F (36.6 °C) (02/21/23 0811)  Pulse: 90 (02/21/23 0811)  Resp: 17 (02/21/23 1049)  BP: 122/71 (02/21/23 0811)  SpO2: 98 % (02/21/23 0811) Vital Signs (24h Range):  Temp:  [97.8 °F (36.6 °C)-98.3 °F (36.8 °C)] 97.8 °F (36.6 °C)  Pulse:  [77-92] 90  Resp:  [12-20] 17  SpO2:  [93 %-100 %] 98 %  BP: (108-129)/(66-79) 122/71     Weight: 74.5 kg (164 lb 3.9 oz) (02/20/23 1849)  Body mass index is 22.28 kg/m².      Intake/Output Summary (Last 24 hours) at 2/21/2023 1050  Last data filed at 2/21/2023 0003  Gross per 24 hour   Intake 447.08 ml   Output --   Net 447.08 ml       Lines/Drains/Airways       Central Venous Catheter Line  Duration                  PowerPort A Cath Single Lumen 02/09/23 1124 left subclavian 11 days              Peripheral Intravenous Line  Duration                  Peripheral IV - Single Lumen 02/20/23 1628 18 G Left Antecubital <1 day                    Physical Exam  Constitutional:       General: He is not in acute distress.     Appearance: He is well-developed.   HENT:      Head: Normocephalic and atraumatic.   Eyes:      Extraocular Movements: Extraocular movements intact.   Cardiovascular:      Rate and Rhythm: Normal rate and regular rhythm.      Heart sounds: Normal heart sounds. No murmur heard.  Pulmonary:      Effort: Pulmonary effort is normal. No respiratory distress.      Breath sounds: Normal breath sounds. No wheezing.   Abdominal:      General: Bowel sounds are normal. There is no distension.      Palpations: Abdomen is soft. There is no hepatomegaly or mass.      Tenderness: There is abdominal tenderness in the right upper quadrant, epigastric area and suprapubic area. There is no guarding or rebound.      Comments: Midline scar noted. There is firmness in the RUQ.    Musculoskeletal:      Cervical back: Normal range of motion and neck  supple.      Right lower leg: No edema.      Left lower leg: No edema.   Skin:     General: Skin is warm and dry.      Findings: No rash.   Neurological:      Mental Status: He is alert and oriented to person, place, and time.      Cranial Nerves: No cranial nerve deficit.   Psychiatric:         Behavior: Behavior normal.       Significant Labs:  CBC:   Recent Labs   Lab 02/20/23  1503 02/21/23  0530   WBC 2.07* 3.77*   HGB 12.0* 11.0*   HCT 36.2* 33.4*    284     CMP:   Recent Labs   Lab 02/21/23  0530   GLU 83   CALCIUM 8.8   ALBUMIN 3.0*   PROT 6.8      K 3.4*   CO2 24   CL 97   BUN 9   CREATININE 0.7   ALKPHOS 642*   *   AST 73*   BILITOT 1.5*     Coagulation: No results for input(s): PT, INR, APTT in the last 48 hours.    Significant Imaging:  Imaging results within the past 24 hours have been reviewed.

## 2023-02-21 NOTE — PLAN OF CARE
Discussed poc with pt, pt verbalized understanding     Purposeful rounding every 2hours    Pt refusing further BS monitoring.  Fall precautions in place, remains injury free  Pt denies c/o nausea  Pain being managed with PRN meds     IVFs  IV Abx given as prescribed  Bed locked at lowest position  Call light within reach     Chart check complete  Reviewed active orders  Will continue with POC

## 2023-02-21 NOTE — SUBJECTIVE & OBJECTIVE
Oncology Treatment Plan:   OP PANC GEMCITABINE CISPLATIN Q4W    Medications:  Continuous Infusions:   sodium chloride 0.9% 125 mL/hr at 02/21/23 1210     Scheduled Meds:   fentaNYL  1 patch Transdermal Q72H    piperacillin-tazobactam (ZOSYN) IVPB  4.5 g Intravenous Q8H    polyethylene glycol  17 g Oral Daily     PRN Meds:acetaminophen, acetaminophen, aluminum-magnesium hydroxide-simethicone, bisacodyL, glucagon (human recombinant), HYDROmorphone, magnesium oxide, magnesium oxide, melatonin, naloxone, ondansetron, ondansetron, oxyCODONE, potassium bicarbonate, potassium bicarbonate, potassium bicarbonate, potassium, sodium phosphates, potassium, sodium phosphates, potassium, sodium phosphates, simethicone, sodium chloride 0.9%     Review of patient's allergies indicates:   Allergen Reactions    Vicryl [sutures, polyglycolic acid] Other (See Comments)     Wound dehiscence after achilles sx    Ciprofloxacin Other (See Comments)     Pt previously had medication reaction; suffered achilles rupture     Meperidine Hives        Past Medical History:   Diagnosis Date    Cancer     COLON CANCER 1995    Digestive disorder     Hypertension     Primary sclerosing cholangitis     Ulcerative colitis     s/p colectomy with J- pouch     Past Surgical History:   Procedure Laterality Date    ABDOMINAL WASHOUT N/A 1/18/2023    Procedure: LAVAGE, PERITONEAL,;  Surgeon: Onur Calvin Jr., MD;  Location: [facility] OR [address] [place] [address];  Service: General;  Laterality: N/A;    BIOPSY OF PERITONEUM N/A 1/18/2023    Procedure: BIOPSY, PERITONEUM;  Surgeon: Onur Calvin Jr., MD;  Location: [facility] OR [address] [place] [address];  Service: General;  Laterality: N/A;    COLON SURGERY      Colectomy with J- pouch    DIAGNOSTIC LAPAROSCOPY N/A 1/18/2023    Procedure: LAPAROSCOPY, DIAGNOSTIC WITH WASHING;  Surgeon: Onur Calvin Jr., MD;  Location: [facility] OR [address] [place] [address];  Service: General;  Laterality: N/A;    ENDOSCOPIC ULTRASOUND OF UPPER GASTROINTESTINAL TRACT N/A 10/14/2022     Procedure: ULTRASOUND, UPPER GI TRACT,;  Surgeon: Vince Teran MD;  Location: [facility];  Service: Endoscopy;  Laterality: N/A;  upper and linear    ENDOSCOPIC ULTRASOUND OF UPPER GASTROINTESTINAL TRACT N/A 12/13/2022    Procedure: ULTRASOUND, UPPER GI TRACT, ENDOSCOPIC;  Surgeon: Vince Teran MD;  Location: [facility];  Service: Endoscopy;  Laterality: N/A;    ERCP N/A 10/14/2022    Procedure: ERCP (ENDOSCOPIC RETROGRADE CHOLANGIOPANCREATOGRAPHY) with spyglass;  Surgeon: Vince Teran MD;  Location: [facility];  Service: Endoscopy;  Laterality: N/A;    ERCP N/A 12/13/2022    Procedure: ERCP (ENDOSCOPIC RETROGRADE CHOLANGIOPANCREATOGRAPHY);  Surgeon: Vince Teran MD;  Location: [facility];  Service: Endoscopy;  Laterality: N/A;    ERCP N/A 12/20/2022    Procedure: ERCP (ENDOSCOPIC RETROGRADE CHOLANGIOPANCREATOGRAPHY);  Surgeon: Vince Teran MD;  Location: [facility];  Service: Endoscopy;  Laterality: N/A;    ERCP N/A 1/4/2023    Procedure: ERCP (ENDOSCOPIC RETROGRADE CHOLANGIOPANCREATOGRAPHY);  Surgeon: Vince Teran MD;  Location: [facility];  Service: Endoscopy;  Laterality: N/A;  room 1    ESOPHAGOGASTRODUODENOSCOPY N/A 12/20/2022    Procedure: EGD (ESOPHAGOGASTRODUODENOSCOPY);  Surgeon: Vince Teran MD;  Location: [facility];  Service: Endoscopy;  Laterality: N/A;    ESOPHAGOGASTRODUODENOSCOPY N/A 1/4/2023    Procedure: EGD (ESOPHAGOGASTRODUODENOSCOPY);  Surgeon: Vince Teran MD;  Location: [facility];  Service: Endoscopy;  Laterality: N/A;    INSERTION OF TUNNELED CENTRAL VENOUS CATHETER (CVC) WITH SUBCUTANEOUS PORT Left 2/9/2023    Procedure: [id number];  Surgeon: Wojciech Martínez MD;  Location: [facility];  Service: General;  Laterality: Left;    POUCHOSCOPY       Family History    None       Tobacco Use    Smoking status: Never     Passive exposure: Never    Smokeless tobacco: Never   Substance and Sexual  Activity    Alcohol use: Not Currently    Drug use: Yes     Types: Marijuana     Comment: medical marijuana    Sexual activity: Not on file       Review of Systems   Constitutional:  Positive for activity change, chills and fatigue. Negative for appetite change, diaphoresis, fever and unexpected weight change.   HENT:  Negative for congestion, dental problem, drooling, ear discharge, ear pain, facial swelling, hearing loss, mouth sores, nosebleeds, postnasal drip, rhinorrhea, sinus pressure, sneezing, sore throat, tinnitus, trouble swallowing and voice change.    Eyes:  Negative for photophobia, pain, discharge, redness, itching and visual disturbance.   Respiratory:  Negative for apnea, cough, choking, chest tightness, shortness of breath, wheezing and stridor.    Cardiovascular:  Negative for chest pain, palpitations and leg swelling.   Gastrointestinal:  Positive for abdominal distention and abdominal pain. Negative for anal bleeding, blood in stool, constipation, diarrhea, nausea, rectal pain and vomiting.   Endocrine: Negative for cold intolerance, heat intolerance, polydipsia, polyphagia and polyuria.   Genitourinary:  Negative for decreased urine volume, difficulty urinating, dysuria, enuresis, flank pain, frequency, genital sores, hematuria, penile discharge, penile pain, penile swelling, scrotal swelling, testicular pain and urgency.   Musculoskeletal:  Negative for arthralgias, back pain, gait problem, joint swelling, myalgias, neck pain and neck stiffness.   Skin:  Negative for color change, pallor, rash and wound.   Allergic/Immunologic: Negative for environmental allergies, food allergies and immunocompromised state.   Neurological:  Positive for weakness. Negative for dizziness, tremors, seizures, syncope, facial asymmetry, speech difficulty, light-headedness, numbness and headaches.   Hematological:  Negative for adenopathy. Does not bruise/bleed easily.   Psychiatric/Behavioral:  Positive for  dysphoric mood. Negative for agitation, behavioral problems, confusion, decreased concentration, hallucinations, self-injury, sleep disturbance and suicidal ideas. The patient is nervous/anxious. The patient is not hyperactive.    Objective:     Vital Signs (Most Recent):  Temp: 98.5 °F (36.9 °C) (02/21/23 1532)  Pulse: 84 (02/21/23 1532)  Resp: 17 (02/21/23 1532)  BP: 122/78 (02/21/23 1532)  SpO2: 95 % (02/21/23 1532) Vital Signs (24h Range):  Temp:  [97.8 °F (36.6 °C)-98.5 °F (36.9 °C)] 98.5 °F (36.9 °C)  Pulse:  [78-92] 84  Resp:  [12-18] 17  SpO2:  [93 %-100 %] 95 %  BP: (111-138)/(71-79) 122/78     Weight: 74.5 kg (164 lb 3.9 oz)  Body mass index is 22.28 kg/m².  Body surface area is 1.95 meters squared.      Intake/Output Summary (Last 24 hours) at 2/21/2023 1652  Last data filed at 2/21/2023 0003  Gross per 24 hour   Intake 397.08 ml   Output --   Net 397.08 ml       Physical Exam  Vitals reviewed.   Constitutional:       General: He is not in acute distress.     Appearance: He is well-developed. He is ill-appearing. He is not diaphoretic.   HENT:      Head: Normocephalic.      Right Ear: External ear normal.      Left Ear: External ear normal.      Nose: Nose normal.      Right Sinus: No maxillary sinus tenderness or frontal sinus tenderness.      Left Sinus: No maxillary sinus tenderness or frontal sinus tenderness.      Mouth/Throat:      Pharynx: No oropharyngeal exudate.   Eyes:      General: Lids are normal. No scleral icterus.        Right eye: No discharge.         Left eye: No discharge.      Extraocular Movements:      Right eye: Normal extraocular motion.      Left eye: Normal extraocular motion.      Conjunctiva/sclera:      Right eye: Right conjunctiva is not injected. No hemorrhage.     Left eye: Left conjunctiva is not injected. No hemorrhage.     Pupils: Pupils are equal, round, and reactive to light.   Neck:      Thyroid: No thyromegaly.      Vascular: No JVD.      Trachea: No tracheal  deviation.   Cardiovascular:      Rate and Rhythm: Normal rate.   Pulmonary:      Effort: Pulmonary effort is normal. No respiratory distress.      Breath sounds: No stridor.   Abdominal:      General: Bowel sounds are normal.      Palpations: Abdomen is soft. There is no hepatomegaly, splenomegaly or mass.      Tenderness: There is no abdominal tenderness.   Musculoskeletal:         General: No tenderness. Normal range of motion.      Cervical back: Normal range of motion and neck supple.   Lymphadenopathy:      Head:      Right side of head: No posterior auricular or occipital adenopathy.      Left side of head: No posterior auricular or occipital adenopathy.      Cervical: No cervical adenopathy.      Right cervical: No superficial, deep or posterior cervical adenopathy.     Left cervical: No superficial, deep or posterior cervical adenopathy.      Upper Body:      Right upper body: No supraclavicular adenopathy.      Left upper body: No supraclavicular adenopathy.   Skin:     General: Skin is dry.      Findings: No erythema or rash.      Nails: There is no clubbing.   Neurological:      Mental Status: He is alert and oriented to person, place, and time.      Cranial Nerves: No cranial nerve deficit.      Coordination: Coordination normal.   Psychiatric:         Behavior: Behavior normal.         Thought Content: Thought content normal.         Judgment: Judgment normal.       Significant Labs:   BMP:   Recent Labs   Lab 02/20/23  1503 02/21/23  0530   GLU 91 83    136   K 3.6 3.4*   CL 96 97   CO2 27 24   BUN 9 9   CREATININE 0.7 0.7   CALCIUM 9.0 8.8   MG  --  1.6   , CBC:   Recent Labs   Lab 02/20/23  1503 02/21/23  0530   WBC 2.07* 3.77*   HGB 12.0* 11.0*   HCT 36.2* 33.4*    284   , CMP:   Recent Labs   Lab 02/20/23  1503 02/21/23  0530    136   K 3.6 3.4*   CL 96 97   CO2 27 24   GLU 91 83   BUN 9 9   CREATININE 0.7 0.7   CALCIUM 9.0 8.8   PROT 7.4 6.8   ALBUMIN 3.3* 3.0*   BILITOT 1.4*  1.5*   ALKPHOS 704* 642*   * 73*   * 121*   ANIONGAP 13 15   , Coagulation: No results for input(s): PT, INR, APTT in the last 48 hours., Haptoglobin: No results for input(s): HAPTOGLOBIN in the last 48 hours., Immunology: No results for input(s): SPEP, TRUNG, PATRICK, FREELAMBDALI in the last 48 hours., LDH: No results for input(s): LDHCSF, BFSOURCE in the last 48 hours., LFTs:   Recent Labs   Lab 02/20/23  1503 02/21/23  0530   * 121*   * 73*   ALKPHOS 704* 642*   BILITOT 1.4* 1.5*   PROT 7.4 6.8   ALBUMIN 3.3* 3.0*   , Reticulocytes: No results for input(s): RETIC in the last 48 hours., Tumor Markers: No results for input(s): PSA, CEA, , AFPTM, KU0351,  in the last 48 hours.    Invalid input(s): ALGTM, and Uric Acid No results for input(s): URICACID in the last 48 hours.    Diagnostic Results:  I have reviewed and interpreted all pertinent imaging results/findings within the past 24 hours.

## 2023-02-21 NOTE — ASSESSMENT & PLAN NOTE
Reached out to IR but collection is too small for percutaneous drainage.   Dr. Teran reviewed images and reports this collection is actually decreased with drain in place.

## 2023-02-21 NOTE — CONSULTS
[facility]  Gastroenterology  Consult Note    Patient Name: Guevara Osullivan II  MRN: [id number]  Admission Date: 2/20/2023  Hospital Length of Stay: 1 days  Code Status: Full Code   Attending Provider: Adela Riley DO   Consulting Provider: Reynaldo Karimi PA-C  Primary Care Physician: Dima Ch MD  Principal Problem:Intra-abdominal abscess    Inpatient consult to Gastroenterology  Consult performed by: Reynaldo Karimi PA-C  Consult ordered by: Adela Riley DO  Reason for consult: Abd pain; abscess        Subjective:     HPI:  The patient has a history of UC, PSC and intra-abdominal carcinomatosis. He is followed by Dr. Shell. First chemo was 02/16/23. The patient felt bad after but by day three began to have worsening abdominal pain. The pain is located in epigastric region and RUQ. He also has some lower abdominal pain. It is associated with nausea but no vomiting. He contacted Dr. Christensen's office yesterday and was instructed to present to the ER for evaluation. The patient had been in [facility] around the 12th with a bowel obstruction so there was concern for this again. CT w/ contrast showed a small gas and fluid collection adjacent to the body of the pancreas that measures 25 mm in craniocaudal dimension by 18 mm in AP dimension by 18 mm in medial-lateral dimension. A drain is noted between this fluid collection and the body of the stomach.  This is characteristic of an abscess with associated pancreatitis. There is intrahepatic biliary ductal dilatation. A biliary stent remains in place. WBC count 20.7, Hgb 12, , , T. Bili 1.4, , rest of chemistry panel is ok. UA unremarkable. Vitals stable. Patient was started on Zosyn. He had a good BM today he attributed to the oral contrast. No other issues with BMs in the last few days. Denies overt bleeding.       GI History  Patient with h/o UC and colon cancer (1995) s/p colectomy w/ J pouch  He was referred to Dr. Teran for  EUS/ERCP after he was found to have elevated LFTs and abnormal MRI (09/2022).     EUS/ERCP (10/14/22): stricture consistent with PSC, CBD stent placed, brushings negative for malignancy.     Hosp (10/17/22): admitted for post-ERCP pancreatitis     CA 19-9 (11/02/22) 1181.2     CT w&w/o (12/08/22): Abnormal infiltrative soft tissue attenuation around the base of the gallbladder and tameka hepatis region, increased compared to 10/16/2022 suspicious for infiltrative malignancy likely cholangiocarcinoma. Additionally, there are scattered nodules in the peritoneum and mesentery suspicious for peritoneal carcinomatosis. 12 x 10 x 6.7 cm heterogeneous somewhat loculated rim enhancing fluid collection abutting the body of the pancreas suspicious for walled-off peripancreatic necrosis     EUS (12/13/22): note of pancreatic walled off necrosis. Cystogastrostomy performed with placement of axial stent. ERCP not done due to duodenal narrowing from external compression.   CT w/ (12/13/22): Status post cyst gastrostomy tube placement into the large pancreatic cystic lesion possibly pseudocyst or walled-off necrosis. Stable soft tissue attenuation lesion near the tameka hepatis possibly fibrosis or neoplasm. Mild distention the gallbladder and wall thickening. Periportal edema and mild intrahepatic biliary ductal dilation despite patient status post biliary stenting.     EGD/ERCP (12/20/22): pancreatic endoscopic necrosecetomy; duodenal stricture biopsied and dilated. ERCP with stent exchange and dilation of distal biliary stricture with stent placement.    UGI (12/21/22): Rugal fold thickening within the stomach suggestive of gastritis.  Cyst gastrostomy noted with communication of the collection within the lesser sac.  There was some delay of contrast and narrowing within the duodenum Wiliam just beyond the duodenal bulb consistent with a duodenal stricture.  Contrast did pass the stricture into the duodenum     CT w/ (12/27/22):  Similar appearance of an ill-defined area of hypoattenuation in the region of the tameka hepatis again suspicious for infiltrative mass/malignancy. Interval increase in size of several, irregular spiculated peritoneal nodules as compared to the priors. Continued distension of the gallbladder with common bile duct stent in place. Interval decrease in size of an air in fluid collection within or adjacent to the pancreas status post cyst gastrostomy.      Past Medical History:   Diagnosis Date    Cancer     COLON CANCER 1995    Digestive disorder     Hypertension     Primary sclerosing cholangitis     Ulcerative colitis     s/p colectomy with J- pouch       Past Surgical History:   Procedure Laterality Date    ABDOMINAL WASHOUT N/A 1/18/2023    Procedure: LAVAGE, PERITONEAL,;  Surgeon: Onur Calvin Jr., MD;  [facility];  Service: General;  Laterality: N/A;    BIOPSY OF PERITONEUM N/A 1/18/2023    Procedure: BIOPSY, PERITONEUM;  Surgeon: Onur Calvin Jr., MD;  Location: [facility] OR [address] [place] [address];  Service: General;  Laterality: N/A;    COLON SURGERY      Colectomy with J- pouch    DIAGNOSTIC LAPAROSCOPY N/A 1/18/2023    Procedure: LAPAROSCOPY, DIAGNOSTIC WITH WASHING;  Surgeon: Onur Calvin Jr., MD;  Location: [facility] OR [address] [place] [address];  Service: General;  Laterality: N/A;    ENDOSCOPIC ULTRASOUND OF UPPER GASTROINTESTINAL TRACT N/A 10/14/2022    Procedure: ULTRASOUND, UPPER GI TRACT,;  Surgeon: Vince Teran MD;  Location: [facility];  Service: Endoscopy;  Laterality: N/A;  upper and linear    ENDOSCOPIC ULTRASOUND OF UPPER GASTROINTESTINAL TRACT N/A 12/13/2022    Procedure: ULTRASOUND, UPPER GI TRACT, ENDOSCOPIC;  Surgeon: Vince Teran MD;  Location: [facility];  Service: Endoscopy;  Laterality: N/A;    ERCP N/A 10/14/2022    Procedure: ERCP (ENDOSCOPIC RETROGRADE CHOLANGIOPANCREATOGRAPHY) with spyglass;  Surgeon: Vince Teran MD;  Location: [facility];  Service:  Endoscopy;  Laterality: N/A;    ERCP N/A 12/13/2022    Procedure: ERCP (ENDOSCOPIC RETROGRADE CHOLANGIOPANCREATOGRAPHY);  Surgeon: Vince Terna MD;  Location: [facility];  Service: Endoscopy;  Laterality: N/A;    ERCP N/A 12/20/2022    Procedure: ERCP (ENDOSCOPIC RETROGRADE CHOLANGIOPANCREATOGRAPHY);  Surgeon: Vince Teran MD;  Location: [facility];  Service: Endoscopy;  Laterality: N/A;    ERCP N/A 1/4/2023    Procedure: ERCP (ENDOSCOPIC RETROGRADE CHOLANGIOPANCREATOGRAPHY);  Surgeon: Vince Teran MD;  Location: [facility];  Service: Endoscopy;  Laterality: N/A;  room 1    ESOPHAGOGASTRODUODENOSCOPY N/A 12/20/2022    Procedure: EGD (ESOPHAGOGASTRODUODENOSCOPY);  Surgeon: Vince Teran MD;  Location: [facility];  Service: Endoscopy;  Laterality: N/A;    ESOPHAGOGASTRODUODENOSCOPY N/A 1/4/2023    Procedure: EGD (ESOPHAGOGASTRODUODENOSCOPY);  Surgeon: Vince Teran MD;  Location: [facility];  Service: Endoscopy;  Laterality: N/A;    INSERTION OF TUNNELED CENTRAL VENOUS CATHETER (CVC) WITH SUBCUTANEOUS PORT Left 2/9/2023    Procedure: [id number];  Surgeon: Wojciech Martínez MD;  Location: [facility];  Service: General;  Laterality: Left;    POUCHOSCOPY         Review of patient's allergies indicates:   Allergen Reactions    Vicryl [sutures, polyglycolic acid] Other (See Comments)     Wound dehiscence after achilles sx    Ciprofloxacin Other (See Comments)     Pt previously had medication reaction; suffered achilles rupture     Meperidine Hives     Family History    None       Tobacco Use    Smoking status: Never     Passive exposure: Never    Smokeless tobacco: Never   Substance and Sexual Activity    Alcohol use: Not Currently    Drug use: Yes     Types: Marijuana     Comment: medical marijuana    Sexual activity: Not on file     Review of Systems   Constitutional:  Negative for fever.   HENT:  Negative for hearing loss.    Eyes:  Negative  for visual disturbance.   Respiratory:  Negative for cough and shortness of breath.    Cardiovascular:  Negative for chest pain and palpitations.   Gastrointestinal:         As per HPI.   Genitourinary:  Negative for difficulty urinating, dysuria, frequency and hematuria.   Musculoskeletal:  Negative for arthralgias and back pain.   Skin:  Negative for color change and rash.   Neurological:  Negative for seizures, syncope, numbness and headaches.   Hematological:  Does not bruise/bleed easily.   Psychiatric/Behavioral:  The patient is not nervous/anxious.    Objective:     Vital Signs (Most Recent):  Temp: 97.8 °F (36.6 °C) (02/21/23 0811)  Pulse: 90 (02/21/23 0811)  Resp: 17 (02/21/23 1049)  BP: 122/71 (02/21/23 0811)  SpO2: 98 % (02/21/23 0811) Vital Signs (24h Range):  Temp:  [97.8 °F (36.6 °C)-98.3 °F (36.8 °C)] 97.8 °F (36.6 °C)  Pulse:  [77-92] 90  Resp:  [12-20] 17  SpO2:  [93 %-100 %] 98 %  BP: (108-129)/(66-79) 122/71     Weight: 74.5 kg (164 lb 3.9 oz) (02/20/23 1849)  Body mass index is 22.28 kg/m².      Intake/Output Summary (Last 24 hours) at 2/21/2023 1050  Last data filed at 2/21/2023 0003  Gross per 24 hour   Intake 447.08 ml   Output --   Net 447.08 ml       Lines/Drains/Airways       Central Venous Catheter Line  Duration                  PowerPort A Cath Single Lumen 02/09/23 1124 left subclavian 11 days              Peripheral Intravenous Line  Duration                  Peripheral IV - Single Lumen 02/20/23 1628 18 G Left Antecubital <1 day                    Physical Exam  Constitutional:       General: He is not in acute distress.     Appearance: He is well-developed.   HENT:      Head: Normocephalic and atraumatic.   Eyes:      Extraocular Movements: Extraocular movements intact.   Cardiovascular:      Rate and Rhythm: Normal rate and regular rhythm.      Heart sounds: Normal heart sounds. No murmur heard.  Pulmonary:      Effort: Pulmonary effort is normal. No respiratory distress.       Breath sounds: Normal breath sounds. No wheezing.   Abdominal:      General: Bowel sounds are normal. There is no distension.      Palpations: Abdomen is soft. There is no hepatomegaly or mass.      Tenderness: There is abdominal tenderness in the right upper quadrant, epigastric area and suprapubic area. There is no guarding or rebound.      Comments: Midline scar noted. There is firmness in the RUQ.    Musculoskeletal:      Cervical back: Normal range of motion and neck supple.      Right lower leg: No edema.      Left lower leg: No edema.   Skin:     General: Skin is warm and dry.      Findings: No rash.   Neurological:      Mental Status: He is alert and oriented to person, place, and time.      Cranial Nerves: No cranial nerve deficit.   Psychiatric:         Behavior: Behavior normal.       Significant Labs:  CBC:   Recent Labs   Lab 02/20/23  1503 02/21/23  0530   WBC 2.07* 3.77*   HGB 12.0* 11.0*   HCT 36.2* 33.4*    284     CMP:   Recent Labs   Lab 02/21/23  0530   GLU 83   CALCIUM 8.8   ALBUMIN 3.0*   PROT 6.8      K 3.4*   CO2 24   CL 97   BUN 9   CREATININE 0.7   ALKPHOS 642*   *   AST 73*   BILITOT 1.5*     Coagulation: No results for input(s): PT, INR, APTT in the last 48 hours.    Significant Imaging:  Imaging results within the past 24 hours have been reviewed.    Assessment/Plan:     Oncology  Primary peritoneal adenocarcinoma  Will consult Palliative Care to help with pain management.     GI  * Intra-abdominal abscess  Reached out to IR but collection is too small for percutaneous drainage.   Dr. Teran reviewed images and reports this collection is actually decreased with drain in place.       Primary sclerosing cholangitis  Stent in place on CT with ductal dilatation. Will plan on ERCP with stent exchange tomorrow. If there is debris or it is clogged, it could cause increased pain.    Monitor LFTs.         Thank you for your consult. I will follow-up with patient. Please  contact us if you have any additional questions.    Reynaldo Karimi PA-C  Gastroenterology  [facility]

## 2023-02-21 NOTE — ASSESSMENT & PLAN NOTE
Patient is currently being treated with cisplatin gemcitabine for best course of action with intra-abdominal carcinomatosis.  Patient has been presented at multidisciplinary precision Medicine conference with proceeding with their recommendations.  Told the family once patient is medically stabilized will be happy to see back in the office to administer day 8 of treatment.  Patient clearly understands this is a treatable but not curable malignancy

## 2023-02-21 NOTE — ED NOTES
Hydromorphone vial returned to [facility] under temporary patient by BRAYDON Ybarra. Return witnessed by this RN.

## 2023-02-21 NOTE — CONSULTS
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  Patient was seen in video visit yesterday with rapidly progressive abdominal pain recommended admission to hospital for pain control further evaluation very patient is seen today feeling much better relieve currently with Duragesic patch has been seen and evaluated by palliative care.  Is scheduled to go endoscopic evaluation with ERCP tomorrow for possible obstructed stent        Oncology Treatment Plan:   OP PANC GEMCITABINE CISPLATIN Q4W    Medications:  Continuous Infusions:   sodium chloride 0.9% 125 mL/hr at 02/21/23 1210     Scheduled Meds:   fentaNYL  1 patch Transdermal Q72H    piperacillin-tazobactam (ZOSYN) IVPB  4.5 g Intravenous Q8H    polyethylene glycol  17 g Oral Daily     PRN Meds:acetaminophen, acetaminophen, aluminum-magnesium hydroxide-simethicone, bisacodyL, glucagon (human recombinant), HYDROmorphone, magnesium oxide, magnesium oxide, melatonin, naloxone, ondansetron, ondansetron, oxyCODONE, potassium bicarbonate, potassium bicarbonate, potassium bicarbonate, potassium, sodium phosphates, potassium, sodium phosphates, potassium, sodium phosphates, simethicone, sodium chloride 0.9%     Review of patient's allergies indicates:   Allergen Reactions    Vicryl [sutures, polyglycolic acid] Other (See Comments)     Wound dehiscence after achilles sx    Ciprofloxacin Other (See Comments)     Pt  previously had medication reaction; suffered achilles rupture     Meperidine Hives        Past Medical History:   Diagnosis Date    Cancer     COLON CANCER 1995    Digestive disorder     Hypertension     Primary sclerosing cholangitis     Ulcerative colitis     s/p colectomy with J- pouch     Past Surgical History:   Procedure Laterality Date    ABDOMINAL WASHOUT N/A 1/18/2023    Procedure: LAVAGE, PERITONEAL,;  Surgeon: Onur Calvin Jr., MD;  Location: [facility] OR [address] [place] [address];  Service: General;  Laterality: N/A;    BIOPSY OF PERITONEUM N/A 1/18/2023    Procedure: BIOPSY, PERITONEUM;  Surgeon: Onur Calvin Jr., MD;  Location: [facility] OR [address] [place] [address];  Service: General;  Laterality: N/A;    COLON SURGERY      Colectomy with J- pouch    DIAGNOSTIC LAPAROSCOPY N/A 1/18/2023    Procedure: LAPAROSCOPY, DIAGNOSTIC WITH WASHING;  Surgeon: Onur Calvin Jr., MD;  Location: [facility] OR [address] [place] [address];  Service: General;  Laterality: N/A;    ENDOSCOPIC ULTRASOUND OF UPPER GASTROINTESTINAL TRACT N/A 10/14/2022    Procedure: ULTRASOUND, UPPER GI TRACT,;  Surgeon: Vince Teran MD;  Location: [facility];  Service: Endoscopy;  Laterality: N/A;  upper and linear    ENDOSCOPIC ULTRASOUND OF UPPER GASTROINTESTINAL TRACT N/A 12/13/2022    Procedure: ULTRASOUND, UPPER GI TRACT, ENDOSCOPIC;  Surgeon: Vince Teran MD;  Location: [facility];  Service: Endoscopy;  Laterality: N/A;    ERCP N/A 10/14/2022    Procedure: ERCP (ENDOSCOPIC RETROGRADE CHOLANGIOPANCREATOGRAPHY) with spyglass;  Surgeon: Vince Teran MD;  Location: [facility];  Service: Endoscopy;  Laterality: N/A;    ERCP N/A 12/13/2022    Procedure: ERCP (ENDOSCOPIC RETROGRADE CHOLANGIOPANCREATOGRAPHY);  Surgeon: Vince Teran MD;  Location: [facility];  Service: Endoscopy;  Laterality: N/A;    ERCP N/A 12/20/2022    Procedure: ERCP (ENDOSCOPIC RETROGRADE CHOLANGIOPANCREATOGRAPHY);  Surgeon: Vince Teran MD;  Location:  [facility] ENDO;  Service: Endoscopy;  Laterality: N/A;    ERCP N/A 1/4/2023    Procedure: ERCP (ENDOSCOPIC RETROGRADE CHOLANGIOPANCREATOGRAPHY);  Surgeon: Vince Teran MD;  Location: [facility];  Service: Endoscopy;  Laterality: N/A;  room 1    ESOPHAGOGASTRODUODENOSCOPY N/A 12/20/2022    Procedure: EGD (ESOPHAGOGASTRODUODENOSCOPY);  Surgeon: Vince Teran MD;  Location: [facility];  Service: Endoscopy;  Laterality: N/A;    ESOPHAGOGASTRODUODENOSCOPY N/A 1/4/2023    Procedure: EGD (ESOPHAGOGASTRODUODENOSCOPY);  Surgeon: Vince Teran MD;  Location: [facility];  Service: Endoscopy;  Laterality: N/A;    INSERTION OF TUNNELED CENTRAL VENOUS CATHETER (CVC) WITH SUBCUTANEOUS PORT Left 2/9/2023    Procedure: [id number]-[id number]-W-CATH;  Surgeon: Wojciech Martínez MD;  Location: [facility];  Service: General;  Laterality: Left;    POUCHOSCOPY       Family History    None       Tobacco Use    Smoking status: Never     Passive exposure: Never    Smokeless tobacco: Never   Substance and Sexual Activity    Alcohol use: Not Currently    Drug use: Yes     Types: Marijuana     Comment: medical marijuana    Sexual activity: Not on file       Review of Systems   Constitutional:  Positive for activity change, chills and fatigue. Negative for appetite change, diaphoresis, fever and unexpected weight change.   HENT:  Negative for congestion, dental problem, drooling, ear discharge, ear pain, facial swelling, hearing loss, mouth sores, nosebleeds, postnasal drip, rhinorrhea, sinus pressure, sneezing, sore throat, tinnitus, trouble swallowing and voice change.    Eyes:  Negative for photophobia, pain, discharge, redness, itching and visual disturbance.   Respiratory:  Negative for apnea, cough, choking, chest tightness, shortness of breath, wheezing and stridor.    Cardiovascular:  Negative for chest pain, palpitations and leg swelling.   Gastrointestinal:  Positive for abdominal distention and abdominal pain.  Negative for anal bleeding, blood in stool, constipation, diarrhea, nausea, rectal pain and vomiting.   Endocrine: Negative for cold intolerance, heat intolerance, polydipsia, polyphagia and polyuria.   Genitourinary:  Negative for decreased urine volume, difficulty urinating, dysuria, enuresis, flank pain, frequency, genital sores, hematuria, penile discharge, penile pain, penile swelling, scrotal swelling, testicular pain and urgency.   Musculoskeletal:  Negative for arthralgias, back pain, gait problem, joint swelling, myalgias, neck pain and neck stiffness.   Skin:  Negative for color change, pallor, rash and wound.   Allergic/Immunologic: Negative for environmental allergies, food allergies and immunocompromised state.   Neurological:  Positive for weakness. Negative for dizziness, tremors, seizures, syncope, facial asymmetry, speech difficulty, light-headedness, numbness and headaches.   Hematological:  Negative for adenopathy. Does not bruise/bleed easily.   Psychiatric/Behavioral:  Positive for dysphoric mood. Negative for agitation, behavioral problems, confusion, decreased concentration, hallucinations, self-injury, sleep disturbance and suicidal ideas. The patient is nervous/anxious. The patient is not hyperactive.    Objective:     Vital Signs (Most Recent):  Temp: 98.5 °F (36.9 °C) (02/21/23 1532)  Pulse: 84 (02/21/23 1532)  Resp: 17 (02/21/23 1532)  BP: 122/78 (02/21/23 1532)  SpO2: 95 % (02/21/23 1532) Vital Signs (24h Range):  Temp:  [97.8 °F (36.6 °C)-98.5 °F (36.9 °C)] 98.5 °F (36.9 °C)  Pulse:  [78-92] 84  Resp:  [12-18] 17  SpO2:  [93 %-100 %] 95 %  BP: (111-138)/(71-79) 122/78     Weight: 74.5 kg (164 lb 3.9 oz)  Body mass index is 22.28 kg/m².  Body surface area is 1.95 meters squared.      Intake/Output Summary (Last 24 hours) at 2/21/2023 1652  Last data filed at 2/21/2023 0003  Gross per 24 hour   Intake 397.08 ml   Output --   Net 397.08 ml       Physical Exam  Vitals reviewed.    Constitutional:       General: He is not in acute distress.     Appearance: He is well-developed. He is ill-appearing. He is not diaphoretic.   HENT:      Head: Normocephalic.      Right Ear: External ear normal.      Left Ear: External ear normal.      Nose: Nose normal.      Right Sinus: No maxillary sinus tenderness or frontal sinus tenderness.      Left Sinus: No maxillary sinus tenderness or frontal sinus tenderness.      Mouth/Throat:      Pharynx: No oropharyngeal exudate.   Eyes:      General: Lids are normal. No scleral icterus.        Right eye: No discharge.         Left eye: No discharge.      Extraocular Movements:      Right eye: Normal extraocular motion.      Left eye: Normal extraocular motion.      Conjunctiva/sclera:      Right eye: Right conjunctiva is not injected. No hemorrhage.     Left eye: Left conjunctiva is not injected. No hemorrhage.     Pupils: Pupils are equal, round, and reactive to light.   Neck:      Thyroid: No thyromegaly.      Vascular: No JVD.      Trachea: No tracheal deviation.   Cardiovascular:      Rate and Rhythm: Normal rate.   Pulmonary:      Effort: Pulmonary effort is normal. No respiratory distress.      Breath sounds: No stridor.   Abdominal:      General: Bowel sounds are normal.      Palpations: Abdomen is soft. There is no hepatomegaly, splenomegaly or mass.      Tenderness: There is no abdominal tenderness.   Musculoskeletal:         General: No tenderness. Normal range of motion.      Cervical back: Normal range of motion and neck supple.   Lymphadenopathy:      Head:      Right side of head: No posterior auricular or occipital adenopathy.      Left side of head: No posterior auricular or occipital adenopathy.      Cervical: No cervical adenopathy.      Right cervical: No superficial, deep or posterior cervical adenopathy.     Left cervical: No superficial, deep or posterior cervical adenopathy.      Upper Body:      Right upper body: No supraclavicular  adenopathy.      Left upper body: No supraclavicular adenopathy.   Skin:     General: Skin is dry.      Findings: No erythema or rash.      Nails: There is no clubbing.   Neurological:      Mental Status: He is alert and oriented to person, place, and time.      Cranial Nerves: No cranial nerve deficit.      Coordination: Coordination normal.   Psychiatric:         Behavior: Behavior normal.         Thought Content: Thought content normal.         Judgment: Judgment normal.       Significant Labs:   BMP:   Recent Labs   Lab 02/20/23  1503 02/21/23  0530   GLU 91 83    136   K 3.6 3.4*   CL 96 97   CO2 27 24   BUN 9 9   CREATININE 0.7 0.7   CALCIUM 9.0 8.8   MG  --  1.6   , CBC:   Recent Labs   Lab 02/20/23  1503 02/21/23  0530   WBC 2.07* 3.77*   HGB 12.0* 11.0*   HCT 36.2* 33.4*    284   , CMP:   Recent Labs   Lab 02/20/23  1503 02/21/23  0530    136   K 3.6 3.4*   CL 96 97   CO2 27 24   GLU 91 83   BUN 9 9   CREATININE 0.7 0.7   CALCIUM 9.0 8.8   PROT 7.4 6.8   ALBUMIN 3.3* 3.0*   BILITOT 1.4* 1.5*   ALKPHOS 704* 642*   * 73*   * 121*   ANIONGAP 13 15   , Coagulation: No results for input(s): PT, INR, APTT in the last 48 hours., Haptoglobin: No results for input(s): HAPTOGLOBIN in the last 48 hours., Immunology: No results for input(s): SPEP, TRUNG, PATRICK, FREELAMBDALI in the last 48 hours., LDH: No results for input(s): LDHCSF, BFSOURCE in the last 48 hours., LFTs:   Recent Labs   Lab 02/20/23  1503 02/21/23  0530   * 121*   * 73*   ALKPHOS 704* 642*   BILITOT 1.4* 1.5*   PROT 7.4 6.8   ALBUMIN 3.3* 3.0*   , Reticulocytes: No results for input(s): RETIC in the last 48 hours., Tumor Markers: No results for input(s): PSA, CEA, , AFPTM, YE0222,  in the last 48 hours.    Invalid input(s): ALGTM, and Uric Acid No results for input(s): URICACID in the last 48 hours.    Diagnostic Results:  I have reviewed and interpreted all pertinent imaging results/findings within  the past 24 hours.    Assessment/Plan:     * Intra-abdominal abscess  Intra-abdominal abscess secondary to underlying malignancy.  Agree with attempts at stent tomorrow to see if some pressure can be with drawn from his abdominal distention.  Patient does appear more comfortable today than when I saw patient on video visit yesterday    Primary peritoneal adenocarcinoma  The patient has previous diagnosis of a rectal carcinoma at age 23.  The best thought of precision Medicine conference was that this could represent intra-abdominal malignancy secondary to cholangiocarcinoma in that is why we are treating such    Abdominal carcinomatosis  Patient is currently being treated with cisplatin gemcitabine for best course of action with intra-abdominal carcinomatosis.  Patient has been presented at multidisciplinary precision Medicine conference with proceeding with their recommendations.  Told the family once patient is medically stabilized will be happy to see back in the office to administer day 8 of treatment.  Patient clearly understands this is a treatable but not curable malignancy        Thank you for your consult. I will follow-up with patient. Please contact us if you have any additional questions.    Preston Shell MD  Hematology/Oncology  [facility]

## 2023-02-21 NOTE — H&P
[facility] - Emergency Dept.  [facility] Medicine  History & Physical    Patient Name: Guevara Osullivan II  MRN: [id number]  Patient Class: IP- Inpatient  Admission Date: 2/20/2023  Attending Physician: Adela Riley DO   Primary Care Provider: Dima Ch MD         Patient information was obtained from patient, spouse/SO, past medical records and ER records.     Subjective:     Principal Problem:Intra-abdominal abscess    Chief Complaint:   Chief Complaint   Patient presents with    Abdominal Pain     ABD pain, On Chemo, worsening pain since then, Sent by Dr Shell to R/O SBO        HPI: Guevara Osullivan II is a 53 y.o. male with past medical history of Cancer, Digestive disorder, Hypertension, Primary sclerosing cholangitis, and Ulcerative colitis who presented to ED on 2/20/2023 with worsening abdominal pain.  Patient with primary peritoneal adenocarcinoma, history of intra-abdominal carcinomatosis.  Had recent admission at [facility] for SBO.  He is on cycle 1 day 4 of cisplatin gemcitabine started on 02/16.  He had follow-up appointment with Dr. Shell today and reported significant worsening abdominal pain, discomfort and nausea.  Reports that he has been unable to eat.  Subsequently was encouraged by Dr. Shell to go to ED for further evaluation.  Workup in ED significant for CT of abdomen and pelvis which showed inflammatory changes characteristic of an abscess with associated pancreatitis, no definite current gastrointestinal obstruction.  Admitted to hospital medicine service for further evaluation and treatment.  GI consulted.  They recommend IR consultation to evaluate for possible drainage of abscess    PCP: Dima Ch MD    SDM:  Spouse    CODE STATUS:  Full code      Past Medical History:   Diagnosis Date    Cancer     COLON CANCER 1995    Digestive disorder     Hypertension     Primary sclerosing cholangitis     Ulcerative colitis     s/p colectomy with J- pouch       Past  Surgical History:   Procedure Laterality Date    ABDOMINAL WASHOUT N/A 1/18/2023    Procedure: LAVAGE, PERITONEAL,;  Surgeon: Onur Calvin Jr., MD;  Location: [facility] OR 2ND FLR;  Service: General;  Laterality: N/A;    BIOPSY OF PERITONEUM N/A 1/18/2023    Procedure: BIOPSY, PERITONEUM;  Surgeon: Onur Calvin Jr., MD;  Location: [facility] OR 2ND FLR;  Service: General;  Laterality: N/A;    COLON SURGERY      Colectomy with J- pouch    DIAGNOSTIC LAPAROSCOPY N/A 1/18/2023    Procedure: LAPAROSCOPY, DIAGNOSTIC WITH WASHING;  Surgeon: Onur Calvin Jr., MD;  Location: [facility] OR 2ND FLR;  Service: General;  Laterality: N/A;    ENDOSCOPIC ULTRASOUND OF UPPER GASTROINTESTINAL TRACT N/A 10/14/2022    Procedure: ULTRASOUND, UPPER GI TRACT,;  Surgeon: Vince Teran MD;  Location: [facility];  Service: Endoscopy;  Laterality: N/A;  upper and linear    ENDOSCOPIC ULTRASOUND OF UPPER GASTROINTESTINAL TRACT N/A 12/13/2022    Procedure: ULTRASOUND, UPPER GI TRACT, ENDOSCOPIC;  Surgeon: Vince Teran MD;  Location: [facility];  Service: Endoscopy;  Laterality: N/A;    ERCP N/A 10/14/2022    Procedure: ERCP (ENDOSCOPIC RETROGRADE CHOLANGIOPANCREATOGRAPHY) with spyglass;  Surgeon: Vince Teran MD;  Location: [facility];  Service: Endoscopy;  Laterality: N/A;    ERCP N/A 12/13/2022    Procedure: ERCP (ENDOSCOPIC RETROGRADE CHOLANGIOPANCREATOGRAPHY);  Surgeon: Vince Teran MD;  Location: [facility];  Service: Endoscopy;  Laterality: N/A;    ERCP N/A 12/20/2022    Procedure: ERCP (ENDOSCOPIC RETROGRADE CHOLANGIOPANCREATOGRAPHY);  Surgeon: Vince Teran MD;  Location: [facility];  Service: Endoscopy;  Laterality: N/A;    ERCP N/A 1/4/2023    Procedure: ERCP (ENDOSCOPIC RETROGRADE CHOLANGIOPANCREATOGRAPHY);  Surgeon: Vince Teran MD;  Location: [facility];  Service: Endoscopy;  Laterality: N/A;  room 1    ESOPHAGOGASTRODUODENOSCOPY N/A 12/20/2022     Procedure: EGD (ESOPHAGOGASTRODUODENOSCOPY);  Surgeon: Vince Teran MD;  Location: [facility] ENDO;  Service: Endoscopy;  Laterality: N/A;    ESOPHAGOGASTRODUODENOSCOPY N/A 1/4/2023    Procedure: EGD (ESOPHAGOGASTRODUODENOSCOPY);  Surgeon: Vince Teran MD;  Location: [facility] ENDO;  Service: Endoscopy;  Laterality: N/A;    INSERTION OF TUNNELED CENTRAL VENOUS CATHETER (CVC) WITH SUBCUTANEOUS PORT Left 2/9/2023    Procedure: KERVCVOWZ-JUZC-A-CATH;  Surgeon: Wojciech Martínez MD;  Location: [facility] OR;  Service: General;  Laterality: Left;    POUCHOSCOPY         Review of patient's allergies indicates:   Allergen Reactions    Vicryl [sutures, polyglycolic acid] Other (See Comments)     Wound dehiscence after achilles sx    Ciprofloxacin Other (See Comments)     Pt previously had medication reaction; suffered achilles rupture     Meperidine Hives       No current facility-administered medications on file prior to encounter.     Current Outpatient Medications on File Prior to Encounter   Medication Sig    morphine (MS CONTIN) 30 MG 12 hr tablet Take 1 tablet (30 mg total) by mouth 3 (three) times daily.    ondansetron (ZOFRAN-ODT) 8 MG TbDL Take 1 tablet (8 mg total) by mouth every 8 (eight) hours as needed (nausea/vomiting).    oxyCODONE-acetaminophen (PERCOCET)  mg per tablet Take 1 tablet by mouth every 6 (six) hours as needed for Pain.    ALPRAZolam (XANAX) 0.25 MG tablet Take 1 tablet (0.25 mg total) by mouth 2 (two) times daily as needed for Anxiety.    dexAMETHasone (DECADRON) 4 MG Tab Take 2 tablets (8 mg total) by mouth once daily. Take as directed on days 2 and 3 of your chemotherapy cycle.    lactulose (CHRONULAC) 20 gram/30 mL Soln Take 30 mLs (20 g total) by mouth 3 (three) times daily.    LIDOcaine-prilocaine (EMLA) cream Apply to affected area once as directed    oxyCODONE (ROXICODONE) 10 mg Tab immediate release tablet Take 10 mg by mouth 5 (five) times daily.     prochlorperazine (COMPAZINE) 5 MG tablet Take 1 tablet (5 mg total) by mouth every 6 (six) hours as needed for Nausea.     Family History    None       Tobacco Use    Smoking status: Never     Passive exposure: Never    Smokeless tobacco: Never   Substance and Sexual Activity    Alcohol use: Not Currently    Drug use: Yes     Types: Marijuana     Comment: medical marijuana    Sexual activity: Not on file     Review of Systems   Constitutional:  Positive for activity change and appetite change. Negative for fever.   Respiratory:  Negative for shortness of breath.    Cardiovascular:  Negative for chest pain and palpitations.   Gastrointestinal:  Positive for abdominal pain and nausea. Negative for constipation and vomiting.   Genitourinary:  Negative for difficulty urinating.   All other systems reviewed and are negative.  Objective:     Vital Signs (Most Recent):  Temp: 98.3 °F (36.8 °C) (02/20/23 1415)  Pulse: 78 (02/20/23 1700)  Resp: 18 (02/20/23 1727)  BP: 111/72 (02/20/23 1700)  SpO2: 97 % (02/20/23 1700) Vital Signs (24h Range):  Temp:  [98.3 °F (36.8 °C)] 98.3 °F (36.8 °C)  Pulse:  [77-91] 78  Resp:  [12-20] 18  SpO2:  [96 %-97 %] 97 %  BP: (108-117)/(66-72) 111/72        Body mass index is 22.28 kg/m².    Physical Exam  Vitals and nursing note reviewed. Exam conducted with a chaperone present.   Constitutional:       General: He is not in acute distress.     Appearance: He is ill-appearing.   HENT:      Head: Normocephalic and atraumatic.      Right Ear: External ear normal.      Left Ear: External ear normal.      Nose: Nose normal.      Mouth/Throat:      Mouth: Mucous membranes are dry.   Eyes:      Extraocular Movements: Extraocular movements intact.      Pupils: Pupils are equal, round, and reactive to light.   Cardiovascular:      Rate and Rhythm: Normal rate and regular rhythm.   Pulmonary:      Effort: Pulmonary effort is normal. No respiratory distress.      Breath sounds: No wheezing.    Abdominal:      Palpations: Abdomen is soft.      Tenderness: There is abdominal tenderness. There is no guarding or rebound.      Comments: Diffuse tenderness on palpation, worse on right upper quadrant   Genitourinary:     Comments: Deferred  Musculoskeletal:      Cervical back: Neck supple.   Skin:     General: Skin is warm and dry.   Neurological:      Mental Status: He is alert and oriented to person, place, and time. Mental status is at baseline.   Psychiatric:         Mood and Affect: Affect is blunt.         Behavior: Behavior is cooperative.         CRANIAL NERVES     CN III, IV, VI   Pupils are equal, round, and reactive to light.     Significant Labs: All pertinent labs within the past 24 hours have been reviewed.  Blood Culture: No results for input(s): LABBLOO in the last 48 hours.  CBC:   Recent Labs   Lab 02/20/23  1503   WBC 2.07*   HGB 12.0*   HCT 36.2*        CMP:   Recent Labs   Lab 02/20/23  1503      K 3.6   CL 96   CO2 27   GLU 91   BUN 9   CREATININE 0.7   CALCIUM 9.0   PROT 7.4   ALBUMIN 3.3*   BILITOT 1.4*   ALKPHOS 704*   *   *   ANIONGAP 13     Lipase:   Recent Labs   Lab 02/20/23  1503   LIPASE 4       Significant Imaging: I have reviewed all pertinent imaging results/findings within the past 24 hours.    Assessment/Plan:     * Intra-abdominal abscess  CT of abdomen pelvis showed findings characteristic of an abscess with associated pancreatitis  Patient with abdominal carcinomatosis, peritoneal adenocarcinoma on chemotherapy.  Had prior ERCP with stent placement followed by stent exchange by Dr. Conte for cholangitis  Blood cultures pending  Start empiric antibiotic treatment IV Zosyn  GI and Interventional Radiology consultation  Multimodal pain control  Antiemetics p.r.n.  Start clear liquid diet, advanced as tolerated  Monitor CBC and fever curve      Immunodeficiency due to chemotherapy  Complicating management of primary diagnosis    Primary  peritoneal adenocarcinoma  Follows with Dr. Shell  Recently started on cisplatin gemcitabine  Pain control  Nausea control    Abdominal carcinomatosis  See plan for primary peritoneal adenocarcinoma      VTE Risk Mitigation (From admission, onward)         Ordered     Reason for No Pharmacological VTE Prophylaxis  Once        Question:  Reasons:  Answer:  Physician Provided (leave comment)  Comment:  possible intervention    02/20/23 1814     IP VTE HIGH RISK PATIENT  Once         02/20/23 1814     Place sequential compression device  Until discontinued         02/20/23 1814                   Adela Riley DO  Department of Hospital Medicine   [facility] - Emergency Dept.

## 2023-02-21 NOTE — HPI
53-year-old male history of intra-abdominal carcinomatosis felt secondary to possible cholangiocarcinoma patient is receive his 1st coast course of cisplatin gemcitabine last week is currently cycle 1 day 7.  Patient was seen in video visit yesterday with rapidly progressive abdominal pain recommended admission to hospital for pain control further evaluation very patient is seen today feeling much better relieve currently with Duragesic patch has been seen and evaluated by palliative care.  Is scheduled to go endoscopic evaluation with ERCP tomorrow for possible obstructed stent

## 2023-02-21 NOTE — NURSING
1040am: Marleni pt assigned NN, but is out. [employer] Alexandro Peck genetic test reulst are back. Report scanned into media and updated in Oncology hx. Dr. Shell aware via in-basket msg.   Oncology Navigation   Intake  Date of Diagnosis:  (negative path)  Cancer Type: GI  Internal / External Referral: Internal  Date of Referral: 23  Initial Nurse Navigator Contact: 23  Referral to Initial Contact Timeline (days): 0  Date Worked: 23  First Appointment Available: 23  Appointment Date: 23  First Available Date vs. Scheduled Date (days): 3  Reason for Treatment Delay: -- (seeking 2nd opinion @ [facility])     Treatment  Current Status: Staging work-up  Date Presented to Tumor Board: 23    Surgical Oncologist: Nikunj  Consult Date: 23    Medical Oncologist: Dr. Shell  Consult Date: 23  Chemotherapy: Planned  Chemotherapy Regimen: Gemcitabine/Cisplatin       Procedures: Genetic test  Biopsy Schedule Date: 22  CT Schedule Date: 23  EUS / EGD: EUS- 2022  Genetic Testing Date Sent: 23  MRI Schedule Date: 23  Port / PICC Schedule Date: 23             Support Systems: Family members     Acuity  Stage: 2  Systemic Treatment - predicted or initiated: Chemotherapy Regimen with Multiple drugs (+1)  ECO  Comorbidities in Medical History: 2  Support: 0  Transportation: 0  Verbalizes the need for more education: 1  Navigation Acuity: 0     Follow Up  No follow-ups on file.

## 2023-02-21 NOTE — CONSULTS
Advance Care Planning    Consult Note  Palliative Medicine      Consult Requested By: Adela Riley DO  Reason for Consult: Pain and symptom management    SUBJECTIVE:     History of Present Illness:  Guevara Osullivan II is a 53 y.o. male with past medical history of Cancer, Digestive disorder, Hypertension, Primary sclerosing cholangitis, and Ulcerative colitis and colon cancer (1995) s/p colectomy w/ J pouch who presented to ED on 2/20/2023 with worsening abdominal pain.  Patient with primary peritoneal adenocarcinoma, history of intra-abdominal carcinomatosis.  Had recent admission at [facility] for SBO.  He is on cycle 1 day 4 of cisplatin gemcitabine started on 02/16.  He had follow-up appointment with Dr. Shell today and reported significant worsening abdominal pain, discomfort and nausea.  Reports that he has been unable to eat.  Subsequently was encouraged by Dr. Shell to go to ED for further evaluation.  Workup in ED significant for CT of abdomen and pelvis which showed inflammatory changes characteristic of an abscess with associated pancreatitis, no definite current gastrointestinal obstruction.  Admitted to hospital medicine service for further evaluation and treatment.  GI consulted.  They recommend IR consultation to evaluate for possible drainage of abscess in which radiology noted abscess too small for percutaneous drain. GI with plans for ERCP tomorrow. Patient was scheduled to see palliative medicine in April but presented to hospital prior to. In the setting of primary sclerosing cholangitis, GI with plans for ERCP with stent exchange and noted if there is debris or it is clogged it can cause increased pain. Palliative medicine consulted for pain and symptom management.     Upon examination, patient lying in bed in no acute distress. I explained why I was consulted to participate in the patient's care. We talked about the role palliative care often plays in helping patients with  advanced care planning and symptom management. I particularly expressed how important it was to clarify what type of care the patient would like to receive moving forward given the current status. Patient with complaints of persistent pain in RUQ that has now radiated to the lower abdomen. He noted that initially pain regimen MSER 30mg po TID with prn percocet 10 q 6 was effective in pain management. He noted he was taking around 2 doses of percocet for breakthrough and prior to arrival was taking 4 doses. He notes that pain is persistent with no aggravating factors and now opiates not relieving it. He noted nausea and vomiting at home as well and pain with swallowing pills which makes his pain regimen difficult. In this setting, we discussed initiation of fentanyl patch to bypass oral route due to primary peritoneal adenocarcinoma, history of intra-abdominal carcinomatosis along with Hx of Digestive disorder, Primary sclerosing cholangitis, and Ulcerative colitis along with GI surgeries in the past. He noted he often suffers with GI issues and nausea and vomiting. We discussed based on him tolerating over 90 MMEs that we will start at low dose of 25mcg q 72 hours fentanyl patch knowing he could tolerate and could increase as needed. We can continue oxycodone 10mg po q 4 hrs prn break through pain, patient NPO at midnight so can continue IV dilaudid for pain control. I did reach out to pharmacist as patient patient with documented meperidine allergy to ensure that he can safely receive fentanyl. I spoke with pharmacist, Janet, who noted documentation that patient has received more than one injection of fentanyl in the past and tolerated with no issues so it is safe to start fentanyl patch. Patient and wife at bedside in agreement with plan. Since I was scheduled to see patient in April, I did introduce the typical conversations in PM clinic. I noted that I did not want to over whelm patient and his wife but I did  introduce goals of care, advance care planning along with code status.  We did discuss the importance of knowing patient's wishes if treatment were no longer beneficial or difficulty tolerating, also wishes regarding code status and just having a plan. They were both open to discussion and noted they needed to have these tough but necessary conversations. I will follow up more on this tomorrow along with monitoring and adjusting pain regimen. Patient also reported effectiveness of medical marijuana in managing his symptoms of pain, decreased appetite, insomnia, and anxiety noting that he just wanted to make me aware.     Past Medical History:   Diagnosis Date    Cancer     COLON CANCER 1995    Digestive disorder     Hypertension     Primary sclerosing cholangitis     Ulcerative colitis     s/p colectomy with J- pouch     Past Surgical History:   Procedure Laterality Date    ABDOMINAL WASHOUT N/A 1/18/2023    Procedure: LAVAGE, PERITONEAL,;  Surgeon: Onur Calvin Jr., MD;  Location: [facility] OR [address] [place] [address];  Service: General;  Laterality: N/A;    BIOPSY OF PERITONEUM N/A 1/18/2023    Procedure: BIOPSY, PERITONEUM;  Surgeon: Onur Calvin Jr., MD;  Location: [facility] OR [address] [place] [address];  Service: General;  Laterality: N/A;    COLON SURGERY      Colectomy with J- pouch    DIAGNOSTIC LAPAROSCOPY N/A 1/18/2023    Procedure: LAPAROSCOPY, DIAGNOSTIC WITH WASHING;  Surgeon: Onur Calvin Jr., MD;  Location: [facility] OR [address] [place] [address];  Service: General;  Laterality: N/A;    ENDOSCOPIC ULTRASOUND OF UPPER GASTROINTESTINAL TRACT N/A 10/14/2022    Procedure: ULTRASOUND, UPPER GI TRACT,;  Surgeon: Vince Teran MD;  Location: [facility];  Service: Endoscopy;  Laterality: N/A;  upper and linear    ENDOSCOPIC ULTRASOUND OF UPPER GASTROINTESTINAL TRACT N/A 12/13/2022    Procedure: ULTRASOUND, UPPER GI TRACT, ENDOSCOPIC;  Surgeon: Vince Teran MD;  Location: [facility] ENDO;  Service: Endoscopy;  Laterality: N/A;    ERCP N/A  10/14/2022    Procedure: ERCP (ENDOSCOPIC RETROGRADE CHOLANGIOPANCREATOGRAPHY) with spyglass;  Surgeon: Vince Teran MD;  Location: [facility];  Service: Endoscopy;  Laterality: N/A;    ERCP N/A 12/13/2022    Procedure: ERCP (ENDOSCOPIC RETROGRADE CHOLANGIOPANCREATOGRAPHY);  Surgeon: Vince Teran MD;  Location: [facility];  Service: Endoscopy;  Laterality: N/A;    ERCP N/A 12/20/2022    Procedure: ERCP (ENDOSCOPIC RETROGRADE CHOLANGIOPANCREATOGRAPHY);  Surgeon: Vince Teran MD;  Location: [facility];  Service: Endoscopy;  Laterality: N/A;    ERCP N/A 1/4/2023    Procedure: ERCP (ENDOSCOPIC RETROGRADE CHOLANGIOPANCREATOGRAPHY);  Surgeon: Vince Teran MD;  Location: [facility];  Service: Endoscopy;  Laterality: N/A;  room 1    ESOPHAGOGASTRODUODENOSCOPY N/A 12/20/2022    Procedure: EGD (ESOPHAGOGASTRODUODENOSCOPY);  Surgeon: Vince Teran MD;  Location: [facility];  Service: Endoscopy;  Laterality: N/A;    ESOPHAGOGASTRODUODENOSCOPY N/A 1/4/2023    Procedure: EGD (ESOPHAGOGASTRODUODENOSCOPY);  Surgeon: Vince Teran MD;  Location: [facility];  Service: Endoscopy;  Laterality: N/A;    INSERTION OF TUNNELED CENTRAL VENOUS CATHETER (CVC) WITH SUBCUTANEOUS PORT Left 2/9/2023    Procedure: [id number];  Surgeon: Wojciech Martínez MD;  Location: [facility];  Service: General;  Laterality: Left;    POUCHOSCOPY       History reviewed. No pertinent family history.    Social History     Socioeconomic History    Marital status:    Tobacco Use    Smoking status: Never     Passive exposure: Never    Smokeless tobacco: Never   Substance and Sexual Activity    Alcohol use: Not Currently    Drug use: Yes     Types: Marijuana     Comment: medical marijuana      Review of patient's allergies indicates:   Allergen Reactions    Vicryl [sutures, polyglycolic acid] Other (See Comments)     Wound dehiscence after achilles sx    Ciprofloxacin Other (See Comments)      Pt previously had medication reaction; suffered achilles rupture     Meperidine Hives       Medications:    Current Facility-Administered Medications:     0.9%  NaCl infusion, , Intravenous, Continuous, Adela Taarea, DO, Last Rate: 125 mL/hr at 02/21/23 1210, New Bag at 02/21/23 1210    acetaminophen tablet 650 mg, 650 mg, Oral, Q8H PRN, Adela Taarea, DO    acetaminophen tablet 650 mg, 650 mg, Oral, Q4H PRN, Adela Taarea, DO    aluminum-magnesium hydroxide-simethicone 200-200-20 mg/5 mL suspension 30 mL, 30 mL, Oral, QID PRN, Adela Taarea, DO    bisacodyL suppository 10 mg, 10 mg, Rectal, Daily PRN, Adela Taarea, DO    glucagon (human recombinant) injection 1 mg, 1 mg, Intramuscular, PRN, Adela Taarea, DO    HYDROmorphone (PF) injection 2 mg, 2 mg, Intravenous, Q3H PRN, April AD Naik, JOHN, 2 mg at 02/21/23 1204    magnesium oxide tablet 800 mg, 800 mg, Oral, PRN, Adela Taarea, DO    magnesium oxide tablet 800 mg, 800 mg, Oral, PRN, Adela Taarea, DO    melatonin tablet 6 mg, 6 mg, Oral, Nightly PRN, Adela Taarea, DO    naloxone 0.4 mg/mL injection 0.02 mg, 0.02 mg, Intravenous, PRN, Adela Taarea, DO    ondansetron disintegrating tablet 8 mg, 8 mg, Oral, Q8H PRN, Adela Taarea, DO    ondansetron injection 4 mg, 4 mg, Intravenous, Q8H PRN, Adela Taarea, DO, 4 mg at 02/21/23 0445    oxyCODONE immediate release tablet 10 mg, 10 mg, Oral, Q4H PRN, Shahida Naik NP    piperacillin-tazobactam (ZOSYN) 4.5 g in dextrose 5 % in water (D5W) 5 % 100 mL IVPB (MB+), 4.5 g, Intravenous, Q8H, Adela Taarea, DO, Last Rate: 25 mL/hr at 02/21/23 1212, 4.5 g at 02/21/23 1212    polyethylene glycol packet 17 g, 17 g, Oral, Daily, Adela Taarea, DO    potassium bicarbonate disintegrating tablet 35 mEq, 35 mEq, Oral, PRN, Adela Taarea, DO    potassium bicarbonate disintegrating tablet 50 mEq, 50 mEq, Oral, PRN, Adela Taarea, DO    potassium bicarbonate disintegrating tablet 60 mEq, 60 mEq, Oral, PRN, Adela Taarea, DO    potassium, sodium phosphates  280-160-250 mg packet 2 packet, 2 packet, Oral, PRN, Adela Taarea, DO    potassium, sodium phosphates 280-160-250 mg packet 2 packet, 2 packet, Oral, PRN, Adela Taarea, DO    potassium, sodium phosphates 280-160-250 mg packet 2 packet, 2 packet, Oral, PRN, Adela Taarea, DO    simethicone chewable tablet 80 mg, 1 tablet, Oral, QID PRN, Adela Taarea, DO    sodium chloride 0.9% flush 10 mL, 10 mL, Intravenous, Q12H PRN, Adela Taarea, DO    ROS:  Review of Systems   Constitutional:  Positive for activity change, appetite change and fatigue.   HENT:  Negative for congestion.    Respiratory:  Negative for chest tightness, shortness of breath and wheezing.    Cardiovascular:  Negative for chest pain, palpitations and leg swelling.   Gastrointestinal:  Positive for abdominal distention, abdominal pain, constipation, nausea and vomiting.   Musculoskeletal:         +abdominal pain    Skin:  Negative for color change.   Neurological:  Negative for dizziness and weakness.   Psychiatric/Behavioral:  Positive for sleep disturbance. Negative for agitation and dysphoric mood. The patient is nervous/anxious.      OBJECTIVE:     Physical Exam:  Vitals: Temp: 98.3 °F (36.8 °C) (02/21/23 1205)  Pulse: 83 (02/21/23 1205)  Resp: 17 (02/21/23 1205)  BP: 138/76 (02/21/23 1205)  SpO2: 97 % (02/21/23 1205)    Physical Exam  Vitals and nursing note reviewed.   Constitutional:       General: He is not in acute distress.  HENT:      Head: Normocephalic.      Nose: Nose normal.      Mouth/Throat:      Mouth: Mucous membranes are dry.   Eyes:      General:         Right eye: No discharge.         Left eye: No discharge.      Pupils: Pupils are equal, round, and reactive to light.   Cardiovascular:      Rate and Rhythm: Normal rate.   Pulmonary:      Effort: Pulmonary effort is normal. No respiratory distress.   Abdominal:      General: There is distension.      Tenderness: There is abdominal tenderness.      Comments: Pain and tenderness to RUQ and  supra pubic area, some pain with swallowing reported as well.    Musculoskeletal:         General: Normal range of motion.      Cervical back: Normal range of motion.   Skin:     General: Skin is warm and dry.   Neurological:      Mental Status: He is alert and oriented to person, place, and time.   Psychiatric:         Mood and Affect: Mood normal.         Behavior: Behavior normal.         Thought Content: Thought content normal.         Judgment: Judgment normal.         Review of Symptoms      Symptom Assessment (ESAS 0-10 Scale)  Pain:  8  Dyspnea:  0  Anxiety:  0  Nausea:  0  Depression:  0  Anorexia:  0  Fatigue:  0  Insomnia:  0  Restlessness:  0  Agitation:  0     Constipation:  Positive    Anxiety:  Is nervous/anxious  Constipation:  Constipation    Bowel Management Plan (BMP):  Yes      Pain Assessment:    Location(s): abdomen    Abdomen       Location: right        Quality: Burning, sharp and hot        Quantity: 9/10 in intensity        Chronicity: Onset 0 second(s) ago, gradually worsening        Aggravating Factors: None        Alleviating Factors: Opiates        Associated Symptoms: None    ECOG Performance Status rdGrdrrdarddrderd:rd rd3rd Living Arrangements:  Lives with spouse    Psychosocial/Cultural:   See Palliative Psychosocial Note: No  **Primary  to Follow**  Palliative Care  Consult: No    Advance Directives:     Decision Making:  Patient answered questions and Family answered questions  Goals of Care: The patient and family endorses that what is most important right now is to focus on remaining as independent as possible and symptom/pain control    Accordingly, we have decided that the best plan to meet the patient's goals includes continuing with treatment    Labs:  WBC   Date Value Ref Range Status   02/21/2023 3.77 (L) 3.90 - 12.70 K/uL Final       Hemoglobin   Date Value Ref Range Status   02/21/2023 11.0 (L) 14.0 - 18.0 g/dL Final       Hematocrit   Date Value Ref Range  Status   02/21/2023 33.4 (L) 40.0 - 54.0 % Final       MCV   Date Value Ref Range Status   02/21/2023 88 82 - 98 fL Final       Platelets   Date Value Ref Range Status   02/21/2023 284 150 - 450 K/uL Final       BMP  Lab Results   Component Value Date     02/21/2023    K 3.4 (L) 02/21/2023    CL 97 02/21/2023    CO2 24 02/21/2023    BUN 9 02/21/2023    CREATININE 0.7 02/21/2023    CALCIUM 8.8 02/21/2023    ANIONGAP 15 02/21/2023    EGFRNORACEVR >60 02/21/2023       Lab Results   Component Value Date    AST 73 (H) 02/21/2023     (H) 12/02/2022    ALKPHOS 642 (H) 02/21/2023    BILITOT 1.5 (H) 02/21/2023       Albumin   Date Value Ref Range Status   02/21/2023 3.0 (L) 3.5 - 5.2 g/dL Final       Radiology:I have reviewed all pertinent imaging results/findings within the past 24 hours.      ASSESSMENT   Guevara Osullivan II is a 53 y.o. male with past medical history of Cancer, Digestive disorder, Hypertension, Primary sclerosing cholangitis, and Ulcerative colitis and colon cancer (1995) s/p colectomy w/ J pouch who presented to ED on 2/20/2023 with worsening abdominal pain.  Patient with primary peritoneal adenocarcinoma, history of intra-abdominal carcinomatosis.  Had recent admission at [facility] for SBO.  He is on cycle 1 day 4 of cisplatin gemcitabine started on 02/16.  He had follow-up appointment with Dr. Shell today and reported significant worsening abdominal pain, discomfort and nausea.  Reports that he has been unable to eat.  Subsequently was encouraged by Dr. Shell to go to ED for further evaluation.  Workup in ED significant for CT of abdomen and pelvis which showed inflammatory changes characteristic of an abscess with associated pancreatitis, no definite current gastrointestinal obstruction.  Admitted to hospital medicine service for further evaluation and treatment.  GI consulted.  They recommend IR consultation to evaluate for possible drainage of abscess in which radiology  noted abscess too small for percutaneous drain. GI with plans for ERCP tomorrow. Patient was scheduled to see palliative medicine in April but presented to hospital prior to. In the setting of primary sclerosing cholangitis, GI with plans for ERCP with stent exchange and noted if there is debris or it is clogged it can cause increased pain. Palliative medicine consulted for pain and symptom management.     PLAN   **Encounter for Palliative Care  -Code status: Full code, introduced discussion will follow up  -HCPOA: Surrogate decision maker spouse, Leticia Osullivan, [phone]  -GOC: Remain independent, symptom and pain management, continue with treatment. Introduced GOC discussion and will follow up more tomorrow.   -See HPI for further details      **Neoplasm related pain   -Patient with extensive GI hx. C/o N/V and pain w/ swallowing.   - reviewed, home regimen MSER 30mg po TID and prn percocet 10mg q 6 hrs prn pain   -Due to GI issues, N/V as well will start Fentanyl 25mcg patch q 72 hours for pain. This route of administration will ensure that despite GI issues consistent pain relief.   -Continue prn oxycodone 10mg and IV dilaudid 2mg for break through pain   -Will monitor usage and adjust pain regimen as needed   -Discussed bowel regimen to prevent OIC with patient and spouse   -Will continue management in outpatient clinic     **Primary peritoneal adenocarcinoma  **Abdominal carcinomatosis  -Under the care of Dr. Shell   -Tx with cisplatin gemcitabine  -PM following for pain & symptom management     **Intra-abdominal abscess  **Primary sclerosing cholangitis  -Management per primary team, GI on board Plan for ERCP w/ stent exchange tomorrow.   -CT of abdomen pelvis showed findings characteristic of an abscess with associated pancreatitis  -Had prior ERCP with stent placement followed by stent exchange by Dr. Conte for cholangitis  -GI noted if stent clogged or debris noted it can cause increased pain as  well.     Discussed case and visit details with Dr. Riley      Thank you for allowing Palliative Medicine to be involved in the care of Guevara Osullivan II.       Medical decision making: parenteral opioids    Plan required increased review of medication choice, interaction, dosing, frequency, and route due to patient complexity. Patient complexity increased by: being on more than 8 medications    60 min ACP time spent discussing: code status, assessed patient specific goals and addressed the best way to achieve them, coordination of care and emotional support, formulating and communicating prognosis, exploring burden/ benefit of various approaches of treatment, inquired about existing or willingness to complete advance directive documents.    Abby Madera NP  Palliative Medicine

## 2023-02-21 NOTE — HOSPITAL COURSE
53 year old male, under the management of hospital medicine for abdominal pain, small abscess. Patient evaluated by GI, felt the area was smaller than when previously evaluated, GI completed ERCP, tube replacement (2/22), recommended to monitor LFT's, LFT's increased and bili increased, GI plans for repeated ERCP 2/23, LFT's and Bili improved.  Repeat labs reviewed. On 2/24/23, pt reported continued abdominal pain, constipation, and no flatulence- mildly improved with prescribed medication. KUB obtained with results showing possible partial small bowel obstruction or ileus, Case discussed in detail with GI, conservative management, significant improvement.  Patient seen by palliative Care pain regimen effective, prescriptions delivered to bedside yesterday.  General surgery to the consult it should symptoms or imaging worsen.  LFTs trending down and T bili normalized.  Potassium normalized. Lipase elevated with post ERCP pancreatitis suspected. Plan is discharge home today, patient already scheduled to follow-up with Oncology on 2/25, GI to arrange follow-up. Advised patient to remain on Full Liquid diet for now, advance diet slowly as tolerated. Patient and spouse provided with instructions for Bowel Regimen and Gas management, both stated understanding. Patient seen and examined on day of discharge and determined stable for discharge.

## 2023-02-21 NOTE — SUBJECTIVE & OBJECTIVE
Interval History: Palliative medicine consulted, initiated on fentanyl patch. GI plans to evaluate endoscopically tomorrow, possibly replace drain tube.     Review of Systems   Constitutional:  Positive for activity change and appetite change. Negative for chills, diaphoresis, fever and unexpected weight change.   HENT:  Negative for congestion, hearing loss, nosebleeds, postnasal drip, rhinorrhea and trouble swallowing.    Eyes:  Negative for discharge and visual disturbance.   Respiratory:  Negative for cough, chest tightness and shortness of breath.    Cardiovascular:  Negative for chest pain, palpitations and leg swelling.   Gastrointestinal:  Positive for abdominal pain and nausea. Negative for abdominal distention, constipation, diarrhea and vomiting.   Endocrine: Negative for cold intolerance and heat intolerance.   Genitourinary:  Negative for difficulty urinating, dysuria, frequency and hematuria.   Musculoskeletal:  Positive for back pain. Negative for arthralgias, gait problem and myalgias.   Skin: Negative.    Neurological:  Negative for dizziness, weakness, light-headedness and headaches.   Hematological:  Negative for adenopathy. Does not bruise/bleed easily.   Psychiatric/Behavioral:  The patient is not nervous/anxious.    All other systems reviewed and are negative.  Objective:     Vital Signs (Most Recent):  Temp: 98.5 °F (36.9 °C) (02/21/23 1532)  Pulse: 84 (02/21/23 1532)  Resp: 17 (02/21/23 1532)  BP: 122/78 (02/21/23 1532)  SpO2: 95 % (02/21/23 1532) Vital Signs (24h Range):  Temp:  [97.8 °F (36.6 °C)-98.5 °F (36.9 °C)] 98.5 °F (36.9 °C)  Pulse:  [78-92] 84  Resp:  [12-18] 17  SpO2:  [93 %-100 %] 95 %  BP: (111-138)/(71-79) 122/78     Weight: 74.5 kg (164 lb 3.9 oz)  Body mass index is 22.28 kg/m².    Intake/Output Summary (Last 24 hours) at 2/21/2023 1635  Last data filed at 2/21/2023 0003  Gross per 24 hour   Intake 447.08 ml   Output --   Net 447.08 ml      Physical Exam  Vitals and nursing  note reviewed. Exam conducted with a chaperone present.   Constitutional:       General: He is not in acute distress.     Appearance: He is ill-appearing.   HENT:      Head: Normocephalic and atraumatic.      Right Ear: External ear normal.      Left Ear: External ear normal.      Nose: Nose normal.      Mouth/Throat:      Mouth: Mucous membranes are dry.   Eyes:      Extraocular Movements: Extraocular movements intact.      Pupils: Pupils are equal, round, and reactive to light.   Cardiovascular:      Rate and Rhythm: Normal rate and regular rhythm.   Pulmonary:      Effort: Pulmonary effort is normal. No respiratory distress.      Breath sounds: No wheezing.   Abdominal:      Palpations: Abdomen is soft.      Tenderness: There is abdominal tenderness. There is no guarding or rebound.      Comments: Diffuse tenderness on palpation, worse on right upper quadrant   Genitourinary:     Comments: Deferred  Musculoskeletal:      Cervical back: Neck supple.   Skin:     General: Skin is warm and dry.   Neurological:      Mental Status: He is alert and oriented to person, place, and time. Mental status is at baseline.   Psychiatric:         Mood and Affect: Affect is blunt.         Behavior: Behavior is cooperative.       Significant Labs: All pertinent labs within the past 24 hours have been reviewed.  CBC:   Recent Labs   Lab 02/20/23  1503 02/21/23  0530   WBC 2.07* 3.77*   HGB 12.0* 11.0*   HCT 36.2* 33.4*    284     CMP:   Recent Labs   Lab 02/20/23  1503 02/21/23  0530    136   K 3.6 3.4*   CL 96 97   CO2 27 24   GLU 91 83   BUN 9 9   CREATININE 0.7 0.7   CALCIUM 9.0 8.8   PROT 7.4 6.8   ALBUMIN 3.3* 3.0*   BILITOT 1.4* 1.5*   ALKPHOS 704* 642*   * 73*   * 121*   ANIONGAP 13 15       Significant Imaging: I have reviewed all pertinent imaging results/findings within the past 24 hours.

## 2023-02-21 NOTE — H&P (VIEW-ONLY)
[facility] beds at the [facility]  Gastroenterology  Consult Note    Patient Name: Guevara Osullivan II  MRN: [id number]  Admission Date: 2/20/2023  Hospital Length of Stay: 1 days  Code Status: Full Code   Attending Provider: Adela Riley DO   Consulting Provider: Reynaldo Karimi PA-C  Primary Care Physician: Dima Ch MD  Principal Problem:Intra-abdominal abscess    Inpatient consult to Gastroenterology  Consult performed by: Reynaldo Karimi PA-C  Consult ordered by: Adela Riley DO  Reason for consult: Abd pain; abscess        Subjective:     HPI:  The patient has a history of UC, PSC and intra-abdominal carcinomatosis. He is followed by Dr. Shell. First chemo was 02/16/23. The patient felt bad after but by day three began to have worsening abdominal pain. The pain is located in epigastric region and RUQ. He also has some lower abdominal pain. It is associated with nausea but no vomiting. He contacted Dr. Christensen's office yesterday and was instructed to present to the ER for evaluation. The patient had been in [facility] around the 12th with a bowel obstruction so there was concern for this again. CT w/ contrast showed a small gas and fluid collection adjacent to the body of the pancreas that measures 25 mm in craniocaudal dimension by 18 mm in AP dimension by 18 mm in medial-lateral dimension. A drain is noted between this fluid collection and the body of the stomach.  This is characteristic of an abscess with associated pancreatitis. There is intrahepatic biliary ductal dilatation. A biliary stent remains in place. WBC count 20.7, Hgb 12, , , T. Bili 1.4, , rest of chemistry panel is ok. UA unremarkable. Vitals stable. Patient was started on Zosyn. He had a good BM today he attributed to the oral contrast. No other issues with BMs in the last few days. Denies overt bleeding.       GI History  Patient with h/o UC and colon cancer (1995) s/p colectomy w/ J pouch  He was referred to Dr. Teran for  EUS/ERCP after he was found to have elevated LFTs and abnormal MRI (09/2022).     EUS/ERCP (10/14/22): stricture consistent with PSC, CBD stent placed, brushings negative for malignancy.     Hosp (10/17/22): admitted for post-ERCP pancreatitis     CA 19-9 (11/02/22) 1181.2     CT w&w/o (12/08/22): Abnormal infiltrative soft tissue attenuation around the base of the gallbladder and tameka hepatis region, increased compared to 10/16/2022 suspicious for infiltrative malignancy likely cholangiocarcinoma. Additionally, there are scattered nodules in the peritoneum and mesentery suspicious for peritoneal carcinomatosis. 12 x 10 x 6.7 cm heterogeneous somewhat loculated rim enhancing fluid collection abutting the body of the pancreas suspicious for walled-off peripancreatic necrosis     EUS (12/13/22): note of pancreatic walled off necrosis. Cystogastrostomy performed with placement of axial stent. ERCP not done due to duodenal narrowing from external compression.   CT w/ (12/13/22): Status post cyst gastrostomy tube placement into the large pancreatic cystic lesion possibly pseudocyst or walled-off necrosis. Stable soft tissue attenuation lesion near the tameka hepatis possibly fibrosis or neoplasm. Mild distention the gallbladder and wall thickening. Periportal edema and mild intrahepatic biliary ductal dilation despite patient status post biliary stenting.     EGD/ERCP (12/20/22): pancreatic endoscopic necrosecetomy; duodenal stricture biopsied and dilated. ERCP with stent exchange and dilation of distal biliary stricture with stent placement.    UGI (12/21/22): Rugal fold thickening within the stomach suggestive of gastritis.  Cyst gastrostomy noted with communication of the collection within the lesser sac.  There was some delay of contrast and narrowing within the duodenum Wiliam just beyond the duodenal bulb consistent with a duodenal stricture.  Contrast did pass the stricture into the duodenum     CT w/ (12/27/22):  Similar appearance of an ill-defined area of hypoattenuation in the region of the tameka hepatis again suspicious for infiltrative mass/malignancy. Interval increase in size of several, irregular spiculated peritoneal nodules as compared to the priors. Continued distension of the gallbladder with common bile duct stent in place. Interval decrease in size of an air in fluid collection within or adjacent to the pancreas status post cyst gastrostomy.      Past Medical History:   Diagnosis Date    Cancer     COLON CANCER 1995    Digestive disorder     Hypertension     Primary sclerosing cholangitis     Ulcerative colitis     s/p colectomy with J- pouch       Past Surgical History:   Procedure Laterality Date    ABDOMINAL WASHOUT N/A 1/18/2023    Procedure: LAVAGE, PERITONEAL,;  Surgeon: Onur Calvin Jr., MD;  [facility];  Service: General;  Laterality: N/A;    BIOPSY OF PERITONEUM N/A 1/18/2023    Procedure: BIOPSY, PERITONEUM;  Surgeon: Onur Calvin Jr., MD;  Location: [facility] OR [address] [place] [address];  Service: General;  Laterality: N/A;    COLON SURGERY      Colectomy with J- pouch    DIAGNOSTIC LAPAROSCOPY N/A 1/18/2023    Procedure: LAPAROSCOPY, DIAGNOSTIC WITH WASHING;  Surgeon: Onur Calvin Jr., MD;  Location: [facility] OR [address] [place] [address];  Service: General;  Laterality: N/A;    ENDOSCOPIC ULTRASOUND OF UPPER GASTROINTESTINAL TRACT N/A 10/14/2022    Procedure: ULTRASOUND, UPPER GI TRACT,;  Surgeon: Vince Teran MD;  [facility];  Service: Endoscopy;  Laterality: N/A;  upper and linear    ENDOSCOPIC ULTRASOUND OF UPPER GASTROINTESTINAL TRACT N/A 12/13/2022    Procedure: ULTRASOUND, UPPER GI TRACT, ENDOSCOPIC;  Surgeon: Vince Teran MD;  Location: [facility];  Service: Endoscopy;  Laterality: N/A;    ERCP N/A 10/14/2022    Procedure: ERCP (ENDOSCOPIC RETROGRADE CHOLANGIOPANCREATOGRAPHY) with spyglass;  Surgeon: Vince Teran MD;  Location: [facility];  Service:  Endoscopy;  Laterality: N/A;    ERCP N/A 12/13/2022    Procedure: ERCP (ENDOSCOPIC RETROGRADE CHOLANGIOPANCREATOGRAPHY);  Surgeon: Vince Teran MD;  Location: [facility];  Service: Endoscopy;  Laterality: N/A;    ERCP N/A 12/20/2022    Procedure: ERCP (ENDOSCOPIC RETROGRADE CHOLANGIOPANCREATOGRAPHY);  Surgeon: Vince Teran MD;  Location: [facility];  Service: Endoscopy;  Laterality: N/A;    ERCP N/A 1/4/2023    Procedure: ERCP (ENDOSCOPIC RETROGRADE CHOLANGIOPANCREATOGRAPHY);  Surgeon: Vince Teran MD;  Location: [facility];  Service: Endoscopy;  Laterality: N/A;  room 1    ESOPHAGOGASTRODUODENOSCOPY N/A 12/20/2022    Procedure: EGD (ESOPHAGOGASTRODUODENOSCOPY);  Surgeon: Vince Teran MD;  Location: [facility];  Service: Endoscopy;  Laterality: N/A;    ESOPHAGOGASTRODUODENOSCOPY N/A 1/4/2023    Procedure: EGD (ESOPHAGOGASTRODUODENOSCOPY);  Surgeon: Vince Teran MD;  Location: [facility];  Service: Endoscopy;  Laterality: N/A;    INSERTION OF TUNNELED CENTRAL VENOUS CATHETER (CVC) WITH SUBCUTANEOUS PORT Left 2/9/2023    Procedure: EGOZIBSFU-BCMC-F-CATH;  Surgeon: Wojciech Martínez MD;  Location: [facility];  Service: General;  Laterality: Left;    POUCHOSCOPY         Review of patient's allergies indicates:   Allergen Reactions    Vicryl [sutures, polyglycolic acid] Other (See Comments)     Wound dehiscence after achilles sx    Ciprofloxacin Other (See Comments)     Pt previously had medication reaction; suffered achilles rupture     Meperidine Hives     Family History    None       Tobacco Use    Smoking status: Never     Passive exposure: Never    Smokeless tobacco: Never   Substance and Sexual Activity    Alcohol use: Not Currently    Drug use: Yes     Types: Marijuana     Comment: medical marijuana    Sexual activity: Not on file     Review of Systems   Constitutional:  Negative for fever.   HENT:  Negative for hearing loss.    Eyes:  Negative  for visual disturbance.   Respiratory:  Negative for cough and shortness of breath.    Cardiovascular:  Negative for chest pain and palpitations.   Gastrointestinal:         As per HPI.   Genitourinary:  Negative for difficulty urinating, dysuria, frequency and hematuria.   Musculoskeletal:  Negative for arthralgias and back pain.   Skin:  Negative for color change and rash.   Neurological:  Negative for seizures, syncope, numbness and headaches.   Hematological:  Does not bruise/bleed easily.   Psychiatric/Behavioral:  The patient is not nervous/anxious.    Objective:     Vital Signs (Most Recent):  Temp: 97.8 °F (36.6 °C) (02/21/23 0811)  Pulse: 90 (02/21/23 0811)  Resp: 17 (02/21/23 1049)  BP: 122/71 (02/21/23 0811)  SpO2: 98 % (02/21/23 0811) Vital Signs (24h Range):  Temp:  [97.8 °F (36.6 °C)-98.3 °F (36.8 °C)] 97.8 °F (36.6 °C)  Pulse:  [77-92] 90  Resp:  [12-20] 17  SpO2:  [93 %-100 %] 98 %  BP: (108-129)/(66-79) 122/71     Weight: 74.5 kg (164 lb 3.9 oz) (02/20/23 1849)  Body mass index is 22.28 kg/m².      Intake/Output Summary (Last 24 hours) at 2/21/2023 1050  Last data filed at 2/21/2023 0003  Gross per 24 hour   Intake 447.08 ml   Output --   Net 447.08 ml       Lines/Drains/Airways       Central Venous Catheter Line  Duration                  PowerPort A Cath Single Lumen 02/09/23 1124 left subclavian 11 days              Peripheral Intravenous Line  Duration                  Peripheral IV - Single Lumen 02/20/23 1628 18 G Left Antecubital <1 day                    Physical Exam  Constitutional:       General: He is not in acute distress.     Appearance: He is well-developed.   HENT:      Head: Normocephalic and atraumatic.   Eyes:      Extraocular Movements: Extraocular movements intact.   Cardiovascular:      Rate and Rhythm: Normal rate and regular rhythm.      Heart sounds: Normal heart sounds. No murmur heard.  Pulmonary:      Effort: Pulmonary effort is normal. No respiratory distress.       Breath sounds: Normal breath sounds. No wheezing.   Abdominal:      General: Bowel sounds are normal. There is no distension.      Palpations: Abdomen is soft. There is no hepatomegaly or mass.      Tenderness: There is abdominal tenderness in the right upper quadrant, epigastric area and suprapubic area. There is no guarding or rebound.      Comments: Midline scar noted. There is firmness in the RUQ.    Musculoskeletal:      Cervical back: Normal range of motion and neck supple.      Right lower leg: No edema.      Left lower leg: No edema.   Skin:     General: Skin is warm and dry.      Findings: No rash.   Neurological:      Mental Status: He is alert and oriented to person, place, and time.      Cranial Nerves: No cranial nerve deficit.   Psychiatric:         Behavior: Behavior normal.       Significant Labs:  CBC:   Recent Labs   Lab 02/20/23  1503 02/21/23  0530   WBC 2.07* 3.77*   HGB 12.0* 11.0*   HCT 36.2* 33.4*    284     CMP:   Recent Labs   Lab 02/21/23  0530   GLU 83   CALCIUM 8.8   ALBUMIN 3.0*   PROT 6.8      K 3.4*   CO2 24   CL 97   BUN 9   CREATININE 0.7   ALKPHOS 642*   *   AST 73*   BILITOT 1.5*     Coagulation: No results for input(s): PT, INR, APTT in the last 48 hours.    Significant Imaging:  Imaging results within the past 24 hours have been reviewed.    Assessment/Plan:     Oncology  Primary peritoneal adenocarcinoma  Will consult Palliative Care to help with pain management.     GI  * Intra-abdominal abscess  Reached out to IR but collection is too small for percutaneous drainage.   Dr. Teran reviewed images and reports this collection is actually decreased with drain in place.       Primary sclerosing cholangitis  Stent in place on CT with ductal dilatation. Will plan on ERCP with stent exchange tomorrow. If there is debris or it is clogged, it could cause increased pain.    Monitor LFTs.         Thank you for your consult. I will follow-up with patient. Please  contact us if you have any additional questions.    Reynaldo Karimi PA-C  Gastroenterology  [facility]

## 2023-02-21 NOTE — PHARMACY MED REC
"Admission Medication History     The home medication history was taken by Femi Bryant.    You may go to "Admission" then "Reconcile Home Medications" tabs to review and/or act upon these items.     The home medication list has been updated by the Pharmacy department.   Please read ALL comments highlighted in yellow.   Please address this information as you see fit.    Feel free to contact us if you have any questions or require assistance.      Medications listed below were obtained from: Patient/family and Analytic software- [employer]  (Not in a hospital admission)      Femi Bryant  [id number]      Current Outpatient Medications on File Prior to Encounter   Medication Sig Dispense Refill Last Dose    ALPRAZolam (XANAX) 0.25 MG tablet Take 1 tablet (0.25 mg total) by mouth 2 (two) times daily as needed for Anxiety. 60 tablet 1     dexAMETHasone (DECADRON) 4 MG Tab Take 2 tablets (8 mg total) by mouth once daily. Take as directed on days 2 and 3 of your chemotherapy cycle. 24 tablet 5     lactulose (CHRONULAC) 20 gram/30 mL Soln Take 30 mLs (20 g total) by mouth 3 (three) times daily. 600 mL 2     LIDOcaine-prilocaine (EMLA) cream Apply to affected area once as directed 5 g 11     morphine (MS CONTIN) 30 MG 12 hr tablet Take 1 tablet (30 mg total) by mouth 3 (three) times daily. 90 tablet 0     ondansetron (ZOFRAN-ODT) 8 MG TbDL Take 1 tablet (8 mg total) by mouth every 8 (eight) hours as needed (nausea/vomiting). 60 tablet 5     oxyCODONE (ROXICODONE) 10 mg Tab immediate release tablet Take 10 mg by mouth 5 (five) times daily.       oxyCODONE-acetaminophen (PERCOCET)  mg per tablet Take 1 tablet by mouth every 6 (six) hours as needed for Pain. 60 tablet 0     prochlorperazine (COMPAZINE) 5 MG tablet Take 1 tablet (5 mg total) by mouth every 6 (six) hours as needed for Nausea. 20 tablet 11                        .        "

## 2023-02-21 NOTE — ASSESSMENT & PLAN NOTE
Stent in place on CT with ductal dilatation. Will plan on ERCP with stent exchange tomorrow. If there is debris or it is clogged, it could cause increased pain.    Monitor LFTs.

## 2023-02-21 NOTE — PROGRESS NOTES
[facility] Medicine  Progress Note    Patient Name: Guevara Osullivan II  MRN: [id number]  Patient Class: IP- Inpatient   Admission Date: 2/20/2023  Length of Stay: 1 days  Attending Physician: Adela Riley DO  Primary Care Provider: Dima Ch MD        Subjective:     Principal Problem:Intra-abdominal abscess        HPI:  Guevara Osullivan II is a 53 y.o. male with past medical history of Cancer, Digestive disorder, Hypertension, Primary sclerosing cholangitis, and Ulcerative colitis who presented to ED on 2/20/2023 with worsening abdominal pain.  Patient with primary peritoneal adenocarcinoma, history of intra-abdominal carcinomatosis.  Had recent admission at [facility] for SBO.  He is on cycle 1 day 4 of cisplatin gemcitabine started on 02/16.  He had follow-up appointment with Dr. Shell today and reported significant worsening abdominal pain, discomfort and nausea.  Reports that he has been unable to eat.  Subsequently was encouraged by Dr. Shell to go to ED for further evaluation.  Workup in ED significant for CT of abdomen and pelvis which showed inflammatory changes characteristic of an abscess with associated pancreatitis, no definite current gastrointestinal obstruction.  Admitted to hospital medicine service for further evaluation and treatment.  GI consulted.  They recommend IR consultation to evaluate for possible drainage of abscess    PCP: Dima Ch MD    SDM:  Spouse    CODE STATUS:  Full code      Overview/Hospital Course:  53 year old male, under the management of hospital medicine for abdominal pain, small abscess. Patient evaluated by GI, felt the area was smaller than when previously evaluated, GI plans to perform procedure tomorrow to evaluate and possibly replace tube. Patient endorses severe pain which worsens when taking PO pain medications. Increased dose of IV dilaudid, consulted Palliative medicine. Patient was initiated on fentanyl patch,  palliative to follow and adjust regimen per patient response.       Interval History: Palliative medicine consulted, initiated on fentanyl patch. GI plans to evaluate endoscopically tomorrow, possibly replace drain tube.     Review of Systems   Constitutional:  Positive for activity change and appetite change. Negative for chills, diaphoresis, fever and unexpected weight change.   HENT:  Negative for congestion, hearing loss, nosebleeds, postnasal drip, rhinorrhea and trouble swallowing.    Eyes:  Negative for discharge and visual disturbance.   Respiratory:  Negative for cough, chest tightness and shortness of breath.    Cardiovascular:  Negative for chest pain, palpitations and leg swelling.   Gastrointestinal:  Positive for abdominal pain and nausea. Negative for abdominal distention, constipation, diarrhea and vomiting.   Endocrine: Negative for cold intolerance and heat intolerance.   Genitourinary:  Negative for difficulty urinating, dysuria, frequency and hematuria.   Musculoskeletal:  Positive for back pain. Negative for arthralgias, gait problem and myalgias.   Skin: Negative.    Neurological:  Negative for dizziness, weakness, light-headedness and headaches.   Hematological:  Negative for adenopathy. Does not bruise/bleed easily.   Psychiatric/Behavioral:  The patient is not nervous/anxious.    All other systems reviewed and are negative.  Objective:     Vital Signs (Most Recent):  Temp: 98.5 °F (36.9 °C) (02/21/23 1532)  Pulse: 84 (02/21/23 1532)  Resp: 17 (02/21/23 1532)  BP: 122/78 (02/21/23 1532)  SpO2: 95 % (02/21/23 1532) Vital Signs (24h Range):  Temp:  [97.8 °F (36.6 °C)-98.5 °F (36.9 °C)] 98.5 °F (36.9 °C)  Pulse:  [78-92] 84  Resp:  [12-18] 17  SpO2:  [93 %-100 %] 95 %  BP: (111-138)/(71-79) 122/78     Weight: 74.5 kg (164 lb 3.9 oz)  Body mass index is 22.28 kg/m².    Intake/Output Summary (Last 24 hours) at 2/21/2023 1635  Last data filed at 2/21/2023 0003  Gross per 24 hour   Intake 447.08 ml    Output --   Net 447.08 ml      Physical Exam  Vitals and nursing note reviewed. Exam conducted with a chaperone present.   Constitutional:       General: He is not in acute distress.     Appearance: He is ill-appearing.   HENT:      Head: Normocephalic and atraumatic.      Right Ear: External ear normal.      Left Ear: External ear normal.      Nose: Nose normal.      Mouth/Throat:      Mouth: Mucous membranes are dry.   Eyes:      Extraocular Movements: Extraocular movements intact.      Pupils: Pupils are equal, round, and reactive to light.   Cardiovascular:      Rate and Rhythm: Normal rate and regular rhythm.   Pulmonary:      Effort: Pulmonary effort is normal. No respiratory distress.      Breath sounds: No wheezing.   Abdominal:      Palpations: Abdomen is soft.      Tenderness: There is abdominal tenderness. There is no guarding or rebound.      Comments: Diffuse tenderness on palpation, worse on right upper quadrant   Genitourinary:     Comments: Deferred  Musculoskeletal:      Cervical back: Neck supple.   Skin:     General: Skin is warm and dry.   Neurological:      Mental Status: He is alert and oriented to person, place, and time. Mental status is at baseline.   Psychiatric:         Mood and Affect: Affect is blunt.         Behavior: Behavior is cooperative.       Significant Labs: All pertinent labs within the past 24 hours have been reviewed.  CBC:   Recent Labs   Lab 02/20/23  1503 02/21/23  0530   WBC 2.07* 3.77*   HGB 12.0* 11.0*   HCT 36.2* 33.4*    284     CMP:   Recent Labs   Lab 02/20/23  1503 02/21/23  0530    136   K 3.6 3.4*   CL 96 97   CO2 27 24   GLU 91 83   BUN 9 9   CREATININE 0.7 0.7   CALCIUM 9.0 8.8   PROT 7.4 6.8   ALBUMIN 3.3* 3.0*   BILITOT 1.4* 1.5*   ALKPHOS 704* 642*   * 73*   * 121*   ANIONGAP 13 15       Significant Imaging: I have reviewed all pertinent imaging results/findings within the past 24 hours.      Assessment/Plan:      *  Intra-abdominal abscess  CT of abdomen pelvis showed findings characteristic of an abscess with associated pancreatitis  Patient with abdominal carcinomatosis, peritoneal adenocarcinoma on chemotherapy.  Had prior ERCP with stent placement followed by stent exchange by Dr. Conte for cholangitis  Blood cultures: NGTD  Start empiric antibiotic treatment IV Zosyn  GI - plans to evaluate endoscopically in AM, possibly replace drain tube  Interventional Radiology consultation- reports abscess too small to drain  Multimodal pain control- IV pain medications, Initiated fentanyl patch per Palliative medicine  Antiemetics p.r.n.  Full Liquid diet  Monitor CBC and fever curve      Immunodeficiency due to chemotherapy  Complicating management of primary diagnosis    Primary peritoneal adenocarcinoma  Follows with Dr. Shell  Recently started on cisplatin gemcitabine (cycle 1 on 2/16, cycle 2 due 2/23)  Pain control  Consulted Palliative medicine- initiated fentanyl patch  Nausea control    Abdominal carcinomatosis  See plan for primary peritoneal adenocarcinoma      VTE Risk Mitigation (From admission, onward)         Ordered     Reason for No Pharmacological VTE Prophylaxis  Once        Question:  Reasons:  Answer:  Physician Provided (leave comment)  Comment:  possible intervention    02/20/23 1814     IP VTE HIGH RISK PATIENT  Once         02/20/23 1814     Place sequential compression device  Until discontinued         02/20/23 1814                Discharge Planning   APRIL:      Code Status: Full Code   Is the patient medically ready for discharge?:     Reason for patient still in hospital (select all that apply): Patient trending condition  Discharge Plan A: Home with family                  Shahida Naik NP  Department of Hospital Medicine   [facility] - Med Surg

## 2023-02-21 NOTE — ASSESSMENT & PLAN NOTE
CT of abdomen pelvis showed findings characteristic of an abscess with associated pancreatitis  Patient with abdominal carcinomatosis, peritoneal adenocarcinoma on chemotherapy.  Had prior ERCP with stent placement followed by stent exchange by Dr. Conte for cholangitis  Blood cultures: NGTD  Start empiric antibiotic treatment IV Zosyn  GI - plans to evaluate endoscopically in AM, possibly replace drain tube  Interventional Radiology consultation- reports abscess too small to drain  Multimodal pain control- IV pain medications, Initiated fentanyl patch per Palliative medicine  Antiemetics p.r.n.  Full Liquid diet  Monitor CBC and fever curve

## 2023-02-21 NOTE — ASSESSMENT & PLAN NOTE
The patient has previous diagnosis of a rectal carcinoma at age 23.  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

## 2023-02-22 NOTE — NURSING
This ONN covering for coworker who is currently out. Patients wife called concerned about her husbands treatment for this week. Patient called yseterday 2/20/23 with complaints of increased pain  saw patient  via virtual visit. Patient admitted and upon reading notes a GI procedure to be performed tomorrow. Patients wife still concerned about appointment. I explained to spouse that since he will be having a scope tomorrow with possible interventions he may not be up to chemotherapy physically. Also due to being unsure when he will actually be discharged, I informed her that it may be in his best interest to go ahead and reschedule for next week. Patients spouse unsure due to not wanting to get off track with his schedule. Informed patient that even though day 8 will be off schedule the next cycle date should not be affected. She agrees to change appointment, she request for a virtual visit. Informed patient that I will reschedule chemo as well as provider visit at the [facility] at this time, however if the patient is discharged and labs are wnl at discharge and It has not been 7 days navigator can then switch visit to virtual because lab not needed. Patient and spouse agree with tentative plan and understand that plans can change based off of him being currently admitted and how this admission course goes. They are very appreciative of assistance and wife stated a little less anxious now. ONN to relinquish patients care and plans over to coworker upon her return.

## 2023-02-22 NOTE — PROGRESS NOTES
ERCP was performed with stent exchange.  Plastic stent was removed and a fully covered metal stent was placed stents will be downstream from the ampulla.  Please monitor liver enzymes and continue antibiotics.  Stents from cyst gastrostomy in place in her left in place.  If liver enzymes trend up tomorrow we will plan for repeat ERCP while in house otherwise if they trend down or there is no change will continue to monitor.    Vince Teran MD  Gastroenterology  Director of Advanced Endoscopy at [facility]

## 2023-02-22 NOTE — TRANSFER OF CARE
Anesthesia Transfer of Care Note    Patient: Guevara Osullivan II    Procedure(s) Performed: Procedure(s) (LRB):  ERCP (ENDOSCOPIC RETROGRADE CHOLANGIOPANCREATOGRAPHY) (N/A)    Patient location: GI    Anesthesia Type: general    Transport from OR: Transported from OR on room air with adequate spontaneous ventilation    Post pain: adequate analgesia    Post assessment: no apparent anesthetic complications    Post vital signs: stable    Level of consciousness: sedated    Nausea/Vomiting: no nausea/vomiting    Complications: none    Transfer of care protocol was followed      Last vitals:   Visit Vitals  /77   Pulse 77   Temp 37 °C (98.6 °F) (Oral)   Resp 18   Ht 6' (1.829 m)   Wt 74.5 kg (164 lb 3.9 oz)   SpO2 97%   BMI 22.28 kg/m²

## 2023-02-22 NOTE — ASSESSMENT & PLAN NOTE
CT of abdomen pelvis showed findings characteristic of an abscess with associated pancreatitis  Patient with abdominal carcinomatosis, peritoneal adenocarcinoma on chemotherapy.  Had prior ERCP with stent placement followed by stent exchange by Dr. Conte for cholangitis      Blood cultures: NGTD  Start empiric antibiotic treatment IV Zosyn- continue for now  GI - 2/22- EUS with tube replacement completed  Interventional Radiology consultation- reports abscess too small to drain  Multimodal pain control- IV pain medications, Initiated fentanyl patch per Palliative medicine  Antiemetics p.r.n.  Clear Liquid diet- advance as tolerated  Monitor CBC and fever curve

## 2023-02-22 NOTE — INTERVAL H&P NOTE
The patient has been examined and the H&P has been reviewed:    I concur with the findings and no changes have occurred since H&P was written.    Procedure risks, benefits and alternative options discussed and understood by patient/family.    ERCP is currently recommended. The risks, benefits and alternatives of the procedure were discussed with the patient in detail. Benefits include removal of stones, stents, debri, sludge; dilation; placement of a stent; biopsies. Risks include bleeding (0.3-2%), pancreatitis (3%), cholangitis (0.5-3%), perforation (0.08-0.6%), cholecystitis (0.5%), sedation related adverse events. Educational material of the biliary anatomy has been provided today for educational purposes. Other risks include, risks of adverse reaction to sedation requiring the use of reversal agents, bleeding requiring blood transfusion, perforation requiring surgical intervention, technical failure, aspiration leading to respiratory distress and respiratory failure resulting in endotracheal intubation and mechanical ventilation including death. Anesthesia is utilized for this procedure, it is up to the anesthesiologist to determine airway safety including elective endotracheal intubation. Questions were answered, the patient agree to proceed. There was no language barriers.             Active Hospital Problems    Diagnosis  POA    *Intra-abdominal abscess [K65.1]  Yes    Immunodeficiency due to chemotherapy [D84.821, T45.1X5A, Z79.899]  Not Applicable    Primary peritoneal adenocarcinoma [C48.2]  Yes    Abdominal carcinomatosis [C76.2]  Yes    Primary sclerosing cholangitis [K83.01]  Yes      Resolved Hospital Problems   No resolved problems to display.

## 2023-02-22 NOTE — [employer] PATIENT INSTRUCTIONS
Discharge Summary/Instructions after an Endoscopic Procedure  Patient Name: Guevara Osullivan  Patient MRN: [id number]  Patient YOB: 1970 Wednesday, February 22, 2023 Vince Teran MD  Dear patient,  As a result of recent federal legislation (The Federal Cures Act), you may   receive lab or pathology results from your procedure in your [facility]   account before your physician is able to contact you. Your physician or   their representative will relay the results to you with their   recommendations at their soonest availability.  Thank you,  RESTRICTIONS:  During your procedure today, you received medications for sedation.  These   medications may affect your judgment, balance and coordination.  Therefore,   for 24 hours, you have the following restrictions:   - DO NOT drive a car, operate machinery, make legal/financial decisions,   sign important papers or drink alcohol.    ACTIVITY:  Today: no heavy lifting, straining or running due to procedural   sedation/anesthesia.  The following day: return to full activity including work.  DIET:  Eat and drink normally unless instructed otherwise.     TREATMENT FOR COMMON SIDE EFFECTS:  - Mild abdominal pain, nausea, belching, bloating or excessive gas:  rest,   eat lightly and use a heating pad.  - Sore Throat: treat with throat lozenges and/or gargle with warm salt   water.  - Because air was used during the procedure, expelling large amounts of air   from your rectum or belching is normal.  - If a bowel prep was taken, you may not have a bowel movement for 1-3 days.    This is normal.  SYMPTOMS TO WATCH FOR AND REPORT TO YOUR PHYSICIAN:  1. Abdominal pain or bloating, other than gas cramps.  2. Chest pain.  3. Back pain.  4. Signs of infection such as: chills or fever occurring within 24 hours   after the procedure.  5. Rectal bleeding, which would show as bright red, maroon, or black stools.   (A tablespoon of blood from the rectum is not serious,  especially if   hemorrhoids are present.)  6. Vomiting.  7. Weakness or dizziness.  GO DIRECTLY TO THE NEAREST EMERGENCY ROOM IF YOU HAVE ANY OF THE FOLLOWING:      Difficulty breathing              Chills and/or fever over 101 F   Persistent vomiting and/or vomiting blood   Severe abdominal pain   Severe chest pain   Black, tarry stools   Bleeding- more than one tablespoon   Any other symptom or condition that you feel may need urgent attention  Your doctor recommends these additional instructions:  If any biopsies were taken, your doctors clinic will contact you in 1 to 2   weeks with any results.  - [facility] to [facility] for [facility] care.   - Resume previous diet.   - Continue present medications.   - Trend liver enzymes and if elevating or patient has worsening pain will   plan to exchange the biliary stent  For questions, problems or results please call your physician Vince Teran MD at Work:  [phone]  If you have any questions about the above instructions, call the GI   department at [phone] or call the endoscopy unit at [phone]   from 7am until 3 pm.  [facility], EMERGENCY ROOM PHONE NUMBER:   [phone]  IF A COMPLICATION OR EMERGENCY SITUATION ARISES AND YOU ARE UNABLE TO REACH   YOUR PHYSICIAN - GO DIRECTLY TO THE EMERGENCY ROOM.  I have read or have had read to me these discharge instructions for my   procedure and have received a written copy.  I understand these   instructions and will follow-up with my physician if I have any questions.     __________________________________       _____________________________________  Nurse Signature                                          Patient/Designated   Responsible Party Signature  MD Vince Eduardo MD  2/22/2023 1:48:59 PM  This report has been verified and signed electronically.  Dear patient,  As a result of recent federal legislation (The Federal  Cures Act), you may   receive lab or pathology results from your procedure in your [facility]   account before your physician is able to contact you. Your physician or   their representative will relay the results to you with their   recommendations at their soonest availability.  Thank you,  PR[employer]ATION

## 2023-02-22 NOTE — ANESTHESIA PROCEDURE NOTES
Intubation    Date/Time: 2/22/2023 1:07 PM  Performed by: Jacquie Velasquez CRNA  Authorized by: Tom Yun MD     Intubation:     Induction:  Intravenous    Intubated:  Postinduction    Mask Ventilation:  Easy mask    Attempts:  1    Attempted By:  CRNA    Method of Intubation:  Direct    Blade:  Kendrick 2    Laryngeal View Grade: Grade I - full view of cords      Difficult Airway Encountered?: No      Complications:  None    Airway Device:  Oral endotracheal tube    Airway Device Size:  7.5    Style/Cuff Inflation:  Cuffed (inflated to minimal occlusive pressure)    Tube secured:  22    Secured at:  The lips    Placement Verified By:  Capnometry    Complicating Factors:  None    Findings Post-Intubation:  BS equal bilateral and atraumatic/condition of teeth unchanged

## 2023-02-22 NOTE — PROGRESS NOTES
[facility] Medicine  Progress Note    Patient Name: Guevara Osullivan II  MRN: [id number]  Patient Class: IP- Inpatient   Admission Date: 2/20/2023  Length of Stay: 2 days  Attending Physician: Sidney Diaz, *  Primary Care Provider: Dima Ch MD        Subjective:     Principal Problem:Intra-abdominal abscess        HPI:  Guevara Osullivan II is a 53 y.o. male with past medical history of Cancer, Digestive disorder, Hypertension, Primary sclerosing cholangitis, and Ulcerative colitis who presented to ED on 2/20/2023 with worsening abdominal pain.  Patient with primary peritoneal adenocarcinoma, history of intra-abdominal carcinomatosis.  Had recent admission at [facility] for SBO.  He is on cycle 1 day 4 of cisplatin gemcitabine started on 02/16.  He had follow-up appointment with Dr. Shell today and reported significant worsening abdominal pain, discomfort and nausea.  Reports that he has been unable to eat.  Subsequently was encouraged by Dr. Shell to go to ED for further evaluation.  Workup in ED significant for CT of abdomen and pelvis which showed inflammatory changes characteristic of an abscess with associated pancreatitis, no definite current gastrointestinal obstruction.  Admitted to hospital medicine service for further evaluation and treatment.  GI consulted.  They recommend IR consultation to evaluate for possible drainage of abscess    PCP: Dima Ch MD    SDM:  Spouse    CODE STATUS:  Full code      Overview/Hospital Course:  53 year old male, under the management of hospital medicine for abdominal pain, small abscess. Patient evaluated by GI, felt the area was smaller than when previously evaluated, GI completed EUS, tube replacement (2/22), recommend to monitor LFT's in AM, if continued increase will repeat procedure tomorrow, otherwise will monitor. Patient endorses severe pain which worsens when taking PO pain medications. Increased dose of  IV dilaudid, consulted Palliative medicine. Patient was initiated on fentanyl patch, palliative following and will  adjust regimen per patient response. LFTs trending down, repeat labs in AM.       Interval History: S/P EUS with tube replacement. Initiated on clear liquid diet, repeat LFTs in AM.     Review of Systems   Constitutional:  Positive for activity change and appetite change. Negative for chills, diaphoresis, fever and unexpected weight change.   HENT:  Negative for congestion, hearing loss, nosebleeds, postnasal drip, rhinorrhea and trouble swallowing.    Eyes:  Negative for discharge and visual disturbance.   Respiratory:  Negative for cough, chest tightness and shortness of breath.    Cardiovascular:  Negative for chest pain, palpitations and leg swelling.   Gastrointestinal:  Positive for abdominal pain. Negative for abdominal distention, constipation, diarrhea, nausea and vomiting.   Endocrine: Negative for cold intolerance and heat intolerance.   Genitourinary:  Negative for difficulty urinating, dysuria, frequency and hematuria.   Musculoskeletal:  Positive for back pain. Negative for arthralgias, gait problem and myalgias.   Skin: Negative.    Neurological:  Negative for dizziness, weakness, light-headedness and headaches.   Hematological:  Negative for adenopathy. Does not bruise/bleed easily.   Psychiatric/Behavioral:  The patient is not nervous/anxious.    All other systems reviewed and are negative.  Objective:     Vital Signs (Most Recent):  Temp: 99 °F (37.2 °C) (02/22/23 1343)  Pulse: 80 (02/22/23 1411)  Resp: 18 (02/22/23 1505)  BP: 117/60 (02/22/23 1411)  SpO2: 96 % (02/22/23 1411) Vital Signs (24h Range):  Temp:  [98.3 °F (36.8 °C)-99 °F (37.2 °C)] 99 °F (37.2 °C)  Pulse:  [77-86] 80  Resp:  [16-18] 18  SpO2:  [95 %-100 %] 96 %  BP: (113-135)/(59-83) 117/60     Weight: 74.5 kg (164 lb 3.9 oz)  Body mass index is 22.28 kg/m².    Intake/Output Summary (Last 24 hours) at 2/22/2023 1507  Last  data filed at 2/22/2023 1409  Gross per 24 hour   Intake 2908.85 ml   Output --   Net 2908.85 ml      Physical Exam  Vitals and nursing note reviewed. Exam conducted with a chaperone present.   Constitutional:       General: He is not in acute distress.     Appearance: He is ill-appearing.   HENT:      Head: Normocephalic and atraumatic.      Right Ear: External ear normal.      Left Ear: External ear normal.      Nose: Nose normal.      Mouth/Throat:      Mouth: Mucous membranes are dry.   Eyes:      Extraocular Movements: Extraocular movements intact.      Pupils: Pupils are equal, round, and reactive to light.   Cardiovascular:      Rate and Rhythm: Normal rate and regular rhythm.   Pulmonary:      Effort: Pulmonary effort is normal. No respiratory distress.      Breath sounds: No wheezing.   Abdominal:      Palpations: Abdomen is soft.      Tenderness: There is abdominal tenderness. There is no guarding or rebound.      Comments: Diffuse tenderness on palpation, worse on right upper quadrant   Genitourinary:     Comments: Deferred  Musculoskeletal:      Cervical back: Neck supple.   Skin:     General: Skin is warm and dry.   Neurological:      Mental Status: He is alert and oriented to person, place, and time. Mental status is at baseline.   Psychiatric:         Mood and Affect: Affect is blunt.         Behavior: Behavior is cooperative.       Significant Labs: All pertinent labs within the past 24 hours have been reviewed.  CBC:   Recent Labs   Lab 02/21/23  0530 02/22/23  0638   WBC 3.77* 4.21   HGB 11.0* 10.2*   HCT 33.4* 30.9*    231     CMP:   Recent Labs   Lab 02/21/23  0530 02/22/23  0638    136   K 3.4* 3.3*   CL 97 96   CO2 24 29   GLU 83 80   BUN 9 6   CREATININE 0.7 0.6   CALCIUM 8.8 8.7   PROT 6.8 6.7   ALBUMIN 3.0* 2.9*   BILITOT 1.5* 1.0   ALKPHOS 642* 611*   AST 73* 51*   * 91*   ANIONGAP 15 11       Significant Imaging: I have reviewed all pertinent imaging results/findings  within the past 24 hours.      Assessment/Plan:      * Intra-abdominal abscess  CT of abdomen pelvis showed findings characteristic of an abscess with associated pancreatitis  Patient with abdominal carcinomatosis, peritoneal adenocarcinoma on chemotherapy.  Had prior ERCP with stent placement followed by stent exchange by Dr. Conte for cholangitis      Blood cultures: NGTD  Start empiric antibiotic treatment IV Zosyn- continue for now  GI - 2/22- EUS with tube replacement completed  Interventional Radiology consultation- reports abscess too small to drain  Multimodal pain control- IV pain medications, Initiated fentanyl patch per Palliative medicine  Antiemetics p.r.n.  Clear Liquid diet- advance as tolerated  Monitor CBC and fever curve      Immunodeficiency due to chemotherapy  Complicating management of primary diagnosis    Primary peritoneal adenocarcinoma  Follows with Dr. Shell  Recently started on cisplatin gemcitabine (cycle 1 on 2/16, cycle 2 due 2/23)  Pain control  Consulted Palliative medicine- initiated fentanyl patch  Nausea control    Abdominal carcinomatosis  See plan for primary peritoneal adenocarcinoma      VTE Risk Mitigation (From admission, onward)         Ordered     Reason for No Pharmacological VTE Prophylaxis  Once        Question:  Reasons:  Answer:  Physician Provided (leave comment)  Comment:  possible intervention    02/20/23 1814     IP VTE HIGH RISK PATIENT  Once         02/20/23 1814     Place sequential compression device  Until discontinued         02/20/23 1814                Discharge Planning   APRIL:      Code Status: Full Code   Is the patient medically ready for discharge?:     Reason for patient still in hospital (select all that apply): Patient trending condition  Discharge Plan A: Home with family                  Shahida Naik NP  Department of Hospital Medicine   [facility] - Med Surg

## 2023-02-22 NOTE — PLAN OF CARE
Pt AAOx4. Around the clock PRN pain meds adm to manage pain. NS@ 125mL/hr. Accu checks AC&HS. Clear liquid diet, tolerating well. Remains free from incident/injury. Call light in reach. All active orders reviewed. Chart check complete.

## 2023-02-22 NOTE — PROGRESS NOTES
Advance Care Planning    Consult Note  Palliative Medicine      Consult Requested By: Sidney Diaz, *  Reason for Consult: Pain and symptom management    SUBJECTIVE:     History of Present Illness:  Patient seen after ERCP with stent exchange was performed. He is alert and oriented but drowsy post procedure. He reports that he had no pain relief from fentanyl 25mcg patch placed on yesterday. We did discuss due to his MME usage at home, approximately 110-130 MME/day, we could have initiated patch at 50 mcg but patient and wife wanted to start with a lower dose and titrate as needed. I did discuss with patient, pain management at this time will take time due to acute GI issues, procedures, and NPO status in which per chart review he has been requesting IV dilaudid almost every 3 hours. He noted IV dilaudid effective but does not last long, due to NPO status IV administration only route for break through pain. In this setting will increase fentanyl patch to 50 mcg, continue prn regimen. Patient will likely be NPO again as he may have another ERCP done tomorrow. Once patient no long NPO can work on pain regimen appropriate for home. Patient and wife in agreement with plan. Patient requesting prn Dilaudid during exam.     In the last 24hrs the patient has used the following pain medications (7a-7a):  - Fentanyl patch 25 mcg x 1  - IV Dilaudid x 8  -  MSER 30mg x 1    Past Medical History:   Diagnosis Date    Cancer     COLON CANCER 1995    Digestive disorder     Hypertension     Primary sclerosing cholangitis     Ulcerative colitis     s/p colectomy with J- pouch     Past Surgical History:   Procedure Laterality Date    ABDOMINAL WASHOUT N/A 1/18/2023    Procedure: LAVAGE, PERITONEAL,;  Surgeon: Onur Calvin Jr., MD;  Location: [facility] OR [address] [place] [address];  Service: General;  Laterality: N/A;    BIOPSY OF PERITONEUM N/A 1/18/2023    Procedure: BIOPSY, PERITONEUM;  Surgeon: Onur Calvin Jr., MD;  Location: [facility] OR  2ND FLR;  Service: General;  Laterality: N/A;    COLON SURGERY      Colectomy with J- pouch    DIAGNOSTIC LAPAROSCOPY N/A 1/18/2023    Procedure: LAPAROSCOPY, DIAGNOSTIC WITH WASHING;  Surgeon: Onur Calvin Jr., MD;  Location: [facility] OR 2ND FLR;  Service: General;  Laterality: N/A;    ENDOSCOPIC ULTRASOUND OF UPPER GASTROINTESTINAL TRACT N/A 10/14/2022    Procedure: ULTRASOUND, UPPER GI TRACT,;  Surgeon: Vince Teran MD;  Location: [facility];  Service: Endoscopy;  Laterality: N/A;  upper and linear    ENDOSCOPIC ULTRASOUND OF UPPER GASTROINTESTINAL TRACT N/A 12/13/2022    Procedure: ULTRASOUND, UPPER GI TRACT, ENDOSCOPIC;  Surgeon: Vince Teran MD;  Location: [facility];  Service: Endoscopy;  Laterality: N/A;    ERCP N/A 10/14/2022    Procedure: ERCP (ENDOSCOPIC RETROGRADE CHOLANGIOPANCREATOGRAPHY) with spyglass;  Surgeon: Vince Teran MD;  Location: [facility];  Service: Endoscopy;  Laterality: N/A;    ERCP N/A 12/13/2022    Procedure: ERCP (ENDOSCOPIC RETROGRADE CHOLANGIOPANCREATOGRAPHY);  Surgeon: Vince Teran MD;  Location: [facility];  Service: Endoscopy;  Laterality: N/A;    ERCP N/A 12/20/2022    Procedure: ERCP (ENDOSCOPIC RETROGRADE CHOLANGIOPANCREATOGRAPHY);  Surgeon: Vince Teran MD;  Location: [facility];  Service: Endoscopy;  Laterality: N/A;    ERCP N/A 1/4/2023    Procedure: ERCP (ENDOSCOPIC RETROGRADE CHOLANGIOPANCREATOGRAPHY);  Surgeon: Vince Teran MD;  Location: [facility];  Service: Endoscopy;  Laterality: N/A;  room 1    ESOPHAGOGASTRODUODENOSCOPY N/A 12/20/2022    Procedure: EGD (ESOPHAGOGASTRODUODENOSCOPY);  Surgeon: Vince Teran MD;  Location: [facility];  Service: Endoscopy;  Laterality: N/A;    ESOPHAGOGASTRODUODENOSCOPY N/A 1/4/2023    Procedure: EGD (ESOPHAGOGASTRODUODENOSCOPY);  Surgeon: Vince Teran MD;  Location: [facility];  Service: Endoscopy;  Laterality: N/A;    INSERTION OF TUNNELED  CENTRAL VENOUS CATHETER (CVC) WITH SUBCUTANEOUS PORT Left 2/9/2023    Procedure: VVSNOGAAP-GVXY-X-CATH;  Surgeon: Wojciech Martínez MD;  Location: [facility];  Service: General;  Laterality: Left;    POUCHOSCOPY       History reviewed. No pertinent family history.    Social History     Socioeconomic History    Marital status:    Tobacco Use    Smoking status: Never     Passive exposure: Never    Smokeless tobacco: Never   Substance and Sexual Activity    Alcohol use: Not Currently    Drug use: Yes     Types: Marijuana     Comment: medical marijuana      Review of patient's allergies indicates:   Allergen Reactions    Vicryl [sutures, polyglycolic acid] Other (See Comments)     Wound dehiscence after achilles sx    Ciprofloxacin Other (See Comments)     Pt previously had medication reaction; suffered achilles rupture     Meperidine Hives       Medications:    Current Facility-Administered Medications:     0.9%  NaCl infusion, , Intravenous, Continuous, Adela Taarea, DO, Last Rate: 125 mL/hr at 02/21/23 1825, Rate Verify at 02/21/23 1825    acetaminophen tablet 650 mg, 650 mg, Oral, Q8H PRN, Adela Taarea, DO    acetaminophen tablet 650 mg, 650 mg, Oral, Q4H PRN, Adela Taarea, DO    aluminum-magnesium hydroxide-simethicone 200-200-20 mg/5 mL suspension 30 mL, 30 mL, Oral, QID PRN, Adela Taarea, DO    bisacodyL suppository 10 mg, 10 mg, Rectal, Daily PRN, Adela Taarea, DO    fentaNYL 50 mcg/hr 1 patch, 1 patch, Transdermal, Q72H, Abby Madera NP    glucagon (human recombinant) injection 1 mg, 1 mg, Intramuscular, PRN, Adela Taarea, DO    HYDROmorphone (PF) injection 2 mg, 2 mg, Intravenous, Q3H PRN, Shahida Naik NP, 2 mg at 02/22/23 1246    magnesium oxide tablet 800 mg, 800 mg, Oral, PRN, Adela Taarea, DO    magnesium oxide tablet 800 mg, 800 mg, Oral, PRN, Adela Taarea, DO    melatonin tablet 6 mg, 6 mg, Oral, Nightly PRN, Adela Taarea, DO    naloxone 0.4 mg/mL injection 0.02 mg, 0.02 mg, Intravenous, PRN, Adela  Taarea, DO    ondansetron disintegrating tablet 8 mg, 8 mg, Oral, Q8H PRN, Adela Taarea, DO, 8 mg at 02/21/23 1831    ondansetron injection 4 mg, 4 mg, Intravenous, Q8H PRN, Adela Taarea, DO, 4 mg at 02/21/23 0445    oxyCODONE immediate release tablet 10 mg, 10 mg, Oral, Q4H PRN, Shahida Naik NP, 10 mg at 02/22/23 1505    piperacillin-tazobactam (ZOSYN) 4.5 g in dextrose 5 % in water (D5W) 5 % 100 mL IVPB (MB+), 4.5 g, Intravenous, Q8H, Adela Taarea, DO, Stopped at 02/22/23 0742    polyethylene glycol packet 17 g, 17 g, Oral, Daily, Adela Taarea, DO    potassium bicarbonate disintegrating tablet 35 mEq, 35 mEq, Oral, PRN, Adela Taarea, DO    potassium bicarbonate disintegrating tablet 50 mEq, 50 mEq, Oral, PRN, Adela Taarea, DO    potassium bicarbonate disintegrating tablet 60 mEq, 60 mEq, Oral, PRN, Adela Taarea, DO    potassium, sodium phosphates 280-160-250 mg packet 2 packet, 2 packet, Oral, PRN, Adela Taarea, DO    potassium, sodium phosphates 280-160-250 mg packet 2 packet, 2 packet, Oral, PRN, Adela Taarea, DO    potassium, sodium phosphates 280-160-250 mg packet 2 packet, 2 packet, Oral, PRN, Adela Taarea, DO    simethicone chewable tablet 80 mg, 1 tablet, Oral, QID PRN, Adela Taarea, DO, 80 mg at 02/22/23 0943    sodium chloride 0.9% flush 10 mL, 10 mL, Intravenous, Q12H PRN, Adela Taarea, DO    ROS:  Review of Systems   Constitutional:  Positive for activity change, appetite change and fatigue.   HENT:  Negative for congestion.    Respiratory:  Negative for chest tightness, shortness of breath and wheezing.    Cardiovascular:  Negative for chest pain, palpitations and leg swelling.   Gastrointestinal:  Positive for abdominal distention, abdominal pain, constipation, nausea and vomiting.   Musculoskeletal:         +abdominal pain    Skin:  Negative for color change.   Neurological:  Negative for dizziness and weakness.   Psychiatric/Behavioral:  Positive for sleep disturbance. Negative for agitation and dysphoric  mood. The patient is nervous/anxious.      OBJECTIVE:     Physical Exam:  Vitals: Temp: 99 °F (37.2 °C) (02/22/23 1343)  Pulse: 80 (02/22/23 1411)  Resp: 18 (02/22/23 1505)  BP: 117/60 (02/22/23 1411)  SpO2: 96 % (02/22/23 1411)    Physical Exam  Vitals and nursing note reviewed.   Constitutional:       General: He is not in acute distress.     Comments: Drowsy post procedure   HENT:      Head: Normocephalic.      Nose: Nose normal.      Mouth/Throat:      Mouth: Mucous membranes are dry.   Eyes:      General:         Right eye: No discharge.         Left eye: No discharge.      Pupils: Pupils are equal, round, and reactive to light.   Cardiovascular:      Rate and Rhythm: Normal rate.   Pulmonary:      Effort: Pulmonary effort is normal. No respiratory distress.   Abdominal:      General: There is distension.      Tenderness: There is abdominal tenderness.      Comments: Pain and tenderness to RUQ and supra pubic area, some pain with swallowing reported as well.    Musculoskeletal:         General: Normal range of motion.      Cervical back: Normal range of motion.   Skin:     General: Skin is warm and dry.   Neurological:      Mental Status: He is oriented to person, place, and time.   Psychiatric:         Mood and Affect: Mood normal.         Behavior: Behavior normal.         Thought Content: Thought content normal.         Judgment: Judgment normal.         Review of Symptoms      Symptom Assessment (ESAS 0-10 Scale)  Pain:  0  Dyspnea:  0  Anxiety:  0  Nausea:  0  Depression:  0  Anorexia:  0  Fatigue:  0  Insomnia:  0  Restlessness:  0  Agitation:  0     Constipation:  Positive    Anxiety:  Is nervous/anxious  Constipation:  Constipation    Bowel Management Plan (BMP):  Yes      Pain Assessment:    Location(s): abdomen    Abdomen       Location: right        Quality: Burning, sharp and hot        Quantity: 9/10 in intensity        Chronicity: Onset 0 second(s) ago, gradually worsening        Aggravating  Factors: None        Alleviating Factors: Opiates        Associated Symptoms: None    ECOG Performance Status rdGrdrrdarddrderd:rd rd3rd Living Arrangements:  Lives with spouse    Psychosocial/Cultural:   See Palliative Psychosocial Note: No  **Primary  to Follow**  Palliative Care  Consult: No    Advance Directives:     Decision Making:  Patient answered questions and Family answered questions  Goals of Care: What is most important right now is to focus on remaining as independent as possible, symptom/pain control. Accordingly, we have decided that the best plan to meet the patient's goals includes continuing with treatment.    Labs:  WBC   Date Value Ref Range Status   02/22/2023 4.21 3.90 - 12.70 K/uL Final       Hemoglobin   Date Value Ref Range Status   02/22/2023 10.2 (L) 14.0 - 18.0 g/dL Final       Hematocrit   Date Value Ref Range Status   02/22/2023 30.9 (L) 40.0 - 54.0 % Final       MCV   Date Value Ref Range Status   02/22/2023 87 82 - 98 fL Final       Platelets   Date Value Ref Range Status   02/22/2023 231 150 - 450 K/uL Final       BMP  Lab Results   Component Value Date     02/22/2023    K 3.3 (L) 02/22/2023    CL 96 02/22/2023    CO2 29 02/22/2023    BUN 6 02/22/2023    CREATININE 0.6 02/22/2023    CALCIUM 8.7 02/22/2023    ANIONGAP 11 02/22/2023    EGFRNORACEVR >60 02/22/2023       Lab Results   Component Value Date    AST 51 (H) 02/22/2023     (H) 12/02/2022    ALKPHOS 611 (H) 02/22/2023    BILITOT 1.0 02/22/2023       Albumin   Date Value Ref Range Status   02/22/2023 2.9 (L) 3.5 - 5.2 g/dL Final       Radiology:I have reviewed all pertinent imaging results/findings within the past 24 hours.      ASSESSMENT   Guevara Osullivan II is a 53 y.o. male with past medical history of Cancer, Digestive disorder, Hypertension, Primary sclerosing cholangitis, and Ulcerative colitis and colon cancer (1995) s/p colectomy w/ J pouch who presented to ED on 2/20/2023 with worsening  abdominal pain.  Patient with primary peritoneal adenocarcinoma, history of intra-abdominal carcinomatosis.  Had recent admission at [facility] for SBO.  He is on cycle 1 day 4 of cisplatin gemcitabine started on 02/16.  He had follow-up appointment with Dr. Shell today and reported significant worsening abdominal pain, discomfort and nausea.  Reports that he has been unable to eat.  Subsequently was encouraged by Dr. Shell to go to ED for further evaluation.  Workup in ED significant for CT of abdomen and pelvis which showed inflammatory changes characteristic of an abscess with associated pancreatitis, no definite current gastrointestinal obstruction.  Admitted to hospital medicine service for further evaluation and treatment.  GI consulted.  They recommend IR consultation to evaluate for possible drainage of abscess in which radiology noted abscess too small for percutaneous drain. GI with plans for ERCP tomorrow. Patient was scheduled to see palliative medicine in April but presented to hospital prior to. In the setting of primary sclerosing cholangitis, GI with plans for ERCP with stent exchange and noted if there is debris or it is clogged it can cause increased pain. Palliative medicine consulted for pain and symptom management.     PLAN   **Encounter for Palliative Care  -Code status: Full code, introduced discussion will follow up  -HCPOA: Surrogate decision maker spouse, Leticia Osullivan, [phone]  -GOC: Remain independent, symptom and pain management, continue with treatment. Introduced GOC discussion and will follow up more tomorrow saw patient post procedure and still drowsy.   -See HPI for further details      **Neoplasm related pain   -Patient with extensive GI hx. C/o N/V and pain w/ swallowing.   - reviewed, home regimen MSER 30mg po TID and prn percocet 10mg q 6 hrs prn pain   -Due to GI issues, N/V as well on yesterday started Fentanyl 25mcg patch q 72 hours for pain. This route of  administration will ensure that despite GI issues consistent pain relief.   -Today patient reports Fentanyl not effective and receiving prn iv dilaudid q 3 hrs. Due to his MME usage at home, approximately 110-130 MME/day, we could have initiated patch at 50 mcg but patient and wife wanted to start with a lower dose and titrate as needed.   -D/C fentanyl 25mcg patch and start fentanyl 50 mcg patch.   -Continue prn oxycodone 10mg and IV dilaudid 2mg for break through pain   -Will monitor usage and adjust pain regimen as needed   -Discussed bowel regimen to prevent OIC with patient and spouse   -Will continue management in outpatient clinic     **Primary peritoneal adenocarcinoma  **Abdominal carcinomatosis  -Under the care of Dr. Shell   -Tx with cisplatin gemcitabine  -PM following for pain & symptom management     **Intra-abdominal abscess  **Primary sclerosing cholangitis  -Management per primary team, GI on board Plan for ERCP w/ stent exchange tomorrow.   -CT of abdomen pelvis showed findings characteristic of an abscess with associated pancreatitis  -Had prior ERCP with stent placement followed by stent exchange by Dr. Conte for cholangitis  -2/22 underwent ERCP w/ stent exchange. Plan to monitor LFTs and if uptrend planning for repeat ERCP, if down trend will continue monitoring.     Discussed case and visit details with Dr. Riley      Thank you for allowing Palliative Medicine to be involved in the care of Guevara Osullivan II.       Medical decision making: parenteral opioids    Plan required increased review of medication choice, interaction, dosing, frequency, and route due to patient complexity. Patient complexity increased by: being on more than 8 medications    35 min ACP time spent discussing: code status, assessed patient specific goals and addressed the best way to achieve them, coordination of care and emotional support, formulating and communicating prognosis, exploring burden/ benefit of  various approaches of treatment, inquired about existing or willingness to complete advance directive documents.    Abby Madera NP  Palliative Medicine

## 2023-02-22 NOTE — ANESTHESIA PREPROCEDURE EVALUATION
02/22/2023  Guevara Osullivan II is a 53 y.o., male.      Pre-op Assessment    I have reviewed the Patient Summary Reports.     I have reviewed the Nursing Notes. I have reviewed the NPO Status.   I have reviewed the Medications.     Review of Systems  Anesthesia Hx:  No problems with previous Anesthesia    Social:  Non-Smoker, No Alcohol Use Marijuana use    Hematology/Oncology:         -- Anemia:   Cardiovascular:   Hypertension hyperlipidemia Normal sinus rhythm   Normal ECG   No previous ECGs available   Confirmed by MD KOJO, EDIL ([id number]) on 1/4/2023 9:36:55 PM    Pulmonary:  Pulmonary Normal    Renal/:  Renal/ Normal     Hepatic/GI:   PUD, Liver Disease, sclerosing cholangitis  Pancreatic cyst   Dilated bile duct   Small intestine CA   S/P total colectomy    Neurological:  Neurology Normal    Endocrine:  Endocrine Normal    Psych:  Psychiatric Normal           Physical Exam  General: Cooperative, Alert and Oriented    Airway:  Mallampati: II   Mouth Opening: Normal  TM Distance: Normal  Tongue: Normal    Dental:  Intact        Anesthesia Plan  Type of Anesthesia, risks & benefits discussed:    Anesthesia Type: Gen ETT  Intra-op Monitoring Plan: Standard ASA Monitors  Post Op Pain Control Plan: IV/PO Opioids PRN  Induction:  IV  Informed Consent: Informed consent signed with the Patient representative and all parties understand the risks and agree with anesthesia plan.  All questions answered.   ASA Score: 3  Day of Surgery Review of History & Physical: H&P Update referred to the surgeon/provider.I have interviewed and examined the patient. I have reviewed the patient's H&P dated: There are no significant changes.   Anesthesia Plan Notes: Patient had just received narcotics so spouse signed consents     Ready For Surgery From Anesthesia Perspective.     .

## 2023-02-22 NOTE — SUBJECTIVE & OBJECTIVE
Interval History: S/P EUS with tube replacement. Initiated on clear liquid diet, repeat LFTs in AM.     Review of Systems   Constitutional:  Positive for activity change and appetite change. Negative for chills, diaphoresis, fever and unexpected weight change.   HENT:  Negative for congestion, hearing loss, nosebleeds, postnasal drip, rhinorrhea and trouble swallowing.    Eyes:  Negative for discharge and visual disturbance.   Respiratory:  Negative for cough, chest tightness and shortness of breath.    Cardiovascular:  Negative for chest pain, palpitations and leg swelling.   Gastrointestinal:  Positive for abdominal pain. Negative for abdominal distention, constipation, diarrhea, nausea and vomiting.   Endocrine: Negative for cold intolerance and heat intolerance.   Genitourinary:  Negative for difficulty urinating, dysuria, frequency and hematuria.   Musculoskeletal:  Positive for back pain. Negative for arthralgias, gait problem and myalgias.   Skin: Negative.    Neurological:  Negative for dizziness, weakness, light-headedness and headaches.   Hematological:  Negative for adenopathy. Does not bruise/bleed easily.   Psychiatric/Behavioral:  The patient is not nervous/anxious.    All other systems reviewed and are negative.  Objective:     Vital Signs (Most Recent):  Temp: 99 °F (37.2 °C) (02/22/23 1343)  Pulse: 80 (02/22/23 1411)  Resp: 18 (02/22/23 1505)  BP: 117/60 (02/22/23 1411)  SpO2: 96 % (02/22/23 1411) Vital Signs (24h Range):  Temp:  [98.3 °F (36.8 °C)-99 °F (37.2 °C)] 99 °F (37.2 °C)  Pulse:  [77-86] 80  Resp:  [16-18] 18  SpO2:  [95 %-100 %] 96 %  BP: (113-135)/(59-83) 117/60     Weight: 74.5 kg (164 lb 3.9 oz)  Body mass index is 22.28 kg/m².    Intake/Output Summary (Last 24 hours) at 2/22/2023 1507  Last data filed at 2/22/2023 1409  Gross per 24 hour   Intake 2908.85 ml   Output --   Net 2908.85 ml      Physical Exam  Vitals and nursing note reviewed. Exam conducted with a chaperone present.    Constitutional:       General: He is not in acute distress.     Appearance: He is ill-appearing.   HENT:      Head: Normocephalic and atraumatic.      Right Ear: External ear normal.      Left Ear: External ear normal.      Nose: Nose normal.      Mouth/Throat:      Mouth: Mucous membranes are dry.   Eyes:      Extraocular Movements: Extraocular movements intact.      Pupils: Pupils are equal, round, and reactive to light.   Cardiovascular:      Rate and Rhythm: Normal rate and regular rhythm.   Pulmonary:      Effort: Pulmonary effort is normal. No respiratory distress.      Breath sounds: No wheezing.   Abdominal:      Palpations: Abdomen is soft.      Tenderness: There is abdominal tenderness. There is no guarding or rebound.      Comments: Diffuse tenderness on palpation, worse on right upper quadrant   Genitourinary:     Comments: Deferred  Musculoskeletal:      Cervical back: Neck supple.   Skin:     General: Skin is warm and dry.   Neurological:      Mental Status: He is alert and oriented to person, place, and time. Mental status is at baseline.   Psychiatric:         Mood and Affect: Affect is blunt.         Behavior: Behavior is cooperative.       Significant Labs: All pertinent labs within the past 24 hours have been reviewed.  CBC:   Recent Labs   Lab 02/21/23  0530 02/22/23  0638   WBC 3.77* 4.21   HGB 11.0* 10.2*   HCT 33.4* 30.9*    231     CMP:   Recent Labs   Lab 02/21/23  0530 02/22/23  0638    136   K 3.4* 3.3*   CL 97 96   CO2 24 29   GLU 83 80   BUN 9 6   CREATININE 0.7 0.6   CALCIUM 8.8 8.7   PROT 6.8 6.7   ALBUMIN 3.0* 2.9*   BILITOT 1.5* 1.0   ALKPHOS 642* 611*   AST 73* 51*   * 91*   ANIONGAP 15 11       Significant Imaging: I have reviewed all pertinent imaging results/findings within the past 24 hours.

## 2023-02-23 NOTE — PLAN OF CARE
Plan of care discussed with pt. Pt verbalized understanding. Free from injury. No s/s of distress noted. Vitals stable. IV infusing. Meds as ordered. Pain controlled. NPO. Q2 hour rounding. No complaints. Will continue to monitor pt. 12 hour chart review.

## 2023-02-23 NOTE — ANESTHESIA PREPROCEDURE EVALUATION
02/23/2023  Guevara Osullivan II is a 53 y.o., male.      Pre-op Assessment    I have reviewed the Patient Summary Reports.     I have reviewed the Nursing Notes. I have reviewed the NPO Status.   I have reviewed the Medications.     Review of Systems  Anesthesia Hx:  No problems with previous Anesthesia    Social:  Non-Smoker, No Alcohol Use Marijuana use    Hematology/Oncology:         -- Anemia:   Cardiovascular:   Hypertension hyperlipidemia Normal sinus rhythm   Normal ECG   No previous ECGs available   Confirmed by MD KOJO, EDIL ([id number]) on 1/4/2023 9:36:55 PM    Pulmonary:  Pulmonary Normal    Renal/:  Renal/ Normal     Hepatic/GI:   PUD, Liver Disease, sclerosing cholangitis  Pancreatic cyst   Dilated bile duct   Small intestine CA   S/P total colectomy    Neurological:  Neurology Normal    Endocrine:  Endocrine Normal    Psych:  Psychiatric Normal           Physical Exam  General: Cooperative, Alert and Oriented    Airway:  Mallampati: II   Mouth Opening: Normal  TM Distance: Normal  Tongue: Normal    Dental:  Intact        Anesthesia Plan  Type of Anesthesia, risks & benefits discussed:    Anesthesia Type: Gen ETT  Intra-op Monitoring Plan: Standard ASA Monitors  Post Op Pain Control Plan: IV/PO Opioids PRN  Induction:  IV  Informed Consent: Informed consent signed with the Patient representative and all parties understand the risks and agree with anesthesia plan.  All questions answered.   ASA Score: 3  Day of Surgery Review of History & Physical: H&P Update referred to the surgeon/provider.I have interviewed and examined the patient. I have reviewed the patient's H&P dated: There are no significant changes.   Anesthesia Plan Notes: Patient had just received narcotics so spouse signed consents     Ready For Surgery From Anesthesia Perspective.     .

## 2023-02-23 NOTE — ANESTHESIA POSTPROCEDURE EVALUATION
Anesthesia Post Evaluation    Patient: Guevara Osullivan II    Procedure(s) Performed: Procedure(s) (LRB):  ERCP (ENDOSCOPIC RETROGRADE CHOLANGIOPANCREATOGRAPHY) (N/A)    Final Anesthesia Type: general      Patient location during evaluation: ICU  Patient participation: No - Unable to Participate, Other Reason (see comments) (sedated)  Level of consciousness: sedated  Post-procedure vital signs: reviewed and stable  Pain management: adequate  Airway patency: patent    PONV status at discharge: No PONV  Anesthetic complications: no      Cardiovascular status: hemodynamically stable  Respiratory status: ETT  Hydration status: euvolemic  Follow-up not needed.          Vitals Value Taken Time   BP  02/23/23 1605   Temp  02/23/23 1605   Pulse  02/23/23 1605   Resp  02/23/23 1605   SpO2  02/23/23 1605         No case tracking events are documented in the log.      Pain/Margo Score: Pain Rating Prior to Med Admin: 6 (2/23/2023 12:44 PM)  Pain Rating Post Med Admin: 5 (2/23/2023  9:59 AM)  Margo Score: 10 (2/22/2023  2:11 PM)

## 2023-02-23 NOTE — PROGRESS NOTES
Advance Care Planning    Consult Note  Palliative Medicine      Consult Requested By: Sidney Diaz, *  Reason for Consult: Pain and symptom management    SUBJECTIVE:     History of Present Illness:  Patient seen and examined this morning and reports improvement in abdominal and epigastric pain since increase in fentanyl patch dosage. He still reports that pain is still present and notes that he asks for prn dilaudid due to being npo and pain with swallowing sometimes. He notes that IV dilaudid works but wears off quickly. We discussed increasing oxycodone to 20 mg po q 4 for break through pain. We also discussed when he is not NPO to ask for oxycodone for break through and if not effective to ask for IV dilaudid with plans for establishing regimen for home. He and wife at bedside verbalized understanding and agreement with plan. He also noted that his LFTs increased and was inquiring about plans for ERCP today and NPO status and in this setting I reached out to primary teams who will address. Will continue to follow for support, symptoms management and when procedures are complete will discuss GOC and ACP.     In the last 24hrs the patient has used the following pain medications (7a-7a):  - Fentanyl patch 50 mcg x 1  - IV Dilaudid x 7  - Oxycodone x 3    Past Medical History:   Diagnosis Date    Cancer     COLON CANCER 1995    Digestive disorder     Hypertension     Primary sclerosing cholangitis     Ulcerative colitis     s/p colectomy with J- pouch     Past Surgical History:   Procedure Laterality Date    ABDOMINAL WASHOUT N/A 1/18/2023    Procedure: LAVAGE, PERITONEAL,;  Surgeon: Onur Calvin Jr., MD;  Location: [address];  Service: General;  Laterality: N/A;    BIOPSY OF PERITONEUM N/A 1/18/2023    Procedure: BIOPSY, PERITONEUM;  Surgeon: Onur Calvin Jr., MD;  Location: [facility] OR [address] [place] [address];  Service: General;  Laterality: N/A;    COLON SURGERY      Colectomy with J- pouch    DIAGNOSTIC  LAPAROSCOPY N/A 1/18/2023    Procedure: LAPAROSCOPY, DIAGNOSTIC WITH WASHING;  Surgeon: Onur Calvin Jr., MD;  Location: [facility] OR [address] [place] [address];  Service: General;  Laterality: N/A;    ENDOSCOPIC ULTRASOUND OF UPPER GASTROINTESTINAL TRACT N/A 10/14/2022    Procedure: ULTRASOUND, UPPER GI TRACT,;  Surgeon: Vince Teran MD;  Location: [facility];  Service: Endoscopy;  Laterality: N/A;  upper and linear    ENDOSCOPIC ULTRASOUND OF UPPER GASTROINTESTINAL TRACT N/A 12/13/2022    Procedure: ULTRASOUND, UPPER GI TRACT, ENDOSCOPIC;  Surgeon: Vince Teran MD;  Location: [facility];  Service: Endoscopy;  Laterality: N/A;    ERCP N/A 10/14/2022    Procedure: ERCP (ENDOSCOPIC RETROGRADE CHOLANGIOPANCREATOGRAPHY) with spyglass;  Surgeon: Vince Teran MD;  Location: [facility];  Service: Endoscopy;  Laterality: N/A;    ERCP N/A 12/13/2022    Procedure: ERCP (ENDOSCOPIC RETROGRADE CHOLANGIOPANCREATOGRAPHY);  Surgeon: Vince Teran MD;  Location: [facility];  Service: Endoscopy;  Laterality: N/A;    ERCP N/A 12/20/2022    Procedure: ERCP (ENDOSCOPIC RETROGRADE CHOLANGIOPANCREATOGRAPHY);  Surgeon: Vince Teran MD;  Location: [facility];  Service: Endoscopy;  Laterality: N/A;    ERCP N/A 1/4/2023    Procedure: ERCP (ENDOSCOPIC RETROGRADE CHOLANGIOPANCREATOGRAPHY);  Surgeon: Vince Teran MD;  Location: [facility];  Service: Endoscopy;  Laterality: N/A;  room 1    ERCP N/A 2/22/2023    Procedure: ERCP (ENDOSCOPIC RETROGRADE CHOLANGIOPANCREATOGRAPHY);  Surgeon: Vince Teran MD;  Location: [facility];  Service: Endoscopy;  Laterality: N/A;    ESOPHAGOGASTRODUODENOSCOPY N/A 12/20/2022    Procedure: EGD (ESOPHAGOGASTRODUODENOSCOPY);  Surgeon: Vince Teran MD;  Location: [facility];  Service: Endoscopy;  Laterality: N/A;    ESOPHAGOGASTRODUODENOSCOPY N/A 1/4/2023    Procedure: EGD (ESOPHAGOGASTRODUODENOSCOPY);  Surgeon: Vince Teran MD;   Location: [facility] ENDO;  Service: Endoscopy;  Laterality: N/A;    INSERTION OF TUNNELED CENTRAL VENOUS CATHETER (CVC) WITH SUBCUTANEOUS PORT Left 2/9/2023    Procedure: [id number];  Surgeon: Wojciech Martínez MD;  Location: [facility] OR;  Service: General;  Laterality: Left;    POUCHOSCOPY       History reviewed. No pertinent family history.    Social History     Socioeconomic History    Marital status:    Tobacco Use    Smoking status: Never     Passive exposure: Never    Smokeless tobacco: Never   Substance and Sexual Activity    Alcohol use: Not Currently    Drug use: Yes     Types: Marijuana     Comment: medical marijuana      Review of patient's allergies indicates:   Allergen Reactions    Vicryl [sutures, polyglycolic acid] Other (See Comments)     Wound dehiscence after achilles sx    Ciprofloxacin Other (See Comments)     Pt previously had medication reaction; suffered achilles rupture     Meperidine Hives       Medications:    Current Facility-Administered Medications:     0.9%  NaCl infusion, , Intravenous, Continuous, Adela Taarea, DO, Last Rate: 125 mL/hr at 02/23/23 0004, New Bag at 02/23/23 0004    acetaminophen tablet 650 mg, 650 mg, Oral, Q8H PRN, Adela Taarea, DO    acetaminophen tablet 650 mg, 650 mg, Oral, Q4H PRN, Adela Taarea, DO    aluminum-magnesium hydroxide-simethicone 200-200-20 mg/5 mL suspension 30 mL, 30 mL, Oral, QID PRN, Adela Taarea, DO    bisacodyL suppository 10 mg, 10 mg, Rectal, Daily PRN, Adela Taarea, DO    fentaNYL 50 mcg/hr 1 patch, 1 patch, Transdermal, Q72H, Abby Madera NP, 1 patch at 02/22/23 1631    glucagon (human recombinant) injection 1 mg, 1 mg, Intramuscular, PRN, Adela Taarea, DO    HYDROmorphone (PF) injection 2 mg, 2 mg, Intravenous, Q3H PRN, Shahida Naik NP, 2 mg at 02/23/23 0929    magnesium oxide tablet 800 mg, 800 mg, Oral, PRN, Adela Taarea, DO    magnesium oxide tablet 800 mg, 800 mg, Oral, PRN, Adeal Taarea, DO    melatonin tablet 6 mg, 6 mg, Oral,  Nightly PRN, Adela Taarea, DO    naloxone 0.4 mg/mL injection 0.02 mg, 0.02 mg, Intravenous, PRN, Adela Taarea, DO    ondansetron disintegrating tablet 8 mg, 8 mg, Oral, Q8H PRN, Adela Taarea, DO, 8 mg at 02/21/23 1831    ondansetron injection 4 mg, 4 mg, Intravenous, Q8H PRN, Adela Taarea, DO, 4 mg at 02/21/23 0445    oxyCODONE immediate release tablet 20 mg, 20 mg, Oral, Q4H PRN, Abby Madera, NP    piperacillin-tazobactam (ZOSYN) 4.5 g in dextrose 5 % in water (D5W) 5 % 100 mL IVPB (MB+), 4.5 g, Intravenous, Q8H, Adela Taarea, DO, Last Rate: 25 mL/hr at 02/23/23 0934, 4.5 g at 02/23/23 0934    polyethylene glycol packet 17 g, 17 g, Oral, Daily, Adela Taarea, DO    potassium bicarbonate disintegrating tablet 35 mEq, 35 mEq, Oral, PRN, Adela Taarea, DO    potassium bicarbonate disintegrating tablet 50 mEq, 50 mEq, Oral, PRN, Adela Taarea, DO    potassium bicarbonate disintegrating tablet 60 mEq, 60 mEq, Oral, PRN, Adela Taarea, DO    potassium, sodium phosphates 280-160-250 mg packet 2 packet, 2 packet, Oral, PRN, Adela Taarea, DO    potassium, sodium phosphates 280-160-250 mg packet 2 packet, 2 packet, Oral, PRN, Adela Taarea, DO    potassium, sodium phosphates 280-160-250 mg packet 2 packet, 2 packet, Oral, PRN, Adela Taarea, DO    simethicone chewable tablet 80 mg, 1 tablet, Oral, QID PRN, Adela Taarea, DO, 80 mg at 02/23/23 0930    sodium chloride 0.9% flush 10 mL, 10 mL, Intravenous, Q12H PRN, Adela Taarea, DO    ROS:  Review of Systems   Constitutional:  Positive for activity change, appetite change and fatigue.   HENT:  Negative for congestion.    Respiratory:  Negative for chest tightness, shortness of breath and wheezing.    Cardiovascular:  Negative for chest pain, palpitations and leg swelling.   Gastrointestinal:  Positive for abdominal distention, abdominal pain, constipation, nausea and vomiting.   Musculoskeletal:         +abdominal pain    Skin:  Negative for color change.   Neurological:  Negative for  dizziness and weakness.   Psychiatric/Behavioral:  Positive for sleep disturbance. Negative for agitation and dysphoric mood. The patient is nervous/anxious.      OBJECTIVE:     Physical Exam:  Vitals: Temp: 98.9 °F (37.2 °C) (02/23/23 0726)  Pulse: 72 (02/23/23 0726)  Resp: 18 (02/23/23 0929)  BP: 129/78 (02/23/23 0726)  SpO2: 99 % (02/23/23 0726)    Physical Exam  Vitals and nursing note reviewed.   Constitutional:       General: He is not in acute distress.     Comments: Drowsy post procedure   HENT:      Head: Normocephalic.      Nose: Nose normal.      Mouth/Throat:      Mouth: Mucous membranes are dry.   Eyes:      General:         Right eye: No discharge.         Left eye: No discharge.      Pupils: Pupils are equal, round, and reactive to light.   Cardiovascular:      Rate and Rhythm: Normal rate.   Pulmonary:      Effort: Pulmonary effort is normal. No respiratory distress.   Abdominal:      General: There is distension.      Tenderness: There is abdominal tenderness.      Comments: Pain and tenderness to RUQ and supra pubic area, some pain with swallowing reported as well.    Musculoskeletal:         General: Normal range of motion.      Cervical back: Normal range of motion.   Skin:     General: Skin is warm and dry.   Neurological:      Mental Status: He is oriented to person, place, and time.   Psychiatric:         Mood and Affect: Mood normal.         Behavior: Behavior normal.         Thought Content: Thought content normal.         Judgment: Judgment normal.         Review of Symptoms      Symptom Assessment (ESAS 0-10 Scale)  Pain:  0  Dyspnea:  0  Anxiety:  0  Nausea:  0  Depression:  0  Anorexia:  0  Fatigue:  0  Insomnia:  0  Restlessness:  0  Agitation:  0     Constipation:  Positive    Anxiety:  Is nervous/anxious  Constipation:  Constipation    Bowel Management Plan (BMP):  Yes      Pain Assessment:    Location(s): abdomen    Abdomen       Location: right        Quality: Burning, sharp and hot         Quantity: 9/10 in intensity        Chronicity: Onset 0 second(s) ago, gradually worsening        Aggravating Factors: None        Alleviating Factors: Opiates        Associated Symptoms: None    ECOG Performance Status rdGrdrrdarddrderd:rd rd3rd Living Arrangements:  Lives with spouse    Psychosocial/Cultural:   See Palliative Psychosocial Note: No  **Primary  to Follow**  Palliative Care  Consult: No    Advance Directives:     Decision Making:  Patient answered questions and Family answered questions  Goals of Care: What is most important right now is to focus on remaining as independent as possible, symptom/pain control. Accordingly, we have decided that the best plan to meet the patient's goals includes continuing with treatment.    Labs:  WBC   Date Value Ref Range Status   02/23/2023 5.43 3.90 - 12.70 K/uL Final       Hemoglobin   Date Value Ref Range Status   02/23/2023 10.4 (L) 14.0 - 18.0 g/dL Final       Hematocrit   Date Value Ref Range Status   02/23/2023 30.9 (L) 40.0 - 54.0 % Final       MCV   Date Value Ref Range Status   02/23/2023 87 82 - 98 fL Final       Platelets   Date Value Ref Range Status   02/23/2023 186 150 - 450 K/uL Final       BMP  Lab Results   Component Value Date     (L) 02/23/2023    K 3.1 (L) 02/23/2023    CL 97 02/23/2023    CO2 28 02/23/2023    BUN 6 02/23/2023    CREATININE 0.6 02/23/2023    CALCIUM 8.5 (L) 02/23/2023    ANIONGAP 10 02/23/2023    EGFRNORACEVR >60 02/23/2023       Lab Results   Component Value Date     (H) 02/23/2023     (H) 12/02/2022    ALKPHOS 790 (H) 02/23/2023    BILITOT 1.6 (H) 02/23/2023       Albumin   Date Value Ref Range Status   02/23/2023 2.5 (L) 3.5 - 5.2 g/dL Final       Radiology:I have reviewed all pertinent imaging results/findings within the past 24 hours.      ASSESSMENT   Guevara Osullivan II is a 53 y.o. male with past medical history of Cancer, Digestive disorder, Hypertension, Primary sclerosing  cholangitis, and Ulcerative colitis and colon cancer (1995) s/p colectomy w/ J pouch who presented to ED on 2/20/2023 with worsening abdominal pain.  Patient with primary peritoneal adenocarcinoma, history of intra-abdominal carcinomatosis.  Had recent admission at [facility] for SBO.  He is on cycle 1 day 4 of cisplatin gemcitabine started on 02/16.  He had follow-up appointment with Dr. Shell today and reported significant worsening abdominal pain, discomfort and nausea.  Reports that he has been unable to eat.  Subsequently was encouraged by Dr. Shell to go to ED for further evaluation.  Workup in ED significant for CT of abdomen and pelvis which showed inflammatory changes characteristic of an abscess with associated pancreatitis, no definite current gastrointestinal obstruction.  Admitted to hospital medicine service for further evaluation and treatment.  GI consulted.  They recommend IR consultation to evaluate for possible drainage of abscess in which radiology noted abscess too small for percutaneous drain. GI with plans for ERCP tomorrow. Patient was scheduled to see palliative medicine in April but presented to hospital prior to. In the setting of primary sclerosing cholangitis, GI with plans for ERCP with stent exchange and noted if there is debris or it is clogged it can cause increased pain. Palliative medicine consulted for pain and symptom management.     PLAN   **Encounter for Palliative Care  -Code status: Full code, introduced discussion will follow up  -HCPOA: Surrogate decision maker spouse, Leticia Osullivan, [phone]  -GOC: Remain independent, symptom and pain management, continue with treatment. Introduced GOC discussion and will follow up more once procedures complete and patient open to further discussion.   -See HPI for further details      **Neoplasm related pain   -Patient with extensive GI hx. C/o N/V and pain w/ swallowing.   - reviewed, home regimen MSER 30mg po TID and  prn percocet 10mg q 6 hrs prn pain   -Due to GI issues, N/V started fentanyl patch.This route of administration will ensure that despite GI issues consistent pain relief.   -Continue 50 mcg fentanyl patch patient reported improvement in pain since dose increase.   -Continue prn oxycodone and increase dose to 20mg and IV dilaudid 2mg for break through pain. When patient not NPO instructed to ask for po oxycodone and if not effective ok for IV dilaudid with plans for established regimen for home   -Will monitor usage and adjust pain regimen as needed   -Discussed bowel regimen to prevent OIC with patient and spouse   -Will continue management in outpatient clinic     **Primary peritoneal adenocarcinoma  **Abdominal carcinomatosis  -Under the care of Dr. Shell   -Tx with cisplatin gemcitabine  -PM following for pain & symptom management     **Intra-abdominal abscess  **Primary sclerosing cholangitis  -Management per primary team, GI on board Plan for ERCP w/ stent exchange tomorrow.   -CT of abdomen pelvis showed findings characteristic of an abscess with associated pancreatitis  -Had prior ERCP with stent placement followed by stent exchange by Dr. Conte for cholangitis  -2/22 underwent ERCP w/ stent exchange. Plan to monitor LFTs and if uptrend planning for repeat ERCP, if down trend will continue monitoring. LFTs increased overnight, awaiting further recommendations.     Discussed case and visit details with JOHN Pinedo    Thank you for allowing Palliative Medicine to be involved in the care of Guevara Osullivan II.       Medical decision making: parenteral opioids    Plan required increased review of medication choice, interaction, dosing, frequency, and route due to patient complexity. Patient complexity increased by: being on more than 8 medications    35 min ACP time spent discussing: code status, assessed patient specific goals and addressed the best way to achieve them, coordination of care and emotional  support, formulating and communicating prognosis, exploring burden/ benefit of various approaches of treatment, inquired about existing or willingness to complete advance directive documents.    Abby Madera NP  Palliative Medicine

## 2023-02-23 NOTE — ASSESSMENT & PLAN NOTE
-s/p ERCP yesterday with CBD stent placed.  -LFTs increasing post procedure.  -Discussed with Dr. Dhillon. Will plan for repeat ERCP today for re evaluation.   -Keep NPO.  -Continue Zosyn.

## 2023-02-23 NOTE — PLAN OF CARE
[facility] - Med Surg  Discharge Reassessment    Primary Care Provider: Dima Ch MD    Expected Discharge Date:     Reassessment (most recent)       Discharge Reassessment - 02/23/23 1124          Discharge Reassessment    Assessment Type Discharge Planning Reassessment     Did the patient's condition or plan change since previous assessment? No     Discharge Plan discussed with: --   NP, Shahida Naik    Communicated APRIL with patient/caregiver Date not available/Unable to determine     Discharge Plan A Home     DME Needed Upon Discharge  none     Discharge Barriers Identified None     Why the patient remains in the hospital Requires continued medical care        Post-Acute Status    Discharge Delays None known at this time                   Patient has no d/c needs at this time. Sw to follow up, as needed, for d/c. Planning purposes.

## 2023-02-23 NOTE — PLAN OF CARE
Pt reports better pain control this shift. Chart reviewed.     Problem: Adult Inpatient Plan of Care  Goal: Plan of Care Review  Outcome: Ongoing, Progressing  Goal: Patient-Specific Goal (Individualized)  Outcome: Ongoing, Progressing  Goal: Absence of Hospital-Acquired Illness or Injury  Outcome: Ongoing, Progressing  Goal: Optimal Comfort and Wellbeing  Outcome: Ongoing, Progressing  Goal: Readiness for Transition of Care  Outcome: Ongoing, Progressing     Problem: Coping Ineffective  Goal: Effective Coping  Outcome: Ongoing, Progressing     Problem: Pain Acute  Goal: Acceptable Pain Control and Functional Ability  Outcome: Ongoing, Progressing     Problem: Fall Injury Risk  Goal: Absence of Fall and Fall-Related Injury  Outcome: Ongoing, Progressing     Problem: Infection  Goal: Absence of Infection Signs and Symptoms  Outcome: Ongoing, Progressing

## 2023-02-23 NOTE — PLAN OF CARE
Dr. Teran spoke with pt's wife in waiting room to discuss findings. VSS. Pain at baseline, no GI bleeding. Pt to be discharged from unit.

## 2023-02-23 NOTE — PROGRESS NOTES
[facility]  Gastroenterology  Progress Note    Patient Name: Guevara Osullivan II  MRN: [id number]  Admission Date: 2/20/2023  Hospital Length of Stay: 3 days  Code Status: Full Code   Attending Provider: Sidney Diaz, *  Consulting Provider: Elsa Unger PA-C  Primary Care Physician: Dima Ch MD  Principal Problem: Intra-abdominal abscess      Subjective:     Interval History: Patient resting comfortably. ERCP yesterday with stent placed in the CBD. Patient reports increase in upper abdominal pain today. LFTs increasing from yesterday. Tbili 1.6, , .    Review of Systems   Constitutional:  Negative for appetite change, chills, diaphoresis, fatigue and fever.   Respiratory:  Negative for shortness of breath.    Cardiovascular:  Negative for chest pain.   Gastrointestinal:  Positive for abdominal pain. Negative for abdominal distention, nausea and vomiting.   Skin:  Negative for color change and pallor.   Neurological:  Negative for dizziness, weakness and light-headedness.   Objective:     Vital Signs (Most Recent):  Temp: 98.9 °F (37.2 °C) (02/23/23 0726)  Pulse: 72 (02/23/23 0726)  Resp: 18 (02/23/23 1146)  BP: 129/78 (02/23/23 0726)  SpO2: 99 % (02/23/23 0726) Vital Signs (24h Range):  Temp:  [97.9 °F (36.6 °C)-99 °F (37.2 °C)] 98.9 °F (37.2 °C)  Pulse:  [65-80] 72  Resp:  [17-18] 18  SpO2:  [96 %-100 %] 99 %  BP: (116-135)/(59-78) 129/78     Weight: 74.5 kg (164 lb 3.9 oz) (02/20/23 1849)  Body mass index is 22.28 kg/m².      Intake/Output Summary (Last 24 hours) at 2/23/2023 1214  Last data filed at 2/22/2023 1800  Gross per 24 hour   Intake 1724.47 ml   Output --   Net 1724.47 ml       Lines/Drains/Airways       Central Venous Catheter Line  Duration                  PowerPort A Cath Single Lumen 02/09/23 1124 left subclavian 14 days              Peripheral Intravenous Line  Duration                  Peripheral IV - Single Lumen 02/20/23 1628 18 G Left Antecubital 2  days                    Physical Exam  Constitutional:       General: He is not in acute distress.     Appearance: Normal appearance. He is not ill-appearing, toxic-appearing or diaphoretic.   HENT:      Head: Normocephalic and atraumatic.   Eyes:      General: No scleral icterus.     Extraocular Movements: Extraocular movements intact.   Cardiovascular:      Rate and Rhythm: Normal rate and regular rhythm.   Pulmonary:      Effort: Pulmonary effort is normal. No respiratory distress.   Abdominal:      General: There is no distension.      Palpations: Abdomen is soft.      Tenderness: There is abdominal tenderness.   Musculoskeletal:         General: Normal range of motion.      Cervical back: Normal range of motion.   Skin:     General: Skin is warm and dry.      Coloration: Skin is not jaundiced or pale.   Neurological:      Mental Status: He is alert and oriented to person, place, and time.       Significant Labs:  CBC:   Recent Labs   Lab 02/22/23  0638 02/23/23  0643   WBC 4.21 5.43   HGB 10.2* 10.4*   HCT 30.9* 30.9*    186     CMP:   Recent Labs   Lab 02/23/23  0643   *   CALCIUM 8.5*   ALBUMIN 2.5*   PROT 6.1   *   K 3.1*   CO2 28   CL 97   BUN 6   CREATININE 0.6   ALKPHOS 790*   *   *   BILITOT 1.6*     Coagulation: No results for input(s): PT, INR, APTT in the last 48 hours.      Significant Imaging:  Imaging results within the past 24 hours have been reviewed.    Assessment/Plan:     Oncology  Primary peritoneal adenocarcinoma  -Palliative care following.    GI  * Intra-abdominal abscess  2/23/23  -Reached out to IR but collection is too small for percutaneous drainage.   -Dr. Teran reviewed images and reports this collection is actually decreased with drain in place.       Primary sclerosing cholangitis  -s/p ERCP yesterday with CBD stent placed.  -LFTs increasing post procedure.  -Discussed with Dr. Dhillon. Will plan for repeat ERCP today for re evaluation.    -Keep NPO.  -Continue Zosyn.        Thank you for your consult. I will follow-up with patient. Please contact us if you have any additional questions.    Elsa Unger PA-C  Gastroenterology  [facility]

## 2023-02-23 NOTE — PROVATION PATIENT INSTRUCTIONS
Discharge Summary/Instructions after an Endoscopic Procedure  Patient Name: Guevara Osullivan  Patient MRN: [id number]  Patient YOB: 1970 Thursday, February 23, 2023 Vince Teran MD  Dear patient,  As a result of recent federal legislation (The Federal Cures Act), you may   receive lab or pathology results from your procedure in your [facility]   account before your physician is able to contact you. Your physician or   their representative will relay the results to you with their   recommendations at their soonest availability.  Thank you,  RESTRICTIONS:  During your procedure today, you received medications for sedation.  These   medications may affect your judgment, balance and coordination.  Therefore,   for 24 hours, you have the following restrictions:   - DO NOT drive a car, operate machinery, make legal/financial decisions,   sign important papers or drink alcohol.    ACTIVITY:  Today: no heavy lifting, straining or running due to procedural   sedation/anesthesia.  The following day: return to full activity including work.  DIET:  Eat and drink normally unless instructed otherwise.     TREATMENT FOR COMMON SIDE EFFECTS:  - Mild abdominal pain, nausea, belching, bloating or excessive gas:  rest,   eat lightly and use a heating pad.  - Sore Throat: treat with throat lozenges and/or gargle with warm salt   water.  - Because air was used during the procedure, expelling large amounts of air   from your rectum or belching is normal.  - If a bowel prep was taken, you may not have a bowel movement for 1-3 days.    This is normal.  SYMPTOMS TO WATCH FOR AND REPORT TO YOUR PHYSICIAN:  1. Abdominal pain or bloating, other than gas cramps.  2. Chest pain.  3. Back pain.  4. Signs of infection such as: chills or fever occurring within 24 hours   after the procedure.  5. Rectal bleeding, which would show as bright red, maroon, or black stools.   (A tablespoon of blood from the rectum is not serious,  especially if   hemorrhoids are present.)  6. Vomiting.  7. Weakness or dizziness.  GO DIRECTLY TO THE NEAREST EMERGENCY ROOM IF YOU HAVE ANY OF THE FOLLOWING:      Difficulty breathing              Chills and/or fever over 101 F   Persistent vomiting and/or vomiting blood   Severe abdominal pain   Severe chest pain   Black, tarry stools   Bleeding- more than one tablespoon   Any other symptom or condition that you feel may need urgent attention  Your doctor recommends these additional instructions:  If any biopsies were taken, your doctors clinic will contact you in 1 to 2   weeks with any results.  - Discharge patient to home.   - Resume previous diet.   - Continue present medications.   - Repeat ERCP in 2 months to exchange stent.  For questions, problems or results please call your physician Vince Teran MD at Work:  [phone]  If you have any questions about the above instructions, call the GI   department at [phone] or call the endoscopy unit at [phone]   from 7am until 3 pm.  [facility] [place], EMERGENCY ROOM PHONE NUMBER:   [phone]  IF A COMPLICATION OR EMERGENCY SITUATION ARISES AND YOU ARE UNABLE TO REACH   YOUR PHYSICIAN - GO DIRECTLY TO THE EMERGENCY ROOM.  I have read or have had read to me these discharge instructions for my   procedure and have received a written copy.  I understand these   instructions and will follow-up with my physician if I have any questions.     __________________________________       _____________________________________  Nurse Signature                                          Patient/Designated   Responsible Party Signature  MD Vince Eduardo MD  2/23/2023 4:29:43 PM  This report has been verified and signed electronically.  Dear patient,  As a result of recent federal legislation (The Federal Cures Act), you may   receive lab or pathology results from your procedure in your  [facility]   account before your physician is able to contact you. Your physician or   their representative will relay the results to you with their   recommendations at their soonest availability.  Thank you,  PROVATION

## 2023-02-23 NOTE — TRANSFER OF CARE
Anesthesia Transfer of Care Note    Patient: Guevara Osullivan II    Procedure(s) Performed: Procedure(s) (LRB):  ERCP (ENDOSCOPIC RETROGRADE CHOLANGIOPANCREATOGRAPHY) (N/A)    Patient location: GI    Anesthesia Type: general    Transport from OR: Transported from OR on room air with adequate spontaneous ventilation    Post pain: adequate analgesia    Post assessment: no apparent anesthetic complications and tolerated procedure well    Post vital signs: stable    Level of consciousness: awake and alert    Nausea/Vomiting: no nausea/vomiting    Complications: none    Transfer of care protocol was followed      Last vitals:   Visit Vitals  BP (!) 144/76 (BP Location: Left arm, Patient Position: Lying)   Pulse 62   Temp 36.6 °C (97.8 °F)   Resp 20   Ht 6' (1.829 m)   Wt 74.5 kg (164 lb 3.9 oz)   SpO2 100%   BMI 22.28 kg/m²

## 2023-02-23 NOTE — ANESTHESIA PROCEDURE NOTES
Intubation    Date/Time: 2/23/2023 3:41 PM  Performed by: Trevin Orellana CRNA  Authorized by: Zev Loomis MD     Intubation:     Induction:  Intravenous    Intubated:  Postinduction    Mask Ventilation:  Easy mask    Attempts:  1    Attempted By:  CRNA    Method of Intubation:  Direct    Blade:  Deandre 3    Laryngeal View Grade: Grade IIA - cords partially seen      Difficult Airway Encountered?: No      Complications:  None    Airway Device:  Oral endotracheal tube    Airway Device Size:  7.0    Style/Cuff Inflation:  Cuffed    Tube secured:  22    Secured at:  The lips    Placement Verified By:  Capnometry    Complicating Factors:  None    Findings Post-Intubation:  BS equal bilateral

## 2023-02-23 NOTE — SUBJECTIVE & OBJECTIVE
Subjective:     Interval History: Patient resting comfortably. ERCP yesterday with stent placed in the CBD. Patient reports increase in upper abdominal pain today. LFTs increasing from yesterday. Tbili 1.6, , .    Review of Systems   Constitutional:  Negative for appetite change, chills, diaphoresis, fatigue and fever.   Respiratory:  Negative for shortness of breath.    Cardiovascular:  Negative for chest pain.   Gastrointestinal:  Positive for abdominal pain. Negative for abdominal distention, nausea and vomiting.   Skin:  Negative for color change and pallor.   Neurological:  Negative for dizziness, weakness and light-headedness.   Objective:     Vital Signs (Most Recent):  Temp: 98.9 °F (37.2 °C) (02/23/23 0726)  Pulse: 72 (02/23/23 0726)  Resp: 18 (02/23/23 1146)  BP: 129/78 (02/23/23 0726)  SpO2: 99 % (02/23/23 0726) Vital Signs (24h Range):  Temp:  [97.9 °F (36.6 °C)-99 °F (37.2 °C)] 98.9 °F (37.2 °C)  Pulse:  [65-80] 72  Resp:  [17-18] 18  SpO2:  [96 %-100 %] 99 %  BP: (116-135)/(59-78) 129/78     Weight: 74.5 kg (164 lb 3.9 oz) (02/20/23 1849)  Body mass index is 22.28 kg/m².      Intake/Output Summary (Last 24 hours) at 2/23/2023 1214  Last data filed at 2/22/2023 1800  Gross per 24 hour   Intake 1724.47 ml   Output --   Net 1724.47 ml       Lines/Drains/Airways       Central Venous Catheter Line  Duration                  PowerPort A Cath Single Lumen 02/09/23 1124 left subclavian 14 days              Peripheral Intravenous Line  Duration                  Peripheral IV - Single Lumen 02/20/23 1628 18 G Left Antecubital 2 days                    Physical Exam  Constitutional:       General: He is not in acute distress.     Appearance: Normal appearance. He is not ill-appearing, toxic-appearing or diaphoretic.   HENT:      Head: Normocephalic and atraumatic.   Eyes:      General: No scleral icterus.     Extraocular Movements: Extraocular movements intact.   Cardiovascular:      Rate and  Rhythm: Normal rate and regular rhythm.   Pulmonary:      Effort: Pulmonary effort is normal. No respiratory distress.   Abdominal:      General: There is no distension.      Palpations: Abdomen is soft.      Tenderness: There is abdominal tenderness.   Musculoskeletal:         General: Normal range of motion.      Cervical back: Normal range of motion.   Skin:     General: Skin is warm and dry.      Coloration: Skin is not jaundiced or pale.   Neurological:      Mental Status: He is alert and oriented to person, place, and time.       Significant Labs:  CBC:   Recent Labs   Lab 02/22/23  0638 02/23/23  0643   WBC 4.21 5.43   HGB 10.2* 10.4*   HCT 30.9* 30.9*    186     CMP:   Recent Labs   Lab 02/23/23  0643   *   CALCIUM 8.5*   ALBUMIN 2.5*   PROT 6.1   *   K 3.1*   CO2 28   CL 97   BUN 6   CREATININE 0.6   ALKPHOS 790*   *   *   BILITOT 1.6*     Coagulation: No results for input(s): PT, INR, APTT in the last 48 hours.      Significant Imaging:  Imaging results within the past 24 hours have been reviewed.

## 2023-02-23 NOTE — ASSESSMENT & PLAN NOTE
2/23/23  -Reached out to IR but collection is too small for percutaneous drainage.   -Dr. Teran reviewed images and reports this collection is actually decreased with drain in place.

## 2023-02-24 PROBLEM — K56.600 PARTIAL SMALL BOWEL OBSTRUCTION: Status: ACTIVE | Noted: 2023-01-01

## 2023-02-24 NOTE — ASSESSMENT & PLAN NOTE
Follows with Dr. Shell  Recently started on cisplatin gemcitabine (cycle 1 on 2/16, cycle 2 due 2/23)  Pain control  Consulted Palliative medicine- initiated fentanyl patch, increased to 50mcg  Nausea control

## 2023-02-24 NOTE — ASSESSMENT & PLAN NOTE
-s/p repeat ERCP yesterday with stent exchange.  -upper abdominal pain being controlled with pain medication per patient.   -LFTs trending down  -Will need repeat ERCP for stent removal in 2 months in [place].  -Clear liquid diet. Advance as tolerated.   -Recommend Miralax, as this typically keeps his bowels moving.

## 2023-02-24 NOTE — ASSESSMENT & PLAN NOTE
-GI following-   s/p repeat ERCP on 2/23/22 with stent exchange.  -upper abdominal pain being controlled with pain medication per patient.   -LFTs trending down  -Will need repeat ERCP for stent removal in 2 months in [place].  -Clear liquid diet. Advance as tolerated.

## 2023-02-24 NOTE — SUBJECTIVE & OBJECTIVE
Subjective:     Interval History: Repeat ERCP completed yesterday with CBD stent replacement. LFTs now trending down. Upper abdominal pain being controlled well with pain medication. Complains today of bloating discomfort. Has not had a bowel movement since admission. Wife reports he typically take Miralax every day to maintain his bowels but has not been taking since admit. She reports she gave him Senna this morning. He endorses ambulating in his room, but not much.     Review of Systems   Constitutional:  Positive for fatigue. Negative for appetite change, chills, diaphoresis and fever.   HENT:  Negative for trouble swallowing.    Respiratory:  Negative for shortness of breath.    Cardiovascular:  Negative for chest pain.   Gastrointestinal:  Positive for abdominal distention (bloating), abdominal pain and constipation. Negative for diarrhea, nausea and vomiting.   Skin:  Negative for color change and pallor.   Neurological:  Positive for weakness. Negative for dizziness.   Objective:     Vital Signs (Most Recent):  Temp: 97.9 °F (36.6 °C) (02/24/23 0823)  Pulse: 75 (02/24/23 0823)  Resp: 18 (02/24/23 0824)  BP: (!) 140/81 (02/24/23 0823)  SpO2: 98 % (02/24/23 0823)   Vital Signs (24h Range):  Temp:  [97.8 °F (36.6 °C)-98.5 °F (36.9 °C)] 97.9 °F (36.6 °C)  Pulse:  [62-75] 75  Resp:  [14-20] 18  SpO2:  [97 %-100 %] 98 %  BP: (113-144)/(67-83) 140/81     Weight: 74.5 kg (164 lb 3.9 oz) (02/20/23 1849)  Body mass index is 22.28 kg/m².      Intake/Output Summary (Last 24 hours) at 2/24/2023 0914  Last data filed at 2/24/2023 0350  Gross per 24 hour   Intake 2695.69 ml   Output --   Net 2695.69 ml       Lines/Drains/Airways       Central Venous Catheter Line  Duration                  PowerPort A Cath Single Lumen 02/09/23 1124 left subclavian 14 days              Peripheral Intravenous Line  Duration                  Peripheral IV - Single Lumen 02/20/23 1628 18 G Left Antecubital 3 days                    Physical  Exam  Constitutional:       General: He is not in acute distress.     Appearance: Normal appearance. He is ill-appearing.   HENT:      Head: Normocephalic and atraumatic.   Eyes:      General: No scleral icterus.     Extraocular Movements: Extraocular movements intact.   Cardiovascular:      Rate and Rhythm: Normal rate.   Pulmonary:      Effort: Pulmonary effort is normal. No respiratory distress.   Abdominal:      General: There is no distension.      Palpations: Abdomen is soft.      Tenderness: There is abdominal tenderness. There is no guarding.   Musculoskeletal:      Cervical back: Normal range of motion.   Skin:     General: Skin is warm and dry.      Coloration: Skin is not jaundiced or pale.   Neurological:      Mental Status: He is alert and oriented to person, place, and time.       Significant Labs:  CBC:   Recent Labs   Lab 02/23/23  0643 02/24/23  0637   WBC 5.43 5.59   HGB 10.4* 10.2*   HCT 30.9* 31.2*    156     CMP:   Recent Labs   Lab 02/24/23  0637   GLU 86   CALCIUM 8.7   ALBUMIN 2.5*   PROT 6.0      K 3.8   CO2 29      BUN 5*   CREATININE 0.7   ALKPHOS 678*   *   AST 60*   BILITOT 0.7     Coagulation: No results for input(s): PT, INR, APTT in the last 48 hours.      Significant Imaging:  Imaging results within the past 24 hours have been reviewed.

## 2023-02-24 NOTE — PROGRESS NOTES
[facility] Medicine  Progress Note    Patient Name: Guevara Osullivan II  MRN: [id number]  Patient Class: IP- Inpatient   Admission Date: 2/20/2023  Length of Stay: 4 days  Attending Physician: Sidney Diaz, *  Primary Care Provider: Dima Ch MD        Subjective:     Principal Problem:Intra-abdominal abscess        HPI:  Guevara Osullivan II is a 53 y.o. male with past medical history of Cancer, Digestive disorder, Hypertension, Primary sclerosing cholangitis, and Ulcerative colitis who presented to ED on 2/20/2023 with worsening abdominal pain.  Patient with primary peritoneal adenocarcinoma, history of intra-abdominal carcinomatosis.  Had recent admission at [facility] for SBO.  He is on cycle 1 day 4 of cisplatin gemcitabine started on 02/16.  He had follow-up appointment with Dr. Shell today and reported significant worsening abdominal pain, discomfort and nausea.  Reports that he has been unable to eat.  Subsequently was encouraged by Dr. Shell to go to ED for further evaluation.  Workup in ED significant for CT of abdomen and pelvis which showed inflammatory changes characteristic of an abscess with associated pancreatitis, no definite current gastrointestinal obstruction.  Admitted to hospital medicine service for further evaluation and treatment.  GI consulted.  They recommend IR consultation to evaluate for possible drainage of abscess    PCP: Dima Ch MD    SDM:  Spouse    CODE STATUS:  Full code      Overview/Hospital Course:  53 year old male, under the management of hospital medicine for abdominal pain, small abscess. Patient evaluated by GI, felt the area was smaller than when previously evaluated, GI completed ERCP, tube replacement (2/22), recommended to monitor LFT's, LFT's increased and bili increased, GI plans for repeat ERCP today. Patient endorses severe pain which worsens when taking PO pain medications. Patient was initiated on  fentanyl patch, palliative following and will continue to adjust regimen per patient response. Repeat labs reviewed. On 2/24/23, pt reported continued abdominal pain, constipation, and no flatulence- mildly improved with prescribed medication. KUB obtained with results showing possible partial small bowel obstruction or ileus. IV hydration and analgesia as needed continued. Clear liquids continued as pt states he is refusing to be NPO at this time with risks vs benefits explained and patient/wife understanding verbalized.  Case discussed in detail with GI and palliative care today.  Serial abdominal x-rays to be obtained.  Patient evaluated by palliative Care today pain medications adjusted in advance care planning reviewed.  General surgery to the consult it should symptoms or imaging worsen.  LFTs trending down and T bili normalized.  Potassium normalized. Lipase elevated with post ERCP pancreatitis suspected.       Interval History:  Patient in bed upon exam alert reports abdominal pain, nausea, constipation, and no flatulence.  Imaging supports partial small-bowel obstruction versus ileus.  Lipase elevated.  IV hydration and analgesia as needed continued.  Patient refusing bowel rest at this time a clear liquids continued with risks versus benefits discussed and understanding verbalized per patient/wife.  GI and palliative Care following.    Review of Systems   Constitutional:  Positive for fatigue. Negative for appetite change, chills, diaphoresis and fever.   HENT:  Negative for congestion, drooling, sore throat and trouble swallowing.    Respiratory:  Negative for cough, shortness of breath and wheezing.    Cardiovascular:  Negative for chest pain, palpitations and leg swelling.   Gastrointestinal:  Positive for abdominal distention (bloating), abdominal pain, constipation and nausea (intermittent). Negative for diarrhea and vomiting.   Genitourinary:  Negative for difficulty urinating, dysuria, frequency and  urgency.   Musculoskeletal:  Negative for arthralgias, back pain and myalgias.   Skin:  Negative for color change and pallor.   Neurological:  Positive for weakness. Negative for dizziness and headaches.   Psychiatric/Behavioral:  Negative for agitation and confusion. The patient is nervous/anxious.    Objective:     Vital Signs (Most Recent):  Temp: 97.7 °F (36.5 °C) (02/24/23 1219)  Pulse: 76 (02/24/23 1219)  Resp: 17 (02/24/23 1546)  BP: 137/86 (02/24/23 1219)  SpO2: 96 % (02/24/23 1219) Vital Signs (24h Range):  Temp:  [97.7 °F (36.5 °C)-98.5 °F (36.9 °C)] 97.7 °F (36.5 °C)  Pulse:  [68-76] 76  Resp:  [17-18] 17  SpO2:  [96 %-98 %] 96 %  BP: (126-140)/(78-86) 137/86     Weight: 74.5 kg (164 lb 3.9 oz)  Body mass index is 22.28 kg/m².    Intake/Output Summary (Last 24 hours) at 2/24/2023 1628  Last data filed at 2/24/2023 1410  Gross per 24 hour   Intake 3015.69 ml   Output --   Net 3015.69 ml      Physical Exam  Constitutional:       General: He is not in acute distress.     Appearance: He is ill-appearing.   HENT:      Head: Normocephalic and atraumatic.      Mouth/Throat:      Mouth: Mucous membranes are dry.      Pharynx: Oropharynx is clear.   Eyes:      General: No scleral icterus.     Extraocular Movements: Extraocular movements intact.   Cardiovascular:      Rate and Rhythm: Normal rate.      Pulses: Normal pulses.      Heart sounds: Normal heart sounds.   Pulmonary:      Effort: Pulmonary effort is normal. No respiratory distress.   Abdominal:      General: There is no distension.      Palpations: Abdomen is soft.      Tenderness: There is abdominal tenderness. There is no guarding.   Genitourinary:     Comments: Deferred   Musculoskeletal:      Cervical back: Normal range of motion.   Skin:     General: Skin is warm and dry.      Coloration: Skin is not jaundiced or pale.   Neurological:      Mental Status: He is alert and oriented to person, place, and time.   Psychiatric:         Mood and Affect: Mood  is anxious.       Significant Labs: All pertinent labs within the past 24 hours have been reviewed.  CBC:   Recent Labs   Lab 02/23/23  0643 02/24/23  0637   WBC 5.43 5.59   HGB 10.4* 10.2*   HCT 30.9* 31.2*    156     CMP:   Recent Labs   Lab 02/23/23  0643 02/24/23  0637   * 140   K 3.1* 3.8   CL 97 100   CO2 28 29   * 86   BUN 6 5*   CREATININE 0.6 0.7   CALCIUM 8.5* 8.7   PROT 6.1 6.0   ALBUMIN 2.5* 2.5*   BILITOT 1.6* 0.7   ALKPHOS 790* 678*   * 60*   * 105*   ANIONGAP 10 11       Significant Imaging: I have reviewed all pertinent imaging results/findings within the past 24 hours.      Assessment/Plan:      * Intra-abdominal abscess  CT of abdomen pelvis showed findings characteristic of an abscess with associated pancreatitis  Patient with abdominal carcinomatosis, peritoneal adenocarcinoma on chemotherapy.  Had prior ERCP with stent placement followed by stent exchange by Dr. Conte for cholangitis      Blood cultures: NGTD  Start empiric antibiotic treatment IV Zosyn- continue for now  GI - 2/22- EUS with tube replacement completed  Interventional Radiology consultation- reports abscess too small to drain  Multimodal pain control- IV pain medications, Initiated fentanyl patch per Palliative medicine  Antiemetics p.r.n.  Clear Liquid diet- advance as tolerated  Monitor CBC  2/23: LFTs and Bili increased, GI to repeat ERCP today  2/24- LFTs trending down and Tbili normalized.- GI following       Partial small bowel obstruction  -imaging supports possible partial small bowel obstruction or ileus  -hx of ostomy and prior total colectomy   -IV hydration  -analgesia as needed  -bowel rest refused by patient- clear liquids continued-risk versus benefit explained patient understanding verbalized  -we will consider General surgery consult pending worsening of symptoms or imaging results.  -serial abdominal x-rays  -ambulation as tolerated encouraged- PT OT evaluation  ordered      Immunodeficiency due to chemotherapy  Neutropenic on admission, afebrile    --Daily CBC  --WBC improved    Primary peritoneal adenocarcinoma  Follows with Dr. Shell-Hematology-Oncology  Recently started on cisplatin gemcitabine (cycle 1 on 2/16, cycle 2 due 2/23)  Pain control  Consulted Palliative medicine- initiated fentanyl patch, increased to 50mcg-care planning to be discussed  -antiemetics as needed    Abdominal carcinomatosis  See plan for primary peritoneal adenocarcinoma  -analgesia as needed  -antiemetics as needed  -palliative care following    Primary sclerosing cholangitis  -GI following-   s/p repeat ERCP on 2/23/22 with stent exchange.  -upper abdominal pain being controlled with pain medication per patient.   -LFTs trending down  -Will need repeat ERCP for stent removal in 2 months in [place].  -Clear liquid diet. Advance as tolerated.             VTE Risk Mitigation (From admission, onward)         Ordered     Reason for No Pharmacological VTE Prophylaxis  Once        Question:  Reasons:  Answer:  Physician Provided (leave comment)  Comment:  possible intervention    02/20/23 1814     IP VTE HIGH RISK PATIENT  Once         02/20/23 1814     Place sequential compression device  Until discontinued         02/20/23 1814                Discharge Planning   APRIL:      Code Status: Full Code   Is the patient medically ready for discharge?:     Reason for patient still in hospital (select all that apply): Patient new problem, Patient trending condition, Laboratory test, Treatment, Imaging and Consult recommendations  Discharge Plan A: Home   Discharge Delays: None known at this time              Michelle Robledo NP  Department of Hospital Medicine   [facility] - Med Surg

## 2023-02-24 NOTE — PROGRESS NOTES
[facility] Medicine  Progress Note    Patient Name: Guevara Osullivan II  MRN: [id number]  Patient Class: IP- Inpatient   Admission Date: 2/20/2023  Length of Stay: 3 days  Attending Physician: Sidney Diaz, *  Primary Care Provider: Dima Ch MD        Subjective:     Principal Problem:Intra-abdominal abscess        HPI:  Guevara Osullivan II is a 53 y.o. male with past medical history of Cancer, Digestive disorder, Hypertension, Primary sclerosing cholangitis, and Ulcerative colitis who presented to ED on 2/20/2023 with worsening abdominal pain.  Patient with primary peritoneal adenocarcinoma, history of intra-abdominal carcinomatosis.  Had recent admission at [facility] for SBO.  He is on cycle 1 day 4 of cisplatin gemcitabine started on 02/16.  He had follow-up appointment with Dr. Shell today and reported significant worsening abdominal pain, discomfort and nausea.  Reports that he has been unable to eat.  Subsequently was encouraged by Dr. Shell to go to ED for further evaluation.  Workup in ED significant for CT of abdomen and pelvis which showed inflammatory changes characteristic of an abscess with associated pancreatitis, no definite current gastrointestinal obstruction.  Admitted to hospital medicine service for further evaluation and treatment.  GI consulted.  They recommend IR consultation to evaluate for possible drainage of abscess    PCP: Dima Ch MD    SDM:  Spouse    CODE STATUS:  Full code      Overview/Hospital Course:  53 year old male, under the management of hospital medicine for abdominal pain, small abscess. Patient evaluated by GI, felt the area was smaller than when previously evaluated, GI completed ERCP, tube replacement (2/22), recommended to monitor LFT's, LFT's increased and bili increased, GI plans for repeat ERCP today. Patient endorses severe pain which worsens when taking PO pain medications. Patient was initiated on  fentanyl patch, palliative following and will continue to adjust regimen per patient response. Repeat Labs in AM.        Interval History: LFTs increased, Bili increased. GI to repeat ERCP. Palliative Med following, continued adjustment to pain regimen.     Review of Systems   Constitutional:  Positive for activity change and appetite change. Negative for chills, diaphoresis, fever and unexpected weight change.   HENT:  Negative for congestion, hearing loss, nosebleeds, postnasal drip, rhinorrhea and trouble swallowing.    Eyes:  Negative for discharge and visual disturbance.   Respiratory:  Negative for cough, chest tightness and shortness of breath.    Cardiovascular:  Negative for chest pain, palpitations and leg swelling.   Gastrointestinal:  Positive for abdominal pain. Negative for abdominal distention, constipation, diarrhea, nausea and vomiting.   Endocrine: Negative for cold intolerance and heat intolerance.   Genitourinary:  Negative for difficulty urinating, dysuria, frequency and hematuria.   Musculoskeletal:  Positive for back pain. Negative for arthralgias, gait problem and myalgias.   Skin: Negative.    Neurological:  Negative for dizziness, weakness, light-headedness and headaches.   Hematological:  Negative for adenopathy. Does not bruise/bleed easily.   Psychiatric/Behavioral:  The patient is not nervous/anxious.    All other systems reviewed and are negative.  Objective:     Vital Signs (Most Recent):  Temp: 98.2 °F (36.8 °C) (02/23/23 1605)  Pulse: 71 (02/23/23 1625)  Resp: 16 (02/23/23 1625)  BP: 125/76 (02/23/23 1625)  SpO2: 99 % (02/23/23 1625) Vital Signs (24h Range):  Temp:  [97.8 °F (36.6 °C)-98.9 °F (37.2 °C)] 98.2 °F (36.8 °C)  Pulse:  [62-77] 71  Resp:  [14-20] 16  SpO2:  [96 %-100 %] 99 %  BP: (113-144)/(67-80) 125/76     Weight: 74.5 kg (164 lb 3.9 oz)  Body mass index is 22.28 kg/m².    Intake/Output Summary (Last 24 hours) at 2/23/2023 1808  Last data filed at 2/23/2023 1629  Gross  per 24 hour   Intake 378 ml   Output --   Net 378 ml      Physical Exam  Vitals and nursing note reviewed. Exam conducted with a chaperone present.   Constitutional:       General: He is not in acute distress.     Appearance: He is ill-appearing.   HENT:      Head: Normocephalic and atraumatic.      Right Ear: External ear normal.      Left Ear: External ear normal.      Nose: Nose normal.      Mouth/Throat:      Mouth: Mucous membranes are dry.   Eyes:      Extraocular Movements: Extraocular movements intact.      Pupils: Pupils are equal, round, and reactive to light.   Cardiovascular:      Rate and Rhythm: Normal rate and regular rhythm.   Pulmonary:      Effort: Pulmonary effort is normal. No respiratory distress.      Breath sounds: No wheezing.   Abdominal:      Palpations: Abdomen is soft.      Tenderness: There is abdominal tenderness. There is no guarding or rebound.      Comments: Diffuse tenderness on palpation, worse on right upper quadrant   Genitourinary:     Comments: Deferred  Musculoskeletal:      Cervical back: Neck supple.   Skin:     General: Skin is warm and dry.   Neurological:      Mental Status: He is alert and oriented to person, place, and time. Mental status is at baseline.   Psychiatric:         Mood and Affect: Affect is blunt.         Behavior: Behavior is cooperative.       Significant Labs: All pertinent labs within the past 24 hours have been reviewed.  CBC:   Recent Labs   Lab 02/22/23  0638 02/23/23  0643   WBC 4.21 5.43   HGB 10.2* 10.4*   HCT 30.9* 30.9*    186     CMP:   Recent Labs   Lab 02/22/23  0638 02/23/23  0643    135*   K 3.3* 3.1*   CL 96 97   CO2 29 28   GLU 80 112*   BUN 6 6   CREATININE 0.6 0.6   CALCIUM 8.7 8.5*   PROT 6.7 6.1   ALBUMIN 2.9* 2.5*   BILITOT 1.0 1.6*   ALKPHOS 611* 790*   AST 51* 119*   ALT 91* 135*   ANIONGAP 11 10       Significant Imaging: I have reviewed all pertinent imaging results/findings within the past 24  hours.      Assessment/Plan:      * Intra-abdominal abscess  CT of abdomen pelvis showed findings characteristic of an abscess with associated pancreatitis  Patient with abdominal carcinomatosis, peritoneal adenocarcinoma on chemotherapy.  Had prior ERCP with stent placement followed by stent exchange by Dr. Conte for cholangitis      Blood cultures: NGTD  Start empiric antibiotic treatment IV Zosyn- continue for now  GI - 2/22- EUS with tube replacement completed  Interventional Radiology consultation- reports abscess too small to drain  Multimodal pain control- IV pain medications, Initiated fentanyl patch per Palliative medicine  Antiemetics p.r.n.  Clear Liquid diet- advance as tolerated  Monitor CBC  2/23: LFTs and Bili increased, GI to repeat ERCP today      Immunodeficiency due to chemotherapy  Neutropenic on admission, afebrile    --Daily CBC  --WBC improved    Primary peritoneal adenocarcinoma  Follows with Dr. Shell  Recently started on cisplatin gemcitabine (cycle 1 on 2/16, cycle 2 due 2/23)  Pain control  Consulted Palliative medicine- initiated fentanyl patch, increased to 50mcg  Nausea control    Abdominal carcinomatosis  See plan for primary peritoneal adenocarcinoma      VTE Risk Mitigation (From admission, onward)         Ordered     Reason for No Pharmacological VTE Prophylaxis  Once        Question:  Reasons:  Answer:  Physician Provided (leave comment)  Comment:  possible intervention    02/20/23 1814     IP VTE HIGH RISK PATIENT  Once         02/20/23 1814     Place sequential compression device  Until discontinued         02/20/23 1814                Discharge Planning   APRIL:      Code Status: Full Code   Is the patient medically ready for discharge?:     Reason for patient still in hospital (select all that apply): Patient trending condition  Discharge Plan A: Home   Discharge Delays: None known at this time              Shahida Naik NP  Department of Hospital Medicine   [facility]  Surg

## 2023-02-24 NOTE — PROGRESS NOTES
Pharmacy Brief Progress Note:    Patient & caregiver counseled on fentanyl patch education. Discussed importance of medication compliance and proper patch administration and disposal. Patient & caregiver given fentanyl educational handout. Patient & caregiver expressed understanding and had no further questions.    Thank you for allowing us to participate in this patient's care.     Maeve Zuniga 2/24/2023 10:57 AM

## 2023-02-24 NOTE — ASSESSMENT & PLAN NOTE
See plan for primary peritoneal adenocarcinoma  -analgesia as needed  -antiemetics as needed  -palliative care following

## 2023-02-24 NOTE — ASSESSMENT & PLAN NOTE
-imaging supports possible partial small bowel obstruction or ileus  -hx of ostomy and prior total colectomy   -IV hydration  -analgesia as needed  -bowel rest refused by patient- clear liquids continued-risk versus benefit explained patient understanding verbalized  -we will consider General surgery consult pending worsening of symptoms or imaging results.  -serial abdominal x-rays

## 2023-02-24 NOTE — PLAN OF CARE
Discussed poc with pt, pt verbalized understanding     Purposeful rounding every 2hours     Pt refusing further BS monitoring.  Fall precautions in place, remains injury free  Pt denies c/o nausea  Pain being managed with PRN meds    Pt c/o constipation. Gave suppository as ordered, pt had small results.  IVFs  IV Abx given as prescribed    Pt ambulated around nursing station and tolerated well.  Bed locked at lowest position  Call light within reach     Chart check complete  Reviewed active orders  Will continue with POC

## 2023-02-24 NOTE — SUBJECTIVE & OBJECTIVE
Interval History:  Patient in bed upon exam alert reports abdominal pain, nausea, constipation, and no flatulence.  Imaging supports partial small-bowel obstruction versus ileus.  Lipase elevated.  IV hydration and analgesia as needed continued.  Patient refusing bowel rest at this time a clear liquids continued with risks versus benefits discussed and understanding verbalized per patient/wife.  GI and palliative Care following.    Review of Systems   Constitutional:  Positive for fatigue. Negative for appetite change, chills, diaphoresis and fever.   HENT:  Negative for congestion, drooling, sore throat and trouble swallowing.    Respiratory:  Negative for cough, shortness of breath and wheezing.    Cardiovascular:  Negative for chest pain, palpitations and leg swelling.   Gastrointestinal:  Positive for abdominal distention (bloating), abdominal pain, constipation and nausea (intermittent). Negative for diarrhea and vomiting.   Genitourinary:  Negative for difficulty urinating, dysuria, frequency and urgency.   Musculoskeletal:  Negative for arthralgias, back pain and myalgias.   Skin:  Negative for color change and pallor.   Neurological:  Positive for weakness. Negative for dizziness and headaches.   Psychiatric/Behavioral:  Negative for agitation and confusion. The patient is nervous/anxious.    Objective:     Vital Signs (Most Recent):  Temp: 97.7 °F (36.5 °C) (02/24/23 1219)  Pulse: 76 (02/24/23 1219)  Resp: 17 (02/24/23 1546)  BP: 137/86 (02/24/23 1219)  SpO2: 96 % (02/24/23 1219) Vital Signs (24h Range):  Temp:  [97.7 °F (36.5 °C)-98.5 °F (36.9 °C)] 97.7 °F (36.5 °C)  Pulse:  [68-76] 76  Resp:  [17-18] 17  SpO2:  [96 %-98 %] 96 %  BP: (126-140)/(78-86) 137/86     Weight: 74.5 kg (164 lb 3.9 oz)  Body mass index is 22.28 kg/m².    Intake/Output Summary (Last 24 hours) at 2/24/2023 1628  Last data filed at 2/24/2023 1410  Gross per 24 hour   Intake 3015.69 ml   Output --   Net 3015.69 ml      Physical  Exam  Constitutional:       General: He is not in acute distress.     Appearance: He is ill-appearing.   HENT:      Head: Normocephalic and atraumatic.      Mouth/Throat:      Mouth: Mucous membranes are dry.      Pharynx: Oropharynx is clear.   Eyes:      General: No scleral icterus.     Extraocular Movements: Extraocular movements intact.   Cardiovascular:      Rate and Rhythm: Normal rate.      Pulses: Normal pulses.      Heart sounds: Normal heart sounds.   Pulmonary:      Effort: Pulmonary effort is normal. No respiratory distress.   Abdominal:      General: There is no distension.      Palpations: Abdomen is soft.      Tenderness: There is abdominal tenderness. There is no guarding.   Genitourinary:     Comments: Deferred   Musculoskeletal:      Cervical back: Normal range of motion.   Skin:     General: Skin is warm and dry.      Coloration: Skin is not jaundiced or pale.   Neurological:      Mental Status: He is alert and oriented to person, place, and time.   Psychiatric:         Mood and Affect: Mood is anxious.       Significant Labs: All pertinent labs within the past 24 hours have been reviewed.  CBC:   Recent Labs   Lab 02/23/23  0643 02/24/23  0637   WBC 5.43 5.59   HGB 10.4* 10.2*   HCT 30.9* 31.2*    156     CMP:   Recent Labs   Lab 02/23/23  0643 02/24/23  0637   * 140   K 3.1* 3.8   CL 97 100   CO2 28 29   * 86   BUN 6 5*   CREATININE 0.6 0.7   CALCIUM 8.5* 8.7   PROT 6.1 6.0   ALBUMIN 2.5* 2.5*   BILITOT 1.6* 0.7   ALKPHOS 790* 678*   * 60*   * 105*   ANIONGAP 10 11       Significant Imaging: I have reviewed all pertinent imaging results/findings within the past 24 hours.

## 2023-02-24 NOTE — CARE UPDATE
Advance Care Planning   [facility] S[facility]  Palliative Care RN      Patient Name: Guevara Osullivan II  MRN: [id number]  Admission Date: 2/20/2023  Hospital Length of Stay: 4 days  Code Status: Full Code   Attending Provider: Sidney Diaz, *  Palliative Care Provider: Abby Madera NP  Primary Care Physician: Dima Ch MD  Principal Problem:Intra-abdominal abscess    Fentanyl patches, Dilaudid tablets, and Narcan delivered to bedside in anticipation of possible D/C over the weekend when [facility] is closed. Can be difficult to locate these particular meds and want to ensure patient with have them. Instructed not to take own meds, to bring medications home, and keep in a safe place in the meantime. Updated BRAYDON Durán-OC and BRAYDON Moraes.       Lc Fan RN-[facility], Palliative Medicine   [phone]

## 2023-02-24 NOTE — ASSESSMENT & PLAN NOTE
CT of abdomen pelvis showed findings characteristic of an abscess with associated pancreatitis  Patient with abdominal carcinomatosis, peritoneal adenocarcinoma on chemotherapy.  Had prior ERCP with stent placement followed by stent exchange by Dr. Conte for cholangitis      Blood cultures: NGTD  Start empiric antibiotic treatment IV Zosyn- continue for now  GI - 2/22- EUS with tube replacement completed  Interventional Radiology consultation- reports abscess too small to drain  Multimodal pain control- IV pain medications, Initiated fentanyl patch per Palliative medicine  Antiemetics p.r.n.  Clear Liquid diet- advance as tolerated  Monitor CBC  2/23: LFTs and Bili increased, GI to repeat ERCP today  2/24- LFTs trending down and Tbili normalized.- GI following

## 2023-02-24 NOTE — SUBJECTIVE & OBJECTIVE
Interval History: LFTs increased, Bili increased. GI to repeat ERCP. Palliative Med following, continued adjustment to pain regimen.     Review of Systems   Constitutional:  Positive for activity change and appetite change. Negative for chills, diaphoresis, fever and unexpected weight change.   HENT:  Negative for congestion, hearing loss, nosebleeds, postnasal drip, rhinorrhea and trouble swallowing.    Eyes:  Negative for discharge and visual disturbance.   Respiratory:  Negative for cough, chest tightness and shortness of breath.    Cardiovascular:  Negative for chest pain, palpitations and leg swelling.   Gastrointestinal:  Positive for abdominal pain. Negative for abdominal distention, constipation, diarrhea, nausea and vomiting.   Endocrine: Negative for cold intolerance and heat intolerance.   Genitourinary:  Negative for difficulty urinating, dysuria, frequency and hematuria.   Musculoskeletal:  Positive for back pain. Negative for arthralgias, gait problem and myalgias.   Skin: Negative.    Neurological:  Negative for dizziness, weakness, light-headedness and headaches.   Hematological:  Negative for adenopathy. Does not bruise/bleed easily.   Psychiatric/Behavioral:  The patient is not nervous/anxious.    All other systems reviewed and are negative.  Objective:     Vital Signs (Most Recent):  Temp: 98.2 °F (36.8 °C) (02/23/23 1605)  Pulse: 71 (02/23/23 1625)  Resp: 16 (02/23/23 1625)  BP: 125/76 (02/23/23 1625)  SpO2: 99 % (02/23/23 1625) Vital Signs (24h Range):  Temp:  [97.8 °F (36.6 °C)-98.9 °F (37.2 °C)] 98.2 °F (36.8 °C)  Pulse:  [62-77] 71  Resp:  [14-20] 16  SpO2:  [96 %-100 %] 99 %  BP: (113-144)/(67-80) 125/76     Weight: 74.5 kg (164 lb 3.9 oz)  Body mass index is 22.28 kg/m².    Intake/Output Summary (Last 24 hours) at 2/23/2023 1806  Last data filed at 2/23/2023 1629  Gross per 24 hour   Intake 378 ml   Output --   Net 378 ml      Physical Exam  Vitals and nursing note reviewed. Exam conducted  with a chaperone present.   Constitutional:       General: He is not in acute distress.     Appearance: He is ill-appearing.   HENT:      Head: Normocephalic and atraumatic.      Right Ear: External ear normal.      Left Ear: External ear normal.      Nose: Nose normal.      Mouth/Throat:      Mouth: Mucous membranes are dry.   Eyes:      Extraocular Movements: Extraocular movements intact.      Pupils: Pupils are equal, round, and reactive to light.   Cardiovascular:      Rate and Rhythm: Normal rate and regular rhythm.   Pulmonary:      Effort: Pulmonary effort is normal. No respiratory distress.      Breath sounds: No wheezing.   Abdominal:      Palpations: Abdomen is soft.      Tenderness: There is abdominal tenderness. There is no guarding or rebound.      Comments: Diffuse tenderness on palpation, worse on right upper quadrant   Genitourinary:     Comments: Deferred  Musculoskeletal:      Cervical back: Neck supple.   Skin:     General: Skin is warm and dry.   Neurological:      Mental Status: He is alert and oriented to person, place, and time. Mental status is at baseline.   Psychiatric:         Mood and Affect: Affect is blunt.         Behavior: Behavior is cooperative.       Significant Labs: All pertinent labs within the past 24 hours have been reviewed.  CBC:   Recent Labs   Lab 02/22/23  0638 02/23/23  0643   WBC 4.21 5.43   HGB 10.2* 10.4*   HCT 30.9* 30.9*    186     CMP:   Recent Labs   Lab 02/22/23  0638 02/23/23  0643    135*   K 3.3* 3.1*   CL 96 97   CO2 29 28   GLU 80 112*   BUN 6 6   CREATININE 0.6 0.6   CALCIUM 8.7 8.5*   PROT 6.7 6.1   ALBUMIN 2.9* 2.5*   BILITOT 1.0 1.6*   ALKPHOS 611* 790*   AST 51* 119*   ALT 91* 135*   ANIONGAP 11 10       Significant Imaging: I have reviewed all pertinent imaging results/findings within the past 24 hours.

## 2023-02-24 NOTE — PROGRESS NOTES
"Advance Care Planning    Progress Note  Palliative Medicine      Consult Requested By: Sidney Diaz, *  Reason for Consult: Pain and symptom management    SUBJECTIVE:     History of Present Illness: Upon examination, patient lying in bed in no acute distress. On yesterday, patient underwent ERCP and had metal stent replaced with plastic stent, LFTs trending down. He reports improvement in abdominal pain but does report lower abdominal pain and gas pains that are sharp and stabbing in nature and his stomach is with increased "gurgling". KUB ordered by primary team for further assessment. We did also speak about pain medications, he's been in the bed with multiple procedures, and NPO and not eating much. Today he will work with PT as well. He noted some nausea that he associates with oxycodone, and notes iv dilaudid is effective in managing his pain in addition to fentanyl patch. In this setting will d/c oxycodone and start prn po dilaudid for break through pain. Patient and wife in agreement with plan. Also noted with decrease in prn pain medication usage over night. Patient likely to d/c over weekend pending KUB, in this setting I placed discharge med rec orders to ensure patient goes home with pain medications and will continue management in outpatient PM clinic.     In the last 24hrs the patient has used the following pain medications (7a-7a):  - Fentanyl patch 50 mcg x 1  - IV Dilaudid x 3  - Oxycodone x 2    Past Medical History:   Diagnosis Date    Cancer     COLON CANCER 1995    Digestive disorder     Hypertension     Primary sclerosing cholangitis     Ulcerative colitis     s/p colectomy with J- pouch     Past Surgical History:   Procedure Laterality Date    ABDOMINAL WASHOUT N/A 1/18/2023    Procedure: LAVAGE, PERITONEAL,;  Surgeon: Onur Calvin Jr., MD;  Location: [facility] OR [address] [place] [address];  Service: General;  Laterality: N/A;    BIOPSY OF PERITONEUM N/A 1/18/2023    Procedure: BIOPSY, PERITONEUM;  " Surgeon: Onur Calvin Jr., MD;  Location: [facility] OR 2ND FLR;  Service: General;  Laterality: N/A;    COLON SURGERY      Colectomy with J- pouch    DIAGNOSTIC LAPAROSCOPY N/A 1/18/2023    Procedure: LAPAROSCOPY, DIAGNOSTIC WITH WASHING;  Surgeon: Onur Calvin Jr., MD;  Location: [facility] OR 2ND FLR;  Service: General;  Laterality: N/A;    ENDOSCOPIC ULTRASOUND OF UPPER GASTROINTESTINAL TRACT N/A 10/14/2022    Procedure: ULTRASOUND, UPPER GI TRACT,;  Surgeon: Vince Teran MD;  Location: [facility];  Service: Endoscopy;  Laterality: N/A;  upper and linear    ENDOSCOPIC ULTRASOUND OF UPPER GASTROINTESTINAL TRACT N/A 12/13/2022    Procedure: ULTRASOUND, UPPER GI TRACT, ENDOSCOPIC;  Surgeon: Vince Teran MD;  Location: [facility];  Service: Endoscopy;  Laterality: N/A;    ERCP N/A 10/14/2022    Procedure: ERCP (ENDOSCOPIC RETROGRADE CHOLANGIOPANCREATOGRAPHY) with spyglass;  Surgeon: Vince Teran MD;  Location: [facility];  Service: Endoscopy;  Laterality: N/A;    ERCP N/A 12/13/2022    Procedure: ERCP (ENDOSCOPIC RETROGRADE CHOLANGIOPANCREATOGRAPHY);  Surgeon: Vince Teran MD;  Location: [facility];  Service: Endoscopy;  Laterality: N/A;    ERCP N/A 12/20/2022    Procedure: ERCP (ENDOSCOPIC RETROGRADE CHOLANGIOPANCREATOGRAPHY);  Surgeon: Vince Teran MD;  Location: [facility];  Service: Endoscopy;  Laterality: N/A;    ERCP N/A 1/4/2023    Procedure: ERCP (ENDOSCOPIC RETROGRADE CHOLANGIOPANCREATOGRAPHY);  Surgeon: Vince Teran MD;  Location: [facility];  Service: Endoscopy;  Laterality: N/A;  room 1    ERCP N/A 2/22/2023    Procedure: ERCP (ENDOSCOPIC RETROGRADE CHOLANGIOPANCREATOGRAPHY);  Surgeon: Vince Teran MD;  Location: [facility];  Service: Endoscopy;  Laterality: N/A;    ERCP N/A 2/23/2023    Procedure: ERCP (ENDOSCOPIC RETROGRADE CHOLANGIOPANCREATOGRAPHY);  Surgeon: Vince Teran MD;  Location: [facility];  Service:  Endoscopy;  Laterality: N/A;    ESOPHAGOGASTRODUODENOSCOPY N/A 12/20/2022    Procedure: EGD (ESOPHAGOGASTRODUODENOSCOPY);  Surgeon: Vince Teran MD;  Location: [facility];  Service: Endoscopy;  Laterality: N/A;    ESOPHAGOGASTRODUODENOSCOPY N/A 1/4/2023    Procedure: EGD (ESOPHAGOGASTRODUODENOSCOPY);  Surgeon: Vince Teran MD;  Location: [facility];  Service: Endoscopy;  Laterality: N/A;    INSERTION OF TUNNELED CENTRAL VENOUS CATHETER (CVC) WITH SUBCUTANEOUS PORT Left 2/9/2023    Procedure: [id number];  Surgeon: Wojciech Martínez MD;  Location: [facility];  Service: General;  Laterality: Left;    POUCHOSCOPY       History reviewed. No pertinent family history.    Social History     Socioeconomic History    Marital status:    Tobacco Use    Smoking status: Never     Passive exposure: Never    Smokeless tobacco: Never   Substance and Sexual Activity    Alcohol use: Not Currently    Drug use: Yes     Types: Marijuana     Comment: medical marijuana      Review of patient's allergies indicates:   Allergen Reactions    Vicryl [sutures, polyglycolic acid] Other (See Comments)     Wound dehiscence after achilles sx    Ciprofloxacin Other (See Comments)     Pt previously had medication reaction; suffered achilles rupture     Meperidine Hives       Medications:    Current Facility-Administered Medications:     acetaminophen tablet 650 mg, 650 mg, Oral, Q8H PRN, Adela Taarea, DO    aluminum-magnesium hydroxide-simethicone 200-200-20 mg/5 mL suspension 30 mL, 30 mL, Oral, QID PRN, Adela Taarea, DO    bisacodyL suppository 10 mg, 10 mg, Rectal, Daily PRN, Adela Taarea, DO    fentaNYL 50 mcg/hr 1 patch, 1 patch, Transdermal, Q72H, Abby Madera NP, 1 patch at 02/22/23 1631    glucagon (human recombinant) injection 1 mg, 1 mg, Intramuscular, PRN, Adela Taarea, DO    HYDROmorphone (PF) injection 2 mg, 2 mg, Intravenous, Q3H PRN, Abby Madera NP, 2 mg at 02/24/23 0304    HYDROmorphone tablet 4  mg, 4 mg, Oral, Q4H PRN, Abby Madera NP    lactated ringers infusion, , Intravenous, Continuous, Vince Teran MD, Last Rate: 100 mL/hr at 02/23/23 2224, New Bag at 02/23/23 2224    magnesium oxide tablet 800 mg, 800 mg, Oral, PRN, Adela Taarea, DO    magnesium oxide tablet 800 mg, 800 mg, Oral, PRN, Adela Taarea, DO    melatonin tablet 6 mg, 6 mg, Oral, Nightly PRN, Adela Taarea, DO    naloxone 0.4 mg/mL injection 0.02 mg, 0.02 mg, Intravenous, PRN, Adela Taarea, DO    ondansetron disintegrating tablet 8 mg, 8 mg, Oral, Q8H PRN, Adela Taarea, DO, 8 mg at 02/23/23 2031    ondansetron injection 4 mg, 4 mg, Intravenous, Q8H PRN, Adela Taarea, DO, 4 mg at 02/21/23 0445    piperacillin-tazobactam (ZOSYN) 4.5 g in dextrose 5 % in water (D5W) 5 % 100 mL IVPB (MB+), 4.5 g, Intravenous, Q8H, Adela Taarea, DO, Last Rate: 25 mL/hr at 02/24/23 0829, 4.5 g at 02/24/23 0829    polyethylene glycol packet 17 g, 17 g, Oral, Daily, Adela Taarea, DO, 17 g at 02/24/23 0824    potassium bicarbonate disintegrating tablet 35 mEq, 35 mEq, Oral, PRN, Adela Taarea, DO    potassium bicarbonate disintegrating tablet 50 mEq, 50 mEq, Oral, PRN, Adela Taarea, DO    potassium bicarbonate disintegrating tablet 60 mEq, 60 mEq, Oral, PRN, Adela Taarea, DO    potassium, sodium phosphates 280-160-250 mg packet 2 packet, 2 packet, Oral, PRN, Adela Taarea, DO    potassium, sodium phosphates 280-160-250 mg packet 2 packet, 2 packet, Oral, PRN, Adela Taarea, DO    potassium, sodium phosphates 280-160-250 mg packet 2 packet, 2 packet, Oral, PRN, Adela Taarea, DO    simethicone chewable tablet 80 mg, 1 tablet, Oral, QID PRN, Adela Riley DO, 80 mg at 02/23/23 2123    sodium chloride 0.9% flush 10 mL, 10 mL, Intravenous, Q12H PRN, Adela Riley DO    ROS:  Review of Systems   Constitutional:  Positive for activity change, appetite change and fatigue.   HENT:  Negative for congestion.    Respiratory:  Negative for chest tightness, shortness of breath and  wheezing.    Cardiovascular:  Negative for chest pain, palpitations and leg swelling.   Gastrointestinal:  Positive for abdominal distention, abdominal pain, constipation, nausea and vomiting.   Musculoskeletal:         +abdominal pain    Skin:  Negative for color change.   Neurological:  Negative for dizziness and weakness.   Psychiatric/Behavioral:  Positive for sleep disturbance. Negative for agitation and dysphoric mood. The patient is nervous/anxious.      OBJECTIVE:     Physical Exam:  Vitals: Temp: 97.9 °F (36.6 °C) (02/24/23 0823)  Pulse: 75 (02/24/23 0823)  Resp: 18 (02/24/23 0824)  BP: (!) 140/81 (02/24/23 0823)  SpO2: 98 % (02/24/23 0823)    Physical Exam  Vitals and nursing note reviewed.   Constitutional:       General: He is not in acute distress.     Comments: Drowsy post procedure   HENT:      Head: Normocephalic.      Nose: Nose normal.      Mouth/Throat:      Mouth: Mucous membranes are dry.   Eyes:      General:         Right eye: No discharge.         Left eye: No discharge.      Pupils: Pupils are equal, round, and reactive to light.   Cardiovascular:      Rate and Rhythm: Normal rate.   Pulmonary:      Effort: Pulmonary effort is normal. No respiratory distress.   Abdominal:      General: A surgical scar is present. There is distension.      Tenderness: There is abdominal tenderness.      Comments: Pain and tenderness to RUQ and supra pubic area, some pain with swallowing reported as well.    Musculoskeletal:         General: Normal range of motion.      Cervical back: Normal range of motion.   Skin:     General: Skin is warm and dry.   Neurological:      Mental Status: He is oriented to person, place, and time.   Psychiatric:         Mood and Affect: Mood normal.         Behavior: Behavior normal.         Thought Content: Thought content normal.         Judgment: Judgment normal.         Review of Symptoms      Symptom Assessment (ESAS 0-10 Scale)  Pain:  0  Dyspnea:  0  Anxiety:  0  Nausea:   0  Depression:  0  Anorexia:  0  Fatigue:  0  Insomnia:  0  Restlessness:  0  Agitation:  0     Constipation:  Positive    Anxiety:  Is nervous/anxious  Constipation:  Constipation    Bowel Management Plan (BMP):  Yes      Pain Assessment:    Location(s): abdomen    Abdomen       Location: right        Quality: Burning, sharp and hot        Quantity: 9/10 in intensity        Chronicity: Onset 0 second(s) ago, gradually worsening        Aggravating Factors: None        Alleviating Factors: Opiates        Associated Symptoms: None    ECOG Performance Status stGstrstastdstest:st st1st Living Arrangements:  Lives with spouse    Psychosocial/Cultural:   See Palliative Psychosocial Note: No  **Primary  to Follow**  Palliative Care  Consult: No    Advance Directives:     Decision Making:  Patient answered questions and Family answered questions  Goals of Care: What is most important right now is to focus on remaining as independent as possible, symptom/pain control. Accordingly, we have decided that the best plan to meet the patient's goals includes continuing with treatment.    Labs:  WBC   Date Value Ref Range Status   02/24/2023 5.59 3.90 - 12.70 K/uL Final       Hemoglobin   Date Value Ref Range Status   02/24/2023 10.2 (L) 14.0 - 18.0 g/dL Final       Hematocrit   Date Value Ref Range Status   02/24/2023 31.2 (L) 40.0 - 54.0 % Final       MCV   Date Value Ref Range Status   02/24/2023 87 82 - 98 fL Final       Platelets   Date Value Ref Range Status   02/24/2023 156 150 - 450 K/uL Final       BMP  Lab Results   Component Value Date     02/24/2023    K 3.8 02/24/2023     02/24/2023    CO2 29 02/24/2023    BUN 5 (L) 02/24/2023    CREATININE 0.7 02/24/2023    CALCIUM 8.7 02/24/2023    ANIONGAP 11 02/24/2023    EGFRNORACEVR >60 02/24/2023       Lab Results   Component Value Date    AST 60 (H) 02/24/2023     (H) 12/02/2022    ALKPHOS 678 (H) 02/24/2023    BILITOT 0.7 02/24/2023       Albumin    Date Value Ref Range Status   02/24/2023 2.5 (L) 3.5 - 5.2 g/dL Final       Radiology:I have reviewed all pertinent imaging results/findings within the past 24 hours.      ASSESSMENT   Guevara Osullivan II is a 53 y.o. male with past medical history of Cancer, Digestive disorder, Hypertension, Primary sclerosing cholangitis, and Ulcerative colitis and colon cancer (1995) s/p colectomy w/ J pouch who presented to ED on 2/20/2023 with worsening abdominal pain.  Patient with primary peritoneal adenocarcinoma, history of intra-abdominal carcinomatosis.  Had recent admission at [facility] for SBO.  He is on cycle 1 day 4 of cisplatin gemcitabine started on 02/16.  He had follow-up appointment with Dr. Shell today and reported significant worsening abdominal pain, discomfort and nausea.  Reports that he has been unable to eat.  Subsequently was encouraged by Dr. Shell to go to ED for further evaluation.  Workup in ED significant for CT of abdomen and pelvis which showed inflammatory changes characteristic of an abscess with associated pancreatitis, no definite current gastrointestinal obstruction.  Admitted to hospital medicine service for further evaluation and treatment.  GI consulted.  They recommend IR consultation to evaluate for possible drainage of abscess in which radiology noted abscess too small for percutaneous drain. GI with plans for ERCP tomorrow. Patient was scheduled to see palliative medicine in April but presented to hospital prior to. In the setting of primary sclerosing cholangitis, GI with plans for ERCP with stent exchange and noted if there is debris or it is clogged it can cause increased pain. Palliative medicine consulted for pain and symptom management.     PLAN   **Encounter for Palliative Care  -Code status: Full code, introduced discussion will follow up  -HCPOA: Surrogate decision maker spouse, Leticia Osullivan, [phone]  -[facility]: Remain independent, symptom and pain  management, continue with treatment. Introduced GOC discussion and will follow up in PM clinic.   -See HPI for further details      **Neoplasm related pain   -Patient with extensive GI hx. C/o N/V and pain w/ swallowing.   -Due to GI issues, N/V started fentanyl patch.This route of administration will ensure that despite GI issues consistent pain relief.   -Continue 50 mcg fentanyl patch q 72 hours  -D/C prn oxycodone and start dilaudid 4mg po q 4 hrs prn max 5 doses per day  -Discharge medication rec completed from PM standpoint, scripts sent with supply for patient until follow up in clinic. Also sent script for narcan.   -IV dilaudid prn still on board, patient and nurse instructed in preparation for discharge to take po dilaudid and only administer IV if po not effective.   -Discussed bowel regimen to prevent OIC with patient and spouse   -Will continue management in outpatient clinic     **Primary peritoneal adenocarcinoma  **Abdominal carcinomatosis  -Under the care of Dr. Shell   -Tx with cisplatin gemcitabine  -PM following for pain & symptom management     **Intra-abdominal abscess  **Primary sclerosing cholangitis  -Management per primary team, GI on board Plan for ERCP w/ stent exchange tomorrow.   -CT of abdomen pelvis showed findings characteristic of an abscess with associated pancreatitis  -Had prior ERCP with stent placement followed by stent exchange by Dr. Conte for cholangitis  -2/22 underwent ERCP w/ stent exchange.   -2/23 underwent ERCP w/ stent exchange. LFTs improved.     Discussed case and visit details with JOHN Pinedo    Thank you for allowing Palliative Medicine to be involved in the care of Guevara Osullivan II.       Medical decision making: parenteral opioids    Plan required increased review of medication choice, interaction, dosing, frequency, and route due to patient complexity. Patient complexity increased by: being on more than 8 medications    35 min ACP time spent  discussing: code status, assessed patient specific goals and addressed the best way to achieve them, coordination of care and emotional support, formulating and communicating prognosis, exploring burden/ benefit of various approaches of treatment, inquired about existing or willingness to complete advance directive documents.    Abby Madera NP  Palliative Medicine

## 2023-02-24 NOTE — PLAN OF CARE
Patient remains free from injury this shift. Safety precautions maintained. No s/s of acute distress noted. Pain controlled per MD order. IVF infusing. Blood glucose monitoring refused by patient. Wife Leticia at the bedside. Pt refuses bed alarm. Pt prefers lights to be turned off and to please ask before turning them on.Chart and orders review complete. Patient education about care complete.       Problem: Adult Inpatient Plan of Care  Goal: Plan of Care Review  Outcome: Ongoing, Progressing  Goal: Patient-Specific Goal (Individualized)  Outcome: Ongoing, Progressing  Goal: Absence of Hospital-Acquired Illness or Injury  Outcome: Ongoing, Progressing  Goal: Optimal Comfort and Wellbeing  Outcome: Ongoing, Progressing  Goal: Readiness for Transition of Care  Outcome: Ongoing, Progressing     Problem: Coping Ineffective  Goal: Effective Coping  Outcome: Ongoing, Progressing     Problem: Pain Acute  Goal: Acceptable Pain Control and Functional Ability  Outcome: Ongoing, Progressing

## 2023-02-24 NOTE — PROGRESS NOTES
[facility] Surg  Gastroenterology  Progress Note    Patient Name: Guevara Osullivan II  MRN: [id number]  Admission Date: 2/20/2023  Hospital Length of Stay: 4 days  Code Status: Full Code   Attending Provider: Sidney Diaz, *  Consulting Provider: Elsa Unger PA-C  Primary Care Physician: Dima Ch MD  Principal Problem: Intra-abdominal abscess      Subjective:     Interval History: Repeat ERCP completed yesterday with CBD stent replacement. LFTs now trending down. Upper abdominal pain being controlled well with pain medication. Complains today of bloating discomfort. Has not had a bowel movement since admission. Wife reports he typically take Miralax every day to maintain his bowels but has not been taking since admit. She reports she gave him Senna this morning. He endorses ambulating in his room, but not much.     Review of Systems   Constitutional:  Positive for fatigue. Negative for appetite change, chills, diaphoresis and fever.   HENT:  Negative for trouble swallowing.    Respiratory:  Negative for shortness of breath.    Cardiovascular:  Negative for chest pain.   Gastrointestinal:  Positive for abdominal distention (bloating), abdominal pain and constipation. Negative for diarrhea, nausea and vomiting.   Skin:  Negative for color change and pallor.   Neurological:  Positive for weakness. Negative for dizziness.   Objective:     Vital Signs (Most Recent):  Temp: 97.9 °F (36.6 °C) (02/24/23 0823)  Pulse: 75 (02/24/23 0823)  Resp: 18 (02/24/23 0824)  BP: (!) 140/81 (02/24/23 0823)  SpO2: 98 % (02/24/23 0823)   Vital Signs (24h Range):  Temp:  [97.8 °F (36.6 °C)-98.5 °F (36.9 °C)] 97.9 °F (36.6 °C)  Pulse:  [62-75] 75  Resp:  [14-20] 18  SpO2:  [97 %-100 %] 98 %  BP: (113-144)/(67-83) 140/81     Weight: 74.5 kg (164 lb 3.9 oz) (02/20/23 1849)  Body mass index is 22.28 kg/m².      Intake/Output Summary (Last 24 hours) at 2/24/2023 0914  Last data filed at 2/24/2023 0350  Gross per 24  hour   Intake 2695.69 ml   Output --   Net 2695.69 ml       Lines/Drains/Airways       Central Venous Catheter Line  Duration                  PowerPort A Cath Single Lumen 02/09/23 1124 left subclavian 14 days              Peripheral Intravenous Line  Duration                  Peripheral IV - Single Lumen 02/20/23 1628 18 G Left Antecubital 3 days                    Physical Exam  Constitutional:       General: He is not in acute distress.     Appearance: Normal appearance. He is ill-appearing.   HENT:      Head: Normocephalic and atraumatic.   Eyes:      General: No scleral icterus.     Extraocular Movements: Extraocular movements intact.   Cardiovascular:      Rate and Rhythm: Normal rate.   Pulmonary:      Effort: Pulmonary effort is normal. No respiratory distress.   Abdominal:      General: There is no distension.      Palpations: Abdomen is soft.      Tenderness: There is abdominal tenderness. There is no guarding.   Musculoskeletal:      Cervical back: Normal range of motion.   Skin:     General: Skin is warm and dry.      Coloration: Skin is not jaundiced or pale.   Neurological:      Mental Status: He is alert and oriented to person, place, and time.       Significant Labs:  CBC:   Recent Labs   Lab 02/23/23  0643 02/24/23  0637   WBC 5.43 5.59   HGB 10.4* 10.2*   HCT 30.9* 31.2*    156     CMP:   Recent Labs   Lab 02/24/23  0637   GLU 86   CALCIUM 8.7   ALBUMIN 2.5*   PROT 6.0      K 3.8   CO2 29      BUN 5*   CREATININE 0.7   ALKPHOS 678*   *   AST 60*   BILITOT 0.7     Coagulation: No results for input(s): PT, INR, APTT in the last 48 hours.      Significant Imaging:  Imaging results within the past 24 hours have been reviewed.    Assessment/Plan:     Oncology  Primary peritoneal adenocarcinoma  -Palliative care following.    GI  * Intra-abdominal abscess  2/23/23  -Reached out to IR but collection is too small for percutaneous drainage.   -Dr. Teran reviewed images and  reports this collection is actually decreased with drain in place.       Primary sclerosing cholangitis  -s/p repeat ERCP yesterday with stent exchange.  -upper abdominal pain being controlled with pain medication per patient.   -LFTs trending down  -Will need repeat ERCP for stent removal in 2 months in [place].  -Clear liquid diet. Advance as tolerated.   -Recommend Miralax, as this typically keeps his bowels moving.         Thank you for your consult. I will follow-up with patient. Please contact us if you have any additional questions.    Elsa Unger PA-C  Gastroenterology  [facility] - Med Surg

## 2023-02-24 NOTE — ASSESSMENT & PLAN NOTE
CT of abdomen pelvis showed findings characteristic of an abscess with associated pancreatitis  Patient with abdominal carcinomatosis, peritoneal adenocarcinoma on chemotherapy.  Had prior ERCP with stent placement followed by stent exchange by Dr. Conte for cholangitis      Blood cultures: NGTD  Start empiric antibiotic treatment IV Zosyn- continue for now  GI - 2/22- EUS with tube replacement completed  Interventional Radiology consultation- reports abscess too small to drain  Multimodal pain control- IV pain medications, Initiated fentanyl patch per Palliative medicine  Antiemetics p.r.n.  Clear Liquid diet- advance as tolerated  Monitor CBC  2/23: LFTs and Bili increased, GI to repeat ERCP today

## 2023-02-24 NOTE — ASSESSMENT & PLAN NOTE
Follows with Dr. Shell-Hematology-Oncology  Recently started on cisplatin gemcitabine (cycle 1 on 2/16, cycle 2 due 2/23)  Pain control  Consulted Palliative medicine- initiated fentanyl patch, increased to 50mcg-care planning to be discussed  -antiemetics as needed

## 2023-02-25 NOTE — PLAN OF CARE
EVAL AND TX COMPLETED:pt performed bed mobility independently, transfers independently. Ambulated 450 ft independently. No further PT needs

## 2023-02-25 NOTE — DISCHARGE SUMMARY
[facility] Medicine  Discharge Summary      Patient Name: Guevara Osullivan II  MRN: [id number]  BRANDI: [id number]  Patient Class: IP- Inpatient  Admission Date: 2/20/2023  Hospital Length of Stay: 5 days  Discharge Date and Time:  02/25/2023 11:31 AM  Attending Physician: Sidney Diaz, *   Discharging Provider: Shahida Naik NP  Primary Care Provider: Dima Ch MD    Primary Care Team: Networked reference to record PCT     HPI:   Guevara Osullivan II is a 53 y.o. male with past medical history of Cancer, Digestive disorder, Hypertension, Primary sclerosing cholangitis, and Ulcerative colitis who presented to ED on 2/20/2023 with worsening abdominal pain.  Patient with primary peritoneal adenocarcinoma, history of intra-abdominal carcinomatosis.  Had recent admission at [facility] for SBO.  He is on cycle 1 day 4 of cisplatin gemcitabine started on 02/16.  He had follow-up appointment with Dr. Shell today and reported significant worsening abdominal pain, discomfort and nausea.  Reports that he has been unable to eat.  Subsequently was encouraged by Dr. Shell to go to ED for further evaluation.  Workup in ED significant for CT of abdomen and pelvis which showed inflammatory changes characteristic of an abscess with associated pancreatitis, no definite current gastrointestinal obstruction.  Admitted to hospital medicine service for further evaluation and treatment.  GI consulted.  They recommend IR consultation to evaluate for possible drainage of abscess    PCP: Dima Ch MD    SDM:  Spouse    CODE STATUS:  Full code      Procedure(s) (LRB):  ERCP (ENDOSCOPIC RETROGRADE CHOLANGIOPANCREATOGRAPHY) (N/A)      Hospital Course:   53 year old male, under the management of hospital medicine for abdominal pain, small abscess. Patient evaluated by GI, felt the area was smaller than when previously evaluated, GI completed ERCP, tube replacement (2/22), recommended to  monitor LFT's, LFT's increased and bili increased, GI plans for repeated ERCP 2/23, LFT's and Bili improved.  Repeat labs reviewed. On 2/24/23, pt reported continued abdominal pain, constipation, and no flatulence- mildly improved with prescribed medication. KUB obtained with results showing possible partial small bowel obstruction or ileus, Case discussed in detail with GI, conservative management, significant improvement.  Patient seen by palliative Care pain regimen effective, prescriptions delivered to bedside yesterday.  General surgery to the consult it should symptoms or imaging worsen.  LFTs trending down and T bili normalized.  Potassium normalized. Lipase elevated with post ERCP pancreatitis suspected. Plan is discharge home today, patient already scheduled to follow-up with Oncology on 2/25, GI to arrange follow-up. Advised patient to remain on Full Liquid diet for now, advance diet slowly as tolerated. Patient and spouse provided with instructions for Bowel Regimen and Gas management, both stated understanding. Patient seen and examined on day of discharge and determined stable for discharge.        Goals of Care Treatment Preferences:  Code Status: Full Code          What is most important right now is to focus on remaining as independent as possible, symptom/pain control.  Accordingly, we have decided that the best plan to meet the patient's goals includes continuing with treatment.      Consults:   Consults (From admission, onward)        Status Ordering Provider     Inpatient consult to Palliative Care  Once        Provider:  Abby Madera NP    Completed KRUPA ALLEN     Inpatient consult to Interventional Radiology  Once        Provider:  Onur Alcaraz MD    Completed JACE NOBLES     Inpatient consult to Gastroenterology  Once        Provider:  (Not yet assigned)    Completed MARITA MURRAY          Oncology  Primary peritoneal adenocarcinoma  Follows with   [facility]  Recently started on cisplatin gemcitabine (cycle 1 on 2/16, cycle 2 due 2/23)  Pain control  Consulted Palliative medicine- initiated fentanyl patch, increased to 50mcg-care planning to be discussed  -antiemetics as needed    Abdominal carcinomatosis  See plan for primary peritoneal adenocarcinoma  -analgesia as needed  -antiemetics as needed  -palliative care following    GI  * Intra-abdominal abscess  CT of abdomen pelvis showed findings characteristic of an abscess with associated pancreatitis  Patient with abdominal carcinomatosis, peritoneal adenocarcinoma on chemotherapy.  Had prior ERCP with stent placement followed by stent exchange by Dr. Conte for cholangitis      Blood cultures: NGTD  Start empiric antibiotic treatment IV Zosyn- continue for now  GI - 2/22- EUS with tube replacement completed  Interventional Radiology consultation- reports abscess too small to drain  Multimodal pain control- IV pain medications, Initiated fentanyl patch per Palliative medicine  Antiemetics p.r.n.  Clear Liquid diet- advance as tolerated  Monitor CBC  2/23: LFTs and Bili increased, GI to repeat ERCP today  2/24- LFTs trending down and Tbili normalized.- GI following   2/25- continued improvement, plan for d/c follow-up with GI and Oncology      Partial small bowel obstruction  -imaging supports possible partial small bowel obstruction or ileus  -hx of ostomy and prior total colectomy   -IV hydration  -analgesia as needed  -bowel rest refused by patient- clear liquids continued-risk versus benefit explained patient understanding verbalized  -serial abdominal x-rays    -Significant improvement, discussed with GI, OK with discharge home, continue Full Liquid diet for now, advance diet slowly in a few days, continue bowel regimen.       Primary sclerosing cholangitis  -GI following-   s/p repeat ERCP on 2/23/22 with stent exchange.  -upper abdominal pain being controlled with pain medication per  patient.   -LFTs improved  -Will need repeat ERCP for stent removal in 2 months in [facility] to arrange.  -Full liquid diet, can continue smoothies, protein shakes          Other  Immunodeficiency due to chemotherapy  Neutropenic on admission, afebrile    --Improved      Final Active Diagnoses:    Diagnosis Date Noted POA    PRINCIPAL PROBLEM:  Intra-abdominal abscess [K65.1] 02/20/2023 Yes    Partial small bowel obstruction [K56.600] 02/24/2023 No    Immunodeficiency due to chemotherapy [D84.821, T45.1X5A, Z79.899] 02/15/2023 Not Applicable    Primary peritoneal adenocarcinoma [C48.2] 02/09/2023 Yes    Abdominal carcinomatosis [C76.2] 01/31/2023 Yes    Primary sclerosing cholangitis [K83.01] 10/16/2022 Yes      Problems Resolved During this Admission:       Discharged Condition: stable    Disposition: Home or Self Care    Follow Up:   Follow-up Information     Dima Ch MD Follow up in 3 day(s).    Specialty: Internal Medicine  Contact information:  [address] [address] [address] [phone]             Preston Shell MD Follow up in 3 day(s).    Specialty: Hematology and Oncology  Why: Appointment already scheduled  Contact information:  [address]  [place] [address] [phone]             [facility] Follow up in 1 week(s).    Specialty: Gastroenterology  Why: Gastroenterology to arrange  Contact information:  [address] [facility] [address] [phone]  Additional information:  Please take [address] to the Medical Office Building. Check in on the 4th floor.                     Patient Instructions:      Diet full liquid   Order Comments: Start with full liquids/smoothies, advance diet slowly     Notify your health care provider if you experience any of the following:  temperature >100.4     Notify your health care provider if you experience any of the following:  persistent nausea and vomiting or diarrhea     Notify your  health care provider if you experience any of the following:  severe uncontrolled pain     No dressing needed     Activity as tolerated       Significant Diagnostic Studies: Labs:   CMP   Recent Labs   Lab 02/24/23  0637 02/25/23  0533    140   K 3.8 3.3*    96   CO2 29 31*   GLU 86 82   BUN 5* 3*   CREATININE 0.7 0.7   CALCIUM 8.7 9.2   PROT 6.0 7.1   ALBUMIN 2.5* 3.0*   BILITOT 0.7 0.8   ALKPHOS 678* 724*   AST 60* 57*   * 104*   ANIONGAP 11 13    and CBC   Recent Labs   Lab 02/24/23  0637 02/25/23  0533   WBC 5.59 6.97   HGB 10.2* 11.6*   HCT 31.2* 34.3*    203     Radiology: All imaging reviewed    Pending Diagnostic Studies:     None         Medications:  Reconciled Home Medications:      Medication List      START taking these medications    fentaNYL 50 mcg/hr  Commonly known as: DURAGESIC  Place 1 patch onto the skin every 72 hours.     HYDROmorphone 4 MG tablet  Commonly known as: DILAUDID  Take 1 tablet (4 mg total) by mouth every 4 (four) hours as needed for Pain. Max 5 doses per day     naloxone 4 mg/actuation Spry  Commonly known as: NARCAN  1 spray (4 mg total) by Nasal route once. Use if opioid overdose is suspected then immediately seek evaluation at emergency department for 1 dose        CONTINUE taking these medications    ALPRAZolam 0.25 MG tablet  Commonly known as: XANAX  Take 1 tablet (0.25 mg total) by mouth 2 (two) times daily as needed for Anxiety.     dexAMETHasone 4 MG Tab  Commonly known as: DECADRON  Take 2 tablets (8 mg total) by mouth once daily. Take as directed on days 2 and 3 of your chemotherapy cycle.     lactulose 20 gram/30 mL Soln  Commonly known as: CHRONULAC  Take 30 mLs (20 g total) by mouth 3 (three) times daily.     LIDOcaine-prilocaine cream  Commonly known as: EMLA  Apply to affected area once as directed     ondansetron 8 MG Tbdl  Commonly known as: ZOFRAN-ODT  Take 1 tablet (8 mg total) by mouth every 8 (eight) hours as needed  (nausea/vomiting).     prochlorperazine 5 MG tablet  Commonly known as: COMPAZINE  Take 1 tablet (5 mg total) by mouth every 6 (six) hours as needed for Nausea.        STOP taking these medications    morphine 30 MG 12 hr tablet  Commonly known as: MS CONTIN     oxyCODONE 10 mg Tab immediate release tablet  Commonly known as: ROXICODONE     oxyCODONE-acetaminophen  mg per tablet  Commonly known as: PERCOCET            Indwelling Lines/Drains at time of discharge:   Lines/Drains/Airways     Central Venous Catheter Line  Duration                PowerPort A Cath Single Lumen 02/09/23 1124 left subclavian 16 days                Time spent on the discharge of patient: 75 minutes         Shahida Naik NP  Department of Hospital Medicine  [facility]

## 2023-02-25 NOTE — ASSESSMENT & PLAN NOTE
-GI following-   s/p repeat ERCP on 2/23/22 with stent exchange.  -upper abdominal pain being controlled with pain medication per patient.   -LFTs improved  -Will need repeat ERCP for stent removal in 2 months in [facility] to arrange.  -Full liquid diet, can continue smoothies, protein shakes

## 2023-02-25 NOTE — PT/OT/SLP EVAL
Occupational Therapy   Evaluation and Discharge Note    Name: Guevara Osullivan II  MRN: [id number]  Admitting Diagnosis: Intra-abdominal abscess  Recent Surgery: Procedure(s) (LRB):  ERCP (ENDOSCOPIC RETROGRADE CHOLANGIOPANCREATOGRAPHY) (N/A) 2 Days Post-Op    Recommendations:     Discharge Recommendations: other (see comments) (NONE)  Discharge Equipment Recommendations: none  Barriers to discharge:  None    Assessment:     Guevara Osullivan II is a 53 y.o. male with a medical diagnosis of Intra-abdominal abscess. At this time, patient is functioning at their prior level of function and does not require further acute OT services.     Plan:     During this hospitalization, patient does not require further acute OT services.  Please re-consult if situation changes.    Plan of Care Reviewed with: patient, spouse    Subjective     Chief Complaint: ABDOMINAL PAIN  Patient/Family Comments/goals: TO RETURN HOME.    Occupational Profile:  Living Environment: PATIENT LIVES WITH WIFE IN A 1 STORY HOUSE WITH NO STEPS WITH PLAYROOM ON 2ND FLOOR WHICH IS NOT USED BY PATIENT.  Previous level of function: (I) WITH ALL LEVELS OF SELF CARE.  PATIENT WOULD CHOOSE BETWEEN USING THE [employer] OR THE WALK-IN SHOWER WHICH HAS A BUILT-IN SEAT BUT NO GRAB BARS.  Roles and Routines: PATIENT WAS DRIVING BUT STOPPED DRIVING RECENTLY DUE TO USE OF PAIN MEDS.  Equipment Used at home: none  Assistance upon Discharge: WIFE    Pain/Comfort:  Pain Rating 1: 7/10  Location 1: abdomen  Pain Addressed 1: Pre-medicate for activity, Reposition, Distraction, Cessation of Activity    Patients cultural, spiritual, Yarsanism conflicts given the current situation:      Objective:     Communicated with: NURSE ALEXI prior to session.  Patient found supine with peripheral IV upon OT entry to room.    General Precautions: Standard, fall  Orthopedic Precautions:    Braces:    Respiratory Status: Room air     Occupational Performance:    Bed Mobility:     PATIENT (I) WITH ALL LEVELS OF BED MOBILITY.    Functional Mobility/Transfers:  Patient completed Sit <> Stand Transfer with independence  with  PATIENT PUSHING IV POLE DURING AMBULATION.   Patient completed Toilet Transfer Stand Pivot technique with modified independence with  PATIENT PUSHING IV POLE  Functional Mobility: MOD (I) PUSHING IV POLE.    Activities of Daily Living:  (I) WITH ALL LEVELS OF BED MOBILITY WITH (S) RECOMMENDED FOR BATHING AND JACUZZI/WALK-IN SHOWER T/F'S AS SAFETY PRECAUTION.    Cognitive/Visual Perceptual:  NO DEFICITS NOTED.    Physical Exam:  Balance:    -       MOD (I) WITH FUNC MOBILITY WITH PATIENT PUSHING IV POLE BUT WITH NO AD.  Upper Extremity Range of Motion:     -       Right Upper Extremity: WFL  -       Left Upper Extremity: WFL    AMPAC 6 Click ADL:  AMPAC Total Score: 23    Treatment & Education:  OT EVAL PERFORMED.  PATIENT EDUCATED RE:  PURPOSE OF OT.  PATIENT PARTICIPATED IN FUNC MOBILITY AND SELF CARE TASKS.     Patient left HOB elevated with all lines intact, call button in reach, and WIFE present    GOALS:   Multidisciplinary Problems       Occupational Therapy Goals          Problem: Occupational Therapy    Goal Priority Disciplines Outcome Interventions   Occupational Therapy Goal     OT, PT/OT     Description: NO SKILLED OT INTERVENTION NEEDS IDENTIFIED.   NO OT RECS FOR D/C.                        History:     Past Medical History:   Diagnosis Date    Cancer     COLON CANCER 1995    Digestive disorder     Hypertension     Primary sclerosing cholangitis     Ulcerative colitis     s/p colectomy with J- pouch         Past Surgical History:   Procedure Laterality Date    ABDOMINAL WASHOUT N/A 1/18/2023    Procedure: LAVAGE, PERITONEAL,;  Surgeon: Onur Calvin Jr., MD;  Location: [facility] OR [address] [place] [address];  Service: General;  Laterality: N/A;    BIOPSY OF PERITONEUM N/A 1/18/2023    Procedure: BIOPSY, PERITONEUM;  Surgeon: Onur Calvin Jr., MD;  Location: [facility] OR [address] [place] [address];   Service: General;  Laterality: N/A;    COLON SURGERY      Colectomy with J- pouch    DIAGNOSTIC LAPAROSCOPY N/A 1/18/2023    Procedure: LAPAROSCOPY, DIAGNOSTIC WITH WASHING;  Surgeon: Onur Calvin Jr., MD;  Location: [address];  Service: General;  Laterality: N/A;    ENDOSCOPIC ULTRASOUND OF UPPER GASTROINTESTINAL TRACT N/A 10/14/2022    Procedure: ULTRASOUND, UPPER GI TRACT,;  Surgeon: Vince Teran MD;  Location: [facility];  Service: Endoscopy;  Laterality: N/A;  upper and linear    ENDOSCOPIC ULTRASOUND OF UPPER GASTROINTESTINAL TRACT N/A 12/13/2022    Procedure: ULTRASOUND, UPPER GI TRACT, ENDOSCOPIC;  Surgeon: Vince Teran MD;  Location: [facility];  Service: Endoscopy;  Laterality: N/A;    ERCP N/A 10/14/2022    Procedure: ERCP (ENDOSCOPIC RETROGRADE CHOLANGIOPANCREATOGRAPHY) with spyglass;  Surgeon: Vince Teran MD;  Location: [facility];  Service: Endoscopy;  Laterality: N/A;    ERCP N/A 12/13/2022    Procedure: ERCP (ENDOSCOPIC RETROGRADE CHOLANGIOPANCREATOGRAPHY);  Surgeon: Vince Teran MD;  Location: [facility];  Service: Endoscopy;  Laterality: N/A;    ERCP N/A 12/20/2022    Procedure: ERCP (ENDOSCOPIC RETROGRADE CHOLANGIOPANCREATOGRAPHY);  Surgeon: Vince Teran MD;  Location: [facility];  Service: Endoscopy;  Laterality: N/A;    ERCP N/A 1/4/2023    Procedure: ERCP (ENDOSCOPIC RETROGRADE CHOLANGIOPANCREATOGRAPHY);  Surgeon: Vince Teran MD;  Location: [facility];  Service: Endoscopy;  Laterality: N/A;  room 1    ERCP N/A 2/22/2023    Procedure: ERCP (ENDOSCOPIC RETROGRADE CHOLANGIOPANCREATOGRAPHY);  Surgeon: Vince Teran MD;  Location: [facility];  Service: Endoscopy;  Laterality: N/A;    ERCP N/A 2/23/2023    Procedure: ERCP (ENDOSCOPIC RETROGRADE CHOLANGIOPANCREATOGRAPHY);  Surgeon: Vince Teran MD;  Location: [facility];  Service: Endoscopy;  Laterality: N/A;    ESOPHAGOGASTRODUODENOSCOPY N/A  12/20/2022    Procedure: EGD (ESOPHAGOGASTRODUODENOSCOPY);  Surgeon: Vince Teran MD;  Location: [facility];  Service: Endoscopy;  Laterality: N/A;    ESOPHAGOGASTRODUODENOSCOPY N/A 1/4/2023    Procedure: EGD (ESOPHAGOGASTRODUODENOSCOPY);  Surgeon: Vince Teran MD;  Location: [facility];  Service: Endoscopy;  Laterality: N/A;    INSERTION OF TUNNELED CENTRAL VENOUS CATHETER (CVC) WITH SUBCUTANEOUS PORT Left 2/9/2023    Procedure: [id number];  Surgeon: Wojciech Martínez MD;  Location: [facility];  Service: General;  Laterality: Left;    POUCHOSCOPY         Time Tracking:     OT Date of Treatment: 02/25/23  OT Start Time: 0955  OT Stop Time: 1020  OT Total Time (min): 25 min    Billable Minutes:Evaluation 10  Therapeutic Activity 15    2/25/2023

## 2023-02-25 NOTE — DISCHARGE INSTRUCTIONS
Advance diet slowly, for the next few days remain on Full Liquids , smoothies, protein shakes. As tolerated introduce bland, solid foods.     For Bowel regimen:   Continue to use miralax daily, can add Milk of Magnesia to alternate with the Miralax. Continue to use dulcolax suppositories as needed.   In about a week, can trial psyllium fiber,start slow, can cause gas.  Continue Gas-X for the intestinal gas, can also do exercises to move the gas including: walking, rolling back and forth, stomach massage, Yoga exercises, or other exercises as discussed.

## 2023-02-25 NOTE — ASSESSMENT & PLAN NOTE
-s/p repeat ERCP with stent exchange. Pain controlled with oral pain medication.    -Will need follow up outpatient GI clinic and repeat ERCP for stent removal in 2 months in [place].

## 2023-02-25 NOTE — SUBJECTIVE & OBJECTIVE
Subjective:     Interval History: pt states he is feeling much better today.  Says he is passing gas and abdomen is softer.  Xray improvement.  Continues with pain but responds to oral pain medication.  Tolerating liquids without nausea or vomiting.      Review of Systems   All other systems reviewed and are negative.  Objective:     Vital Signs (Most Recent):  Temp: 97.6 °F (36.4 °C) (02/25/23 0739)  Pulse: 74 (02/25/23 0739)  Resp: 17 (02/25/23 1018)  BP: (!) 146/83 (02/25/23 0739)  SpO2: 98 % (02/25/23 0739)   Vital Signs (24h Range):  Temp:  [97.6 °F (36.4 °C)-98.8 °F (37.1 °C)] 97.6 °F (36.4 °C)  Pulse:  [74-81] 74  Resp:  [17-18] 17  SpO2:  [96 %-99 %] 98 %  BP: (132-146)/(83-90) 146/83     Weight: 72.6 kg (160 lb 0.9 oz) (02/25/23 0004)  Body mass index is 21.71 kg/m².      Intake/Output Summary (Last 24 hours) at 2/25/2023 1115  Last data filed at 2/25/2023 0323  Gross per 24 hour   Intake 1572.6 ml   Output --   Net 1572.6 ml       Lines/Drains/Airways       Central Venous Catheter Line  Duration                  PowerPort A Cath Single Lumen 02/09/23 1124 left subclavian 15 days              Peripheral Intravenous Line  Duration                  Peripheral IV - Single Lumen 02/20/23 1628 18 G Left Antecubital 4 days                    Physical Exam  Constitutional:       Appearance: Normal appearance.   HENT:      Head: Normocephalic and atraumatic.   Eyes:      General: No scleral icterus.     Extraocular Movements: Extraocular movements intact.   Cardiovascular:      Rate and Rhythm: Normal rate and regular rhythm.   Pulmonary:      Breath sounds: No wheezing.   Abdominal:      General: There is no distension.      Palpations: Abdomen is soft.      Tenderness: There is no abdominal tenderness.      Comments: Occasional high pitched bowel sounds   Skin:     General: Skin is warm and dry.   Neurological:      General: No focal deficit present.      Mental Status: He is alert and oriented to person,  place, and time.   Psychiatric:         Mood and Affect: Mood normal.         Behavior: Behavior normal.       Significant Labs:  CBC:   Recent Labs   Lab 02/24/23  0637 02/25/23  0533   WBC 5.59 6.97   HGB 10.2* 11.6*   HCT 31.2* 34.3*    203     CMP:   Recent Labs   Lab 02/25/23  0533   GLU 82   CALCIUM 9.2   ALBUMIN 3.0*   PROT 7.1      K 3.3*   CO2 31*   CL 96   BUN 3*   CREATININE 0.7   ALKPHOS 724*   *   AST 57*   BILITOT 0.8         Significant Imaging:  Imaging results within the past 24 hours have been reviewed.  Abdominal xray shows gaseous distention but is improved.

## 2023-02-25 NOTE — ASSESSMENT & PLAN NOTE
-imaging supports possible partial small bowel obstruction or ileus  -hx of ostomy and prior total colectomy   -IV hydration  -analgesia as needed  -bowel rest refused by patient- clear liquids continued-risk versus benefit explained patient understanding verbalized  -serial abdominal x-rays    -Significant improvement, discussed with GI, OK with discharge home, continue Full Liquid diet for now, advance diet slowly in a few days, continue bowel regimen.

## 2023-02-25 NOTE — ASSESSMENT & PLAN NOTE
CT of abdomen pelvis showed findings characteristic of an abscess with associated pancreatitis  Patient with abdominal carcinomatosis, peritoneal adenocarcinoma on chemotherapy.  Had prior ERCP with stent placement followed by stent exchange by Dr. Conte for cholangitis      Blood cultures: NGTD  Start empiric antibiotic treatment IV Zosyn- continue for now  GI - 2/22- EUS with tube replacement completed  Interventional Radiology consultation- reports abscess too small to drain  Multimodal pain control- IV pain medications, Initiated fentanyl patch per Palliative medicine  Antiemetics p.r.n.  Clear Liquid diet- advance as tolerated  Monitor CBC  2/23: LFTs and Bili increased, GI to repeat ERCP today  2/24- LFTs trending down and Tbili normalized.- GI following   2/25- continued improvement, plan for d/c follow-up with GI and Oncology

## 2023-02-25 NOTE — PLAN OF CARE
Discussed poc with pt, pt verbalized understanding     Purposeful rounding every 2hours     Pt refusing further BS monitoring.  Fall precautions in place, remains injury free  Pt denies c/o nausea  Pain being managed with PRN meds     IVFs  Abx given as prescribed     Pt ambulated around nursing station and tolerated well.  Bed locked at lowest position  Call light within reach     Chart check complete  Reviewed active orders  Will continue with POC

## 2023-02-25 NOTE — PT/OT/SLP EVAL
Physical Therapy Evaluation and Discharge Note    Patient Name:  Guevara Osullivan II   MRN:  [id number]    Recommendations:     Discharge Recommendations:  home  Discharge Equipment Recommendations: none   Barriers to discharge: None    Assessment:     Guevara Osullivan II is a 53 y.o. male admitted with a medical diagnosis of Intra-abdominal abscess. Now s/p ERCP. At this time, patient is functioning at their prior level of function and does not require further acute PT services.     Recent Surgery: Procedure(s) (LRB):  ERCP (ENDOSCOPIC RETROGRADE CHOLANGIOPANCREATOGRAPHY) (N/A) 2 Days Post-Op    Plan:     During this hospitalization, patient does not require further acute PT services.  Please re-consult if situation changes.      Subjective     Chief Complaint: intra-abdominal abscess  Patient/Family Comments/goals: to go home  Pain/Comfort:  Pain Rating 1: 7/10 (abdominal pain)  Pain Addressed 1: Reposition, Distraction, Pre-medicate for activity    Patients cultural, spiritual, Synagogue conflicts given the current situation: no    Living Environment:  Pt lives with wife in 2 story home with no steps/stairs at entry. Pt has bed and bath on bottom floor and does not go upstairs  Prior to admission, patients level of function was independent/mod I.  Equipment used at home: walk-in shower with seat.  DME owned (not currently used): none.  Upon discharge, patient will have assistance from family/spouse.    Objective:     Communicated with nursing (Aleisha) and performed chart review via epic system prior to session.  Patient found supine with peripheral IV upon PT entry to room.    General Precautions: Standard, fall    Orthopedic Precautions:N/A   Braces: N/A  Respiratory Status: Room air    Exams:  Cognitive Exam:  Patient is oriented to Person, Place, Time, and Situation  Gross Motor Coordination:  WFL  Postural Exam:  Patient presented with the following abnormalities:    -       Rounded shoulders  RLE ROM:  WFL  RLE Strength: WFL  LLE ROM: WFL  LLE Strength: WFL    Functional Mobility:  Bed Mobility:     Scooting: independence  Supine to Sit: independence  Sit to Supine: independence  Transfers:     Sit to Stand:  independence with no AD  Bed to Chair: independence with  no AD  using  Stand Pivot  Gait: 75ft x 2 independently managing his IV pole with no s/s of distress or LOB  Balance: good     AM-PAC 6 CLICK MOBILITY  Total Score:21       Treatment and Education:   Educated pt on call don't fall policy and use of call button to alert nursing staff of needs.  Pt expressed understanding.     AM-PAC 6 CLICK MOBILITY  Total Score:21     Patient left supine with all lines intact, call button in reach, nursing notified, and nursing present.    GOALS:   Multidisciplinary Problems       Physical Therapy Goals          Problem: Physical Therapy    Goal Priority Disciplines Outcome Goal Variances Interventions   Physical Therapy Goal     PT, PT/OT      Description: No further PT at this time. Pt appears to be a functional baseline.                        History:     Past Medical History:   Diagnosis Date    Cancer     COLON CANCER 1995    Digestive disorder     Hypertension     Primary sclerosing cholangitis     Ulcerative colitis     s/p colectomy with J- pouch       Past Surgical History:   Procedure Laterality Date    ABDOMINAL WASHOUT N/A 1/18/2023    Procedure: LAVAGE, PERITONEAL,;  Surgeon: Onur Calvin Jr., MD;  Location: [address];  Service: General;  Laterality: N/A;    BIOPSY OF PERITONEUM N/A 1/18/2023    Procedure: BIOPSY, PERITONEUM;  Surgeon: Onur Calvin Jr., MD;  Location: [address];  Service: General;  Laterality: N/A;    COLON SURGERY      Colectomy with J- pouch    DIAGNOSTIC LAPAROSCOPY N/A 1/18/2023    Procedure: LAPAROSCOPY, DIAGNOSTIC WITH WASHING;  Surgeon: Onur Calvin Jr., MD;  Location: [facility] OR [address] [place] [address];  Service: General;  Laterality: N/A;    ENDOSCOPIC ULTRASOUND OF UPPER  GASTROINTESTINAL TRACT N/A 10/14/2022    Procedure: ULTRASOUND, UPPER GI TRACT,;  Surgeon: Vince Teran MD;  Location: [facility];  Service: Endoscopy;  Laterality: N/A;  upper and linear    ENDOSCOPIC ULTRASOUND OF UPPER GASTROINTESTINAL TRACT N/A 12/13/2022    Procedure: ULTRASOUND, UPPER GI TRACT, ENDOSCOPIC;  Surgeon: Vince Teran MD;  Location: [facility];  Service: Endoscopy;  Laterality: N/A;    ERCP N/A 10/14/2022    Procedure: ERCP (ENDOSCOPIC RETROGRADE CHOLANGIOPANCREATOGRAPHY) with spyglass;  Surgeon: Vince Teran MD;  Location: [facility];  Service: Endoscopy;  Laterality: N/A;    ERCP N/A 12/13/2022    Procedure: ERCP (ENDOSCOPIC RETROGRADE CHOLANGIOPANCREATOGRAPHY);  Surgeon: Vince Teran MD;  Location: [facility];  Service: Endoscopy;  Laterality: N/A;    ERCP N/A 12/20/2022    Procedure: ERCP (ENDOSCOPIC RETROGRADE CHOLANGIOPANCREATOGRAPHY);  Surgeon: Vince Teran MD;  Location: [facility];  Service: Endoscopy;  Laterality: N/A;    ERCP N/A 1/4/2023    Procedure: ERCP (ENDOSCOPIC RETROGRADE CHOLANGIOPANCREATOGRAPHY);  Surgeon: Vince Teran MD;  Location: [facility];  Service: Endoscopy;  Laterality: N/A;  room 1    ERCP N/A 2/22/2023    Procedure: ERCP (ENDOSCOPIC RETROGRADE CHOLANGIOPANCREATOGRAPHY);  Surgeon: Vince Teran MD;  Location: [facility];  Service: Endoscopy;  Laterality: N/A;    ERCP N/A 2/23/2023    Procedure: ERCP (ENDOSCOPIC RETROGRADE CHOLANGIOPANCREATOGRAPHY);  Surgeon: Vince Teran MD;  Location: [facility];  Service: Endoscopy;  Laterality: N/A;    ESOPHAGOGASTRODUODENOSCOPY N/A 12/20/2022    Procedure: EGD (ESOPHAGOGASTRODUODENOSCOPY);  Surgeon: Vince Teran MD;  Location: [facility];  Service: Endoscopy;  Laterality: N/A;    ESOPHAGOGASTRODUODENOSCOPY N/A 1/4/2023    Procedure: EGD (ESOPHAGOGASTRODUODENOSCOPY);  Surgeon: Vince Teran MD;  Location: [facility];   Service: Endoscopy;  Laterality: N/A;    INSERTION OF TUNNELED CENTRAL VENOUS CATHETER (CVC) WITH SUBCUTANEOUS PORT Left 2/9/2023    Procedure: [id number];  Surgeon: Wojciech Martínez MD;  Location: [facility];  Service: General;  Laterality: Left;    POUCHOSCOPY         Time Tracking:     PT Received On: 02/25/23  PT Start Time: 1010     PT Stop Time: 1033  PT Total Time (min): 23 min     Billable Minutes: Evaluation 10 and Gait Training 13      02/25/2023

## 2023-02-25 NOTE — PLAN OF CARE
[facility]  Discharge Final Note    Primary Care Provider: Dima Ch MD    Expected Discharge Date: 2/25/2023    Final Discharge Note (most recent)       Final Note - 02/25/23 1149          Final Note    Assessment Type Final Discharge Note     Anticipated Discharge Disposition Home or Self Care        Post-Acute Status    Discharge Delays None known at this time                     Important Message from Medicare             Contact Info       Dima Ch MD   Specialty: Internal Medicine   Relationship: PCP - [facility]  [address]  [facility] [address]   Phone: [phone]       Next Steps: Follow up in 3 day(s)    Preston Shell MD   Specialty: Hematology and Oncology    [address]  [place] [address]   Phone: [phone]       Next Steps: Follow up in 3 day(s)    Instructions: Appointment already scheduled    [facility]   Specialty: Gastroenterology    [address] [place] [id number]   Phone: [phone]       Next Steps: Follow up in 1 week(s)    Instructions: Gastroenterology to arrange          Pt has no discharge needs at the time of chart review.

## 2023-02-25 NOTE — NURSING
Gave pt written and verbal discharge education. Pt verbalizes understanding. Pt IV removed, catheter intact. Pt discharged in stable condition.

## 2023-02-25 NOTE — ASSESSMENT & PLAN NOTE
-Reached out to IR but collection is too small for percutaneous drainage.   -Dr. Teran reviewed images and reports this collection is actually decreased with drain in place.

## 2023-02-25 NOTE — ASSESSMENT & PLAN NOTE
Clinical improvement since yesterday and improvement in imaging.  Tolerating liquids.  Pt and wife are very anxious to go home.  I think it is reasonable to advance diet and if tolerated discharge home.  Discussed symptoms that are concerning and to bring back to ER.  Discussed bowel regimen at length -- miralax and MoM alternating. Encouraged movement.  Can add low dose psyllium but cautiously given h/o obstruction.  We discussed the use of movantik but given the concern for obstruction, I recommend against this at this time.  Discussed diet -- avoid raw fruits and veggies, ok to blend in smoothie.

## 2023-02-25 NOTE — PROGRESS NOTES
[facility]  Gastroenterology  Progress Note    Patient Name: Guevara Osullivan II  MRN: [id number]  Admission Date: 2/20/2023  Hospital Length of Stay: 5 days  Code Status: Full Code   Attending Provider: Sidney Diaz, *  Consulting Provider: Anju Osuna MD  Primary Care Physician: Dima Ch MD  Principal Problem: Intra-abdominal abscess      Subjective:     Interval History: pt states he is feeling much better today.  Says he is passing gas and abdomen is softer.  Xray improvement.  Continues with pain but responds to oral pain medication.  Tolerating liquids without nausea or vomiting.      Review of Systems   All other systems reviewed and are negative.  Objective:     Vital Signs (Most Recent):  Temp: 97.6 °F (36.4 °C) (02/25/23 0739)  Pulse: 74 (02/25/23 0739)  Resp: 17 (02/25/23 1018)  BP: (!) 146/83 (02/25/23 0739)  SpO2: 98 % (02/25/23 0739)   Vital Signs (24h Range):  Temp:  [97.6 °F (36.4 °C)-98.8 °F (37.1 °C)] 97.6 °F (36.4 °C)  Pulse:  [74-81] 74  Resp:  [17-18] 17  SpO2:  [96 %-99 %] 98 %  BP: (132-146)/(83-90) 146/83     Weight: 72.6 kg (160 lb 0.9 oz) (02/25/23 0004)  Body mass index is 21.71 kg/m².      Intake/Output Summary (Last 24 hours) at 2/25/2023 1115  Last data filed at 2/25/2023 0323  Gross per 24 hour   Intake 1572.6 ml   Output --   Net 1572.6 ml       Lines/Drains/Airways       Central Venous Catheter Line  Duration                  PowerPort A Cath Single Lumen 02/09/23 1124 left subclavian 15 days              Peripheral Intravenous Line  Duration                  Peripheral IV - Single Lumen 02/20/23 1628 18 G Left Antecubital 4 days                    Physical Exam  Constitutional:       Appearance: Normal appearance.   HENT:      Head: Normocephalic and atraumatic.   Eyes:      General: No scleral icterus.     Extraocular Movements: Extraocular movements intact.   Cardiovascular:      Rate and Rhythm: Normal rate and regular rhythm.   Pulmonary:       Breath sounds: No wheezing.   Abdominal:      General: There is no distension.      Palpations: Abdomen is soft.      Tenderness: There is no abdominal tenderness.      Comments: Occasional high pitched bowel sounds   Skin:     General: Skin is warm and dry.   Neurological:      General: No focal deficit present.      Mental Status: He is alert and oriented to person, place, and time.   Psychiatric:         Mood and Affect: Mood normal.         Behavior: Behavior normal.       Significant Labs:  CBC:   Recent Labs   Lab 02/24/23  0637 02/25/23  0533   WBC 5.59 6.97   HGB 10.2* 11.6*   HCT 31.2* 34.3*    203     CMP:   Recent Labs   Lab 02/25/23  0533   GLU 82   CALCIUM 9.2   ALBUMIN 3.0*   PROT 7.1      K 3.3*   CO2 31*   CL 96   BUN 3*   CREATININE 0.7   ALKPHOS 724*   *   AST 57*   BILITOT 0.8         Significant Imaging:  Imaging results within the past 24 hours have been reviewed.  Abdominal xray shows gaseous distention but is improved.      Assessment/Plan:     Oncology  Primary peritoneal adenocarcinoma  -Palliative care following.    GI  * Intra-abdominal abscess  -Reached out to IR but collection is too small for percutaneous drainage.   -Dr. Teran reviewed images and reports this collection is actually decreased with drain in place.       Partial small bowel obstruction  Clinical improvement since yesterday and improvement in imaging.  Tolerating liquids.  Pt and wife are very anxious to go home.  I think it is reasonable to advance diet and if tolerated discharge home.  Discussed symptoms that are concerning and to bring back to ER.  Discussed bowel regimen at length -- miralax and MoM alternating. Encouraged movement.  Can add low dose psyllium but cautiously given h/o obstruction.  We discussed the use of movantik but given the concern for obstruction, I recommend against this at this time.  Discussed diet -- avoid raw fruits and veggies, ok to blend in smoothie.     Primary  sclerosing cholangitis  -s/p repeat ERCP with stent exchange. Pain controlled with oral pain medication.    -Will need follow up outpatient GI clinic and repeat ERCP for stent removal in 2 months in [place].            Thank you for your consult. I will sign off. Please contact us if you have any additional questions.    Anju Osuna MD  Gastroenterology  [facility]

## 2023-02-27 NOTE — PROGRESS NOTES
Returned call to pt wife regarding pt recent discharge from hospital (). Scheduled to see MD tomorrow but would like to move appts back a day. Offered lab and MD 3/1 @ 1220, 120 and infusion 3/2 @ 8am all at [facility] location, to which they are agreeable. Told them I will check with infusion when they come for MD appt 3/1 to see if later infusion time available. States appetite has increased since d/c from hospital. No other questions at this time but they know to call if that should change.   Oncology Navigation   Intake  Date of Diagnosis:  (negative path)  Cancer Type: GI  Internal / External Referral: Internal  Date of Referral: 23  Initial Nurse Navigator Contact: 23  Referral to Initial Contact Timeline (days): 0  Date Worked: 23  First Appointment Available: 23  Appointment Date: 23  First Available Date vs. Scheduled Date (days): 3  Reason for Treatment Delay: -- (seeking 2nd opinion @ [facility])     Treatment  Current Status: Staging work-up  Date Presented to Tumor Board: 23    Surgical Oncologist: Nikunj  Consult Date: 23    Medical Oncologist: Dr. Shell  Consult Date: 23  Chemotherapy: Planned  Chemotherapy Regimen: Gemcitabine/Cisplatin       Procedures: Genetic test  Biopsy Schedule Date: 22  CT Schedule Date: 23  EUS / EGD: EUS- 2022  Genetic Testing Date Sent: 23  MRI Schedule Date: 23  Port / PICC Schedule Date: 23             Support Systems: Family members     Acuity  Stage: 2  Systemic Treatment - predicted or initiated: Chemotherapy Regimen with Multiple drugs (+1)  ECO  Comorbidities in Medical History: 2  Support: 0  Transportation: 0  Verbalizes the need for more education: 1  Navigation Acuity: 0     Follow Up  No follow-ups on file.

## 2023-03-01 PROBLEM — Z15.89: Status: ACTIVE | Noted: 2023-01-01

## 2023-03-01 PROBLEM — Z15.09: Status: ACTIVE | Noted: 2023-01-01

## 2023-03-01 NOTE — PROGRESS NOTES
Subjective:       Patient ID: Guevara Osullivan II is a 53 y.o. male.    Chief Complaint: Results, Chemotherapy, and Cancer    HPI:  53-year-old male returns recent hospitalization after cycle 1 day 1 of cisplatin gemcitabine.  Patient returns for cycle 1 day 8 of treatment.  Patient's as feeling better is been seen by palliative care is on Duragesic patch ECOG status 1 accompanied by wife    Past Medical History:   Diagnosis Date    Cancer     COLON CANCER 1995    Digestive disorder     Hypertension     Primary sclerosing cholangitis     Ulcerative colitis     s/p colectomy with J- pouch     History reviewed. No pertinent family history.  Social History     Socioeconomic History    Marital status:    Tobacco Use    Smoking status: Never     Passive exposure: Never    Smokeless tobacco: Never   Substance and Sexual Activity    Alcohol use: Not Currently    Drug use: Yes     Types: Marijuana     Comment: medical marijuana     Past Surgical History:   Procedure Laterality Date    ABDOMINAL WASHOUT N/A 1/18/2023    Procedure: LAVAGE, PERITONEAL,;  Surgeon: Onur Calvin Jr., MD;  Location: [address];  Service: General;  Laterality: N/A;    BIOPSY OF PERITONEUM N/A 1/18/2023    Procedure: BIOPSY, PERITONEUM;  Surgeon: Onur Calvin Jr., MD;  Location: [address];  Service: General;  Laterality: N/A;    COLON SURGERY      Colectomy with J- pouch    DIAGNOSTIC LAPAROSCOPY N/A 1/18/2023    Procedure: LAPAROSCOPY, DIAGNOSTIC WITH WASHING;  Surgeon: Onur Calvin Jr., MD;  Location: [address];  Service: General;  Laterality: N/A;    ENDOSCOPIC ULTRASOUND OF UPPER GASTROINTESTINAL TRACT N/A 10/14/2022    Procedure: ULTRASOUND, UPPER GI TRACT,;  Surgeon: Vince Teran MD;  Location: [facility];  Service: Endoscopy;  Laterality: N/A;  upper and linear    ENDOSCOPIC ULTRASOUND OF UPPER GASTROINTESTINAL TRACT N/A 12/13/2022    Procedure: ULTRASOUND, UPPER GI TRACT,  ENDOSCOPIC;  Surgeon: Vince Teran MD;  Location: [facility];  Service: Endoscopy;  Laterality: N/A;    ERCP N/A 10/14/2022    Procedure: ERCP (ENDOSCOPIC RETROGRADE CHOLANGIOPANCREATOGRAPHY) with spyglass;  Surgeon: Vince Teran MD;  Location: [facility];  Service: Endoscopy;  Laterality: N/A;    ERCP N/A 12/13/2022    Procedure: ERCP (ENDOSCOPIC RETROGRADE CHOLANGIOPANCREATOGRAPHY);  Surgeon: Vince Teran MD;  Location: [facility];  Service: Endoscopy;  Laterality: N/A;    ERCP N/A 12/20/2022    Procedure: ERCP (ENDOSCOPIC RETROGRADE CHOLANGIOPANCREATOGRAPHY);  Surgeon: Vince Teran MD;  Location: [facility];  Service: Endoscopy;  Laterality: N/A;    ERCP N/A 1/4/2023    Procedure: ERCP (ENDOSCOPIC RETROGRADE CHOLANGIOPANCREATOGRAPHY);  Surgeon: Vince Teran MD;  Location: [facility];  Service: Endoscopy;  Laterality: N/A;  room 1    ERCP N/A 2/22/2023    Procedure: ERCP (ENDOSCOPIC RETROGRADE CHOLANGIOPANCREATOGRAPHY);  Surgeon: Vince Teran MD;  Location: [facility];  Service: Endoscopy;  Laterality: N/A;    ERCP N/A 2/23/2023    Procedure: ERCP (ENDOSCOPIC RETROGRADE CHOLANGIOPANCREATOGRAPHY);  Surgeon: Vince Teran MD;  Location: [facility];  Service: Endoscopy;  Laterality: N/A;    ESOPHAGOGASTRODUODENOSCOPY N/A 12/20/2022    Procedure: EGD (ESOPHAGOGASTRODUODENOSCOPY);  Surgeon: Vince Teran MD;  Location: [facility];  Service: Endoscopy;  Laterality: N/A;    ESOPHAGOGASTRODUODENOSCOPY N/A 1/4/2023    Procedure: EGD (ESOPHAGOGASTRODUODENOSCOPY);  Surgeon: Vince Teran MD;  Location: [facility];  Service: Endoscopy;  Laterality: N/A;    INSERTION OF TUNNELED CENTRAL VENOUS CATHETER (CVC) WITH SUBCUTANEOUS PORT Left 2/9/2023    Procedure: [id number];  Surgeon: Wojciech Martínez MD;  Location: [facility];  Service: General;  Laterality: Left;    POUCHOSCOPY         Labs:  Lab Results    Component Value Date    WBC 9.71 03/01/2023    HGB 11.7 (L) 03/01/2023    HCT 35.5 (L) 03/01/2023    MCV 90 03/01/2023     03/01/2023     BMP  Lab Results   Component Value Date     03/01/2023    K 3.5 03/01/2023    CL 92 (L) 03/01/2023    CO2 33 (H) 03/01/2023    BUN 7 03/01/2023    CREATININE 0.7 03/01/2023    CALCIUM 9.0 03/01/2023    ANIONGAP 13 03/01/2023     Lab Results   Component Value Date    ALT 80 (H) 03/01/2023    AST 56 (H) 03/01/2023     (H) 12/02/2022    ALKPHOS 645 (H) 03/01/2023    BILITOT 0.8 03/01/2023       Lab Results   Component Value Date    IRON 24 (L) 01/31/2023    TIBC 275 01/31/2023    FERRITIN 230 01/31/2023     No results found for: [id number]  No results found for: FOLATE  No results found for: TSH      Review of Systems   Constitutional:  Positive for activity change, appetite change and fatigue. Negative for chills, diaphoresis, fever and unexpected weight change.   HENT:  Negative for congestion, dental problem, drooling, ear discharge, ear pain, facial swelling, hearing loss, mouth sores, nosebleeds, postnasal drip, rhinorrhea, sinus pressure, sneezing, sore throat, tinnitus, trouble swallowing and voice change.    Eyes:  Negative for photophobia, pain, discharge, redness, itching and visual disturbance.   Respiratory:  Negative for apnea, cough, choking, chest tightness, shortness of breath, wheezing and stridor.    Cardiovascular:  Negative for chest pain, palpitations and leg swelling.   Gastrointestinal:  Positive for abdominal distention and abdominal pain. Negative for anal bleeding, blood in stool, constipation, diarrhea, nausea, rectal pain and vomiting.        Marked improvement with decrease abdominal swelling   Endocrine: Negative for cold intolerance, heat intolerance, polydipsia, polyphagia and polyuria.   Genitourinary:  Negative for decreased urine volume, difficulty urinating, dysuria, enuresis, flank pain, frequency, genital sores, hematuria,  penile discharge, penile pain, penile swelling, scrotal swelling, testicular pain and urgency.   Musculoskeletal:  Negative for arthralgias, back pain, gait problem, joint swelling, myalgias, neck pain and neck stiffness.   Skin:  Negative for color change, pallor, rash and wound.   Allergic/Immunologic: Negative for environmental allergies, food allergies and immunocompromised state.   Neurological:  Positive for weakness. Negative for dizziness, tremors, seizures, syncope, facial asymmetry, speech difficulty, light-headedness, numbness and headaches.   Hematological:  Negative for adenopathy. Does not bruise/bleed easily.   Psychiatric/Behavioral:  Positive for dysphoric mood. Negative for agitation, behavioral problems, confusion, decreased concentration, hallucinations, self-injury, sleep disturbance and suicidal ideas. The patient is nervous/anxious. The patient is not hyperactive.      Objective:      Physical Exam  Vitals reviewed.   Constitutional:       General: He is not in acute distress.     Appearance: He is well-developed. He is ill-appearing. He is not diaphoretic.   HENT:      Head: Normocephalic.      Right Ear: External ear normal.      Left Ear: External ear normal.      Nose: Nose normal.      Right Sinus: No maxillary sinus tenderness or frontal sinus tenderness.      Left Sinus: No maxillary sinus tenderness or frontal sinus tenderness.      Mouth/Throat:      Pharynx: No oropharyngeal exudate.   Eyes:      General: Lids are normal. No scleral icterus.        Right eye: No discharge.         Left eye: No discharge.      Extraocular Movements:      Right eye: Normal extraocular motion.      Left eye: Normal extraocular motion.      Conjunctiva/sclera:      Right eye: Right conjunctiva is not injected. No hemorrhage.     Left eye: Left conjunctiva is not injected. No hemorrhage.     Pupils: Pupils are equal, round, and reactive to light.   Neck:      Thyroid: No thyromegaly.      Vascular: No  JVD.      Trachea: No tracheal deviation.   Cardiovascular:      Rate and Rhythm: Normal rate.   Pulmonary:      Effort: Pulmonary effort is normal. No respiratory distress.      Breath sounds: No stridor.   Abdominal:      General: Bowel sounds are normal.      Palpations: Abdomen is soft. There is no hepatomegaly, splenomegaly or mass.      Tenderness: There is no abdominal tenderness.      Comments: Decrease abdominal swelling   Musculoskeletal:         General: No tenderness. Normal range of motion.      Cervical back: Normal range of motion and neck supple.   Lymphadenopathy:      Head:      Right side of head: No posterior auricular or occipital adenopathy.      Left side of head: No posterior auricular or occipital adenopathy.      Cervical: No cervical adenopathy.      Right cervical: No superficial, deep or posterior cervical adenopathy.     Left cervical: No superficial, deep or posterior cervical adenopathy.      Upper Body:      Right upper body: No supraclavicular adenopathy.      Left upper body: No supraclavicular adenopathy.   Skin:     General: Skin is dry.      Findings: No erythema or rash.      Nails: There is no clubbing.   Neurological:      Mental Status: He is alert and oriented to person, place, and time.      Cranial Nerves: No cranial nerve deficit.      Coordination: Coordination normal.   Psychiatric:         Behavior: Behavior normal.         Thought Content: Thought content normal.         Judgment: Judgment normal.           Assessment:      1. Primary peritoneal adenocarcinoma    2. Metastatic adenocarcinoma    3. Abdominal carcinomatosis    4. 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  Discussed implications of answered questions consent has been signed.treatment ,Consented the patient  to the treatment plan and the patient was educated on the planned duration of the treatment and schedule of the treatment administration.  Will treat with proceed with treatment will also ask medical geneticist review case with FH gene mutation.  Discussed implications and answered questions.  RTC 1 week for next cycle        Med Onc Chart Routing      Follow up with physician 1 week. Cycle 1 day 15 of cisplatin gemcitabine   Follow up with [facility]    Infusion scheduling note    Injection scheduling note    Labs CBC and CMP   Lab interval:     Imaging    Pharmacy appointment    Other referrals         Preston Shell Jr, MD FACP

## 2023-03-02 NOTE — PLAN OF CARE
Discussed plan of care with pt. Addressed any and ongoing concerns. Pt denies    Problem: Adult Inpatient Plan of Care  Goal: Plan of Care Review  Outcome: Ongoing, Progressing  Flowsheets (Taken 3/2/2023 1120)  Plan of Care Reviewed With: patient  Goal: Patient-Specific Goal (Individualized)  Outcome: Ongoing, Progressing  Goal: Absence of Hospital-Acquired Illness or Injury  Outcome: Ongoing, Progressing  Intervention: Identify and Manage Fall Risk  Flowsheets (Taken 3/2/2023 1120)  Safety Promotion/Fall Prevention:   in recliner, wheels locked   room near unit station  Intervention: Prevent Infection  Flowsheets (Taken 3/2/2023 1120)  Infection Prevention:   hand hygiene promoted   equipment surfaces disinfected  Goal: Optimal Comfort and Wellbeing  Outcome: Ongoing, Progressing  Intervention: Monitor Pain and Promote Comfort  Flowsheets (Taken 3/2/2023 1120)  Pain Management Interventions:   quiet environment facilitated   warm blanket provided  Intervention: Provide Person-Centered Care  Flowsheets (Taken 3/2/2023 1120)  Trust Relationship/Rapport:   reassurance provided   care explained   choices provided   thoughts/feelings acknowledged   emotional support provided   empathic listening provided   questions answered   questions encouraged

## 2023-03-02 NOTE — PROGRESS NOTES
Talked with pt and wife during infusion, states he is feeling better today. States the pain has been under control for the most part and he's eating and drinking a little bit more also. He and family are glad he's able to restart tx today, hopeful they'll have positive results. No NN needs at this time but they voiced that they will call if necessary.   Oncology Navigation   Intake  Date of Diagnosis:  (negative path)  Cancer Type: GI  Internal / External Referral: Internal  Date of Referral: 23  Initial Nurse Navigator Contact: 23  Referral to Initial Contact Timeline (days): 0  Date Worked: 23  First Appointment Available: 23  Appointment Date: 23  First Available Date vs. Scheduled Date (days): 3  Reason for Treatment Delay: -- (seeking 2nd opinion @ [facility])     Treatment  Current Status: Staging work-up  Date Presented to Tumor Board: 23    Surgical Oncologist: Nikunj  Consult Date: 23    Medical Oncologist: Dr. Shell  Consult Date: 23  Chemotherapy: Planned  Chemotherapy Regimen: Gemcitabine/Cisplatin       Procedures: Genetic test  Biopsy Schedule Date: 22  CT Schedule Date: 23  EUS / EGD: EUS- 2022  Genetic Testing Date Sent: 23  MRI Schedule Date: 23  Port / PICC Schedule Date: 23             Support Systems: Family members     Acuity  Stage: 2  Systemic Treatment - predicted or initiated: Chemotherapy Regimen with Multiple drugs (+1)  ECO  Comorbidities in Medical History: 2  Support: 0  Transportation: 0  Verbalizes the need for more education: 1  Navigation Acuity: 0     Follow Up  Follow up in 1 week (on 3/9/2023) for venofer.

## 2023-03-06 NOTE — TELEPHONE ENCOUNTER
Received a message from patient stating since he resumed chemo, his pain has increased significantly in which he is taking prn dilaudid 4mg po q 3 hrs for pain. He noted that he has significant abdominal pain behind his rib cage describing as burning, sharp, and persistent. It is not the same as his gas pain from inpatient hospital stay recently. He noted the pain regimen was effective on discharge and he tolerated his first chemo session well but the next few cycles have not been tolerated well due to increased and persistent pain. In this setting will increase fentanyl dosage and monitor. Since patient has fentanyl 50 mcg patches at home and due to change on today, will add fentanyl 12 mcg patch to equal 62 mcg q 72 hours. I instructed them to decrease frequency of prn dilaudid to 4mg po q 4 hrs prn break through pain max of 5 doses per day. They noted he is passing gas and having regular bowel movements, noting he is taking miralax and gas-x daily. I instructed them to notify me of any changes or issues, to allow dose increase 6-8 hrs to take effect with the patch. Patient and spouse verbalized understanding. Script sent to the [facility] pharmacy to be picked up today.

## 2023-03-07 NOTE — PROGRESS NOTES
Pt wife called states pt has been having rough time with pain, general fatigue since last infusion, would like to know if he can get fluids 3/9 when he comes for iron. Denies any other c/o at present. In basket msg sent to Dr. Shell, orders written. Told her pt can get fluids, encouraged them to go to ED if pain persists or worsens. She voiced understanding this information.   Oncology Navigation   Intake  Date of Diagnosis:  (negative path)  Cancer Type: GI  Internal / External Referral: Internal  Date of Referral: 23  Initial Nurse Navigator Contact: 23  Referral to Initial Contact Timeline (days): 0  Date Worked: 23  First Appointment Available: 23  Appointment Date: 23  First Available Date vs. Scheduled Date (days): 3  Reason for Treatment Delay: -- (seeking 2nd opinion @ [facility])     Treatment  Current Status: Staging work-up  Date Presented to Tumor Board: 23    Surgical Oncologist: Nikunj  Consult Date: 23    Medical Oncologist: Dr. Shell  Consult Date: 23  Chemotherapy: Planned  Chemotherapy Regimen: Gemcitabine/Cisplatin       Procedures: Genetic test  Biopsy Schedule Date: 22  CT Schedule Date: 23  EUS / EGD: EUS- 2022  Genetic Testing Date Sent: 23  MRI Schedule Date: 23  Port / PICC Schedule Date: 23             Support Systems: Family members     Acuity  Stage: 2  Systemic Treatment - predicted or initiated: Chemotherapy Regimen with Multiple drugs (+1)  ECO  Comorbidities in Medical History: 2  Support: 0  Transportation: 0  Verbalizes the need for more education: 1  Navigation Acuity: 0     Follow Up  No follow-ups on file.

## 2023-03-09 NOTE — PROGRESS NOTES
Discussed with pt and wife imaging appts per provider request. Abd xray today after completing fluids, 1st fl [place] [facility]. U/S to r/o DVT will be at [facility] radiology 7pm tonight and then CT c/a/p tomorrow (3/10) at 11am at , npo after 7am tomorrow morning, arrive at  for 945 to drink prep. Pt will then go to 4th fl infusion for 1 hour of fluids. Ok to proceed home after fluids, provider will f/u with results. Told them that PA and I will both be out tomorrow but Dr. Shell will get results, gave them ONN phone number in case they have questions/concerns. Pt and wife express understanding and have no questions at this time. Ok to schedule fluids per infusion charge nurse.  Oncology Navigation   Intake  Date of Diagnosis:  (negative path)  Cancer Type: GI  Internal / External Referral: Internal  Date of Referral: 23  Initial Nurse Navigator Contact: 23  Referral to Initial Contact Timeline (days): 0  Date Worked: 23  First Appointment Available: 23  Appointment Date: 23  First Available Date vs. Scheduled Date (days): 3  Reason for Treatment Delay: -- (seeking 2nd opinion @ [facility])     Treatment  Current Status: Staging work-up  Date Presented to Tumor Board: 23    Surgical Oncologist: Nikunj  Consult Date: 23    Medical Oncologist: Dr. Shell  Consult Date: 23  Chemotherapy: Planned  Chemotherapy Regimen: Gemcitabine/Cisplatin       Procedures: Genetic test  Biopsy Schedule Date: 22  CT Schedule Date: 23  EUS / EGD: EUS- 2022  Genetic Testing Date Sent: 23  MRI Schedule Date: 23  Port / PICC Schedule Date: 23             Support Systems: Family members     Acuity  Stage: 2  Systemic Treatment - predicted or initiated: Chemotherapy Regimen with Multiple drugs (+1)  ECO  Comorbidities in Medical History: 2  Support: 0  Transportation: 0  Verbalizes the need for more education: 1  Navigation Acuity: 0     Follow Up  Follow up in about  1 week (around 3/16/2023).

## 2023-03-09 NOTE — PROGRESS NOTES
"Subjective:       Patient ID: Guevara Osullivan II is a 53 y.o. male.    Chief Complaint: Cancer and Dehydration    Primary Oncologist/Hematologist: Dr. Shell    HPI: Mr. Osullivan is a 53 year old male who is following up for his primary peritoneal adenocarcinoma, history of intra-abdominal carcinomatosis.  Patient has stent placed.   Cancer Hx: colon cx at age of 23. experienced several months of abd pain, jaundice with weight loss, first presented to  General in 10/2022. 1/9/23 with Dr. Calvin who then performed diag lap with peritoneal bx and washings on 1/18/23. CA 19-9 elevated >4000. At surgery, there was significant peritoneal disease in all 4 quadrants, on bowel serosa, liver capsule and gallbladder. All 3 biopsies and washings pathology positive for adenocarcinoma. Started cisplatin + gemzar cycle 1 day 1 2/16/23.  Pmhx: Primary sclerosing cholangitis, and Ulcerative colitis     Today:  He presents today for IVF and IV iron.   He went to ER 2/20/23 for increasing abdominal pain. Workup in ED significant for CT of abdomen and pelvis which showed inflammatory changes characteristic of an abscess with associated pancreatitis, no definite current gastrointestinal obstruction. Had prior ERCP with stent placement followed by stent exchange by Dr. Conte for cholangitis.  He comes in complaining of abdominal pain. He states the pain is located in mid/upper abdomen. He states it started after his last chemo treatment. He describes the pain as burning, "on fire". He denies swelling, jaundice, n/v. He states he is either having diarrhea or constipation, never normal BM. His last BM was this morning, diarrhea. He has been trying to stay hydrated. His appetite is decreased but has been eating snacks. He also has some pain in his R leg, similar sensation as the abdomen.     Social History     Socioeconomic History    Marital status:    Tobacco Use    Smoking status: Never     Passive exposure: Never    " Smokeless tobacco: Never   Substance and Sexual Activity    Alcohol use: Not Currently    Drug use: Yes     Types: Marijuana     Comment: medical marijuana       Past Medical History:   Diagnosis Date    Cancer     COLON CANCER 1995    Digestive disorder     Hypertension     Primary sclerosing cholangitis     Ulcerative colitis     s/p colectomy with J- pouch       No family history on file.    Past Surgical History:   Procedure Laterality Date    ABDOMINAL WASHOUT N/A 1/18/2023    Procedure: LAVAGE, PERITONEAL,;  Surgeon: Onur Calvin Jr., MD;  Location: [facility] OR [address] [place] [address];  Service: General;  Laterality: N/A;    BIOPSY OF PERITONEUM N/A 1/18/2023    Procedure: BIOPSY, PERITONEUM;  Surgeon: Onur Calvin Jr., MD;  Location: [facility] OR [address] [place] [address];  Service: General;  Laterality: N/A;    COLON SURGERY      Colectomy with J- pouch    DIAGNOSTIC LAPAROSCOPY N/A 1/18/2023    Procedure: LAPAROSCOPY, DIAGNOSTIC WITH WASHING;  Surgeon: Onur Calvin Jr., MD;  Location: [facility] OR [address] [place] [address];  Service: General;  Laterality: N/A;    ENDOSCOPIC ULTRASOUND OF UPPER GASTROINTESTINAL TRACT N/A 10/14/2022    Procedure: ULTRASOUND, UPPER GI TRACT,;  Surgeon: Vince Teran MD;  Location: [facility];  Service: Endoscopy;  Laterality: N/A;  upper and linear    ENDOSCOPIC ULTRASOUND OF UPPER GASTROINTESTINAL TRACT N/A 12/13/2022    Procedure: ULTRASOUND, UPPER GI TRACT, ENDOSCOPIC;  Surgeon: Vince Teran MD;  Location: [facility];  Service: Endoscopy;  Laterality: N/A;    ERCP N/A 10/14/2022    Procedure: ERCP (ENDOSCOPIC RETROGRADE CHOLANGIOPANCREATOGRAPHY) with spyglass;  Surgeon: Vince Teran MD;  Location: [facility];  Service: Endoscopy;  Laterality: N/A;    ERCP N/A 12/13/2022    Procedure: ERCP (ENDOSCOPIC RETROGRADE CHOLANGIOPANCREATOGRAPHY);  Surgeon: Vince Teran MD;  Location: [facility];  Service: Endoscopy;  Laterality: N/A;    ERCP N/A 12/20/2022    Procedure: ERCP (ENDOSCOPIC  RETROGRADE CHOLANGIOPANCREATOGRAPHY);  Surgeon: Vince Teran MD;  Location: [facility];  Service: Endoscopy;  Laterality: N/A;    ERCP N/A 1/4/2023    Procedure: ERCP (ENDOSCOPIC RETROGRADE CHOLANGIOPANCREATOGRAPHY);  Surgeon: Vince Teran MD;  Location: [facility];  Service: Endoscopy;  Laterality: N/A;  room 1    ERCP N/A 2/22/2023    Procedure: ERCP (ENDOSCOPIC RETROGRADE CHOLANGIOPANCREATOGRAPHY);  Surgeon: Vince Teran MD;  Location: [facility];  Service: Endoscopy;  Laterality: N/A;    ERCP N/A 2/23/2023    Procedure: ERCP (ENDOSCOPIC RETROGRADE CHOLANGIOPANCREATOGRAPHY);  Surgeon: Vince Teran MD;  Location: [facility];  Service: Endoscopy;  Laterality: N/A;    ESOPHAGOGASTRODUODENOSCOPY N/A 12/20/2022    Procedure: EGD (ESOPHAGOGASTRODUODENOSCOPY);  Surgeon: Vince Teran MD;  Location: [facility];  Service: Endoscopy;  Laterality: N/A;    ESOPHAGOGASTRODUODENOSCOPY N/A 1/4/2023    Procedure: EGD (ESOPHAGOGASTRODUODENOSCOPY);  Surgeon: Vince Teran MD;  Location: [facility];  Service: Endoscopy;  Laterality: N/A;    INSERTION OF TUNNELED CENTRAL VENOUS CATHETER (CVC) WITH SUBCUTANEOUS PORT Left 2/9/2023    Procedure: [id number];  Surgeon: Wojciech Martínez MD;  Location: [facility];  Service: General;  Laterality: Left;    POUCHOSCOPY         Review of Systems   Constitutional:  Positive for activity change and appetite change. Negative for chills, diaphoresis, fatigue and fever.   HENT:  Negative for congestion, nosebleeds and rhinorrhea.    Respiratory:  Negative for cough and shortness of breath.    Cardiovascular:  Negative for chest pain, palpitations and leg swelling.   Gastrointestinal:  Positive for abdominal pain, constipation and diarrhea. Negative for abdominal distention, anal bleeding, blood in stool, nausea and vomiting.   Genitourinary:  Negative for hematuria.   Musculoskeletal:  Negative for arthralgias and back pain.    Skin:  Negative for color change.   Allergic/Immunologic: Positive for immunocompromised state.   Neurological:  Positive for weakness and numbness. Negative for dizziness, light-headedness and headaches.       Medication List with Changes/Refills   Current Medications    ALPRAZOLAM (XANAX) 0.25 MG TABLET    Take 1 tablet (0.25 mg total) by mouth 2 (two) times daily as needed for Anxiety.    DEXAMETHASONE (DECADRON) 4 MG TAB    Take 2 tablets (8 mg total) by mouth once daily. Take as directed on days 2 and 3 of your chemotherapy cycle.    FENTANYL (DURAGESIC) 12 MCG/HR PT72    Place 1 patch onto the skin every 72 hours.    FENTANYL (DURAGESIC) 50 MCG/HR    Place 1 patch onto the skin every 72 hours.    HYDROMORPHONE (DILAUDID) 4 MG TABLET    Take 1 tablet (4 mg total) by mouth every 4 (four) hours as needed for Pain. Max 5 doses per day    LACTULOSE (CHRONULAC) 20 GRAM/30 ML SOLN    Take 30 mLs (20 g total) by mouth 3 (three) times daily.    LIDOCAINE-PRILOCAINE (EMLA) CREAM    Apply to affected area once as directed    ONDANSETRON (ZOFRAN-ODT) 8 MG TBDL    Take 1 tablet (8 mg total) by mouth every 8 (eight) hours as needed (nausea/vomiting).    PROCHLORPERAZINE (COMPAZINE) 5 MG TABLET    Take 1 tablet (5 mg total) by mouth every 6 (six) hours as needed for Nausea.     Objective:     Vitals:    03/09/23 1316   BP: 111/78   Pulse: 77   Temp: 97.8 °F (36.6 °C)       Physical Exam  Constitutional:       General: He is not in acute distress.     Appearance: He is ill-appearing. He is not toxic-appearing or diaphoretic.   HENT:      Head: Normocephalic and atraumatic.   Cardiovascular:      Rate and Rhythm: Normal rate.   Pulmonary:      Effort: Pulmonary effort is normal.   Abdominal:      General: There is no distension.      Tenderness: There is no abdominal tenderness.   Musculoskeletal:         General: No swelling or tenderness.      Right lower leg: No edema.      Left lower leg: No edema.   Skin:     General:  Skin is warm and dry.      Capillary Refill: Capillary refill takes 2 to 3 seconds.      Coloration: Skin is pale. Skin is not jaundiced.      Findings: No bruising, erythema, lesion or rash.   Neurological:      Mental Status: He is alert.   Psychiatric:         Mood and Affect: Mood normal.          Labs/Results:  Lab Results   Component Value Date    WBC 5.54 03/09/2023    RBC 4.14 (L) 03/09/2023    HGB 12.4 (L) 03/09/2023    HCT 37.7 (L) 03/09/2023    MCV 91 03/09/2023    MCH 30.0 03/09/2023    MCHC 32.9 03/09/2023    RDW 15.2 (H) 03/09/2023     03/09/2023    MPV 7.8 (L) 03/09/2023    GRAN 3.8 03/09/2023    GRAN 68.9 03/09/2023    LYMPH 1.2 03/09/2023    LYMPH 22.2 03/09/2023    MONO 0.4 03/09/2023    MONO 6.7 03/09/2023    EOS 0.0 03/09/2023    BASO 0.07 03/09/2023    EOSINOPHIL 0.5 03/09/2023    BASOPHIL 1.3 03/09/2023     CMP  Sodium   Date Value Ref Range Status   03/09/2023 138 136 - 145 mmol/L Final     Potassium   Date Value Ref Range Status   03/09/2023 4.8 3.5 - 5.1 mmol/L Final     Chloride   Date Value Ref Range Status   03/09/2023 97 95 - 110 mmol/L Final     CO2   Date Value Ref Range Status   03/09/2023 32 (H) 23 - 29 mmol/L Final     Glucose   Date Value Ref Range Status   03/09/2023 95 70 - 110 mg/dL Final     BUN   Date Value Ref Range Status   03/09/2023 5 (L) 6 - 20 mg/dL Final     Creatinine   Date Value Ref Range Status   03/09/2023 0.7 0.5 - 1.4 mg/dL Final     Calcium   Date Value Ref Range Status   03/09/2023 9.3 8.7 - 10.5 mg/dL Final     Total Protein   Date Value Ref Range Status   03/09/2023 7.0 6.0 - 8.4 g/dL Final     Albumin   Date Value Ref Range Status   03/09/2023 3.3 (L) 3.5 - 5.2 g/dL Final     Total Bilirubin   Date Value Ref Range Status   03/09/2023 0.6 0.1 - 1.0 mg/dL Final     Comment:     For infants and newborns, interpretation of results should be based  on gestational age, weight and in agreement with clinical  observations.    Premature Infant recommended  reference ranges:  Up to 24 hours.............<8.0 mg/dL  Up to 48 hours............<12.0 mg/dL  3-5 days..................<15.0 mg/dL  6-29 days.................<15.0 mg/dL       Alkaline Phosphatase   Date Value Ref Range Status   03/09/2023 850 (H) 55 - 135 U/L Final     AST   Date Value Ref Range Status   03/09/2023 68 (H) 10 - 40 U/L Final     ALT   Date Value Ref Range Status   03/09/2023 103 (H) 10 - 44 U/L Final     Anion Gap   Date Value Ref Range Status   03/09/2023 9 8 - 16 mmol/L Final     eGFR   Date Value Ref Range Status   03/09/2023 >60 >60 mL/min/1.73 m^2 Final          Latest Reference Range & Units 01/31/23 15:36   Iron 45 - 160 ug/dL 24 (L)   TIBC 250 - 450 ug/dL 275   Saturated Iron 20 - 50 % 9 (L)   Transferrin 200 - 375 mg/dL 186 (L)   Ferritin 20.0 - 300.0 ng/mL 230     Ct abd/pelvis 2/202/23  Impression:  1. There are inflammatory changes in and adjacent to the body of the pancreas. There is a small gas and fluid collection adjacent to the body of the pancreas that measures 25 mm in craniocaudal dimension by 18 mm in AP dimension by 18 mm in medial-lateral dimension. A drain is noted between this fluid collection and the body of the stomach.  This is characteristic of an abscess with associated pancreatitis.  2. The liver is enlarged. It measures 19.9 cm in craniocaudal dimension. There is intrahepatic biliary ductal dilatation. A biliary stent remains in place.  3. There are multiple surgical changes associated with a partial colectomy. The appendix is not visualized.  There is no definite current gastrointestinal obstruction.  If additional imaging evaluation is clinically indicated, I recommend consideration of a barium small bowel follow-through study.  4. The spleen is enlarged. It measures 14.4 cm in length.  5. There is a small amount of ascites. There is no pneumoperitoneum.  6. There are mild degenerative changes between L2 and L3.  7. There is a 9 mm osseous density adjacent to the  anterior inferior aspect of the L3 vertebral body.  This is characteristic of a limbus vertebra.  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    Relevant Orders    US Lower Extremity Veins Right    Abdominal carcinomatosis    Relevant Orders    X-Ray Abdomen Flat And Erect    CT Abdomen Pelvis With Contrast    US Lower Extremity Veins Right    Iron deficiency anemia due to chronic blood loss    Primary peritoneal adenocarcinoma    Relevant Orders    X-Ray Abdomen Flat And Erect       GI    S/P total colectomy     Plan:     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 They were unhappy with previous visit, being put on NPO, and unable to obtain medications in timely fashion. Spoke with palliative care--> continue same pain management regimen.   --U/S R leg to R/O DVT  --abdominal xray today  --CT scan tomorrow --> IVF after. 1 hr    Iron deficiency anemia due to chronic blood loss  --continue with IV iron  --iron: 24, sat: 9%, ferritin: 230-iron def  --IV iron venofer x 5 doses, weekly     Follow-Up: continue with follow up    Zelda Suh PA-C  Hematology Oncology

## 2023-03-10 NOTE — DISCHARGE INSTRUCTIONS
[place]  [id number] [facility]  [address]  [phone] phone     [phone] fax  Hours of Operation: Monday- Friday 8:00am- 5:00pm  After hours phone  [phone]  Hematology / Oncology Physicians on call      CARYN [address]una [address] Dr., JOHN Gasca, JOHN Mahoney, LIZET Min    Please call with any concerns regarding your appointment today.       FALL PREVENTION   Falls often occur due to slipping, tripping or losing your balance. Here are ways to reduce your risk of falling again.   Was there anything that caused your fall that can be fixed, removed or replaced?   Make your home safe by keeping walkways clear of objects you may trip over.   Use non-slip pads under rugs.   Do not walk in poorly lit areas.   Do not stand on chairs or wobbly ladders.   Use caution when reaching overhead or looking upward. This position can cause a loss of balance.   Be sure your shoes fit properly, have non-slip bottoms and are in good condition.   Be cautious when going up and down stairs, curbs, and when walking on uneven sidewalks.   If your balance is poor, consider using a cane or walker.   If your fall was related to alcohol use, stop or limit alcohol intake.   If your fall was related to use of sleeping medicines, talk to your doctor about this. You may need to reduce your dosage at bedtime if you awaken during the night to go to the bathroom.   To reduce the need for nighttime bathroom trips:   Avoid drinking fluids for several hours before going to bed   Empty your bladder before going to bed   Men can keep a urinal at the bedside   © 7620-6574 [employer] [facility], [address] [place] [address], [place] [address]. All rights reserved. This information is not intended as a substitute for professional medical care. Always follow your healthcare professional's instructions.

## 2023-03-10 NOTE — PROGRESS NOTES
"  Cancer Genetics  Hereditary/High Risk Clinic  Department of Hematology and Oncology  [facility]        Date of Service:  2023  Provider:  Lloyd Sargent MS, [facility]    Patient Information  Name:  Guevara Osullivan II  :  1970  MRN:  [id number]        Referring Provider: Preston Shell MD    The chief complaint leading to consultation is: as below.  Visit type: in-person.  Face-to-face time with patient: approximately 45 minutes.  Approximately 120 minutes in total were spent on this encounter, which includes face-to-face time and non-face-to-face time preparing to see the patient (e.g., review of tests), obtaining and/or reviewing separately obtained history, documenting clinical information in the electronic or other health record, independently interpreting results (not separately reported) and communicating results to the patient/family/caregiver, or care coordination (not separately reported).      SUBJECTIVE:      Chief Complaint: Genetic Counseling    History of Present Illness (HPI):  Guevara Osullivan II ("Maurisio"), 53 y.o., assigned male sex at birth is established with the [facility] but new to me.  He was referred by Medical Oncologyfor genetic cancer risk assessment given her family history of breast cancer. At ~age 23, he was diagnosed with colon cancer s/p total colectomy with J-pouch. 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 Primary is noted to be suspicious of cholangiocarcinoma Started cisplatin + gemzar.  He underwent genetic testing so we met today to discuss the results.    Focused Medical History:   Germline cancer-genetic testing:  Yes - [facility] (2023)  FH [id number] ([id number])  Cancer:  Yes   Colon cancer ()  Peritoneal adenocarcinoma (2023)  Colon polyp:  No  Endoscopic u/s of upper GI tract: 2022  Hx of ulcerative colitis  Other " benign tumor:  No  Pancreatitis:  No  Pancreatic cyst:  Yes - 1995     Focused Surgical History  Colectomy w/ J-pouch    Ancestry:  Ashkenazi [employer] ancestry:  No  Paternal: Burmese  Maternal: Irish    Focused Family History:  Consanguinity in ancestors:  No  Germline cancer-genetic testing in blood relatives:  No  Cancer in family:  Yes; there are no known blood relatives affected with cancer other than those mentioned in the pedigree below    Family Cancer Pedigree:        Review of Systems:  See HPI.        SUBJECTIVE:   Records Reviewed  Medical History  Problem List  Any pertinent, available Procedures and Pathology reports in both [facility] and [facility]    COUNSELING   Bill was seen for post-test genetic counseling on today. He was recently diagnosed with peritoneal adenocarcinoma. 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 We met today to discuss these test results.    Genetic Testing  Due to his history of cancer, he underwent genetic testing in February 2023. [facility] Hereditary [facility] Test was ordered, which investigated the following 48 genes: APC, ANABEL, AXIN2, BAP1, BARD1, BMPR1A, BRCA1, BRCA2, BRIP1, CDH1, CDK4 ([id number]), CDKN2A (p14ARF), CHEK2, CTNNA1, EFGR, EPCAM, FH, FLCN, GREM1, HOXB13, MEN1, MET, MITF, MLH1, MSH2, MSH3, MSH6, MUTYH, NTHL1, PALB2, PMS2, POLD1, POLE, PTEN, RAD51C, RAD51D, RET, SDHA, SDHB, SDHC, SDHD, SMAD4, STK11, TERT, TP53, TSC1, TSC2, VHL.     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 This result does not explains his personal or family history of cancer. No pathogenic mutations were identified in any of the other 47 genes investigated.     FH Risks & Management  We discussed that pathogenic mutations in the FH gene is associated with hereditary leiomyomatosis and renal cell cancer (HLRCC). Individuals with HLRCC  syndrome have an increased risk for cutaneous leiomyomas (noncancerous skin tumors), uterine leiomyomas (multiple large uterine fibroids), and kidney cancer (21%). There is also an increased chance for tumors of the nerves called paragangliomas (PGL) and tumors of the adrenal glands called pheochromocytomas (PCC), but the lifetime risk is not clear. PGL and PCC are usually noncancerous, but can cause symptoms because of where they are located and/or excess hormones that they may produce. We reviewed the specific cancer risks and the National Comprehensive Cancer Network (NCCN) guidelines for individuals with a pathogenic FH mutation, as outlined below. Outline below are the associated management guidelines for individuals with a FH mutation.    Risks for Men and Women  For Cutaneous Leiomyomas  Skin exam (full-body) every 1-2 years     For Kidney Cancer Risk (21%)  Abdominal MRI (preferred) every year starting at age 8-10, or 10 years before the earliest kidney cancer in the family (whichever comes first). If an abdominal MRI is unavailable, consider a CT with and without IV contrast. MRI is preferred for surveillance to limit radiation exposure; however, CT can be used for surgical planning as needed.     For PGL and PCC (lifetime risk unknown)  Blood pressure monitoring at all medical visits starting at ages 6-10.  If there is a personal or family history of PGL or PCC, consider a baseline MRI from the skull base to the pelvis,and blood screening tests (plasma free metanephrines).    For Women only: Uterine Leiomyomas Risk  Gynecologic exam every year starting at age 20, or earlier based on symptoms    There is a very rare condition known as Fumarate Hydratase deficiency (FHD) that can be a risk for the children of parents who both carry a FH mutation. This condition occurs when child inherits two FH mutations, one from each parent. FHD is a condition characterized by difficulty gaining weight, low muscle tone,  seizures, developmental delays, distinctive facial features, and a small head size.    FH - Implications for Relatives  Because a FH mutation was identified in her, her relatives are also at risk to share this mutation. Maurisio likely inherited this FH mutation from one of his parents, so his parents and brother each have a 50% chance to carry this mutation. There is a 50% chance for each of his son to have inherited this mutation. We do not recommended for children to have genetic testing for this gene. There is also a chance that other relatives could carry this mutation. Genetic testing for this mutation is recommended for his relatives, as testing would clarify if they have increased cancer risks that warrant additional cancer screening. We are happy to meet with relatives for genetic counseling and testing, or they can locate a genetic counselor near them at [email or web address].     Assessment/Plan  Given he did not have comprehensive genetic testing and the primary origin of cancer is still unknown, Maurisio would like to proceed with additional genetic testing. We discussed the potential cost of the testing and the information regarding the genetic testing.    Causes of cancer  Germline cancer genetic testing is the testing of genes associated with cancer, known as cancer susceptibility genes.  Just as these genes are inherited from parents, mutations in these genes can be inherited, as well.  A mutation in a cancer susceptibility gene adversely affects the gene's ability to prevent cancer; therefore, carriers of cancer susceptibility gene mutations may be at increased risk for certain cancers.     Only 5-10% cancers are caused by a cancer susceptibility gene mutation, meaning the cancer is genetic/hereditary; rather, most cancers are sporadic.  Causes of sporadic cancers may include environmental risk factors, lifestyle risk factors, and non-modifiable risk factors.  It is important to note that members of a family often  share not only their genetics but also risk factors including environmental and lifestyle risk factors, so cancers can be familial.     Potential results of genetic testing, and their implications  Potential results of genetic testing include positive, negative, and variant of unknown significance (VUS).    A positive result indicates the presence of at least one clinically significant mutation, and the patient's associated cancer risks vary depending upon the cancer susceptibility gene(s) in which there is/are a mutation(s).  With a positive result, in some cases, depending upon the specific result and the patient's clinical history, modified risk management may be recommended, including measures for risk reduction and/or surveillance; however, modified management is not always an option.    A negative result indicates that no clinically significant mutations were identified in the gene(s) tested.    A VUS indicates that there is not presently enough data for the laboratory to make a determination as to whether the variant is clinically significant.  VUSs are not typically acted upon clinically.        Modified management may also be recommended, even with a result of no or unknown significance, based upon risk assessment that incorporates the family history.  Sometimes, depending upon the genetic testing result and the cancer diagnosis, additional/modified treatments may be an option, though this is not guaranteed.     Genetic mutation inheritance  If Bill tests positive for a mutation, his first-degree relatives would each have a 50% chance of having the same mutation, and other, more distantly related blood-relatives would also be at risk of having the same mutation.       Genetic discrimination  The Genetic Information Nondiscrimination Act (LALO) is U[place] federal legislation that provides some protections against use of an individual's genetic information by their health insurer and by their employer.  Title I  of LALO prohibits most health insurers from utilizing an individual's genetic information to make decisions regarding insurance eligibility or premium charges.  Title II of LALO prohibits covered entities, including employers, from requesting the genetic information of employees and applicants.  LALO does not protect individuals from genetic discrimination toward health insurance obtained through a job with the  or through the Federal Employees Health Benefits Plan; from genetic discrimination by employers with fewer than 15 employees or if employed by the ; or from genetic discrimination by any other type of policies/entities, including but not limited to life insurance, disability insurance, long-term care insurance,  benefits, and [facility] benefits.     Genetic testing logistics  An outside laboratory would perform the testing after a blood sample is collected here at [facility] or a saliva sample is collected by the patient at home.  With genetic testing, there is a potential for the patient to incur out-of-pocket costs.  Results can take 2-3 weeks.  Post-test genetic counseling can be conducted once the genetic testing results are available.     Assessment/Plan  Based on the information provided by Maurisio, he does not meet current criteria for genetic testing of based on his family history  according to current clinical guidelines. Maurisio's clinical history, including personal history and/or family history, is not strongly suggestive of a hereditary cancer syndrome in the family given the personal and family history of cancer. The estimated cost out of pocket is $250.     Offered germline cancer-genetic testing at this time versus deferring testing at this time or declining testing altogether.  Various test panel options were discussed. Maurisio decided to proceed with genetic testing through the 2-gene BRCA1/BRCA2 Core Panel and the 84-gene Multi-Cancer Panel (AIP, ALK, APC, ANABEL,  AXIN2, BAP1, BARD1, BLM, BMPR1A, BRCA1, BRCA2, BRIP1, CASR, CDC73, CDH1, CDK4, CDKN1B, CDKN1C, CDKN2A, CEBPA, CHEK2, CTNNA1, DICER1, DIS3L2, EGFR, EPCAM, FH, FLCN, GATA2, GPC3, GREM1, HOXB13, HRAS, KIT, MAX, MEN1, MET, MITF, MLH1, MSH2, MSH3, MSH6, MUTYH, NBN, NF1, NF2, NTHL1, PALB2, PDGFRA, PHOX2B, PMS2, POLD1, POLE, POT1, QRQGJ4G, PTCH1, PTEN, RAD50, RAD51C, RAD51D, RB1, RECQL4, RET, RUNX1, SDHA, SDHAF2, SDHB, SDHC, SDHD, SMAD4, SMARCA4, SMARCB1, SMARCE1, STK11, SUFU, TERC, TERT, VWRE222, TP53, TSC1, TSC2, VHL, WRN, WT1).  Maurisio has provided his informed consent to proceed. A blood sample will be collected on Wednesday with other labs.    Questions were encouraged and answered to the patient's satisfaction, and his verbalized understanding of information and agreement with the plan.         Signed,    Lloyd Sargent MS, [facility] [place]  Certified Genetic Counselor, Hereditary and High-Risk Clinic  Hematology/Oncology, [facility]    03/13/2023

## 2023-03-15 NOTE — TELEPHONE ENCOUNTER
Patient and wife reached out due to continuous pain. He recently underwent CT and xray of abdomen which no significant changes were noted but patient still with ileus vs partial bowel obstruction same as Xray completed 2/2023 during previous hospital stay. Patient discharged per his request despite X-ray and due to some improvement after having a bowel movement. I explained to patient that his abdominal pain could be in the setting of two things: he has significant amount of tumor in his abdomen and also the possible presence of obstruction or ileus. I recommended follow up with GI in which they noted Dr. Herrera is no longer working here. I will reach out to see that patient has an appointment to establish care with a new provider and also to review x rays and make recommendations as this could be etiology of abdominal pain. I made patient and wife aware that I do not want to continue to increase pain medication when there could be another etiology in addition to neoplasm related pain. In this setting I recommended follow up with GI for review of scans and recommendations. I will increase fentanyl to 75mcg q 72 hours and refill prn dilaudid but if patient still with increasing pain I told patient and wife that my recommendation will be follow up with GI or present to ED for further work up and eval. They agreed with plan. Patient alert and oriented while on phone and reports no other issues outside of abdominal pain. I had patient's follow up with me rescheduled to a sooner date on 3/29/23 at 11 AM and made them aware. They were instructed to call me sooner with any questions or concerns.

## 2023-03-15 NOTE — PROGRESS NOTES
Met with pt in the lab to have genetic test drawn, per BRAD, genetic counselor request. Specimen collected by  using 2 pt identifiers and given to me. Specimen packaged appropriately and will be hand delivered by me to [facility] drop box today. Pt states nausea has been a problem this am, unable to eat but trying to find balance b/t diarrhea and constipation which has been an ongoing process. States pain has been under control today. Wheeled pt to hem/onc lobby with wife accompanying him after lab draw. Pt and wife informed that MD will see him once labs results available, they expressed understanding this information. [employer] tracking #[id number]  Oncology Navigation   Intake  Date of Diagnosis:  (negative path)  Cancer Type: GI  Internal / External Referral: Internal  Date of Referral: 23  Initial Nurse Navigator Contact: 23  Referral to Initial Contact Timeline (days): 0  Date Worked: 03/15/23  First Appointment Available: 23  Appointment Date: 23  First Available Date vs. Scheduled Date (days): 3  Reason for Treatment Delay: -- (seeking 2nd opinion @ [facility])     Treatment  Current Status: Staging work-up  Date Presented to Tumor Board: 23    Surgical Oncologist: Nikunj  Consult Date: 23    Medical Oncologist: Dr. Shell  Consult Date: 23  Chemotherapy: Planned  Chemotherapy Regimen: Gemcitabine/Cisplatin       Procedures: Genetic test  Biopsy Schedule Date: 22  CT Schedule Date: 23  EUS / EGD: EUS- 2022  Genetic Testing Date Sent: 23  MRI Schedule Date: 23  Port / PICC Schedule Date: 23             Support Systems: Family members     Acuity  Stage: 2  Systemic Treatment - predicted or initiated: Chemotherapy Regimen with Multiple drugs (+1)  ECO  Comorbidities in Medical History: 2  Support: 0  Transportation: 0  Verbalizes the need for more education: 1  Navigation Acuity: 0     Follow Up  No follow-ups on file.

## 2023-03-15 NOTE — PROGRESS NOTES
Subjective:       Patient ID: Guevara Osullivan II is a 53 y.o. male.    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  Patient continues with chronic stable narcotic use monitored by palliative care continues to still have persistent abdominal discomfort.  ECOG status 2    Past Medical History:   Diagnosis Date    Cancer     COLON CANCER 1995    Digestive disorder     Hypertension     Primary sclerosing cholangitis     Ulcerative colitis     s/p colectomy with J- pouch     Family History   Problem Relation Age of Onset    Cancer Mother         GYN?    Testicular cancer Father     Skin cancer Father     Lung cancer Maternal Grandmother         +smoking hx    Heart disease Paternal Uncle      Social History     Socioeconomic History    Marital status:    Tobacco Use    Smoking status: Never     Passive exposure: Never    Smokeless tobacco: Never   Substance and Sexual Activity    Alcohol use: Not Currently    Drug use: Yes     Types: Marijuana     Comment: medical marijuana     Past Surgical History:   Procedure Laterality Date    ABDOMINAL WASHOUT N/A 1/18/2023    Procedure: LAVAGE, PERITONEAL,;  Surgeon: Onur Calvin Jr., MD;  [facility];  Service: General;  Laterality: N/A;    BIOPSY OF PERITONEUM N/A 1/18/2023    Procedure: BIOPSY, PERITONEUM;  Surgeon: Onur Calvin Jr., MD;  [facility];  Service: General;  Laterality: N/A;    COLON SURGERY      Colectomy with J- pouch    DIAGNOSTIC LAPAROSCOPY N/A 1/18/2023    Procedure: LAPAROSCOPY, DIAGNOSTIC WITH WASHING;  Surgeon: Onur Calvin Jr., MD;  Location: [facility] OR [facility];  Service: General;  Laterality: N/A;    ENDOSCOPIC ULTRASOUND OF UPPER GASTROINTESTINAL TRACT N/A 10/14/2022    Procedure: ULTRASOUND, UPPER GI TRACT,;   Surgeon: Vince Teran MD;  Location: [facility];  Service: Endoscopy;  Laterality: N/A;  upper and linear    ENDOSCOPIC ULTRASOUND OF UPPER GASTROINTESTINAL TRACT N/A 12/13/2022    Procedure: ULTRASOUND, UPPER GI TRACT, ENDOSCOPIC;  Surgeon: Vince Teran MD;  Location: [facility];  Service: Endoscopy;  Laterality: N/A;    ERCP N/A 10/14/2022    Procedure: ERCP (ENDOSCOPIC RETROGRADE CHOLANGIOPANCREATOGRAPHY) with spyglass;  Surgeon: Vince Teran MD;  Location: [facility];  Service: Endoscopy;  Laterality: N/A;    ERCP N/A 12/13/2022    Procedure: ERCP (ENDOSCOPIC RETROGRADE CHOLANGIOPANCREATOGRAPHY);  Surgeon: Vince Teran MD;  Location: [facility];  Service: Endoscopy;  Laterality: N/A;    ERCP N/A 12/20/2022    Procedure: ERCP (ENDOSCOPIC RETROGRADE CHOLANGIOPANCREATOGRAPHY);  Surgeon: Vince Teran MD;  Location: [facility];  Service: Endoscopy;  Laterality: N/A;    ERCP N/A 1/4/2023    Procedure: ERCP (ENDOSCOPIC RETROGRADE CHOLANGIOPANCREATOGRAPHY);  Surgeon: Vince Teran MD;  Location: [facility];  Service: Endoscopy;  Laterality: N/A;  room 1    ERCP N/A 2/22/2023    Procedure: ERCP (ENDOSCOPIC RETROGRADE CHOLANGIOPANCREATOGRAPHY);  Surgeon: Vince Teran MD;  Location: [facility];  Service: Endoscopy;  Laterality: N/A;    ERCP N/A 2/23/2023    Procedure: ERCP (ENDOSCOPIC RETROGRADE CHOLANGIOPANCREATOGRAPHY);  Surgeon: Vince Teran MD;  Location: [facility];  Service: Endoscopy;  Laterality: N/A;    ESOPHAGOGASTRODUODENOSCOPY N/A 12/20/2022    Procedure: EGD (ESOPHAGOGASTRODUODENOSCOPY);  Surgeon: Vince Teran MD;  Location: [facility];  Service: Endoscopy;  Laterality: N/A;    ESOPHAGOGASTRODUODENOSCOPY N/A 1/4/2023    Procedure: EGD (ESOPHAGOGASTRODUODENOSCOPY);  Surgeon: Vince Teran MD;  Location: [facility];  Service: Endoscopy;  Laterality: N/A;    INSERTION OF TUNNELED CENTRAL  VENOUS CATHETER (CVC) WITH SUBCUTANEOUS PORT Left 2/9/2023    Procedure: [id number];  Surgeon: Wojciech Martínez MD;  [facility];  Service: General;  Laterality: Left;    POUCHOSCOPY         Labs:  Lab Results   Component Value Date    WBC 6.30 03/15/2023    HGB 12.2 (L) 03/15/2023    HCT 37.7 (L) 03/15/2023    MCV 93 03/15/2023     03/15/2023     BMP  Lab Results   Component Value Date     03/15/2023    K 4.7 03/15/2023    CL 98 03/15/2023    CO2 32 (H) 03/15/2023    BUN 7 03/15/2023    CREATININE 0.7 03/15/2023    CALCIUM 9.6 03/15/2023    ANIONGAP 9 03/15/2023     Lab Results   Component Value Date    ALT 53 (H) 03/15/2023    AST 39 03/15/2023     (H) 12/02/2022    ALKPHOS 631 (H) 03/15/2023    BILITOT 0.7 03/15/2023       Lab Results   Component Value Date    IRON 24 (L) 01/31/2023    TIBC 275 01/31/2023    FERRITIN 230 01/31/2023     No results found for: [id number]  No results found for: FOLATE  Lab Results   Component Value Date    TSH 1.928 03/02/2023         Review of Systems   Constitutional:  Positive for activity change, appetite change, fatigue and unexpected weight change. Negative for chills, diaphoresis and fever.   HENT:  Negative for congestion, dental problem, drooling, ear discharge, ear pain, facial swelling, hearing loss, mouth sores, nosebleeds, postnasal drip, rhinorrhea, sinus pressure, sneezing, sore throat, tinnitus, trouble swallowing and voice change.    Eyes:  Negative for photophobia, pain, discharge, redness, itching and visual disturbance.   Respiratory:  Negative for apnea, cough, choking, chest tightness, shortness of breath, wheezing and stridor.    Cardiovascular:  Negative for chest pain, palpitations and leg swelling.   Gastrointestinal:  Positive for abdominal distention and abdominal pain. Negative for anal bleeding, blood in stool, constipation, diarrhea, nausea, rectal pain and vomiting.   Endocrine: Negative for cold intolerance, heat  intolerance, polydipsia, polyphagia and polyuria.   Genitourinary:  Negative for decreased urine volume, difficulty urinating, dysuria, enuresis, flank pain, frequency, genital sores, hematuria, penile discharge, penile pain, penile swelling, scrotal swelling, testicular pain and urgency.   Musculoskeletal:  Negative for arthralgias, back pain, gait problem, joint swelling, myalgias, neck pain and neck stiffness.   Skin:  Negative for color change, pallor, rash and wound.   Allergic/Immunologic: Negative for environmental allergies, food allergies and immunocompromised state.   Neurological:  Positive for weakness. Negative for dizziness, tremors, seizures, syncope, facial asymmetry, speech difficulty, light-headedness, numbness and headaches.   Hematological:  Negative for adenopathy. Does not bruise/bleed easily.   Psychiatric/Behavioral:  Positive for dysphoric mood. Negative for agitation, behavioral problems, confusion, decreased concentration, hallucinations, self-injury, sleep disturbance and suicidal ideas. The patient is nervous/anxious. The patient is not hyperactive.      Objective:      Physical Exam  Vitals reviewed.   Constitutional:       General: He is not in acute distress.     Appearance: He is well-developed. He is ill-appearing. He is not diaphoretic.   HENT:      Head: Normocephalic.      Right Ear: External ear normal.      Left Ear: External ear normal.      Nose: Nose normal.      Right Sinus: No maxillary sinus tenderness or frontal sinus tenderness.      Left Sinus: No maxillary sinus tenderness or frontal sinus tenderness.      Mouth/Throat:      Pharynx: No oropharyngeal exudate.   Eyes:      General: Lids are normal. No scleral icterus.        Right eye: No discharge.         Left eye: No discharge.      Extraocular Movements:      Right eye: Normal extraocular motion.      Left eye: Normal extraocular motion.      Conjunctiva/sclera:      Right eye: Right conjunctiva is not injected. No  hemorrhage.     Left eye: Left conjunctiva is not injected. No hemorrhage.     Pupils: Pupils are equal, round, and reactive to light.   Neck:      Thyroid: No thyromegaly.      Vascular: No JVD.      Trachea: No tracheal deviation.   Cardiovascular:      Rate and Rhythm: Normal rate.      Heart sounds: Normal heart sounds.   Pulmonary:      Effort: Pulmonary effort is normal. No respiratory distress.      Breath sounds: No stridor.   Abdominal:      General: There is distension.      Palpations: Abdomen is soft. There is no hepatomegaly, splenomegaly or mass.      Tenderness: There is abdominal tenderness.   Musculoskeletal:         General: No tenderness. Normal range of motion.      Cervical back: Normal range of motion and neck supple.   Lymphadenopathy:      Head:      Right side of head: No posterior auricular or occipital adenopathy.      Left side of head: No posterior auricular or occipital adenopathy.      Cervical: No cervical adenopathy.      Right cervical: No superficial, deep or posterior cervical adenopathy.     Left cervical: No superficial, deep or posterior cervical adenopathy.      Upper Body:      Right upper body: No supraclavicular adenopathy.      Left upper body: No supraclavicular adenopathy.   Skin:     General: Skin is dry.      Findings: No erythema or rash.      Nails: There is no clubbing.   Neurological:      Mental Status: He is alert and oriented to person, place, and time.      Cranial Nerves: No cranial nerve deficit.      Motor: Weakness present.      Coordination: Coordination normal.   Psychiatric:         Behavior: Behavior normal.         Thought Content: Thought content normal.         Judgment: Judgment normal.           Assessment:      1. Abdominal carcinomatosis    2. Metastatic adenocarcinoma    3. Primary peritoneal adenocarcinoma    4. 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 Immunodeficiency due to chemotherapy    6. 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 S/P total colectomy           Plan:     Will proceed with cycle 2 day 1 of treatment.  Discussed pros and cons and repeat imaging at the end of 3 cycles.  Discussed follow-up pain management through palliative care.  Patient is uncomfortable has been seen by [facility] as well additional laboratory work pending answered questions with family understand palliative nature of illness.      Med Onc Chart Routing      Follow up with physician 2 weeks. Can be seen by either myself or APAP in 1 week   Follow up with MILAGROS 1 week. Can be seen by APAP and myself in 1 week in 2 weeks as well   Infusion scheduling note    Injection scheduling note Cisplatin gemcitabine as ordered   Labs CBC, CMP and magnesium   Schedulin day prior to infusion  Preferred lab:  Lab interval:     Imaging    Pharmacy appointment    Other referrals            Preston Shell Jr, MD FACP

## 2023-03-16 NOTE — PLAN OF CARE
Discussed plan of care with pt. Addressed any and ongoing concerns. Pt denies    Problem: Adult Inpatient Plan of Care  Goal: Plan of Care Review  Outcome: Ongoing, Progressing  Flowsheets (Taken 3/16/2023 1104)  Plan of Care Reviewed With:   patient   spouse  Goal: Patient-Specific Goal (Individualized)  Outcome: Ongoing, Progressing  Goal: Absence of Hospital-Acquired Illness or Injury  Outcome: Ongoing, Progressing  Intervention: Identify and Manage Fall Risk  Flowsheets (Taken 3/16/2023 1104)  Safety Promotion/Fall Prevention:   in recliner, wheels locked   room near unit station  Intervention: Prevent Infection  Flowsheets (Taken 3/16/2023 1104)  Infection Prevention:   hand hygiene promoted   equipment surfaces disinfected  Goal: Optimal Comfort and Wellbeing  Outcome: Ongoing, Progressing  Intervention: Monitor Pain and Promote Comfort  Flowsheets (Taken 3/16/2023 1104)  Pain Management Interventions:   quiet environment facilitated   warm blanket provided     Problem: Anemia (Chemotherapy Effects)  Goal: Anemia Symptom Improvement  Outcome: Ongoing, Progressing  Intervention: Monitor and Manage Anemia  Flowsheets (Taken 3/16/2023 1104)  Oral Nutrition Promotion:   calorie-dense liquids provided   calorie-dense foods provided  Safety Promotion/Fall Prevention:   in recliner, wheels locked   room near unit station  Fatigue Management: frequent rest breaks encouraged

## 2023-03-17 NOTE — PATIENT INSTRUCTIONS
Recommendations:   Rec'd liberal use of high calories foods like oil, butter, cheese, eggs, avocado, whole milk, cream, etc.     Aim to eat a small meal or supplement every 2-3 hours     Continue Compleat 1-2x/day

## 2023-03-17 NOTE — PROGRESS NOTES
"Oncology Nutrition Assessment for Medical Nutrition Therapy Initial Visit  Consultation Time: 60 Minutes  Referring Provider: Preston Shell MD  Reason for Nutrition Consult: Decreased Appetite, New Patient - Nutrition Counseling and Education , Nutrition related questions, Severe Protein Calorie Malnutrition, and Weight Loss  The patient location is: home. The chief complaint leading to consultation is: unintentional weight loss.   Visit type: audiovisual   Face to Face time with patient: 60 mintues 75 minutes of total time spent on the encounter, which includes face to face time and non-face to face time preparing to see the patient (eg, review of tests), Obtaining and/or reviewing separately obtained history, Documenting clinical information in the electronic or other health record, Independently interpreting results (not separately reported) and communicating results to the patient/family/caregiver, or Care coordination (not separately reported).    Each patient to whom he or she provides medical services by telemedicine is:  (1) informed of the relationship between the physician and patient and the respective role of any other health care provider with respect to management of the patient; and (2) notified that he or she may decline to receive medical services by telemedicine and may withdraw from such care at any time.   Notes:     Nutrition Assessment      Patient Information:    Guevara Osullivan II  : 1970   53 y.o. male    Allergies/Intolerances: Lactose Intolerance  Social Data: lives with spouse/significant Other. Accompanied by spouse.  Anthropometrics:     Weight:  66.2 kg (145 lb 15 oz)                                  Height:  5'11" (1.8 m)     BMI: 20.4    Usual BW: 225 lb (102 kg)  Weight Change: Loss of 80 lb (35% body wt) in the past year      Supplements/Vitamins:    MVI/Supp: yes   Drug/Nutrient interactions: None Noted   Activity Level:     not active    Form of Activity: activities " of daily living only        Malnutrition Assessment:   Nutrition Risk: Patient meets at least 2 characteristics of the ASPEN/AND criteria for Severe Malnutrition in context of Chronic illness    Energy Intake [Severe, Chronic </=75% of estimated energy requirement for >/=1 mo ]  Interpretation of Weight Loss [Severe, Chronic >20% weight loss within 1 year]: Loss of 80 lb (35% body wt) in the past year      Food/Nutrition-related history:    Diet/PO Recall:   Appetite: Poor  Fluid Intake: Adequate  Diet Recall:  Breakfast: donut or half a pastry  Lunch: shake made with Compleat, whey protein powder, peanut butter, banana, chocolate syrup   Dinner: a taco or a few bites of chicken  Snacks: 1-2x/day: brenden snaps or lemon cake   Drinks: water, pedialyte, gatorade   ONS: Protein Powder of choice  and Compleat  1-2x/day  Servings of F/V per day: 1-2x/day  Eating out: 1-2x/week.   Cultural/Spiritual/Personal Preferences: Dysgeusia    GI Symptoms: abdominal pain, bloating, constipation, diarrhea, early satiety - feeling of fullness after eating a very small amount, nausea, or weight loss 80 lbs. within the last 1 year(s)   Frequency: a bowel movement every other day  Oncology Nutrition Symptoms: anorexia, diarrhea, constipation, taste changes, weight loss, pain, nausea, fatigue, smells bother me, and feel full quickly              Difficulty chewing or swallowing?  No    Patient Notes/Reports: Pt referred for a Nutrition Consultation related to Decreased Appetite, New Patient - Nutrition Counseling and Education , Nutrition related questions, Severe Protein Calorie Malnutrition, and Weight Loss. Patient has a medical diagnosis of cancer of the small intestine. Currently on CISPLATIN GEMCITABINE DURVALUMAB chemotherapy. Mr. Osullivan is a colon cancer and colectomy survivor. He now has cancer of the bile duct and small intestine and has lost 80 lbs and suffered bowel obstructions and severe abdominal pain in the past year. He  is taking Miralax and staying hydrated to prevent constipation from recurring. He does not consume lactose in foods. The Compleat formula serves as the base for his shakes. He drinks pedialyte and gatorade daily and gets IV fluids at the cancer center. Mr. Osullivan lost weight with every recent hospitalization and is worried about having to go to the hospital again. Encouraged him to eat as much as he can to help get through chemo but that if he decides not to continue with chemo, to only eat when hungry. Painkillers and medical marijuana help him to have an appetite and eat.    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      Past Medical History:   Diagnosis Date    Cancer     COLON CANCER 1995    Digestive disorder     Hypertension     Primary sclerosing cholangitis     Ulcerative colitis     s/p colectomy with J- pouch     Past Surgical History:   Procedure Laterality Date    ABDOMINAL WASHOUT N/A 1/18/2023    Procedure: LAVAGE, PERITONEAL,;  Surgeon: Onur Calvin Jr., MD;  Location: [facility] OR [address] [place] [address];  Service: General;  Laterality: N/A;    BIOPSY OF PERITONEUM N/A 1/18/2023    Procedure: BIOPSY, PERITONEUM;  Surgeon: Onur Calvin Jr., MD;  Location: [facility] OR [address] [place] [address];  Service: General;  Laterality: N/A;    COLON SURGERY      Colectomy with J- pouch    DIAGNOSTIC LAPAROSCOPY N/A 1/18/2023    Procedure: LAPAROSCOPY, DIAGNOSTIC WITH WASHING;  Surgeon: Onur Calvin Jr.,  MD;  Location: [facility] OR [address] [place] [address];  Service: General;  Laterality: N/A;    ENDOSCOPIC ULTRASOUND OF UPPER GASTROINTESTINAL TRACT N/A 10/14/2022    Procedure: ULTRASOUND, UPPER GI TRACT,;  Surgeon: Vince Teran MD;  Location: [facility];  Service: Endoscopy;  Laterality: N/A;  upper and linear    ENDOSCOPIC ULTRASOUND OF UPPER GASTROINTESTINAL TRACT N/A 12/13/2022    Procedure: ULTRASOUND, UPPER GI TRACT, ENDOSCOPIC;  Surgeon: Vince Teran MD;  Location: [facility];  Service: Endoscopy;  Laterality: N/A;    ERCP N/A 10/14/2022    Procedure: ERCP (ENDOSCOPIC RETROGRADE CHOLANGIOPANCREATOGRAPHY) with spyglass;  Surgeon: Vince Teran MD;  Location: [facility];  Service: Endoscopy;  Laterality: N/A;    ERCP N/A 12/13/2022    Procedure: ERCP (ENDOSCOPIC RETROGRADE CHOLANGIOPANCREATOGRAPHY);  Surgeon: Vince Teran MD;  Location: [facility];  Service: Endoscopy;  Laterality: N/A;    ERCP N/A 12/20/2022    Procedure: ERCP (ENDOSCOPIC RETROGRADE CHOLANGIOPANCREATOGRAPHY);  Surgeon: Vince Teran MD;  Location: [facility];  Service: Endoscopy;  Laterality: N/A;    ERCP N/A 1/4/2023    Procedure: ERCP (ENDOSCOPIC RETROGRADE CHOLANGIOPANCREATOGRAPHY);  Surgeon: Vince Teran MD;  Location: [facility];  Service: Endoscopy;  Laterality: N/A;  room 1    ERCP N/A 2/22/2023    Procedure: ERCP (ENDOSCOPIC RETROGRADE CHOLANGIOPANCREATOGRAPHY);  Surgeon: Vince Teran MD;  Location: [facility];  Service: Endoscopy;  Laterality: N/A;    ERCP N/A 2/23/2023    Procedure: ERCP (ENDOSCOPIC RETROGRADE CHOLANGIOPANCREATOGRAPHY);  Surgeon: Vince Teran MD;  Location: [facility];  Service: Endoscopy;  Laterality: N/A;    ESOPHAGOGASTRODUODENOSCOPY N/A 12/20/2022    Procedure: EGD (ESOPHAGOGASTRODUODENOSCOPY);  Surgeon: Vince Teran MD;  Location: [facility];  Service: Endoscopy;  Laterality: N/A;    ESOPHAGOGASTRODUODENOSCOPY N/A 1/4/2023     Procedure: EGD (ESOPHAGOGASTRODUODENOSCOPY);  Surgeon: Vince Teran MD;  Location: [facility];  Service: Endoscopy;  Laterality: N/A;    INSERTION OF TUNNELED CENTRAL VENOUS CATHETER (CVC) WITH SUBCUTANEOUS PORT Left 2/9/2023    Procedure: [id number];  Surgeon: Wojciech Martínez MD;  Location: [facility] OR;  Service: General;  Laterality: Left;    POUCHOSCOPY         Current Outpatient Medications   Medication Instructions    ALPRAZolam (XANAX) 0.25 mg, Oral, 2 times daily PRN    dexAMETHasone (DECADRON) 8 mg, Oral, Daily, Take as directed on days 2 and 3 of your chemotherapy cycle.    fentaNYL (DURAGESIC) 75 mcg/hr 1 patch, Transdermal, Every 72 hours    HYDROmorphone (DILAUDID) 4 mg, Oral, Every 4 hours PRN, Max 5 doses per day    lactulose (CHRONULAC) 20 g, Oral, 3 times daily    LIDOcaine-prilocaine (EMLA) cream Apply to affected area once as directed    ondansetron (ZOFRAN-ODT) 8 mg, Oral, Every 8 hours PRN    prochlorperazine (COMPAZINE) 5 mg, Oral, Every 6 hours PRN      Labs:  Reviewed        Nutrition Diagnosis    Nutrition Problem: inadequate protein/energy intake  Etiology (related to): nausea, tumor location, early satiety , and dysgeusia   Signs/Symptoms (as evidenced by): BMI = 20.4 and weight loss    Nutrition Intervention      Estimated Energy/Fluid Requirements:   Weight used: CBW 66.2 kg  Calories: 1540-4137 kcal/day (25-30 kcal/kg)  Protein: 80 g/day (1.2 g/kg)  Fluid: 7322-6266 mL/day (1 mL/kcal)   Recommendations:   Rec'd liberal use of high calories foods like oil, butter, cheese, eggs, avocado, whole milk, cream, etc.     Aim to eat a small meal or supplement every 2-3 hours     Continue  Compleat  1-2x/day   Education Needs Satisfied: yes   Education Materials Provided / Reviewed:   High-Calorie Food List and Snack Ideas  and Diet to Prevent Bowel Obstruction  Poor Appetite, Constipation , and Taste and Smell Changes  Nutrition During Your Cancer Treatment  Recipes for Shakes  With Lactose Free Dairy  and Recipes to Help With Nausea    Barriers to Learning: none identified  Patient and/ or Family Verbalizes understanding: yes      Nutrition Monitoring and Evaluation    Monitor: energy intake, diet tolerance , and weight    Goals:   SMART Goal 1: Adequate intake of calories and protein to promote weight gain   Indicator: Weight/BMI    SMART Goal 2: Adherence to nutrition recommendations for improved symptom management  Indicator: Diet Recall     Follow up Patient provided with dietitian contact number and advised to call with questions or make future appointment if further intervention is needed.    Communication to referring provider/care team: note available in chart  Signature: Lo Abarca, MPH, RD, LDN

## 2023-03-20 PROBLEM — R10.84 INTRACTABLE GENERALIZED ABDOMINAL PAIN: Status: ACTIVE | Noted: 2023-01-01

## 2023-03-20 NOTE — Clinical Note
Diagnosis: Intractable generalized abdominal pain [[id number]]   Future Attending Provider: DEMETRIA CLARKE [[id number]]   Admitting Provider:: DEMETRIA CLARKE [[id number]]

## 2023-03-20 NOTE — ASSESSMENT & PLAN NOTE
S/P chemotherapy 3/16/2023, Gemzar/Cisplatin.     --Daily CBC, CMP  --Antiemetics per MAR  --IV Fluids   --Pain medications per MAR  --Consult Hem/Onc

## 2023-03-20 NOTE — HPI
53 y.o. 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 Pt reports that he is using his prescribed Fentanyl, Dilaudid, and medical marijuana for his pain, but it is not providing any relief.  Symptoms are constant and moderate in severity. No mitigating or exacerbating factors reported. Associated sxs include nausea, a reduced appetite, constipation, and a mass to the lower mid-abdominal wall. Patient denies any fever, chills, V/D, CP, SOB, weakness, and all other sxs at this time. Pt takes Miralax and Mylanta daily. In the ED, CT abdomen: Small-bowel wall thickening and dilation with scattered air-fluid levels.  Is unclear if this relates to obstruction or infectious or inflammatory enteritis. WBC: 1.27, H/H: 12.1/36.4, Pl: 456, Alk Phos: 598, AST/ALT: 53/75. Patient treated with IV fluids, antiemetics, IV pain medication. Patient is a full code, surrogate decision maker is his spouse, Leticia Osullivan. Placed in observation under the care of Hospital Medicine for intractable N/V, Neutropenia.

## 2023-03-20 NOTE — H&P
[facility] - Emergency Dept.  [facility] Medicine  History & Physical    Patient Name: Guevara Osullivan II  MRN: [id number]  Patient Class: OP- Observation  Admission Date: 3/20/2023  Attending Physician: Altagracia Armstrong MD   Primary Care Provider: Dima Ch MD         Patient information was obtained from patient, spouse/SO, past medical records and ER records.     Subjective:     Principal Problem:Partial small bowel obstruction    Chief Complaint:   Chief Complaint   Patient presents with    Abdominal Pain     Pt abdominal cancer, receiving chemotherapy at the [facility]. pt of Dr. Shell Has multiple pain meds, but has breakthrough pain. + nausea.         HPI: 53 y.o. 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 Pt reports that he is using his prescribed Fentanyl, Dilaudid, and medical marijuana for his pain, but it is not providing any relief.  Symptoms are constant and moderate in severity. No mitigating or exacerbating factors reported. Associated sxs include nausea, a reduced appetite, constipation, and a mass to the lower mid-abdominal wall. Patient denies any fever, chills, V/D, CP, SOB, weakness, and all other sxs at this time. Pt takes Miralax and Mylanta daily. In the ED, CT abdomen: Small-bowel wall thickening and dilation with scattered air-fluid levels.  Is unclear if this relates to obstruction or infectious or inflammatory enteritis. WBC: 1.27, H/H: 12.1/36.4, Pl: 456, Alk Phos: 598, AST/ALT: 53/75. Patient treated with IV fluids, antiemetics, IV pain medication. Patient is a full code, surrogate decision maker is his spouse, Leticia Osullivan. Placed in observation under the care of [facility] Medicine for  intractable N/V, Neutropenia.       Past Medical History:   Diagnosis Date    Cancer     COLON CANCER 1995    Digestive disorder     Hypertension     Primary sclerosing cholangitis     Ulcerative colitis     s/p colectomy with J- pouch       Past Surgical History:   Procedure Laterality Date    ABDOMINAL WASHOUT N/A 1/18/2023    Procedure: LAVAGE, PERITONEAL,;  Surgeon: Onur Calvin Jr., MD;  Location: [facility] OR [address] [place] [address];  Service: General;  Laterality: N/A;    BIOPSY OF PERITONEUM N/A 1/18/2023    Procedure: BIOPSY, PERITONEUM;  Surgeon: Onur Calvin Jr., MD;  [facility];  Service: General;  Laterality: N/A;    COLON SURGERY      Colectomy with J- pouch    DIAGNOSTIC LAPAROSCOPY N/A 1/18/2023    Procedure: LAPAROSCOPY, DIAGNOSTIC WITH WASHING;  Surgeon: Onur Calvin Jr., MD;  Location: [facility] OR [address] [place] [address];  Service: General;  Laterality: N/A;    ENDOSCOPIC ULTRASOUND OF UPPER GASTROINTESTINAL TRACT N/A 10/14/2022    Procedure: ULTRASOUND, UPPER GI TRACT,;  Surgeon: Vince Teran MD;  Location: [facility];  Service: Endoscopy;  Laterality: N/A;  upper and linear    ENDOSCOPIC ULTRASOUND OF UPPER GASTROINTESTINAL TRACT N/A 12/13/2022    Procedure: ULTRASOUND, UPPER GI TRACT, ENDOSCOPIC;  Surgeon: Vince Teran MD;  Location: [facility];  Service: Endoscopy;  Laterality: N/A;    ERCP N/A 10/14/2022    Procedure: ERCP (ENDOSCOPIC RETROGRADE CHOLANGIOPANCREATOGRAPHY) with spyglass;  Surgeon: Vince Teran MD;  Location: [facility];  Service: Endoscopy;  Laterality: N/A;    ERCP N/A 12/13/2022    Procedure: ERCP (ENDOSCOPIC RETROGRADE CHOLANGIOPANCREATOGRAPHY);  Surgeon: Vince Teran MD;  Location: [facility];  Service: Endoscopy;  Laterality: N/A;    ERCP N/A 12/20/2022    Procedure: ERCP (ENDOSCOPIC RETROGRADE CHOLANGIOPANCREATOGRAPHY);  Surgeon: Vince Teran MD;  Location: [facility];  Service: Endoscopy;  Laterality: N/A;     ERCP N/A 1/4/2023    Procedure: ERCP (ENDOSCOPIC RETROGRADE CHOLANGIOPANCREATOGRAPHY);  Surgeon: Vince Teran MD;  Location: [facility];  Service: Endoscopy;  Laterality: N/A;  room 1    ERCP N/A 2/22/2023    Procedure: ERCP (ENDOSCOPIC RETROGRADE CHOLANGIOPANCREATOGRAPHY);  Surgeon: Vince Teran MD;  Location: [facility];  Service: Endoscopy;  Laterality: N/A;    ERCP N/A 2/23/2023    Procedure: ERCP (ENDOSCOPIC RETROGRADE CHOLANGIOPANCREATOGRAPHY);  Surgeon: Vince Teran MD;  Location: [facility];  Service: Endoscopy;  Laterality: N/A;    ESOPHAGOGASTRODUODENOSCOPY N/A 12/20/2022    Procedure: EGD (ESOPHAGOGASTRODUODENOSCOPY);  Surgeon: Vince Teran MD;  Location: [facility];  Service: Endoscopy;  Laterality: N/A;    ESOPHAGOGASTRODUODENOSCOPY N/A 1/4/2023    Procedure: EGD (ESOPHAGOGASTRODUODENOSCOPY);  Surgeon: Vince Teran MD;  Location: [facility];  Service: Endoscopy;  Laterality: N/A;    INSERTION OF TUNNELED CENTRAL VENOUS CATHETER (CVC) WITH SUBCUTANEOUS PORT Left 2/9/2023    Procedure: [id number];  Surgeon: Wojciech Martínez MD;  Location: [facility];  Service: General;  Laterality: Left;    POUCHOSCOPY         Review of patient's allergies indicates:   Allergen Reactions    Vicryl [sutures, polyglycolic acid] Other (See Comments)     Wound dehiscence after achilles sx    Ciprofloxacin Other (See Comments)     Pt previously had medication reaction; suffered achilles rupture     Meperidine Hives       No current facility-administered medications on file prior to encounter.     Current Outpatient Medications on File Prior to Encounter   Medication Sig    ALPRAZolam (XANAX) 0.25 MG tablet Take 1 tablet (0.25 mg total) by mouth 2 (two) times daily as needed for Anxiety.    dexAMETHasone (DECADRON) 4 MG Tab Take 2 tablets (8 mg total) by mouth once daily. Take as directed on days 2 and 3 of your chemotherapy cycle.    fentaNYL  (DURAGESIC) 75 mcg/hr Place 1 patch onto the skin every 72 hours. for 15 days    HYDROmorphone (DILAUDID) 4 MG tablet Take 1 tablet (4 mg total) by mouth every 4 (four) hours as needed for Pain. Max 5 doses per day    ondansetron (ZOFRAN-ODT) 8 MG TbDL Take 1 tablet (8 mg total) by mouth every 8 (eight) hours as needed (nausea/vomiting).    prochlorperazine (COMPAZINE) 5 MG tablet Take 1 tablet (5 mg total) by mouth every 6 (six) hours as needed for Nausea.    lactulose (CHRONULAC) 20 gram/30 mL Soln Take 30 mLs (20 g total) by mouth 3 (three) times daily.    LIDOcaine-prilocaine (EMLA) cream Apply to affected area once as directed     Family History       Problem Relation (Age of Onset)    Cancer Mother    Heart disease Paternal Uncle    Lung cancer Maternal Grandmother    Skin cancer Father    Testicular cancer Father          Tobacco Use    Smoking status: Never     Passive exposure: Never    Smokeless tobacco: Never   Substance and Sexual Activity    Alcohol use: Not Currently    Drug use: Yes     Types: Marijuana     Comment: medical marijuana    Sexual activity: Not on file     Review of Systems   Constitutional:  Positive for activity change, appetite change, chills, fatigue and unexpected weight change. Negative for diaphoresis and fever.   HENT:  Negative for congestion, hearing loss, nosebleeds, postnasal drip, rhinorrhea and trouble swallowing.    Eyes:  Negative for discharge and visual disturbance.   Respiratory:  Positive for shortness of breath. Negative for cough and chest tightness.    Cardiovascular:  Negative for chest pain, palpitations and leg swelling.   Gastrointestinal:  Positive for abdominal distention, abdominal pain, constipation and nausea. Negative for diarrhea and vomiting.   Endocrine: Negative for cold intolerance and heat intolerance.   Genitourinary:  Negative for difficulty urinating, dysuria, frequency and hematuria.   Musculoskeletal:  Positive for arthralgias, back  pain and myalgias. Negative for gait problem.   Skin: Negative.    Neurological:  Positive for dizziness, weakness, light-headedness and headaches.   Hematological:  Negative for adenopathy. Does not bruise/bleed easily.   Psychiatric/Behavioral:  Negative for agitation, behavioral problems and confusion. The patient is nervous/anxious.    Objective:     Vital Signs (Most Recent):  Temp: 97.8 °F (36.6 °C) (03/20/23 1430)  Pulse: 90 (03/20/23 1815)  Resp: 16 (03/20/23 1700)  BP: (!) 141/87 (03/20/23 1815)  SpO2: 97 % (03/20/23 1815)   Vital Signs (24h Range):  Temp:  [97.8 °F (36.6 °C)] 97.8 °F (36.6 °C)  Pulse:  [] 90  Resp:  [14-24] 16  SpO2:  [97 %-100 %] 97 %  BP: (120-141)/(78-87) 141/87     Weight: 65.2 kg (143 lb 10.1 oz)  Body mass index is 20.03 kg/m².    Physical Exam  Vitals and nursing note reviewed.   Constitutional:       General: He is not in acute distress.     Appearance: He is well-developed. He is cachectic. He is ill-appearing and toxic-appearing. He is not diaphoretic.   HENT:      Head: Normocephalic and atraumatic.      Right Ear: Hearing and external ear normal.      Left Ear: Hearing and external ear normal.      Nose: Nose normal. No mucosal edema or rhinorrhea.      Mouth/Throat:      Pharynx: Uvula midline.   Eyes:      General:         Right eye: No discharge.         Left eye: No discharge.      Conjunctiva/sclera: Conjunctivae normal.      Right eye: No chemosis.     Left eye: No chemosis.     Pupils: Pupils are equal, round, and reactive to light.   Neck:      Thyroid: No thyroid mass or thyromegaly.      Trachea: Trachea normal.   Cardiovascular:      Rate and Rhythm: Regular rhythm. Tachycardia present.      Pulses:           Dorsalis pedis pulses are 2+ on the right side and 2+ on the left side.      Heart sounds: Normal heart sounds. No murmur heard.  Pulmonary:      Effort: Pulmonary effort is normal. No respiratory distress.      Breath sounds: Normal breath sounds. No  decreased breath sounds or wheezing.   Abdominal:      General: Bowel sounds are decreased. There is no distension.      Palpations: Abdomen is soft.      Tenderness: There is no abdominal tenderness.   Musculoskeletal:         General: Normal range of motion.      Cervical back: Normal range of motion and neck supple.   Lymphadenopathy:      Cervical: No cervical adenopathy.      Upper Body:      Right upper body: No supraclavicular adenopathy.      Left upper body: No supraclavicular adenopathy.   Skin:     General: Skin is warm and dry.      Capillary Refill: Capillary refill takes less than 2 seconds.      Findings: No rash.   Neurological:      Mental Status: He is oriented to person, place, and time. He is lethargic.   Psychiatric:         Mood and Affect: Mood is depressed. Mood is not anxious.         Speech: Speech is delayed.         Behavior: Behavior normal.         Thought Content: Thought content normal.         Judgment: Judgment normal.         CRANIAL NERVES     CN III, IV, VI   Pupils are equal, round, and reactive to light.     Significant Labs: All pertinent labs within the past 24 hours have been reviewed.  CBC:   Recent Labs   Lab 03/20/23  1550   WBC 1.27*   HGB 12.1*   HCT 36.4*   *     CMP:   Recent Labs   Lab 03/20/23  1550   *   K 3.7   CL 93*   CO2 28   *   BUN 13   CREATININE 0.7   CALCIUM 9.2   PROT 7.5   ALBUMIN 3.2*   BILITOT 1.2*   ALKPHOS 598*   AST 53*   ALT 75*   ANIONGAP 13       Significant Imaging: I have reviewed all pertinent imaging results/findings within the past 24 hours.    Assessment/Plan:     * Partial small bowel obstruction  Secondary to malignancy, recurrent, will manage conservatively for now due to recurrent nature and Hx of improvement with conservative mearsures, if no improvement will consider General Surgery or GI Consult.     --NPO  --Antiemetics  --Pain meds per MAR          Intractable generalized abdominal pain  Secondary to advanced  malignancy, followed by Palliative Med    --Consult Palliative medicine  --Pain medications per MAR  --Will be due for fentanyl patch change on 3/21/23  --Continue Miralax BID        Immunodeficiency due to chemotherapy  Secondary to chemotherapy, WBC: 1.27 on admission, reported Chills, diaphoresis and rigors overnight.     --Daily CBC, CMP  --Prophylactic Abx- cefepime  --IV fluids  --Neutropenic precautions      Cachexia  Secondary to advanced malignancy    --Resume Diet when appropriate, if >24H recommend TPN      Metastatic adenocarcinoma  S/P chemotherapy 3/16/2023, Gemzar/Cisplatin.     --Daily CBC, CMP  --Antiemetics per MAR  --IV Fluids   --Pain medications per MAR  --Consult Hem/Onc        VTE Risk Mitigation (From admission, onward)         Ordered     enoxaparin injection 40 mg  Daily         03/20/23 1825     IP VTE HIGH RISK PATIENT  Once         03/20/23 1825     Place sequential compression device  Until discontinued         03/20/23 1825     Place sequential compression device  Until discontinued         03/20/23 1812                     On 03/20/2023, patient should be placed in hospital observation services under my care in collaboration with Altagracia Armstrong MD.      Shahida Naik NP  Department of Hospital Medicine  [facility] - Emergency Dept.

## 2023-03-20 NOTE — PHARMACY MED REC
"Admission Medication History     The home medication history was taken by Femi Bryant.    You may go to "Admission" then "Reconcile Home Medications" tabs to review and/or act upon these items.     The home medication list has been updated by the Pharmacy department.   Please read ALL comments highlighted in yellow.   Please address this information as you see fit.    Feel free to contact us if you have any questions or require assistance.      Medications listed below were obtained from: Analytic software- [employer] and Medical records  (Not in a hospital admission)      Femi Bryant  [id number]    Current Outpatient Medications on File Prior to Encounter   Medication Sig Dispense Refill Last Dose    ALPRAZolam (XANAX) 0.25 MG tablet Take 1 tablet (0.25 mg total) by mouth 2 (two) times daily as needed for Anxiety. 60 tablet 1 3/20/2023    HYDROmorphone (DILAUDID) 4 MG tablet Take 1 tablet (4 mg total) by mouth every 4 (four) hours as needed for Pain. Max 5 doses per day 120 tablet 0 3/20/2023 at 2:00PM    ondansetron (ZOFRAN-ODT) 8 MG TbDL Take 1 tablet (8 mg total) by mouth every 8 (eight) hours as needed (nausea/vomiting). 60 tablet 5 3/20/2023    dexAMETHasone (DECADRON) 4 MG Tab Take 2 tablets (8 mg total) by mouth once daily. Take as directed on days 2 and 3 of your chemotherapy cycle. 24 tablet 5     fentaNYL (DURAGESIC) 75 mcg/hr Place 1 patch onto the skin every 72 hours. for 15 days 5 patch 0 3/18/2023 at 7:00PM    lactulose (CHRONULAC) 20 gram/30 mL Soln Take 30 mLs (20 g total) by mouth 3 (three) times daily. 600 mL 2     LIDOcaine-prilocaine (EMLA) cream Apply to affected area once as directed 5 g 11     prochlorperazine (COMPAZINE) 5 MG tablet Take 1 tablet (5 mg total) by mouth every 6 (six) hours as needed for Nausea. 20 tablet 11                            .        "

## 2023-03-20 NOTE — ASSESSMENT & PLAN NOTE
Secondary to advanced malignancy, followed by Palliative Med    --Consult Palliative medicine  --Pain medications per MAR  --Will be due for fentanyl patch change on 3/21/23  --Continue Miralax BID

## 2023-03-20 NOTE — ASSESSMENT & PLAN NOTE
Secondary to malignancy, recurrent, will manage conservatively for now due to recurrent nature and Hx of improvement with conservative mearsures, if no improvement will consider General Surgery or GI Consult.     --NPO  --Antiemetics  --Pain meds per MAR

## 2023-03-20 NOTE — PROGRESS NOTES
Received call from pt wife about pt pain, more than usual describes it as burning in his abdomen. States bowel mvmt is unformed and feels bumps mid abdominal area and wife states she felt golf ball size knot at the bottom of his stomach. Both are concerned if it's cancer growing or stool that can't pass. Told him will discuss with [facility] and call them back, they are hesitant about going to ED. Discussed with oncology PA and GI NP, both recommend pt got to ED for further eval. Called pt wife back, pt on speaker to let them know both providers recs and they are agreeable to go to [facility] ED, would like Dr. Shell and Abby with palliative to know and ask if they would let ED know they are coming would like to avoid the experience they had last time they went to [facility] ED. Notified ANGELICA ROOT and KT, NP via secure chat. Dr. Castellano is GI on call. PA states she will notify Dr. Shell. Pt wife states she will let me know when at ED.   Oncology Navigation   Intake  Date of Diagnosis:  (negative path)  Cancer Type: GI  Internal / External Referral: Internal  Date of Referral: 01/19/23  Initial Nurse Navigator Contact: 01/19/23  Referral to Initial Contact Timeline (days): 0  Date Worked: 03/20/23  First Appointment Available: 01/06/23  Appointment Date: 01/09/23  First Available Date vs. Scheduled Date (days): 3  Reason for Treatment Delay: -- (seeking 2nd opinion @ [facility])     Treatment  Current Status: Staging work-up  Date Presented to Tumor Board: 02/03/23    Surgical Oncologist: Nikunj  Consult Date: 01/09/23    Medical Oncologist: Dr. Shell  Consult Date: 01/31/23  Chemotherapy: Planned  Chemotherapy Regimen: Gemcitabine/Cisplatin       Procedures: Genetic test  Biopsy Schedule Date: 12/20/22  CT Schedule Date: 02/02/23  EUS / EGD: EUS- 12/13/2022  Genetic Testing Date Sent: 01/31/23  MRI Schedule Date: 01/03/23  Port / PICC Schedule Date: 02/09/23             Support Systems: Family members     Acuity  Stage: 2  Systemic  Treatment - predicted or initiated: Chemotherapy Regimen with Multiple drugs (+1)  ECO  Comorbidities in Medical History: 2  Support: 0  Transportation: 0  Verbalizes the need for more education: 1  Navigation Acuity: 0     Follow Up  Follow up in 2 days (on 3/22/2023).

## 2023-03-20 NOTE — ED NOTES
Urinalysis accidentally collected. Urine still needed for urinalysis. Primary nurse, Jazmine, made aware.

## 2023-03-20 NOTE — SUBJECTIVE & OBJECTIVE
Past Medical History:   Diagnosis Date    Cancer     COLON CANCER 1995    Digestive disorder     Hypertension     Primary sclerosing cholangitis     Ulcerative colitis     s/p colectomy with J- pouch       Past Surgical History:   Procedure Laterality Date    ABDOMINAL WASHOUT N/A 1/18/2023    Procedure: LAVAGE, PERITONEAL,;  Surgeon: Onur Calvin Jr., MD;  [facility];  Service: General;  Laterality: N/A;    BIOPSY OF PERITONEUM N/A 1/18/2023    Procedure: BIOPSY, PERITONEUM;  Surgeon: Onur Calvin Jr., MD;  [facility];  Service: General;  Laterality: N/A;    COLON SURGERY      Colectomy with J- pouch    DIAGNOSTIC LAPAROSCOPY N/A 1/18/2023    Procedure: LAPAROSCOPY, DIAGNOSTIC WITH WASHING;  Surgeon: Onur Calvin Jr., MD;  [facility];  Service: General;  Laterality: N/A;    ENDOSCOPIC ULTRASOUND OF UPPER GASTROINTESTINAL TRACT N/A 10/14/2022    Procedure: ULTRASOUND, UPPER GI TRACT,;  Surgeon: Vince Teran MD;  Location: [facility];  Service: Endoscopy;  Laterality: N/A;  upper and linear    ENDOSCOPIC ULTRASOUND OF UPPER GASTROINTESTINAL TRACT N/A 12/13/2022    Procedure: ULTRASOUND, UPPER GI TRACT, ENDOSCOPIC;  Surgeon: Vince Teran MD;  Location: [facility];  Service: Endoscopy;  Laterality: N/A;    ERCP N/A 10/14/2022    Procedure: ERCP (ENDOSCOPIC RETROGRADE CHOLANGIOPANCREATOGRAPHY) with spyglass;  Surgeon: Vince Teran MD;  Location: [facility];  Service: Endoscopy;  Laterality: N/A;    ERCP N/A 12/13/2022    Procedure: ERCP (ENDOSCOPIC RETROGRADE CHOLANGIOPANCREATOGRAPHY);  Surgeon: Vince Teran MD;  Location: [facility];  Service: Endoscopy;  Laterality: N/A;    ERCP N/A 12/20/2022    Procedure: ERCP (ENDOSCOPIC RETROGRADE CHOLANGIOPANCREATOGRAPHY);  Surgeon: Vince Teran MD;  Location: [facility];  Service: Endoscopy;  Laterality: N/A;    ERCP N/A 1/4/2023    Procedure: ERCP (ENDOSCOPIC  RETROGRADE CHOLANGIOPANCREATOGRAPHY);  Surgeon: Vince Teran MD;  Location: [facility];  Service: Endoscopy;  Laterality: N/A;  room 1    ERCP N/A 2/22/2023    Procedure: ERCP (ENDOSCOPIC RETROGRADE CHOLANGIOPANCREATOGRAPHY);  Surgeon: Vince Teran MD;  Location: [facility];  Service: Endoscopy;  Laterality: N/A;    ERCP N/A 2/23/2023    Procedure: ERCP (ENDOSCOPIC RETROGRADE CHOLANGIOPANCREATOGRAPHY);  Surgeon: Vince Teran MD;  Location: [facility];  Service: Endoscopy;  Laterality: N/A;    ESOPHAGOGASTRODUODENOSCOPY N/A 12/20/2022    Procedure: EGD (ESOPHAGOGASTRODUODENOSCOPY);  Surgeon: Vince Teran MD;  Location: [facility];  Service: Endoscopy;  Laterality: N/A;    ESOPHAGOGASTRODUODENOSCOPY N/A 1/4/2023    Procedure: EGD (ESOPHAGOGASTRODUODENOSCOPY);  Surgeon: Vince Teran MD;  Location: [facility];  Service: Endoscopy;  Laterality: N/A;    INSERTION OF TUNNELED CENTRAL VENOUS CATHETER (CVC) WITH SUBCUTANEOUS PORT Left 2/9/2023    Procedure: [id number];  Surgeon: Wojciech Martínez MD;  Location: [facility];  Service: General;  Laterality: Left;    POUCHOSCOPY         Review of patient's allergies indicates:   Allergen Reactions    Vicryl [sutures, polyglycolic acid] Other (See Comments)     Wound dehiscence after achilles sx    Ciprofloxacin Other (See Comments)     Pt previously had medication reaction; suffered achilles rupture     Meperidine Hives       No current facility-administered medications on file prior to encounter.     Current Outpatient Medications on File Prior to Encounter   Medication Sig    ALPRAZolam (XANAX) 0.25 MG tablet Take 1 tablet (0.25 mg total) by mouth 2 (two) times daily as needed for Anxiety.    dexAMETHasone (DECADRON) 4 MG Tab Take 2 tablets (8 mg total) by mouth once daily. Take as directed on days 2 and 3 of your chemotherapy cycle.    fentaNYL (DURAGESIC) 75 mcg/hr Place 1 patch onto the skin every 72 hours. for  15 days    HYDROmorphone (DILAUDID) 4 MG tablet Take 1 tablet (4 mg total) by mouth every 4 (four) hours as needed for Pain. Max 5 doses per day    ondansetron (ZOFRAN-ODT) 8 MG TbDL Take 1 tablet (8 mg total) by mouth every 8 (eight) hours as needed (nausea/vomiting).    prochlorperazine (COMPAZINE) 5 MG tablet Take 1 tablet (5 mg total) by mouth every 6 (six) hours as needed for Nausea.    lactulose (CHRONULAC) 20 gram/30 mL Soln Take 30 mLs (20 g total) by mouth 3 (three) times daily.    LIDOcaine-prilocaine (EMLA) cream Apply to affected area once as directed     Family History       Problem Relation (Age of Onset)    Cancer Mother    Heart disease Paternal Uncle    Lung cancer Maternal Grandmother    Skin cancer Father    Testicular cancer Father          Tobacco Use    Smoking status: Never     Passive exposure: Never    Smokeless tobacco: Never   Substance and Sexual Activity    Alcohol use: Not Currently    Drug use: Yes     Types: Marijuana     Comment: medical marijuana    Sexual activity: Not on file     Review of Systems   Constitutional:  Positive for activity change, appetite change, chills, fatigue and unexpected weight change. Negative for diaphoresis and fever.   HENT:  Negative for congestion, hearing loss, nosebleeds, postnasal drip, rhinorrhea and trouble swallowing.    Eyes:  Negative for discharge and visual disturbance.   Respiratory:  Positive for shortness of breath. Negative for cough and chest tightness.    Cardiovascular:  Negative for chest pain, palpitations and leg swelling.   Gastrointestinal:  Positive for abdominal distention, abdominal pain, constipation and nausea. Negative for diarrhea and vomiting.   Endocrine: Negative for cold intolerance and heat intolerance.   Genitourinary:  Negative for difficulty urinating, dysuria, frequency and hematuria.   Musculoskeletal:  Positive for arthralgias, back pain and myalgias. Negative for gait problem.   Skin: Negative.    Neurological:   Positive for dizziness, weakness, light-headedness and headaches.   Hematological:  Negative for adenopathy. Does not bruise/bleed easily.   Psychiatric/Behavioral:  Negative for agitation, behavioral problems and confusion. The patient is nervous/anxious.    Objective:     Vital Signs (Most Recent):  Temp: 97.8 °F (36.6 °C) (03/20/23 1430)  Pulse: 90 (03/20/23 1815)  Resp: 16 (03/20/23 1700)  BP: (!) 141/87 (03/20/23 1815)  SpO2: 97 % (03/20/23 1815)   Vital Signs (24h Range):  Temp:  [97.8 °F (36.6 °C)] 97.8 °F (36.6 °C)  Pulse:  [] 90  Resp:  [14-24] 16  SpO2:  [97 %-100 %] 97 %  BP: (120-141)/(78-87) 141/87     Weight: 65.2 kg (143 lb 10.1 oz)  Body mass index is 20.03 kg/m².    Physical Exam  Vitals and nursing note reviewed.   Constitutional:       General: He is not in acute distress.     Appearance: He is well-developed. He is cachectic. He is ill-appearing and toxic-appearing. He is not diaphoretic.   HENT:      Head: Normocephalic and atraumatic.      Right Ear: Hearing and external ear normal.      Left Ear: Hearing and external ear normal.      Nose: Nose normal. No mucosal edema or rhinorrhea.      Mouth/Throat:      Pharynx: Uvula midline.   Eyes:      General:         Right eye: No discharge.         Left eye: No discharge.      Conjunctiva/sclera: Conjunctivae normal.      Right eye: No chemosis.     Left eye: No chemosis.     Pupils: Pupils are equal, round, and reactive to light.   Neck:      Thyroid: No thyroid mass or thyromegaly.      Trachea: Trachea normal.   Cardiovascular:      Rate and Rhythm: Regular rhythm. Tachycardia present.      Pulses:           Dorsalis pedis pulses are 2+ on the right side and 2+ on the left side.      Heart sounds: Normal heart sounds. No murmur heard.  Pulmonary:      Effort: Pulmonary effort is normal. No respiratory distress.      Breath sounds: Normal breath sounds. No decreased breath sounds or wheezing.   Abdominal:      General: Bowel sounds are  decreased. There is no distension.      Palpations: Abdomen is soft.      Tenderness: There is no abdominal tenderness.   Musculoskeletal:         General: Normal range of motion.      Cervical back: Normal range of motion and neck supple.   Lymphadenopathy:      Cervical: No cervical adenopathy.      Upper Body:      Right upper body: No supraclavicular adenopathy.      Left upper body: No supraclavicular adenopathy.   Skin:     General: Skin is warm and dry.      Capillary Refill: Capillary refill takes less than 2 seconds.      Findings: No rash.   Neurological:      Mental Status: He is oriented to person, place, and time. He is lethargic.   Psychiatric:         Mood and Affect: Mood is depressed. Mood is not anxious.         Speech: Speech is delayed.         Behavior: Behavior normal.         Thought Content: Thought content normal.         Judgment: Judgment normal.         CRANIAL NERVES     CN III, IV, VI   Pupils are equal, round, and reactive to light.     Significant Labs: All pertinent labs within the past 24 hours have been reviewed.  CBC:   Recent Labs   Lab 03/20/23  1550   WBC 1.27*   HGB 12.1*   HCT 36.4*   *     CMP:   Recent Labs   Lab 03/20/23  1550   *   K 3.7   CL 93*   CO2 28   *   BUN 13   CREATININE 0.7   CALCIUM 9.2   PROT 7.5   ALBUMIN 3.2*   BILITOT 1.2*   ALKPHOS 598*   AST 53*   ALT 75*   ANIONGAP 13       Significant Imaging: I have reviewed all pertinent imaging results/findings within the past 24 hours.

## 2023-03-20 NOTE — ED PROVIDER NOTES
SCRIBE #1 NOTE: I, Dianne Armstrong, am scribing for, and in the presence of, Natalie Mahnoey MD. I have scribed the entire note.      History      Chief Complaint   Patient presents with    Abdominal Pain     Pt abdominal cancer, receiving chemotherapy at the [place]. pt of Dr. Shell Has multiple pain meds, but has breakthrough pain. + nausea.        Review of patient's allergies indicates:   Allergen Reactions    Vicryl [sutures, polyglycolic acid] Other (See Comments)     Wound dehiscence after achilles sx    Ciprofloxacin Other (See Comments)     Pt previously had medication reaction; suffered achilles rupture     Meperidine Hives        HPI   HPI    3/20/2023, 3:25 PM   History obtained from the patient      History of Present Illness: Guevara Osullivan II is a 53 y.o. 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 Pt reports that he is using his prescribed Fentanyl, Dilaudid, and medical marijuana for his pain, but it is not providing any relief.  Symptoms are constant and moderate in severity. No mitigating or exacerbating factors reported. Associated sxs include nausea, a reduced appetite, constipation, and a mass to the lower mid-abdominal wall. Patient denies any fever, chills, V/D, CP, SOB, weakness, and all other sxs at this time. Pt takes Murelax and Mylanta daily. No further complaints or concerns at this time.         Arrival mode: Personal vehicle      PCP: Dima Ch MD       Past Medical History:  Past Medical History:   Diagnosis Date    Cancer     COLON CANCER 1995    Digestive disorder     Hypertension     Primary sclerosing cholangitis     Ulcerative colitis     s/p colectomy with J- pouch        Past Surgical History:  Past Surgical History:   Procedure Laterality Date    ABDOMINAL WASHOUT N/A 1/18/2023    Procedure: LAVAGE, PERITONEAL,;  Surgeon: nOur Calvin Jr., MD;  Location: [facility] OR 2ND FLR;  Service: General;  Laterality: N/A;    BIOPSY OF PERITONEUM N/A 1/18/2023    Procedure: BIOPSY, PERITONEUM;  Surgeon: Onur Calvin Jr., MD;  Location: [facility] OR 2ND FLR;  Service: General;  Laterality: N/A;    COLON SURGERY      Colectomy with J- pouch    DIAGNOSTIC LAPAROSCOPY N/A 1/18/2023    Procedure: LAPAROSCOPY, DIAGNOSTIC WITH WASHING;  Surgeon: Onur Calvin Jr., MD;  Location: [facility] OR 2ND FLR;  Service: General;  Laterality: N/A;    ENDOSCOPIC ULTRASOUND OF UPPER GASTROINTESTINAL TRACT N/A 10/14/2022    Procedure: ULTRASOUND, UPPER GI TRACT,;  Surgeon: Vince Teran MD;  Location: [facility];  Service: Endoscopy;  Laterality: N/A;  upper and linear    ENDOSCOPIC ULTRASOUND OF UPPER GASTROINTESTINAL TRACT N/A 12/13/2022    Procedure: ULTRASOUND, UPPER GI TRACT, ENDOSCOPIC;  Surgeon: Vince Teran MD;  Location: [facility];  Service: Endoscopy;  Laterality: N/A;    ERCP N/A 10/14/2022    Procedure: ERCP (ENDOSCOPIC RETROGRADE CHOLANGIOPANCREATOGRAPHY) with spyglass;  Surgeon: Vince Teran MD;  Location: [facility];  Service: Endoscopy;  Laterality: N/A;    ERCP N/A 12/13/2022    Procedure: ERCP (ENDOSCOPIC RETROGRADE CHOLANGIOPANCREATOGRAPHY);  Surgeon: Vince Teran MD;  Location: [facility];  Service: Endoscopy;  Laterality: N/A;    ERCP N/A 12/20/2022    Procedure: ERCP (ENDOSCOPIC RETROGRADE CHOLANGIOPANCREATOGRAPHY);  Surgeon: Vince Teran MD;  Location: [facility];  Service: Endoscopy;  Laterality: N/A;    ERCP N/A 1/4/2023    Procedure: ERCP (ENDOSCOPIC RETROGRADE CHOLANGIOPANCREATOGRAPHY);  Surgeon: Vince Teran MD;  Location: [facility];  Service: Endoscopy;  Laterality: N/A;  room 1    ERCP N/A 2/22/2023     Procedure: ERCP (ENDOSCOPIC RETROGRADE CHOLANGIOPANCREATOGRAPHY);  Surgeon: Vince Teran MD;  Location: [facility] ENDO;  Service: Endoscopy;  Laterality: N/A;    ERCP N/A 2/23/2023    Procedure: ERCP (ENDOSCOPIC RETROGRADE CHOLANGIOPANCREATOGRAPHY);  Surgeon: Vince Teran MD;  Location: [facility];  Service: Endoscopy;  Laterality: N/A;    ESOPHAGOGASTRODUODENOSCOPY N/A 12/20/2022    Procedure: EGD (ESOPHAGOGASTRODUODENOSCOPY);  Surgeon: Vince Teran MD;  Location: [facility];  Service: Endoscopy;  Laterality: N/A;    ESOPHAGOGASTRODUODENOSCOPY N/A 1/4/2023    Procedure: EGD (ESOPHAGOGASTRODUODENOSCOPY);  Surgeon: Vince Teran MD;  Location: [facility];  Service: Endoscopy;  Laterality: N/A;    INSERTION OF TUNNELED CENTRAL VENOUS CATHETER (CVC) WITH SUBCUTANEOUS PORT Left 2/9/2023    Procedure: [id number]-EOYN-Y-CATH;  Surgeon: Wojciech Martínez MD;  Location[facility];  Service: General;  Laterality: Left;    POUCHOSCOPY           Family History:  Family History   Problem Relation Age of Onset    Cancer Mother         GYN?    Testicular cancer Father     Skin cancer Father     Lung cancer Maternal Grandmother         +smoking hx    Heart disease Paternal Uncle        Social History:  Social History     Tobacco Use    Smoking status: Never     Passive exposure: Never    Smokeless tobacco: Never   Substance and Sexual Activity    Alcohol use: Not Currently    Drug use: Yes     Types: Marijuana     Comment: medical marijuana    Sexual activity: Not on file       ROS   Review of Systems   Constitutional:  Positive for appetite change (reduced). Negative for chills and fever.   HENT:  Negative for sore throat.    Respiratory:  Negative for shortness of breath.    Cardiovascular:  Negative for chest pain.   Gastrointestinal:  Positive for abdominal pain (chronic, generalized), constipation and nausea. Negative for diarrhea and vomiting.        (+) mass to the lower mid-abdominal  wall     Genitourinary:  Negative for dysuria.   Musculoskeletal:  Negative for back pain.   Skin:  Negative for rash.   Neurological:  Negative for weakness.   Hematological:  Does not bruise/bleed easily.   All other systems reviewed and are negative.    Physical Exam      Initial Vitals [03/20/23 1430]   BP Pulse Resp Temp SpO2   120/83 (!) 126 (!) 24 97.8 °F (36.6 °C) 100 %      MAP       --          Physical Exam  Nursing Notes and Vital Signs Reviewed.  Constitutional: Patient is in mild distress. Chronically ill appearing.  Head: Atraumatic. Normocephalic.  Eyes: PERRL. EOM intact. Conjunctivae are not pale. No scleral icterus.  ENT: Mucous membranes are moist. Oropharynx is clear and symmetric.    Neck: Supple. Full ROM. No lymphadenopathy.  Cardiovascular: Tachycardic rate. Regular rhythm. No murmurs, rubs, or gallops. Distal pulses are 2+ and symmetric.  Pulmonary/Chest: No respiratory distress. Clear to auscultation bilaterally. No wheezing or rales.  Abdominal: Soft and non-distended.  There is generalized abdominal tenderness. There is what feels like a palpable mass to the mid-lower abdominal wall.  No rebound, guarding, or rigidity.   Musculoskeletal: Moves all extremities. No obvious deformities. No edema.  Skin: Warm and dry.  Neurological:  Alert, awake, and appropriate.  Normal speech.  No acute focal neurological deficits are appreciated.  Psychiatric: Anxious. Good eye contact. Appropriate in content.    ED Course    Procedures  ED Vital Signs:  Vitals:    03/20/23 1430 03/20/23 1554 03/20/23 1600 03/20/23 1630   BP: 120/83  124/80 123/78   Pulse: (!) 126  93 90   Resp: (!) 24 20 20 14   Temp: 97.8 °F (36.6 °C)      TempSrc: Oral      SpO2: 100%  97% 97%   Weight: 65.2 kg (143 lb 10.1 oz)       03/20/23 1700   BP: 123/80   Pulse: 87   Resp: 16   Temp:    TempSrc:    SpO2: 98%   Weight:        Abnormal Lab Results:  Labs Reviewed   CBC W/ AUTO DIFFERENTIAL - Abnormal; Notable for the following  components:       Result Value    WBC 1.27 (*)     RBC 4.04 (*)     Hemoglobin 12.1 (*)     Hematocrit 36.4 (*)     RDW 15.3 (*)     Platelets 456 (*)     MPV 8.1 (*)     Gran % 76.0 (*)     Mono % 1.0 (*)     All other components within normal limits    Narrative:     WBC  critical result(s) called and verbal readback obtained from LEDA QUIROS RN by RA2 03/20/2023 16:12   COMPREHENSIVE METABOLIC PANEL - Abnormal; Notable for the following components:    Sodium 134 (*)     Chloride 93 (*)     Glucose 118 (*)     Albumin 3.2 (*)     Total Bilirubin 1.2 (*)     Alkaline Phosphatase 598 (*)     AST 53 (*)     ALT 75 (*)     All other components within normal limits   CK - Abnormal; Notable for the following components:    CPK 13 (*)     All other components within normal limits   LIPASE   MAGNESIUM   URINALYSIS, REFLEX TO URINE CULTURE        All Lab Results:  Results for orders placed or performed during the hospital encounter of 03/20/23   CBC auto differential   Result Value Ref Range    WBC 1.27 (LL) 3.90 - 12.70 K/uL    RBC 4.04 (L) 4.60 - 6.20 M/uL    Hemoglobin 12.1 (L) 14.0 - 18.0 g/dL    Hematocrit 36.4 (L) 40.0 - 54.0 %    MCV 90 82 - 98 fL    MCH 30.0 27.0 - 31.0 pg    MCHC 33.2 32.0 - 36.0 g/dL    RDW 15.3 (H) 11.5 - 14.5 %    Platelets 456 (H) 150 - 450 K/uL    MPV 8.1 (L) 9.2 - 12.9 fL    Immature Granulocytes CANCELED 0.0 - 0.5 %    Immature Grans (Abs) CANCELED 0.00 - 0.04 K/uL    nRBC 0 0 /100 WBC    Gran % 76.0 (H) 38.0 - 73.0 %    Lymph % 20.0 18.0 - 48.0 %    Mono % 1.0 (L) 4.0 - 15.0 %    Eosinophil % 0.0 0.0 - 8.0 %    Basophil % 1.0 0.0 - 1.9 %    Bands 2.0 %    Platelet Estimate Appears normal     Differential Method Manual    Comprehensive metabolic panel   Result Value Ref Range    Sodium 134 (L) 136 - 145 mmol/L    Potassium 3.7 3.5 - 5.1 mmol/L    Chloride 93 (L) 95 - 110 mmol/L    CO2 28 23 - 29 mmol/L    Glucose 118 (H) 70 - 110 mg/dL    BUN 13 6 - 20 mg/dL    Creatinine 0.7 0.5 - 1.4  mg/dL    Calcium 9.2 8.7 - 10.5 mg/dL    Total Protein 7.5 6.0 - 8.4 g/dL    Albumin 3.2 (L) 3.5 - 5.2 g/dL    Total Bilirubin 1.2 (H) 0.1 - 1.0 mg/dL    Alkaline Phosphatase 598 (H) 55 - 135 U/L    AST 53 (H) 10 - 40 U/L    ALT 75 (H) 10 - 44 U/L    Anion Gap 13 8 - 16 mmol/L    eGFR >60 >60 mL/min/1.73 m^2   Lipase   Result Value Ref Range    Lipase 4 4 - 60 U/L   CPK   Result Value Ref Range    CPK 13 (L) 20 - 200 U/L   Magnesium   Result Value Ref Range    Magnesium 2.0 1.6 - 2.6 mg/dL         Imaging Results:  Imaging Results               CT Abdomen Pelvis With Contrast (Final result)  Result time 03/20/23 17:39:40      Final result by Satinder Moy MD (03/20/23 17:39:40)                   Impression:      Small-bowel wall thickening and dilation with scattered air-fluid levels.  Is unclear if this relates to obstruction or infectious or inflammatory enteritis.  Correlation is advised.  Further evaluation as warranted.    Darrel hepatis soft tissue mass similar to the prior exam.  Intra-abdominal soft tissue nodules and lymphadenopathy stable from the prior.    Cyst gastrostomy tube in place.    Increased abdominal free fluid in comparison to the prior exam, of unclear etiology.    This report was flagged in Epic as abnormal.    All CT scans at this facility are performed  using dose modulation techniques as appropriate to performed exam including the following:  automated exposure control; adjustment of mA and/or kV according to the patients size (this includes techniques or standardized protocols for targeted exams where dose is matched to indication/reason for exam: i.e. extremities or head);  iterative reconstruction technique.      Electronically signed by: Satinder Moy  Date:    03/20/2023  Time:    17:39               Narrative:    EXAMINATION:  CT ABDOMEN PELVIS WITH CONTRAST    CLINICAL HISTORY:  Bowel obstruction suspected;    TECHNIQUE:  Low dose axial images, sagittal and coronal reformations  were obtained from the lung bases to the pubic symphysis.  Contrast was not administered.    COMPARISON:  Multiple priors.    FINDINGS:  Heart: Normal in size. No pericardial effusion.    Lung Bases: Well aerated, without consolidation or pleural fluid.    Liver: Soft tissue mass at the tameka hepatis.    Gallbladder: Gallbladder distended.    Bile Ducts: Biliary stent in place.  Mild intrahepatic biliary ductal dilation.    Pancreas: Decreased size of the peripancreatic fluid collection in comparison the prior exam with minimal residual lumen identified.    Spleen: Borderline splenomegaly.    Adrenals: Unremarkable.    Kidneys/ Ureters: Unremarkable.    Bladder: No evidence of wall thickening.    Reproductive organs: Unremarkable.    GI Tract/Mesentery: Cyst gastrostomy tube in place.  Prior bowel anastomosis.    Small bowel dilation with mild wall thickening.  Air-fluid levels less than expected in comparison the prior abdominal radiograph.  Obstruction or ileus not excluded.  Infectious or inflammatory enteritis could appear similarly.    Peritoneal Space: Multiple enlarged lymph nodes and soft tissue nodules throughout the abdomen pelvis similar to the prior.  Small volume free fluid about the liver and left upper quadrant increased from the prior exam.    Abdominal wall: Unremarkable.    Vasculature: No significant atherosclerosis or aneurysm.    Bones: No acute fracture.                                       X-Ray Abdomen Flat And Erect (Final result)  Result time 03/20/23 15:35:28      Final result by ARNOLDO Delgado Sr., MD (03/20/23 15:35:28)                   Impression:      1. There is a prominent amount of gas within the gastrointestinal system.  There is a dilated loop of small bowel on the right side of the abdomen in the expected location of the ileum.  It has a diameter of 4.6 cm.  This is characteristic of a small bowel obstruction.  2. There are radiopaque tube projected over both sides of the  abdomen.  3. There are surgical clips projected over the pelvis.      Electronically signed by: Blas Delgado MD  Date:    03/20/2023  Time:    15:35               Narrative:    EXAMINATION:  XR ABDOMEN FLAT AND ERECT    CLINICAL HISTORY:  Constipation, unspecified    COMPARISON:  03/09/2023    FINDINGS:  There is a prominent amount of gas within the gastrointestinal system.  There is a dilated loop of small bowel on the right side of the abdomen in the expected location of the ileum.  It has a diameter of 4.6 cm.  There are radiopaque tube projected over both sides of the abdomen.  There are surgical clips projected over the pelvis.  There is no pneumoperitoneum. The bony structures appear intact.                                     The EKG was ordered, reviewed, and independently interpreted by the ED provider.  Interpretation time: 15:44  Rate: 102 BPM  Rhythm: sinus tachycardia  Interpretation: Right atrial enlargement. T wave abnormality, consider inferior ischemia. No STEMI.         The Emergency Provider reviewed the vital signs and test results, which are outlined above.    ED Discussion     6:07 PM: Discussed case with Dr. Armstrong ([facility]). Dr. Armstrong agrees with current care and management of pt and accepts admission.   Admitting Service: Hospital Medicine  Admitting Physician: Dr. Armstrong  Admit to: Obs Med Tele    6:08 PM: Re-evaluated pt. I have discussed test results, shared treatment plan, and the need for admission with patient and family at bedside. Pt and family express understanding at this time and agree with all information. All questions answered. Pt and family have no further questions or concerns at this time. Pt is ready for admit.           ED Medication(s):  Medications   sodium chloride 0.9% flush 10 mL (has no administration in time range)   glucagon (human recombinant) injection 1 mg (has no administration in time range)   dextrose 10% bolus 125 mL 125 mL (has no  administration in time range)   dextrose 10% bolus 250 mL 250 mL (has no administration in time range)   dextrose 40 % gel 15,000 mg (has no administration in time range)   dextrose 40 % gel 30,000 mg (has no administration in time range)   ondansetron injection 4 mg (has no administration in time range)   aluminum-magnesium hydroxide-simethicone 200-200-20 mg/5 mL suspension 30 mL (has no administration in time range)   prochlorperazine injection Soln 5 mg (has no administration in time range)   HYDROmorphone (PF) injection 2 mg (has no administration in time range)   ALPRAZolam tablet 0.25 mg (has no administration in time range)   fentaNYL 75 mcg/hr 1 patch (has no administration in time range)   naloxone 0.4 mg/mL injection 0.02 mg (has no administration in time range)   enoxaparin injection 40 mg (has no administration in time range)   acetaminophen tablet 650 mg (has no administration in time range)   polyethylene glycol packet 17 g (has no administration in time range)   simethicone chewable tablet 80 mg (has no administration in time range)   HYDROmorphone (PF) injection 1 mg (1 mg Intravenous Given 3/20/23 1554)   promethazine (PHENERGAN) 25 mg in dextrose 5 % (D5W) 50 mL IVPB (0 mg Intravenous Stopped 3/20/23 1615)   sodium chloride 0.9% bolus 1,000 mL 1,000 mL (0 mLs Intravenous Stopped 3/20/23 1655)   iohexoL (OMNIPAQUE 350) injection 100 mL (100 mLs Intravenous Given 3/20/23 1727)           New Prescriptions    No medications on file         Medical Decision Making    Medical Decision Making:   Clinical Tests:   Lab Tests: Ordered and Reviewed  Radiological Study: Ordered and Reviewed  Medical Tests: Reviewed and Ordered  ED Management:  Patient with known abdominal carcinomatosis, undergoing palliative chemo, presents with intractable abdominal pain, patient recently admitted for same, lab work reviewed and at baseline except mild neutropenia noted, xray reviewed and shows air fluid levels concerning  for SBO, after review of recent xrays from last imaging xray appears to look at baseline, CT obtained to rule out obstruction or other acute process and results reviewed obstruction can't be excluded, pain seems under control after IV dilaudid and being given iv fluids, case discussed with hospital medicine team who will admit patient for observation.          Scribe Attestation:   Scribe #1: I performed the above scribed service and the documentation accurately describes the services I performed. I attest to the accuracy of the note.    Attending:   Physician Attestation Statement for Scribe #1: I, Natalie Mahoney MD, personally performed the services described in this documentation, as scribed by Dianne Armstrong, in my presence, and it is both accurate and complete.          Clinical Impression       ICD-10-CM ICD-9-CM   1. Intractable generalized abdominal pain  R10.84 789.07   2. Constipation  K59.00 564.00   3. Tachycardia  R00.0 785.0   4. Abdominal carcinomatosis  C76.2 195.2   5. Immunocompromised state due to drug therapy  D84.821 V58.69    Z79.899    6. Chest pain  R07.9 786.50       Disposition:   Disposition: Placed in Observation  Condition: Fair       Natalie Mahoney MD  03/20/23 2381

## 2023-03-20 NOTE — ASSESSMENT & PLAN NOTE
Secondary to chemotherapy, WBC: 1.27 on admission, reported Chills, diaphoresis and rigors overnight.     --Daily CBC, CMP  --Prophylactic Abx- cefepime  --IV fluids  --Neutropenic precautions

## 2023-03-21 PROBLEM — D70.1 CHEMOTHERAPY-INDUCED NEUTROPENIA: Status: ACTIVE | Noted: 2023-01-01

## 2023-03-21 PROBLEM — T45.1X5A CHEMOTHERAPY-INDUCED NEUTROPENIA: Status: ACTIVE | Noted: 2023-01-01

## 2023-03-21 NOTE — HOSPITAL COURSE
53 year old male, under the care of [facility] medicine for intractable N/V, SBO, Neutropenia. WBC improved, continues to be afebrile. Oncology evaluated. Palliative med discussed GOC with patient and spouse. Palliative adjusted pain regimen, added lyrica. Continues to have difficulty tolerating clear liquids, advanced to full liquids. SBFT completed: delayed passage, 5.5H. Provided educational materials on SBO, partial SBO. Dietary recommendations and signs and symptoms to report. Initiated olanzapine for intractable N/V. Oncology plans to arrange OP IV Fluids following chemotherapy, spouse contacted health insurance company to discuss benefits, benefits will cover for Home Health to give IV fluids at home. Spouse notified oncology to assist with orders. Patient reported significant improvement, able to tolerate full liquid diet without N/V or pain. Patient seen and examined on day of discharge and determined stable for discharge.

## 2023-03-21 NOTE — PROGRESS NOTES
Pharmacist Renal Dose Adjustment Note    Guevara Osullivan II is a 53 y.o. male being treated with the medication cefepime.     Patient Data:    Vital Signs (Most Recent):  Temp: 97.4 °F (36.3 °C) (03/21/23 0743)  Pulse: 93 (03/21/23 0743)  Resp: 16 (03/21/23 0910)  BP: 111/77 (03/21/23 0743)  SpO2: 98 % (03/21/23 0743) Vital Signs (72h Range):  Temp:  [97.4 °F (36.3 °C)-98.6 °F (37 °C)]   Pulse:  []   Resp:  [10-24]   BP: (111-141)/(77-87)   SpO2:  [96 %-100 %]      Recent Labs   Lab 03/15/23  0939 03/20/23  1550 03/21/23  0446   CREATININE 0.7 0.7 0.6     Serum creatinine: 0.6 mg/dL 03/21/23 0446  Estimated creatinine clearance: 148 mL/min    Medication: cefepime 2 g IV every 12 hours will be changed to cefepime 2 g IV every 8 hours per pharmacy renal dose adjustment protocol for patients with CrCl greater than 60 mL/min.    Pharmacist's Name: Delia Gaston PharmD  Pharmacist's Extension: [phone]    Thank you for allowing us to participate in this patient's care.     Delia Gaston PharmD 03/21/2023 11:10 AM

## 2023-03-21 NOTE — ASSESSMENT & PLAN NOTE
S/P chemotherapy 3/16/2023, Gemzar/Cisplatin.     --Daily CBC, CMP  --Antiemetics per MAR  --IV Fluids   --Pain medications per MAR  --Consult Hem/Onc  --Consult Palliative med

## 2023-03-21 NOTE — CHAPLAIN
Initial visit with patient and his spouse.  Pt was currently not feeling up to a visit and I will follow up as needed.    Chaplain Joao Maravilla M.Div., [facility]

## 2023-03-21 NOTE — ASSESSMENT & PLAN NOTE
Secondary to advanced malignancy, followed by Palliative Med    --Consult Palliative medicine  --Pain medications per MAR  --Will be due for fentanyl patch change on 3/21/23  --Continue Miralax BID, Maalox

## 2023-03-21 NOTE — CONSULTS
Advance Care Planning    Consult Note  Palliative Medicine      Consult Requested By: Adela Riley DO  Reason for Consult: Pain and symptom management    SUBJECTIVE:     History of Present Illness:Mr. Osullivan is a 53-year-old man metastatic cholangiocarcinoma with intra-abdominal carcinomatosis on palliative chemotherapy with cisplatin gemcitabine and durvalumab under the care of Dr. Shell.  Most recent cycle of chemotherapy and immunotherapy on 03/16/2023. He presented with nausea vomiting and acute on chronic abdominal pain in which he reports experiencing since discharge on prior hospitalization.  He notes intermittent constipation and diarrhea but was able to pass stool recently. He is followed by  palliative medicine with recent escalation narcotic pain medications.In the ED, CT abdomen: Small-bowel wall thickening and dilation with scattered air-fluid levels.  Is unclear if this relates to obstruction or infectious or inflammatory enteritis. WBC: 1.27, H/H: 12.1/36.4, Pl: 456, Alk Phos: 598, AST/ALT: 53/75. Palliative medicine consulted for pain and symptom management.     Patient seen with wife at bedside. He reports improvement in symptoms but still with continued abdominal pain noting dilaudid 2mg IV q 4 hrs effective in managing. We recently in outpatient setting increased Fentanyl patch to 75 mcg patch q 72 hrs with dilaudid 4mg po q 4 hrs prn break through pain. My concern outpatient was increased abdominal pain despite pain medication increased in the setting of recent scans with possible obstruction vs ileus also reached out to Dr. Shell who noted patient has a fair amount of tumor in his abdomen. Along with the fact that we are getting to increased doses of pain medications in which we need to understand whether the focus is comfort and if not re evaluating to ensure safe prescribing. Patient with chemo induced neuropathic pain as well, will start trial dose of Lyrica in which patient in agreement  with plan. He complains of consistent N/V, abdominal pain, decreased po intake, and fatigue. He notes that he does not have a good quality of life at this time and feels as though he is not tolerating chemo well. He and wife noted that he is to complete 3 cycles of treatment prior to restaging scans also verbalize their knowledge of treatment being palliative in nature and not curative. They notes they are wondering if treatment is effective or not because this will direct goals of care. In this setting we further discussed goals of care in which patient noted at this time he wants to remain independent, pain and symptom control, and continuing treatment. He also noted if treatment were not beneficial also in the setting of him not tolerating well, at that time he would be more open to discussing discontinuing treatment and proceed with comfort focused treatment. He noted he has no quality of life due to his symptoms, and does not want to continue with hospitalizations, procedures, IV sticks if it will not reverse his underlying issues. I will reach out to Dr. Shell and ensure he follows up with him as soon as possible to further discuss his options so that he can make an informed decision.      Past Medical History:   Diagnosis Date    Cancer     COLON CANCER 1995    Digestive disorder     Hypertension     Primary sclerosing cholangitis     Ulcerative colitis     s/p colectomy with J- pouch     Past Surgical History:   Procedure Laterality Date    ABDOMINAL WASHOUT N/A 1/18/2023    Procedure: LAVAGE, PERITONEAL,;  Surgeon: Onur Calvin Jr., MD;  Location: [facility] OR [address] [place] [address];  Service: General;  Laterality: N/A;    BIOPSY OF PERITONEUM N/A 1/18/2023    Procedure: BIOPSY, PERITONEUM;  Surgeon: Onur Calvin Jr., MD;  Location: [facility] OR [address] [place] [address];  Service: General;  Laterality: N/A;    COLON SURGERY      Colectomy with J- pouch    DIAGNOSTIC LAPAROSCOPY N/A 1/18/2023    Procedure: LAPAROSCOPY, DIAGNOSTIC WITH  WASHING;  Surgeon: Onur Calvin Jr., MD;  Location: [facility] OR [address] [place] [address];  Service: General;  Laterality: N/A;    ENDOSCOPIC ULTRASOUND OF UPPER GASTROINTESTINAL TRACT N/A 10/14/2022    Procedure: ULTRASOUND, UPPER GI TRACT,;  Surgeon: Vince Teran MD;  Location: [facility];  Service: Endoscopy;  Laterality: N/A;  upper and linear    ENDOSCOPIC ULTRASOUND OF UPPER GASTROINTESTINAL TRACT N/A 12/13/2022    Procedure: ULTRASOUND, UPPER GI TRACT, ENDOSCOPIC;  Surgeon: Vince Teran MD;  Location: [facility];  Service: Endoscopy;  Laterality: N/A;    ERCP N/A 10/14/2022    Procedure: ERCP (ENDOSCOPIC RETROGRADE CHOLANGIOPANCREATOGRAPHY) with spyglass;  Surgeon: Vince Teran MD;  Location: [facility];  Service: Endoscopy;  Laterality: N/A;    ERCP N/A 12/13/2022    Procedure: ERCP (ENDOSCOPIC RETROGRADE CHOLANGIOPANCREATOGRAPHY);  Surgeon: Vince Tearn MD;  Location: [facility];  Service: Endoscopy;  Laterality: N/A;    ERCP N/A 12/20/2022    Procedure: ERCP (ENDOSCOPIC RETROGRADE CHOLANGIOPANCREATOGRAPHY);  Surgeon: Vince Teran MD;  Location: [facility];  Service: Endoscopy;  Laterality: N/A;    ERCP N/A 1/4/2023    Procedure: ERCP (ENDOSCOPIC RETROGRADE CHOLANGIOPANCREATOGRAPHY);  Surgeon: Vince Teran MD;  Location: [facility];  Service: Endoscopy;  Laterality: N/A;  room 1    ERCP N/A 2/22/2023    Procedure: ERCP (ENDOSCOPIC RETROGRADE CHOLANGIOPANCREATOGRAPHY);  Surgeon: Vince Teran MD;  Location: [facility];  Service: Endoscopy;  Laterality: N/A;    ERCP N/A 2/23/2023    Procedure: ERCP (ENDOSCOPIC RETROGRADE CHOLANGIOPANCREATOGRAPHY);  Surgeon: Vince Teran MD;  Location: [facility];  Service: Endoscopy;  Laterality: N/A;    ESOPHAGOGASTRODUODENOSCOPY N/A 12/20/2022    Procedure: EGD (ESOPHAGOGASTRODUODENOSCOPY);  Surgeon: Vince Teran MD;  Location: [facility];  Service: Endoscopy;  Laterality: N/A;     ESOPHAGOGASTRODUODENOSCOPY N/A 1/4/2023    Procedure: EGD (ESOPHAGOGASTRODUODENOSCOPY);  Surgeon: Vince Teran MD;  Location: [facility];  Service: Endoscopy;  Laterality: N/A;    INSERTION OF TUNNELED CENTRAL VENOUS CATHETER (CVC) WITH SUBCUTANEOUS PORT Left 2/9/2023    Procedure: [id number];  Surgeon: Wojciech Martínez MD;  Location: [facility];  Service: General;  Laterality: Left;    POUCHOSCOPY       Family History   Problem Relation Age of Onset    Cancer Mother         GYN?    Testicular cancer Father     Skin cancer Father     Lung cancer Maternal Grandmother         +smoking hx    Heart disease Paternal Uncle        Social History     Socioeconomic History    Marital status:    Tobacco Use    Smoking status: Never     Passive exposure: Never    Smokeless tobacco: Never   Substance and Sexual Activity    Alcohol use: Not Currently    Drug use: Yes     Types: Marijuana     Comment: medical marijuana      Review of patient's allergies indicates:   Allergen Reactions    Vicryl [sutures, polyglycolic acid] Other (See Comments)     Wound dehiscence after achilles sx    Ciprofloxacin Other (See Comments)     Pt previously had medication reaction; suffered achilles rupture     Meperidine Hives       Medications:    Current Facility-Administered Medications:     0.9%  NaCl infusion, , Intravenous, Continuous, Shahida Naik NP, Last Rate: 125 mL/hr at 03/21/23 0544, Rate Verify at 03/21/23 0544    acetaminophen tablet 650 mg, 650 mg, Oral, Q4H PRN, Shahida Naik NP    ALPRAZolam tablet 0.25 mg, 0.25 mg, Oral, BID PRN, Shahida Naik NP    aluminum-magnesium hydroxide-simethicone 200-200-20 mg/5 mL suspension 30 mL, 30 mL, Oral, QID PRN, Altagracia Armstrong MD    aluminum-magnesium hydroxide-simethicone 200-200-20 mg/5 mL suspension 30 mL, 30 mL, Oral, BID, Shahida Naik NP    ceFEPIme (MAXIPIME) 2 g in dextrose 5 % in water (D5W) 5 % 50 mL IVPB (MB+), 2 g, Intravenous, Q8H, Adela  DO Rosemary    dextrose 10% bolus 125 mL 125 mL, 12.5 g, Intravenous, PRN, Altagracia Armstrong MD    dextrose 10% bolus 250 mL 250 mL, 25 g, Intravenous, PRN, Altagracia Armstrong MD    dextrose 40 % gel 15,000 mg, 15 g, Oral, PRN, Altagracia Armstrong MD    dextrose 40 % gel 30,000 mg, 30 g, Oral, PRN, Altagracia Armstrong MD    enoxaparin injection 40 mg, 40 mg, Subcutaneous, Daily, Shahida Naik NP, 40 mg at 03/20/23 1842    fentaNYL 75 mcg/hr 1 patch, 1 patch, Transdermal, Q72H, Shahida Naik NP, 1 patch at 03/21/23 0910    glucagon (human recombinant) injection 1 mg, 1 mg, Intramuscular, PRN, Altagracia Armstrong MD    HYDROmorphone (PF) injection 2 mg, 2 mg, Intravenous, Q3H PRN, Meagan Burrows NP, 2 mg at 03/21/23 1125    naloxone 0.4 mg/mL injection 0.02 mg, 0.02 mg, Intravenous, PRN, Shahida Naik NP    ondansetron injection 4 mg, 4 mg, Intravenous, Q6H PRN, Altagracia Armstrong MD, 4 mg at 03/21/23 1115    polyethylene glycol packet 17 g, 17 g, Oral, BID, Shahida Naik NP    pregabalin capsule 50 mg, 50 mg, Oral, BID, Abby Madera NP    prochlorperazine injection Soln 5 mg, 5 mg, Intravenous, Q6H PRN, Altagracia Armstrong MD, 5 mg at 03/21/23 1132    simethicone chewable tablet 80 mg, 1 tablet, Oral, QID PRN, Shahida Naik NP, 80 mg at 03/21/23 0801    sodium chloride 0.9% flush 10 mL, 10 mL, Intravenous, Q12H PRN, Altagracia Armstrong MD    ROS:  Review of Systems   Constitutional:  Positive for activity change, appetite change, fatigue and unexpected weight change.   HENT:  Negative for congestion.    Respiratory:  Negative for shortness of breath and wheezing.    Cardiovascular:  Negative for chest pain, palpitations and leg swelling.   Gastrointestinal:  Positive for abdominal distention, abdominal pain, constipation, diarrhea, nausea and vomiting. Negative for blood in stool.   Skin:  Negative for color change.   Allergic/Immunologic: Positive for immunocompromised state.   Neurological:   Positive for weakness.        Generalized weakness, onset of burning, sharp pain radiating down right leg since starting chemo    Psychiatric/Behavioral:  The patient is not nervous/anxious.      OBJECTIVE:     Physical Exam:  Vitals: Temp: 97.6 °F (36.4 °C) (03/21/23 1124)  Pulse: 83 (03/21/23 1124)  Resp: 16 (03/21/23 1125)  BP: 135/87 (03/21/23 1124)  SpO2: 97 % (03/21/23 1124)    Physical Exam  Vitals and nursing note reviewed.   Constitutional:       General: He is not in acute distress.     Appearance: He is ill-appearing.   HENT:      Head: Normocephalic.      Nose: Nose normal.      Mouth/Throat:      Mouth: Mucous membranes are dry.   Eyes:      General:         Right eye: No discharge.         Left eye: No discharge.      Pupils: Pupils are equal, round, and reactive to light.   Cardiovascular:      Rate and Rhythm: Normal rate.   Pulmonary:      Effort: Pulmonary effort is normal. No respiratory distress.   Abdominal:      General: There is distension.   Musculoskeletal:         General: Normal range of motion.      Cervical back: Normal range of motion.      Right lower leg: No edema.      Left lower leg: No edema.   Skin:     General: Skin is warm and dry.      Coloration: Skin is pale.   Neurological:      Mental Status: He is alert and oriented to person, place, and time.   Psychiatric:         Mood and Affect: Mood normal.         Behavior: Behavior normal.         Thought Content: Thought content normal.         Judgment: Judgment normal.         Review of Symptoms      Symptom Assessment (ESAS 0-10 Scale)  Pain:  0  Dyspnea:  0  Anxiety:  0  Nausea:  0  Depression:  0  Anorexia:  0  Fatigue:  0  Insomnia:  0  Restlessness:  0  Agitation:  0       Anxiety:  Is not nervous/anxious  Constipation:  Constipation    ECOG Performance Status stGstrstastdstest:st st1st Living Arrangements:  Lives with spouse    Psychosocial/Cultural:   See Palliative Psychosocial Note: No  **Primary  to Follow**  Palliative  Care  Consult: No    Advance Directives:     Decision Making:  Patient answered questions and Family answered questions  Goals of Care: What is most important right now is to focus on remaining as independent as possible, symptom/pain control. Accordingly, we have decided that the best plan to meet the patient's goals includes continuing with treatment.  Ongoing GOC discussion, patient notes if treatment that is palliative in nature no longer beneficial in the setting of inability to tolerate treatment, he would likely transition to comfort focused treatment.     Labs:  WBC   Date Value Ref Range Status   03/21/2023 2.67 (L) 3.90 - 12.70 K/uL Final       Hemoglobin   Date Value Ref Range Status   03/21/2023 10.1 (L) 14.0 - 18.0 g/dL Final       Hematocrit   Date Value Ref Range Status   03/21/2023 30.8 (L) 40.0 - 54.0 % Final       MCV   Date Value Ref Range Status   03/21/2023 91 82 - 98 fL Final       Platelets   Date Value Ref Range Status   03/21/2023 278 150 - 450 K/uL Final       BMP  Lab Results   Component Value Date     (L) 03/21/2023    K 3.7 03/21/2023    CL 97 03/21/2023    CO2 28 03/21/2023    BUN 12 03/21/2023    CREATININE 0.6 03/21/2023    CALCIUM 8.5 (L) 03/21/2023    ANIONGAP 10 03/21/2023    EGFRNORACEVR >60 03/21/2023       Lab Results   Component Value Date    AST 36 03/21/2023     (H) 12/02/2022    ALKPHOS 479 (H) 03/21/2023    BILITOT 0.9 03/21/2023       Albumin   Date Value Ref Range Status   03/21/2023 2.7 (L) 3.5 - 5.2 g/dL Final       Radiology:I have reviewed all pertinent imaging results/findings within the past 24 hours.      ASSESSMENT   Mr. Osullivan is a 53-year-old man metastatic cholangiocarcinoma with intra-abdominal carcinomatosis on palliative chemotherapy with cisplatin gemcitabine and durvalumab under the care of Dr. Shell.  Most recent cycle of chemotherapy and immunotherapy on 03/16/2023. He presented with nausea vomiting and acute on chronic  abdominal pain in which he reports experiencing since discharge on prior hospitalization.  He notes intermittent constipation and diarrhea but was able to pass stool recently. He is followed by  palliative medicine with recent escalation narcotic pain medications.In the ED, CT abdomen: Small-bowel wall thickening and dilation with scattered air-fluid levels.  Is unclear if this relates to obstruction or infectious or inflammatory enteritis. WBC: 1.27, H/H: 12.1/36.4, Pl: 456, Alk Phos: 598, AST/ALT: 53/75. Palliative medicine consulted for pain and symptom management.     PLAN   **Encounter for Palliative Care  -Code status: Full code, did not discuss as not to over whelm patient and wife. Will follow up tomorrow.   -HCPOA: Surrogate decision maker is wife Leticia  -GOC: Focus on remaining as independent as possible, symptom/pain control. Accordingly, we have decided that the best plan to meet the patient's goals includes continuing with treatment.  Ongoing GOC discussion, patient notes if treatment that is palliative in nature no longer beneficial in the setting of inability to tolerate treatment, he would likely transition to comfort focused treatment.   -See HPI for further details      **Neoplasm related pain   -Continue home dose Fentanyl 25 mcg patch q 72 hrs. Was taking dilaudid po 4mg q 4 hrs prn break through pain   -Currently on Dilaudid 2mg IVP q 4 hrs  -Continue current regimen as ongoing GOC discussions occurring, will adjust as needed   -Discussed safe prescribing with patient due to increases in pain regimen, OIC prevention. Also undergoing work up and treatment with question of progressive disease vs fair amount of tumor in abdomen vs X ray imaging with possible air-fluid levels less prominent on follow-up CT differential includes small obstruction infectious inflammatory.      **Chemotherapy induced neuropathy   -Start trial of Lyrica 50mg po bid     **Stage IV cholangiocarcinoma with peritoneal  carcinomatosis  -Under the care of Dr. Shell  -Reported difficulty tolerating treatment, plans to follow up outpatient and if treatment no longer beneficial or progression of disease despite treatment patient likely will transition to comfort focused treatment   -chemo held due to hospital admission and planned to be rescheduled next week if possible.   -GOC: Focus on remaining as independent as possible, symptom/pain control. Accordingly, we have decided that the best plan to meet the patient's goals includes continuing with treatment.  Ongoing GOC discussion, patient notes if treatment that is palliative in nature no longer beneficial in the setting of inability to tolerate treatment, he would likely transition to comfort focused treatment.     Discussed case and visit details with JOHN Pinedo     Thank you for allowing Palliative Medicine to be involved in the care of Guevara Osullivan II.       Medical decision making: management of more than one chronic illness in exacerbation or progression of disease    Plan required increased review of medication choice, interaction, dosing, frequency, and route due to patient complexity. Patient complexity increased by: being on more than 8 medications    60 min ACP time spent discussing: code status, assessed patient specific goals and addressed the best way to achieve them, coordination of care and emotional support, formulating and communicating prognosis, exploring burden/ benefit of various approaches of treatment, inquired about existing or willingness to complete advance directive documents.    Abby Madera NP  Palliative Medicine

## 2023-03-21 NOTE — ASSESSMENT & PLAN NOTE
Secondary to advanced malignancy, limited oral intake.     --Resume Diet when appropriate, if >24H recommend TPN

## 2023-03-21 NOTE — CONSULTS
[facility] Surg  Hematology/Oncology  Consult Note    Patient Name: Guevara Osullivan II  MRN: [id number]  Admission Date: 3/20/2023  Hospital Length of Stay: 0 days  Code Status: Full Code   Attending Provider: Adela Riley DO  Consulting Provider: Sally Csatellanos MD  Primary Care Physician: Dima Ch MD  Principal Problem:Partial small bowel obstruction    Inpatient consult to Hematology/Oncology  Consult performed by: Sally Castellanos MD  Consult ordered by: Shahida Naik NP  Reason for consult: stage IV cholangiocarcinoma  Assessment/Recommendations:     # Stage IV cholangiocarcinoma with peritoneal carcinomatosis:     On palliative chemotherapy with cisplatin gemcitabine and durvalumab under direction of primary oncologist Dr. Shell.  Will notify regarding his inpatient admission, hold chemotherapy planned this week and reschedule next week if possible.  Given ongoing issues with hydration he may benefit from standing IV fluid support following chemotherapy day and will discuss with nurse navigation to arrange per patient request.      # Acute on chronic abdominal pain.  X-ray imaging with possible air-fluid levels less prominent on follow-up CT differential includes small obstruction infectious inflammatory.   No fevers chills or other infectious symptoms at this time. He has been able to move bowels with alternating constipation diarrhea.  Patient request to trial clears reasonable given not clear SBO at this time recommend close monitoring and advancement as possible.      # Malignancy associated pain: diffuse in abdomen and pelvic likely related to disease process / intra-abdominal carcinomatosis.  - Recommend palliative care inpatient evaluation given ongoing pain with exacerbation.    He has had recent outpatient escalation of narcotic pain medication would be important to ensure standing bowel regimen given concern for constipation compounding abdominal pain related to his disease  /peritoneal carcinomatosis.  He does have some neuropathic symptoms ; may trial Lyrica     # Nausea/vomiting:  - Antiemetic p.r.n..    # Chemotherapy associated neutropenia: Improvement in leukopenia on labs 03/21/2023.  Continue to monitor.  Afebrile no infectious symptoms.      Subjective:     HPI:  Mr. Osullivan is a 53-year-old man metastatic cholangiocarcinoma with intra-abdominal carcinomatosis on palliative chemotherapy with cisplatin gemcitabine and durvalumab.  Most recent cycle of chemotherapy and immunotherapy on 03/16/2023.  He presented with nausea vomiting and acute on chronic abdominal pain.  He notes waxing and waning constipation and diarrhea but was able to pass stool recently.  No evidence of bleeding.  He has been following with palliative care with recent escalation narcotic pain medications.    Oncology Treatment Plan:   OP CISPLATIN GEMCITABINE DURVALUMAB Q3W FOLLOWED BY MAINTENANCE DURVALUMAB 1500MG Q4W    Medications:  Continuous Infusions:   sodium chloride 0.9% 125 mL/hr at 03/21/23 0544     Scheduled Meds:   ceFEPime (MAXIPIME) IVPB  2 g Intravenous Q12H    enoxaparin  40 mg Subcutaneous Daily    fentaNYL  1 patch Transdermal Q72H    polyethylene glycol  17 g Oral BID     PRN Meds:acetaminophen, ALPRAZolam, aluminum-magnesium hydroxide-simethicone, dextrose 10%, dextrose 10%, dextrose, dextrose, glucagon (human recombinant), HYDROmorphone, naloxone, ondansetron, prochlorperazine, simethicone, sodium chloride 0.9%     Review of patient's allergies indicates:   Allergen Reactions    Vicryl [sutures, polyglycolic acid] Other (See Comments)     Wound dehiscence after achilles sx    Ciprofloxacin Other (See Comments)     Pt previously had medication reaction; suffered achilles rupture     Meperidine Hives        Past Medical History:   Diagnosis Date    Cancer     COLON CANCER 1995    Digestive disorder     Hypertension     Primary sclerosing cholangitis     Ulcerative colitis      s/p colectomy with J- pouch     Past Surgical History:   Procedure Laterality Date    ABDOMINAL WASHOUT N/A 1/18/2023    Procedure: LAVAGE, PERITONEAL,;  Surgeon: Onur Calvin Jr., MD;  Location: [facility] OR 2ND FLR;  Service: General;  Laterality: N/A;    BIOPSY OF PERITONEUM N/A 1/18/2023    Procedure: BIOPSY, PERITONEUM;  Surgeon: Onur Calvin Jr., MD;  Location: [facility] 2ND FLR;  Service: General;  Laterality: N/A;    COLON SURGERY      Colectomy with J- pouch    DIAGNOSTIC LAPAROSCOPY N/A 1/18/2023    Procedure: LAPAROSCOPY, DIAGNOSTIC WITH WASHING;  Surgeon: Onur Calvin Jr., MD;  Location: [facility] 2ND FLR;  Service: General;  Laterality: N/A;    ENDOSCOPIC ULTRASOUND OF UPPER GASTROINTESTINAL TRACT N/A 10/14/2022    Procedure: ULTRASOUND, UPPER GI TRACT,;  Surgeon: Vince Teran MD;  Location: [facility];  Service: Endoscopy;  Laterality: N/A;  upper and linear    ENDOSCOPIC ULTRASOUND OF UPPER GASTROINTESTINAL TRACT N/A 12/13/2022    Procedure: ULTRASOUND, UPPER GI TRACT, ENDOSCOPIC;  Surgeon: Vince Teran MD;  Location: [facility];  Service: Endoscopy;  Laterality: N/A;    ERCP N/A 10/14/2022    Procedure: ERCP (ENDOSCOPIC RETROGRADE CHOLANGIOPANCREATOGRAPHY) with spyglass;  Surgeon: Vince Teran MD;  Location: [facility];  Service: Endoscopy;  Laterality: N/A;    ERCP N/A 12/13/2022    Procedure: ERCP (ENDOSCOPIC RETROGRADE CHOLANGIOPANCREATOGRAPHY);  Surgeon: Vince Teran MD;  Location: [facility];  Service: Endoscopy;  Laterality: N/A;    ERCP N/A 12/20/2022    Procedure: ERCP (ENDOSCOPIC RETROGRADE CHOLANGIOPANCREATOGRAPHY);  Surgeon: Vince Teran MD;  Location: [facility];  Service: Endoscopy;  Laterality: N/A;    ERCP N/A 1/4/2023    Procedure: ERCP (ENDOSCOPIC RETROGRADE CHOLANGIOPANCREATOGRAPHY);  Surgeon: Vince Teran MD;  Location: [facility] END[facility];  Service: Endoscopy;  Laterality: N/A;  room 1    ERCP N/A  2/22/2023    Procedure: ERCP (ENDOSCOPIC RETROGRADE CHOLANGIOPANCREATOGRAPHY);  Surgeon: Vince Teran MD;  Location: [facility];  Service: Endoscopy;  Laterality: N/A;    ERCP N/A 2/23/2023    Procedure: ERCP (ENDOSCOPIC RETROGRADE CHOLANGIOPANCREATOGRAPHY);  Surgeon: Vince Teran MD;  Location: [facility];  Service: Endoscopy;  Laterality: N/A;    ESOPHAGOGASTRODUODENOSCOPY N/A 12/20/2022    Procedure: EGD (ESOPHAGOGASTRODUODENOSCOPY);  Surgeon: Vince Teran MD;  Location: [facility];  Service: Endoscopy;  Laterality: N/A;    ESOPHAGOGASTRODUODENOSCOPY N/A 1/4/2023    Procedure: EGD (ESOPHAGOGASTRODUODENOSCOPY);  Surgeon: Vince Teran MD;  Location: [facility];  Service: Endoscopy;  Laterality: N/A;    INSERTION OF TUNNELED CENTRAL VENOUS CATHETER (CVC) WITH SUBCUTANEOUS PORT Left 2/9/2023    Procedure: [id number];  Surgeon: Wojciech Martínez MD;  Location: [facility] OR;  Service: General;  Laterality: Left;    POUCHOSCOPY       Family History       Problem Relation (Age of Onset)    Cancer Mother    Heart disease Paternal Uncle    Lung cancer Maternal Grandmother    Skin cancer Father    Testicular cancer Father          Tobacco Use    Smoking status: Never     Passive exposure: Never    Smokeless tobacco: Never   Substance and Sexual Activity    Alcohol use: Not Currently    Drug use: Yes     Types: Marijuana     Comment: medical marijuana    Sexual activity: Not on file       Review of Systems   Constitutional:  Positive for activity change, appetite change and fatigue. Negative for chills, diaphoresis, fever and unexpected weight change.   HENT:  Negative for congestion, rhinorrhea and sinus pain.    Respiratory:  Negative for cough, choking, chest tightness, shortness of breath, wheezing and stridor.    Cardiovascular:  Negative for chest pain, palpitations and leg swelling.   Gastrointestinal:  Positive for abdominal pain and nausea. Negative for  abdominal distention, anal bleeding, blood in stool, constipation, diarrhea, rectal pain and vomiting.   Skin:  Negative for rash.   Hematological:  Does not bruise/bleed easily.   Psychiatric/Behavioral: Negative.     Objective:     Vital Signs (Most Recent):  Temp: 98.2 °F (36.8 °C) (03/21/23 0411)  Pulse: 90 (03/21/23 0513)  Resp: 16 (03/21/23 0446)  BP: 121/78 (03/21/23 0411)  SpO2: 99 % (03/21/23 0411) Vital Signs (24h Range):  Temp:  [97.8 °F (36.6 °C)-98.6 °F (37 °C)] 98.2 °F (36.8 °C)  Pulse:  [] 90  Resp:  [10-24] 16  SpO2:  [96 %-100 %] 99 %  BP: (119-141)/(77-87) 121/78     Weight: 75.2 kg (165 lb 12.6 oz)  Body mass index is 22.48 kg/m².  Body surface area is 1.95 meters squared.      Intake/Output Summary (Last 24 hours) at 3/21/2023 0751  Last data filed at 3/21/2023 0544  Gross per 24 hour   Intake 2138.48 ml   Output --   Net 2138.48 ml       Physical Exam  Vitals reviewed.   Constitutional:       General: He is not in acute distress.     Appearance: Normal appearance. He is ill-appearing.   HENT:      Head: Normocephalic and atraumatic.      Nose: No congestion.      Mouth/Throat:      Mouth: Mucous membranes are moist.   Eyes:      Extraocular Movements: Extraocular movements intact.      Conjunctiva/sclera: Conjunctivae normal.   Cardiovascular:      Rate and Rhythm: Normal rate.   Pulmonary:      Effort: Pulmonary effort is normal. No respiratory distress.   Abdominal:      General: There is no distension.      Palpations: Abdomen is soft.      Tenderness: There is abdominal tenderness. There is no guarding or rebound.      Hernia: No hernia is present.   Musculoskeletal:         General: No swelling.      Cervical back: Neck supple.   Skin:     General: Skin is warm.      Coloration: Skin is not jaundiced.   Neurological:      General: No focal deficit present.      Mental Status: He is alert and oriented to person, place, and time.   Psychiatric:         Mood and Affect: Mood normal.          Behavior: Behavior normal.         Thought Content: Thought content normal.         Judgment: Judgment normal.     Significant Labs:   CBC:   Recent Labs   Lab 03/20/23  1550 03/21/23  0446   WBC 1.27* 2.67*   HGB 12.1* 10.1*   HCT 36.4* 30.8*   * 278    and CMP:   Recent Labs   Lab 03/20/23  1550 03/21/23  0446   * 135*   K 3.7 3.7   CL 93* 97   CO2 28 28   * 93   BUN 13 12   CREATININE 0.7 0.6   CALCIUM 9.2 8.5*   PROT 7.5 6.4   ALBUMIN 3.2* 2.7*   BILITOT 1.2* 0.9   ALKPHOS 598* 479*   AST 53* 36   ALT 75* 56*   ANIONGAP 13 10       Diagnostic Results:  I have reviewed all pertinent imaging results/findings within the past 24 hours.    Assessment/Plan:     Active Diagnoses:    Diagnosis Date Noted POA    PRINCIPAL PROBLEM:  Partial small bowel obstruction [K56.600] 02/24/2023 Yes    Intractable generalized abdominal pain [R10.84] 03/20/2023 Yes    Immunodeficiency due to chemotherapy [D84.821, T45.1X5A, Z79.899] 02/15/2023 Not Applicable    Cachexia [R64] 01/31/2023 Yes    Metastatic adenocarcinoma [C79.9] 01/18/2023 Yes      Problems Resolved During this Admission:       See above    Thank you for your consult. I will follow-up with patient. Please contact us if you have any additional questions.    Sally Castellanos MD  Hematology/Oncology  [facility]

## 2023-03-21 NOTE — PROGRESS NOTES
[facility] Medicine  Progress Note    Patient Name: Guevara Osullivan II  MRN: [id number]  Patient Class: OP- Observation   Admission Date: 3/20/2023  Length of Stay: 0 days  Attending Physician: Adela Riley DO  Primary Care Provider: Dima Ch MD        Subjective:     Principal Problem:Partial small bowel obstruction        HPI:  53 y.o. 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 Pt reports that he is using his prescribed Fentanyl, Dilaudid, and medical marijuana for his pain, but it is not providing any relief.  Symptoms are constant and moderate in severity. No mitigating or exacerbating factors reported. Associated sxs include nausea, a reduced appetite, constipation, and a mass to the lower mid-abdominal wall. Patient denies any fever, chills, V/D, CP, SOB, weakness, and all other sxs at this time. Pt takes Miralax and Mylanta daily. In the ED, CT abdomen: Small-bowel wall thickening and dilation with scattered air-fluid levels.  Is unclear if this relates to obstruction or infectious or inflammatory enteritis. WBC: 1.27, H/H: 12.1/36.4, Pl: 456, Alk Phos: 598, AST/ALT: 53/75. Patient treated with IV fluids, antiemetics, IV pain medication. Patient is a full code, surrogate decision maker is his spouse, Leticia Osullivan. Placed in observation under the care of Hospital Medicine for intractable N/V, Neutropenia.       Overview/Hospital Course:  53 year old male, under the care of hospital medicine for intractable N/V, SBO, Neutropenia. WBC improved, continues to be afebrile. Oncology evaluated. Palliative med discussed GOC with patient and spouse. Plan for SBFT today to evaluate SBO. Palliative  adjusted pain regimen, added lyrica. Continues to have difficulty tolerating clear liquids. Will review results of SBFT when complete. Vitals stable, Labs improved.       Interval History: SBFT today. Palliative adjusted pain regimen. Oncology following.     Review of Systems   Constitutional:  Positive for activity change, appetite change, fatigue and unexpected weight change. Negative for chills, diaphoresis and fever.   HENT:  Negative for congestion, hearing loss, nosebleeds, postnasal drip, rhinorrhea and trouble swallowing.    Eyes:  Negative for discharge and visual disturbance.   Respiratory:  Negative for cough, chest tightness and shortness of breath.    Cardiovascular:  Negative for chest pain, palpitations and leg swelling.   Gastrointestinal:  Positive for abdominal distention, abdominal pain, constipation and nausea. Negative for diarrhea and vomiting.   Endocrine: Negative for cold intolerance and heat intolerance.   Genitourinary:  Negative for difficulty urinating, dysuria, frequency and hematuria.   Musculoskeletal:  Positive for arthralgias, back pain and myalgias. Negative for gait problem.   Skin: Negative.    Neurological:  Positive for weakness. Negative for dizziness, light-headedness and headaches.   Hematological:  Negative for adenopathy. Does not bruise/bleed easily.   Psychiatric/Behavioral:  Negative for agitation, behavioral problems and confusion. The patient is nervous/anxious.    Objective:     Vital Signs (Most Recent):  Temp: 98.6 °F (37 °C) (03/21/23 1542)  Pulse: 82 (03/21/23 1542)  Resp: 18 (03/21/23 1542)  BP: 137/83 (03/21/23 1542)  SpO2: 97 % (03/21/23 1542) Vital Signs (24h Range):  Temp:  [97.4 °F (36.3 °C)-98.6 °F (37 °C)] 98.6 °F (37 °C)  Pulse:  [81-99] 82  Resp:  [10-20] 18  SpO2:  [96 %-100 %] 97 %  BP: (111-141)/(77-87) 137/83     Weight: 75.2 kg (165 lb 12.6 oz)  Body mass index is 22.48 kg/m².    Intake/Output Summary (Last 24 hours) at 3/21/2023 1633  Last data  filed at 3/21/2023 0544  Gross per 24 hour   Intake 2088.48 ml   Output --   Net 2088.48 ml      Physical Exam  Vitals and nursing note reviewed.   Constitutional:       General: He is not in acute distress.     Appearance: He is well-developed. He is cachectic. He is ill-appearing. He is not toxic-appearing or diaphoretic.   HENT:      Head: Normocephalic and atraumatic.      Right Ear: Hearing and external ear normal.      Left Ear: Hearing and external ear normal.      Nose: Nose normal. No mucosal edema or rhinorrhea.      Mouth/Throat:      Pharynx: Uvula midline.   Eyes:      General:         Right eye: No discharge.         Left eye: No discharge.      Conjunctiva/sclera: Conjunctivae normal.      Right eye: No chemosis.     Left eye: No chemosis.     Pupils: Pupils are equal, round, and reactive to light.   Neck:      Thyroid: No thyroid mass or thyromegaly.      Trachea: Trachea normal.   Cardiovascular:      Rate and Rhythm: Regular rhythm. Tachycardia present.      Pulses:           Dorsalis pedis pulses are 2+ on the right side and 2+ on the left side.      Heart sounds: Normal heart sounds. No murmur heard.  Pulmonary:      Effort: Pulmonary effort is normal. No respiratory distress.      Breath sounds: Normal breath sounds. No decreased breath sounds or wheezing.   Abdominal:      General: Bowel sounds are decreased. There is no distension.      Palpations: Abdomen is soft.      Tenderness: There is no abdominal tenderness.   Musculoskeletal:         General: Normal range of motion.      Cervical back: Normal range of motion and neck supple.   Lymphadenopathy:      Cervical: No cervical adenopathy.      Upper Body:      Right upper body: No supraclavicular adenopathy.      Left upper body: No supraclavicular adenopathy.   Skin:     General: Skin is warm and dry.      Capillary Refill: Capillary refill takes less than 2 seconds.      Findings: No rash.   Neurological:      Mental Status: He is oriented  to person, place, and time. He is lethargic.   Psychiatric:         Mood and Affect: Mood is depressed. Mood is not anxious.         Speech: Speech is delayed.         Behavior: Behavior normal.         Thought Content: Thought content normal.         Judgment: Judgment normal.       Significant Labs: All pertinent labs within the past 24 hours have been reviewed.  CBC:   Recent Labs   Lab 03/20/23  1550 03/21/23  0446   WBC 1.27* 2.67*   HGB 12.1* 10.1*   HCT 36.4* 30.8*   * 278     CMP:   Recent Labs   Lab 03/20/23  1550 03/21/23  0446   * 135*   K 3.7 3.7   CL 93* 97   CO2 28 28   * 93   BUN 13 12   CREATININE 0.7 0.6   CALCIUM 9.2 8.5*   PROT 7.5 6.4   ALBUMIN 3.2* 2.7*   BILITOT 1.2* 0.9   ALKPHOS 598* 479*   AST 53* 36   ALT 75* 56*   ANIONGAP 13 10       Significant Imaging: I have reviewed all pertinent imaging results/findings within the past 24 hours.      Assessment/Plan:      * Partial small bowel obstruction  Secondary to malignancy, recurrent, will manage conservatively for now due to recurrent nature and Hx of improvement with conservative mearsures, if no improvement will consider General Surgery or GI Consult.     --NPO- trial of clear liquids, did not tolerate  --Antiemetics  --Pain meds per MAR  --SBFT today          Chemotherapy-induced neutropenia  Severe neutropenia with ANC 1 on admission  Neutropenic precautions    --Improving      Intractable generalized abdominal pain  Secondary to advanced malignancy, followed by Palliative Med    --Consult Palliative medicine  --Pain medications per MAR  --Will be due for fentanyl patch change on 3/21/23  --Continue Miralax BID, Maalox        Immunodeficiency due to chemotherapy  Secondary to chemotherapy, WBC: 1.27 on admission, reported Chills, diaphoresis and rigors night PTA.     --Daily CBC, CMP  --Prophylactic Abx- cefepime  --IV fluids  --Neutropenic precautions      Cachexia  Secondary to advanced malignancy, limited oral  intake.     --Resume Diet when appropriate, if >24H recommend TPN      Metastatic adenocarcinoma  S/P chemotherapy 3/16/2023, Gemzar/Cisplatin.     --Daily CBC, CMP  --Antiemetics per MAR  --IV Fluids   --Pain medications per MAR  --Consult Hem/Onc  --Consult Palliative med        VTE Risk Mitigation (From admission, onward)         Ordered     enoxaparin injection 40 mg  Daily         03/20/23 1825     IP VTE HIGH RISK PATIENT  Once         03/20/23 1825     Place sequential compression device  Until discontinued         03/20/23 1812                Discharge Planning   APRIL:      Code Status: Full Code   Is the patient medically ready for discharge?:     Reason for patient still in hospital (select all that apply): Patient trending condition  Discharge Plan A: Home                  Shahida Naik NP  Department of Hospital Medicine   [facility]

## 2023-03-21 NOTE — SUBJECTIVE & OBJECTIVE
Interval History: SBFT today. Palliative adjusted pain regimen. Oncology following.     Review of Systems   Constitutional:  Positive for activity change, appetite change, fatigue and unexpected weight change. Negative for chills, diaphoresis and fever.   HENT:  Negative for congestion, hearing loss, nosebleeds, postnasal drip, rhinorrhea and trouble swallowing.    Eyes:  Negative for discharge and visual disturbance.   Respiratory:  Negative for cough, chest tightness and shortness of breath.    Cardiovascular:  Negative for chest pain, palpitations and leg swelling.   Gastrointestinal:  Positive for abdominal distention, abdominal pain, constipation and nausea. Negative for diarrhea and vomiting.   Endocrine: Negative for cold intolerance and heat intolerance.   Genitourinary:  Negative for difficulty urinating, dysuria, frequency and hematuria.   Musculoskeletal:  Positive for arthralgias, back pain and myalgias. Negative for gait problem.   Skin: Negative.    Neurological:  Positive for weakness. Negative for dizziness, light-headedness and headaches.   Hematological:  Negative for adenopathy. Does not bruise/bleed easily.   Psychiatric/Behavioral:  Negative for agitation, behavioral problems and confusion. The patient is nervous/anxious.    Objective:     Vital Signs (Most Recent):  Temp: 98.6 °F (37 °C) (03/21/23 1542)  Pulse: 82 (03/21/23 1542)  Resp: 18 (03/21/23 1542)  BP: 137/83 (03/21/23 1542)  SpO2: 97 % (03/21/23 1542) Vital Signs (24h Range):  Temp:  [97.4 °F (36.3 °C)-98.6 °F (37 °C)] 98.6 °F (37 °C)  Pulse:  [81-99] 82  Resp:  [10-20] 18  SpO2:  [96 %-100 %] 97 %  BP: (111-141)/(77-87) 137/83     Weight: 75.2 kg (165 lb 12.6 oz)  Body mass index is 22.48 kg/m².    Intake/Output Summary (Last 24 hours) at 3/21/2023 1633  Last data filed at 3/21/2023 0544  Gross per 24 hour   Intake 2088.48 ml   Output --   Net 2088.48 ml      Physical Exam  Vitals and nursing note reviewed.   Constitutional:        General: He is not in acute distress.     Appearance: He is well-developed. He is cachectic. He is ill-appearing. He is not toxic-appearing or diaphoretic.   HENT:      Head: Normocephalic and atraumatic.      Right Ear: Hearing and external ear normal.      Left Ear: Hearing and external ear normal.      Nose: Nose normal. No mucosal edema or rhinorrhea.      Mouth/Throat:      Pharynx: Uvula midline.   Eyes:      General:         Right eye: No discharge.         Left eye: No discharge.      Conjunctiva/sclera: Conjunctivae normal.      Right eye: No chemosis.     Left eye: No chemosis.     Pupils: Pupils are equal, round, and reactive to light.   Neck:      Thyroid: No thyroid mass or thyromegaly.      Trachea: Trachea normal.   Cardiovascular:      Rate and Rhythm: Regular rhythm. Tachycardia present.      Pulses:           Dorsalis pedis pulses are 2+ on the right side and 2+ on the left side.      Heart sounds: Normal heart sounds. No murmur heard.  Pulmonary:      Effort: Pulmonary effort is normal. No respiratory distress.      Breath sounds: Normal breath sounds. No decreased breath sounds or wheezing.   Abdominal:      General: Bowel sounds are decreased. There is no distension.      Palpations: Abdomen is soft.      Tenderness: There is no abdominal tenderness.   Musculoskeletal:         General: Normal range of motion.      Cervical back: Normal range of motion and neck supple.   Lymphadenopathy:      Cervical: No cervical adenopathy.      Upper Body:      Right upper body: No supraclavicular adenopathy.      Left upper body: No supraclavicular adenopathy.   Skin:     General: Skin is warm and dry.      Capillary Refill: Capillary refill takes less than 2 seconds.      Findings: No rash.   Neurological:      Mental Status: He is oriented to person, place, and time. He is lethargic.   Psychiatric:         Mood and Affect: Mood is depressed. Mood is not anxious.         Speech: Speech is delayed.          Behavior: Behavior normal.         Thought Content: Thought content normal.         Judgment: Judgment normal.       Significant Labs: All pertinent labs within the past 24 hours have been reviewed.  CBC:   Recent Labs   Lab 03/20/23  1550 03/21/23  0446   WBC 1.27* 2.67*   HGB 12.1* 10.1*   HCT 36.4* 30.8*   * 278     CMP:   Recent Labs   Lab 03/20/23  1550 03/21/23  0446   * 135*   K 3.7 3.7   CL 93* 97   CO2 28 28   * 93   BUN 13 12   CREATININE 0.7 0.6   CALCIUM 9.2 8.5*   PROT 7.5 6.4   ALBUMIN 3.2* 2.7*   BILITOT 1.2* 0.9   ALKPHOS 598* 479*   AST 53* 36   ALT 75* 56*   ANIONGAP 13 10       Significant Imaging: I have reviewed all pertinent imaging results/findings within the past 24 hours.

## 2023-03-21 NOTE — ASSESSMENT & PLAN NOTE
Secondary to malignancy, recurrent, will manage conservatively for now due to recurrent nature and Hx of improvement with conservative mearsures, if no improvement will consider General Surgery or GI Consult.     --NPO- trial of clear liquids, did not tolerate  --Antiemetics  --Pain meds per MAR  --SBFT today

## 2023-03-21 NOTE — PLAN OF CARE
[facility] - Med Surg  Initial Discharge Assessment       Primary Care Provider: Dima Ch MD    Admission Diagnosis: Tachycardia [R00.0]  Abdominal carcinomatosis [C76.2]  Constipation [K59.00]  Chest pain [R07.9]  Intractable generalized abdominal pain [R10.84]  Immunocompromised state due to drug therapy [D84.821, Z79.899]    Admission Date: 3/20/2023  Expected Discharge Date: per attending         Payor: [employer] / Plan: [facility] CHOICE PLUS / Product Type: Commercial /     Extended Emergency Contact Information  Primary Emergency Contact: Leticia Osullivan  Address: [id number] [place] [address] [place] [address] [place]  Mobile Phone: [phone]  Relation: Spouse    Discharge Plan A: Home         [facility] [address] [address]  Phone: [phone] Fax: [phone]    [id number] - [place] [address] [address]  [address] [address] [address]  Phone: [phone] Fax: [phone]      Initial Assessment (most recent)       Adult Discharge Assessment - 03/21/23 0919          Discharge Assessment    Assessment Type Discharge Planning Assessment     Confirmed/corrected address, phone number and insurance Yes     Confirmed Demographics Correct on Facesheet     Source of Information patient     When was your last doctors appointment? --   year ago    Communicated APRIL with patient/caregiver Date not available/Unable to determine     Reason For Admission tachycardia     People in Home spouse     Do you expect to return to your current living situation? Yes     Do you have help at home or someone to help you manage your care at home? Yes     Who are your caregiver(s) and their phone number(s)? spouse     Prior to hospitilization cognitive status: Alert/Oriented     Current cognitive status: Alert/Oriented     Equipment Currently Used at Home none     Readmission within 30 days? Yes     Patient  currently being followed by outpatient case management? No     Do you currently have service(s) that help you manage your care at home? No     Do you take prescription medications? Yes     Do you have prescription coverage? Yes     Coverage [employer]     Do you have any problems affording any of your prescribed medications? No     Is the patient taking medications as prescribed? yes     Who is going to help you get home at discharge? spouse     How do you get to doctors appointments? family or friend will provide     Are you on dialysis? No     Do you take coumadin? No     Discharge Plan A Home                   SW to f/u as needed.

## 2023-03-21 NOTE — ASSESSMENT & PLAN NOTE
Secondary to chemotherapy, WBC: 1.27 on admission, reported Chills, diaphoresis and rigors night PTA.     --Daily CBC, CMP  --Prophylactic Abx- cefepime  --IV fluids  --Neutropenic precautions

## 2023-03-21 NOTE — CONSULTS
[facility] - Med Surg  Adult Nutrition  Consult Note    SUMMARY     Recommendations  1. Recommend pt PO diet is advanced to full liquid, Once pt tolerates full liquids, recommend neutropenic diet, texture per SLP recs.   2. If TPN is warranted, Recommend pt is initiated onto PN.   - Custom TPN; DEX: 260g; GIR: 2.40, AA:110g. Total Kcal: 1324kcal (68% EEN).   - Electrolytes per Pharmacy.   - Check TG within 24-48hrs, if WNL, add standard daily lipids.   - Check Mg, Na, K+, Phos, Glu before and during initiation, correct as indicated. - If Clinimix is warranted, recommend to initiate pt onto clinimix 4.25/5 at rate of 110ml/hr. Formula provides 112gm AA, 132gm CHO, Total Kcal 898kcal (45% EEN).   3. Weigh weekly.    Goals:   1. Pt PO diet will advance beyond clear/ full liquid within <5 days.   2. Pt will tolerate >45% of energy needs by RD follow up.  Nutrition Goal Status: new  Communication of RD Recs: other (comment) (POC: Sticky Note)    Assessment and Plan    Nutrition Problem  Inadequate energy intake     Related to (etiology):   Decreased ability to consume sufficient nutrients    Signs and Symptoms (as evidenced by):   Clear liquid diet    Interventions(treatment strategy):  1. Recommend pt PO diet is advanced to full liquid, Once pt tolerates full liquids, recommend neutropenic diet, texture per SLP recs.   2. If TPN is warranted, Recommend pt is initiated onto PN.   - Custom TPN; DEX: 260g; GIR: 2.40, AA:110g. Total Kcal: 1324kcal (68% EEN).   - Electrolytes per Pharmacy.   - Check TG within 24-48hrs, if WNL, add standard daily lipids.   - Check Mg, Na, K+, Phos, Glu before and during initiation, correct as indicated. - If Clinimix is warranted, recommend to initiate pt onto clinimix 4.25/5 at rate of 110ml/hr. Formula provides 112gm AA, 132gm CHO, Total Kcal 898kcal (45% EEN).   3. Weigh weekly.  4. Collaboration of care with medical providers.    Nutrition Diagnosis Status:   New       Malnutrition  Assessment     Skin (Micronutrient): yellow                                 Reason for Assessment    Reason For Assessment: consult, new TPN  Diagnosis: gastrointestinal disease  Relevant Medical History: Partial SBO, Metastatic adenocarcinoma, Cachexia, Intractable generalized abdominal pain, Chemotherapy-induced neutropenia, HTN, HLD, Pancreatic cyst, Dilated bile duct  Interdisciplinary Rounds: did not attend  General Information Comments:   3/21: 53 y.o male admitted w/ current principal problem of Partial small bowel obstruction. Pt current on clear liquid diet. No intake charted to assess if pt is tolerating clear liquid diet. Dietitian will provide TPN recs in case the pt is not tolerating clear liquids. NFPE not performed per pt on neutropenic precautions. Last charted weight for pt is 165lbs, BMI 22.48 (WNL). Per chart review, the pt has Stage IV cholangiocarcinoma with peritoneal carcinomatosis: On palliative chemotherapy with cisplatin gemcitabine and durvalumab. Pt LBM 3/20. Dimitri score 18 (mild risk). All pertinent labs and medications reviewed. Dietitian will continue to follow.  Nutrition Discharge Planning: Neutropenic diet    Wt Readings from Last 15 Encounters:   03/21/23 75.2 kg (165 lb 12.6 oz)   03/15/23 66.2 kg (145 lb 15.1 oz)   03/09/23 67 kg (147 lb 11.3 oz)   03/01/23 69.4 kg (153 lb)   02/25/23 72.6 kg (160 lb 0.9 oz)   02/15/23 74.5 kg (164 lb 3.9 oz)   02/09/23 75.8 kg (167 lb 1.7 oz)   02/02/23 77.6 kg (171 lb 1.2 oz)   01/31/23 78.9 kg (173 lb 15.1 oz)   01/30/23 78 kg (171 lb 14.4 oz)   01/18/23 77.1 kg (170 lb)   01/09/23 78.6 kg (173 lb 4.5 oz)   01/05/23 81 kg (178 lb 9.2 oz)   12/20/22 82.9 kg (182 lb 12.2 oz)   12/13/22 82.8 kg (182 lb 8.7 oz)   ]    Nutrition Risk Screen    Nutrition Risk Screen: other (see comments) (SBO)    Nutrition/Diet History    Spiritual, Cultural Beliefs, Moravian Practices, Values that Affect Care: no  Food Allergies: NKFA  Factors Affecting  Nutritional Intake: altered gastrointestinal function    Anthropometrics    Temp: 97.6 °F (36.4 °C)  Height: 6' (182.9 cm)  Height (inches): 72 in  Weight Method: Bed Scale  Weight: 75.2 kg (165 lb 12.6 oz)  Weight (lb): 165.79 lb  Ideal Body Weight (IBW), Male: 178 lb  % Ideal Body Weight, Male (lb): 93.14 %  BMI (Calculated): 22.5  BMI Grade: 18.5-24.9 - normal       Lab/Procedures/Meds  BMP  Lab Results   Component Value Date     (L) 03/21/2023    K 3.7 03/21/2023    CL 97 03/21/2023    CO2 28 03/21/2023    BUN 12 03/21/2023    CREATININE 0.6 03/21/2023    CALCIUM 8.5 (L) 03/21/2023    ANIONGAP 10 03/21/2023    EGFRNORACEVR >60 03/21/2023       Pertinent Labs Reviewed: reviewed  Pertinent Medications Reviewed: reviewed  Scheduled Meds:   aluminum-magnesium hydroxide-simethicone  30 mL Oral BID    ceFEPime (MAXIPIME) IVPB  2 g Intravenous Q8H    enoxaparin  40 mg Subcutaneous Daily    fentaNYL  1 patch Transdermal Q72H    polyethylene glycol  17 g Oral BID    pregabalin  50 mg Oral BID     Continuous Infusions:   sodium chloride 0.9% 125 mL/hr at 03/21/23 0544     PRN Meds:.acetaminophen, ALPRAZolam, aluminum-magnesium hydroxide-simethicone, dextrose 10%, dextrose 10%, dextrose, dextrose, glucagon (human recombinant), HYDROmorphone, naloxone, ondansetron, prochlorperazine, simethicone, sodium chloride 0.9%    Physical Findings/Assessment         Estimated/Assessed Needs    Weight Used For Calorie Calculations: 75.2 kg (165 lb 12.6 oz)  Energy Calorie Requirements (kcal): 6207-8153 (MSJ, AF 1.2 per GI Disorder vs 30kcal/kg per Cancer)  Energy Need Method: Kcal/kg, [facility]  Protein Requirements:  (1.2-1.5g/kg per cancer)  Weight Used For Protein Calculations: 75.2 kg (165 lb 12.6 oz)  Fluid Requirements (mL): 6673-3229  Estimated Fluid Requirement Method: RDA Method  RDA Method (mL): 1950  CHO Requirement: 244-282      Nutrition Prescription Ordered    Current Diet Order: clear  liquid    Evaluation of Received Nutrient/Fluid Intake    % Kcal Needs: 0  % Protein Needs: 0  I/O: +2138.5  Energy Calories Required: not meeting needs  Protein Required: not meeting needs  Fluid Required: not meeting needs  Tolerance: not tolerating  % Intake of Estimated Energy Needs: 0 - 25 %  % Meal Intake: 0 - 25 %    Nutrition Risk    Level of Risk/Frequency of Follow-up: high       Monitor and Evaluation    Food and Nutrient Intake: energy intake, food and beverage intake, parenteral nutrition intake  Food and Nutrient Adminstration: diet order, enteral and parenteral nutrition administration  Knowledge/Beliefs/Attitudes: food and nutrition knowledge/skill, beliefs and attitudes  Anthropometric Measurements: weight, weight change, body mass index  Biochemical Data, Medical Tests and Procedures: electrolyte and renal panel, gastrointestinal profile, glucose/endocrine profile, inflammatory profile, lipid profile  Nutrition-Focused Physical Findings: overall appearance, skin       Nutrition Follow-Up    RD Follow-up?: Yes  Jayme Mendoza, Registration Eligible, Provisional LDN

## 2023-03-21 NOTE — PLAN OF CARE
Patient admitted to floor tonight and remains free of injury. VSS. Continuing to progress toward patient goal of pain and nausea control. Patient and family given emotional support. Neutropenic and respiratory precautions in place. Will continue to monitor.    Problem: Adult Inpatient Plan of Care  Goal: Patient-Specific Goal (Individualized)  Outcome: Ongoing, Progressing     Problem: Adult Inpatient Plan of Care  Goal: Absence of Hospital-Acquired Illness or Injury  Outcome: Ongoing, Progressing     Problem: Coping Ineffective  Goal: Effective Coping  Outcome: Ongoing, Progressing     Problem: Neutropenia  Goal: Absence of Infection  Outcome: Ongoing, Progressing     Problem: Skin Injury Risk Increased  Goal: Skin Health and Integrity  Outcome: Ongoing, Progressing

## 2023-03-21 NOTE — PROGRESS NOTES
Called to check on pt, no answer. LVM with call back number.   Oncology Navigation   Intake  Date of Diagnosis:  (negative path)  Cancer Type: GI  Internal / External Referral: Internal  Date of Referral: 23  Initial Nurse Navigator Contact: 23  Referral to Initial Contact Timeline (days): 0  Date Worked: 23  First Appointment Available: 23  Appointment Date: 23  First Available Date vs. Scheduled Date (days): 3  Reason for Treatment Delay: -- (seeking 2nd opinion @ [facility])     Treatment  Current Status: Staging work-up  Date Presented to Tumor Board: 23    Surgical Oncologist: Nikunj  Consult Date: 23    Medical Oncologist: Dr. Shell  Consult Date: 23  Chemotherapy: Planned  Chemotherapy Regimen: Gemcitabine/Cisplatin       Procedures: Genetic test  Biopsy Schedule Date: 22  CT Schedule Date: 23  EUS / EGD: EUS- 2022  Genetic Testing Date Sent: 23  MRI Schedule Date: 23  Port / PICC Schedule Date: 23             Support Systems: Family members     Acuity  Stage: 2  Systemic Treatment - predicted or initiated: Chemotherapy Regimen with Multiple drugs (+1)  ECO  Comorbidities in Medical History: 2  Support: 0  Transportation: 0  Verbalizes the need for more education: 1  Navigation Acuity: 0     Follow Up  No follow-ups on file.

## 2023-03-21 NOTE — PLAN OF CARE
Nutrition Recs: 3/21  1. Recommend pt PO diet is advanced to full liquid, Once pt tolerates full liquids, recommend neutropenic diet, texture per SLP recs.   2. If TPN is warranted, Recommend pt is initiated onto PN.   - Custom TPN; DEX: 260g; GIR: 2.40, AA:110g. Total Kcal: 1324kcal (68% EEN).   - Electrolytes per Pharmacy.   - Check TG within 24-48hrs, if WNL, add standard daily lipids.   - Check Mg, Na, K+, Phos, Glu before and during initiation, correct as indicated. - If Clinimix is warranted, recommend to initiate pt onto clinimix 4.25/5 at rate of 110ml/hr. Formula provides 112gm AA, 132gm CHO, Total Kcal 898kcal (45% EEN).   3. Weigh weekly.  4. Collaboration of care with medical providers.    Goals:   1. Pt PO diet will advance beyond clear/ full liquid within <5 days.   2. Pt will tolerate >45% of energy needs by RD follow up.  Nutrition Goal Status: new  Communication of RD Recs: other (comment) (POC: Sticky Note)  Jayme Mendoza, Registration Eligible, Provisional LDN

## 2023-03-22 PROBLEM — G89.3 NEOPLASM RELATED PAIN: Status: ACTIVE | Noted: 2023-01-01

## 2023-03-22 PROBLEM — R11.2 NAUSEA AND VOMITING: Status: RESOLVED | Noted: 2023-01-01 | Resolved: 2023-01-01

## 2023-03-22 PROBLEM — T45.1X5A CHEMOTHERAPY-INDUCED NEUROPATHY: Status: ACTIVE | Noted: 2023-01-01

## 2023-03-22 PROBLEM — R11.2 NAUSEA AND VOMITING: Status: ACTIVE | Noted: 2023-01-01

## 2023-03-22 PROBLEM — D70.1 CHEMOTHERAPY-INDUCED NEUTROPENIA: Status: RESOLVED | Noted: 2023-01-01 | Resolved: 2023-01-01

## 2023-03-22 PROBLEM — R10.84 INTRACTABLE GENERALIZED ABDOMINAL PAIN: Status: RESOLVED | Noted: 2023-01-01 | Resolved: 2023-01-01

## 2023-03-22 PROBLEM — T45.1X5A CHEMOTHERAPY-INDUCED NEUTROPENIA: Status: RESOLVED | Noted: 2023-01-01 | Resolved: 2023-01-01

## 2023-03-22 PROBLEM — Z51.5 ENCOUNTER FOR PALLIATIVE CARE: Status: ACTIVE | Noted: 2023-01-01

## 2023-03-22 PROBLEM — G62.0 CHEMOTHERAPY-INDUCED NEUROPATHY: Status: ACTIVE | Noted: 2023-01-01

## 2023-03-22 NOTE — ASSESSMENT & PLAN NOTE
Continued improvement in leukopenia/neutropenia.  No evidence of infection symptoms.  Continue to monitor.

## 2023-03-22 NOTE — PROGRESS NOTES
Advance Care Planning    Progress Note  Palliative Medicine      Consult Requested By: Adela Riley DO  Reason for Consult: Pain and symptom management    SUBJECTIVE:     History of Present Illness: Patient seen and examined and feels better today. Notes that his nausea and abdominal pain have improved. He is reporting that he is tolerating clear liquids so far along with pudding and reports some diarrhea overnight that is becoming more formed today. We discussed his pain regimen and that he has been requesting IV dilaudid around every 3-4 hours. We discussed plans for seeing if his home regimen prior to admission is effective or if we will need to adjust. Prn po dilaudid 4mg po q 4 hrs ordered and he is to ask for first and if not effective will ask for IV. I expressed my concern with increasing pain medications due to risk of decreased gastric motility and SBFT noted delayed transit. He verbalized understanding. We also discussed for nausea at home taking zofran po scheduled and when nausea resolves transitioning to prn. Also primary team ordered zyprexa in which will be changed to nightly to assist with nausea and sleep as well. Patient still notes in regards to goals of care that he wants to continue with treatment and is scheduled to follow up with Dr. Shell. He still notes that if treatment is not beneficial or disease progresses despite treatment he may likely transition to comfort focused treatment. Will continue to follow.     In the last 24hrs the patient has used the following pain medications (7a-7a):  - Fentanyl patch 75mcg  - Dilaudid 2mg IV x 7 doses     Past Medical History:   Diagnosis Date    Cancer     COLON CANCER 1995    Digestive disorder     Hypertension     Primary sclerosing cholangitis     Ulcerative colitis     s/p colectomy with J- pouch     Past Surgical History:   Procedure Laterality Date    ABDOMINAL WASHOUT N/A 1/18/2023    Procedure: LAVAGE, PERITONEAL,;  Surgeon: Onur Calvin  MD Guanaco;  Location: [facility] OR 2ND FLR;  Service: General;  Laterality: N/A;    BIOPSY OF PERITONEUM N/A 1/18/2023    Procedure: BIOPSY, PERITONEUM;  Surgeon: Onur Calvin Jr., MD;  Location: [facility] OR 2ND FLR;  Service: General;  Laterality: N/A;    COLON SURGERY      Colectomy with J- pouch    DIAGNOSTIC LAPAROSCOPY N/A 1/18/2023    Procedure: LAPAROSCOPY, DIAGNOSTIC WITH WASHING;  Surgeon: Onur Calvin Jr., MD;  Location: [facility] OR 2ND FLR;  Service: General;  Laterality: N/A;    ENDOSCOPIC ULTRASOUND OF UPPER GASTROINTESTINAL TRACT N/A 10/14/2022    Procedure: ULTRASOUND, UPPER GI TRACT,;  Surgeon: Vince Teran MD;  Location: [facility];  Service: Endoscopy;  Laterality: N/A;  upper and linear    ENDOSCOPIC ULTRASOUND OF UPPER GASTROINTESTINAL TRACT N/A 12/13/2022    Procedure: ULTRASOUND, UPPER GI TRACT, ENDOSCOPIC;  Surgeon: Vince Teran MD;  Location: [facility];  Service: Endoscopy;  Laterality: N/A;    ERCP N/A 10/14/2022    Procedure: ERCP (ENDOSCOPIC RETROGRADE CHOLANGIOPANCREATOGRAPHY) with spyglass;  Surgeon: Vince Teran MD;  Location: [facility];  Service: Endoscopy;  Laterality: N/A;    ERCP N/A 12/13/2022    Procedure: ERCP (ENDOSCOPIC RETROGRADE CHOLANGIOPANCREATOGRAPHY);  Surgeon: Vince Teran MD;  Location: [facility];  Service: Endoscopy;  Laterality: N/A;    ERCP N/A 12/20/2022    Procedure: ERCP (ENDOSCOPIC RETROGRADE CHOLANGIOPANCREATOGRAPHY);  Surgeon: Vince Teran MD;  Location: [facility];  Service: Endoscopy;  Laterality: N/A;    ERCP N/A 1/4/2023    Procedure: ERCP (ENDOSCOPIC RETROGRADE CHOLANGIOPANCREATOGRAPHY);  Surgeon: Vince Teran MD;  Location: [facility];  Service: Endoscopy;  Laterality: N/A;  room 1    ERCP N/A 2/22/2023    Procedure: ERCP (ENDOSCOPIC RETROGRADE CHOLANGIOPANCREATOGRAPHY);  Surgeon: Vince Teran MD;  Location: [facility];  Service: Endoscopy;  Laterality: N/A;    ERCP N/A  2/23/2023    Procedure: ERCP (ENDOSCOPIC RETROGRADE CHOLANGIOPANCREATOGRAPHY);  Surgeon: Vince Teran MD;  Location: [facility];  Service: Endoscopy;  Laterality: N/A;    ESOPHAGOGASTRODUODENOSCOPY N/A 12/20/2022    Procedure: EGD (ESOPHAGOGASTRODUODENOSCOPY);  Surgeon: Vince Teran MD;  Location: [facility];  Service: Endoscopy;  Laterality: N/A;    ESOPHAGOGASTRODUODENOSCOPY N/A 1/4/2023    Procedure: EGD (ESOPHAGOGASTRODUODENOSCOPY);  Surgeon: Vince Teran MD;  Location: [facility];  Service: Endoscopy;  Laterality: N/A;    INSERTION OF TUNNELED CENTRAL VENOUS CATHETER (CVC) WITH SUBCUTANEOUS PORT Left 2/9/2023    Procedure: [id number];  Surgeon: Wojciech Martínez MD;  Location: [facility];  Service: General;  Laterality: Left;    POUCHOSCOPY       Family History   Problem Relation Age of Onset    Cancer Mother         GYN?    Testicular cancer Father     Skin cancer Father     Lung cancer Maternal Grandmother         +smoking hx    Heart disease Paternal Uncle        Social History     Socioeconomic History    Marital status:    Tobacco Use    Smoking status: Never     Passive exposure: Never    Smokeless tobacco: Never   Substance and Sexual Activity    Alcohol use: Not Currently    Drug use: Yes     Types: Marijuana     Comment: medical marijuana      Review of patient's allergies indicates:   Allergen Reactions    Vicryl [sutures, polyglycolic acid] Other (See Comments)     Wound dehiscence after achilles sx    Ciprofloxacin Other (See Comments)     Pt previously had medication reaction; suffered achilles rupture     Meperidine Hives       Medications:    Current Facility-Administered Medications:     0.9%  NaCl infusion, , Intravenous, Continuous, April AD Naik NP, Last Rate: 125 mL/hr at 03/22/23 1030, New Bag at 03/22/23 1030    acetaminophen tablet 650 mg, 650 mg, Oral, Q4H PRN, April AD Naik NP    ALPRAZolam tablet 0.25 mg, 0.25 mg, Oral, BID PRN,  April J. Heena, NP    aluminum-magnesium hydroxide-simethicone 200-200-20 mg/5 mL suspension 30 mL, 30 mL, Oral, QID PRN, Altagracia Armstrong MD    aluminum-magnesium hydroxide-simethicone 200-200-20 mg/5 mL suspension 30 mL, 30 mL, Oral, BID, Shahida Naik NP, 30 mL at 03/22/23 0822    ceFEPIme (MAXIPIME) 2 g in dextrose 5 % in water (D5W) 5 % 50 mL IVPB (MB+), 2 g, Intravenous, Q8H, Adela Riley DO, Stopped at 03/22/23 0847    dextrose 10% bolus 125 mL 125 mL, 12.5 g, Intravenous, PRN, Altagracia Armstrong MD    dextrose 10% bolus 250 mL 250 mL, 25 g, Intravenous, PRN, Altagracia Armstrong MD    dextrose 40 % gel 15,000 mg, 15 g, Oral, PRN, Altagracia Armstrong MD    dextrose 40 % gel 30,000 mg, 30 g, Oral, PRN, Altagracia Armstrong MD    enoxaparin injection 40 mg, 40 mg, Subcutaneous, Daily, Shahida Naik NP, 40 mg at 03/21/23 1629    fentaNYL 75 mcg/hr 1 patch, 1 patch, Transdermal, Q72H, Shahida Naik NP, 1 patch at 03/21/23 0910    glucagon (human recombinant) injection 1 mg, 1 mg, Intramuscular, PRN, Altagracia Armstrong MD    HYDROmorphone (PF) injection 2 mg, 2 mg, Intravenous, Q3H PRN, Meagan Burrows NP, 2 mg at 03/22/23 1109    naloxone 0.4 mg/mL injection 0.02 mg, 0.02 mg, Intravenous, PRN, Shahida Naik NP    OLANZapine zydis disintegrating tablet 10 mg, 10 mg, Oral, Once, Shahida Naik NP    ondansetron injection 8 mg, 8 mg, Intravenous, Q6H PRN, Shahida Naik NP, 8 mg at 03/21/23 1738    polyethylene glycol packet 17 g, 17 g, Oral, BID, Shahida Naik NP    pregabalin capsule 50 mg, 50 mg, Oral, BID, Abby Madera NP, 50 mg at 03/22/23 0819    prochlorperazine injection Soln 5 mg, 5 mg, Intravenous, Q6H PRN, Altagracia Armstrong MD, 5 mg at 03/21/23 1132    scopolamine 1.3-1.5 mg (1 mg over 3 days) 1 patch, 1 patch, Transdermal, Q3 Days, Adela Riley DO, 1 patch at 03/21/23 1740    simethicone chewable tablet 80 mg, 1 tablet, Oral, QID PRN, Shahida Naik NP, 80 mg at 03/22/23  0819    sodium chloride 0.9% flush 10 mL, 10 mL, Intravenous, Q12H PRN, Altagracia Armstrong MD    ROS:  Review of Systems   Constitutional:  Positive for activity change, appetite change, fatigue and unexpected weight change.   HENT:  Negative for congestion.    Respiratory:  Negative for shortness of breath and wheezing.    Cardiovascular:  Negative for chest pain, palpitations and leg swelling.   Gastrointestinal:  Positive for abdominal distention, abdominal pain, constipation, diarrhea, nausea and vomiting. Negative for blood in stool.   Skin:  Negative for color change.   Allergic/Immunologic: Positive for immunocompromised state.   Neurological:  Positive for weakness.        Generalized weakness, onset of burning, sharp pain radiating down right leg since starting chemo    Psychiatric/Behavioral:  The patient is not nervous/anxious.      OBJECTIVE:     Physical Exam:  Vitals: Temp: 96.9 °F (36.1 °C) (03/22/23 0710)  Pulse: 66 (03/22/23 0812)  Resp: 16 (03/22/23 1109)  BP: 134/81 (03/22/23 0812)  SpO2: 99 % (03/22/23 0710)    Physical Exam  Vitals and nursing note reviewed.   Constitutional:       General: He is not in acute distress.     Appearance: He is ill-appearing.   HENT:      Head: Normocephalic.      Nose: Nose normal.      Mouth/Throat:      Mouth: Mucous membranes are dry.   Eyes:      General:         Right eye: No discharge.         Left eye: No discharge.      Pupils: Pupils are equal, round, and reactive to light.   Cardiovascular:      Rate and Rhythm: Normal rate.   Pulmonary:      Effort: Pulmonary effort is normal. No respiratory distress.   Abdominal:      General: There is distension.   Musculoskeletal:         General: Normal range of motion.      Cervical back: Normal range of motion.      Right lower leg: No edema.      Left lower leg: No edema.   Skin:     General: Skin is warm and dry.      Coloration: Skin is pale.   Neurological:      Mental Status: He is alert and oriented to  person, place, and time.   Psychiatric:         Mood and Affect: Mood normal.         Behavior: Behavior normal.         Thought Content: Thought content normal.         Judgment: Judgment normal.         Review of Symptoms      Symptom Assessment (ESAS 0-10 Scale)  Pain:  0  Dyspnea:  0  Anxiety:  0  Nausea:  0  Depression:  0  Anorexia:  0  Fatigue:  0  Insomnia:  0  Restlessness:  0  Agitation:  0       Anxiety:  Is not nervous/anxious  Constipation:  Constipation    ECOG Performance Status stGstrstastdstest:st st1st Living Arrangements:  Lives with spouse    Psychosocial/Cultural:   See Palliative Psychosocial Note: No  **Primary  to Follow**  Palliative Care  Consult: No    Advance Directives:     Decision Making:  Patient answered questions and Family answered questions  Goals of Care: What is most important right now is to focus on remaining as independent as possible, symptom/pain control. Accordingly, we have decided that the best plan to meet the patient's goals includes continuing with treatment.    Labs:  WBC   Date Value Ref Range Status   03/22/2023 3.62 (L) 3.90 - 12.70 K/uL Final       Hemoglobin   Date Value Ref Range Status   03/22/2023 9.9 (L) 14.0 - 18.0 g/dL Final       Hematocrit   Date Value Ref Range Status   03/22/2023 30.6 (L) 40.0 - 54.0 % Final       MCV   Date Value Ref Range Status   03/22/2023 91 82 - 98 fL Final       Platelets   Date Value Ref Range Status   03/22/2023 299 150 - 450 K/uL Final       BMP  Lab Results   Component Value Date     03/22/2023    K 3.4 (L) 03/22/2023     03/22/2023    CO2 27 03/22/2023    BUN 13 03/22/2023    CREATININE 0.7 03/22/2023    CALCIUM 9.0 03/22/2023    ANIONGAP 10 03/22/2023    EGFRNORACEVR >60 03/22/2023       Lab Results   Component Value Date    AST 73 (H) 03/22/2023     (H) 12/02/2022    ALKPHOS 825 (H) 03/22/2023    BILITOT 1.1 (H) 03/22/2023       Albumin   Date Value Ref Range Status   03/22/2023 2.8 (L) 3.5  - 5.2 g/dL Final       Radiology:I have reviewed all pertinent imaging results/findings within the past 24 hours.      ASSESSMENT   Mr. Osullivan is a 53-year-old man metastatic cholangiocarcinoma with intra-abdominal carcinomatosis on palliative chemotherapy with cisplatin gemcitabine and durvalumab under the care of Dr. Shell.  Most recent cycle of chemotherapy and immunotherapy on 03/16/2023. He presented with nausea vomiting and acute on chronic abdominal pain in which he reports experiencing since discharge on prior hospitalization.  He notes intermittent constipation and diarrhea but was able to pass stool recently. He is followed by  palliative medicine with recent escalation narcotic pain medications.In the ED, CT abdomen: Small-bowel wall thickening and dilation with scattered air-fluid levels.  Is unclear if this relates to obstruction or infectious or inflammatory enteritis. WBC: 1.27, H/H: 12.1/36.4, Pl: 456, Alk Phos: 598, AST/ALT: 53/75. Palliative medicine consulted for pain and symptom management.     PLAN   **Encounter for Palliative Care  -Code status: Full code, did not discuss as not to over whelm patient and wife. Will follow up tomorrow.   -HCPOA: Surrogate decision maker is wife Leticia  -GOC: Focus on remaining as independent as possible, symptom/pain control. Accordingly, we have decided that the best plan to meet the patient's goals includes continuing with treatment.  Ongoing GOC discussion, patient notes if treatment that is palliative in nature no longer beneficial in the setting of inability to tolerate treatment, he would likely transition to comfort focused treatment. Has follow up with Dr. Shell next week.   -See HPI for further details      **Neoplasm related pain   -Continue home dose Fentanyl 75 mcg patch q 72 hrs. Was taking dilaudid po 4mg q 4 hrs prn break through pain ordered again today to see if home regimen is effective.   -Currently on Dilaudid 2mg IVP q 4 hrs, pt only to  receive if po not effective   -Continue current regimen as ongoing GOC discussions occurring, will adjust as needed. Expressed my concern with increased opioids with current delayed transit.    -Discussed safe prescribing with patient due to increases in pain regimen, OIC prevention. Also undergoing work up and treatment with question of progressive disease vs fair amount of tumor in abdomen vs X ray imaging with possible air-fluid levels less prominent on follow-up CT differential includes small obstruction infectious inflammatory.  SBFT with delayed transit.     **Chemotherapy induced neuropathy   -Continue trial of Lyrica 50mg po bid     **Stage IV cholangiocarcinoma with peritoneal carcinomatosis  -Under the care of Dr. Shell  -Reported difficulty tolerating treatment, plans to follow up outpatient and if treatment no longer beneficial or progression of disease despite treatment patient likely will transition to comfort focused treatment   -chemo held due to hospital admission and planned to be rescheduled next week if possible.   -Patient to start zyprexa HS to aid with sleep and nausea   -GOC: Focus on remaining as independent as possible, symptom/pain control. Accordingly, we have decided that the best plan to meet the patient's goals includes continuing with treatment.  Ongoing GOC discussion, patient notes if treatment that is palliative in nature no longer beneficial in the setting of inability to tolerate treatment, he would likely transition to comfort focused treatment. Has follow up scheduled with Dr. Shell.    Discussed case and visit details with JOHN Pinedo     Thank you for allowing Palliative Medicine to be involved in the care of Guevara Osullivan II.       Medical decision making: management of more than one chronic illness in exacerbation or progression of disease    Plan required increased review of medication choice, interaction, dosing, frequency, and route due to patient complexity.  Patient complexity increased by: being on more than 8 medications       51 minutes total visit  35 minutes of total time spent on the encounter, which includes face to face time and non-face to face time preparing to see the patient (eg, review of tests), Obtaining and/or reviewing separately obtained history, Documenting clinical information in the electronic or other health record, Independently interpreting results (not separately reported) and communicating results to the patient/family/caregiver, or Care coordination (not separately reported).     16 minutes spent in discussing ACP       Abby Madera NP  Palliative Medicine

## 2023-03-22 NOTE — PLAN OF CARE
Pt remains free of injury throughout the shift, safety measures remain in place.   Poc reviewed with pt. Vss, no acute s/s of distress. Medication given as ordered. Pain managed with PRN medication. Hourly rounding done. Chart reviwed, will cont with poc.     Problem: Coping Ineffective  Goal: Effective Coping  Outcome: Ongoing, Progressing     Problem: Neutropenia  Goal: Absence of Infection  Outcome: Ongoing, Progressing     Problem: Adult Inpatient Plan of Care  Goal: Optimal Comfort and Wellbeing  Outcome: Ongoing, Progressing     Problem: Skin Injury Risk Increased  Goal: Skin Health and Integrity  Outcome: Ongoing, Progressing

## 2023-03-22 NOTE — PLAN OF CARE
Problem: Adult Inpatient Plan of Care  Goal: Plan of Care Review  Outcome: Ongoing, Not Progressing  Goal: Patient-Specific Goal (Individualized)  Outcome: Ongoing, Not Progressing  Goal: Absence of Hospital-Acquired Illness or Injury  Outcome: Ongoing, Not Progressing  Goal: Optimal Comfort and Wellbeing  Outcome: Ongoing, Not Progressing  Goal: Readiness for Transition of Care  Outcome: Ongoing, Not Progressing     Problem: Coping Ineffective  Goal: Effective Coping  Outcome: Ongoing, Not Progressing     Problem: Neutropenia  Goal: Absence of Infection  Outcome: Ongoing, Not Progressing     Problem: Skin Injury Risk Increased  Goal: Skin Health and Integrity  Outcome: Ongoing, Not Progressing

## 2023-03-22 NOTE — PLAN OF CARE
Problem: Adult Inpatient Plan of Care  Goal: Plan of Care Review  Outcome: Met  Goal: Patient-Specific Goal (Individualized)  Outcome: Met  Goal: Absence of Hospital-Acquired Illness or Injury  Outcome: Met  Goal: Optimal Comfort and Wellbeing  Outcome: Met  Goal: Readiness for Transition of Care  Outcome: Met     Problem: Coping Ineffective  Goal: Effective Coping  Outcome: Met     Problem: Neutropenia  Goal: Absence of Infection  Outcome: Met     Problem: Skin Injury Risk Increased  Goal: Skin Health and Integrity  Outcome: Met    Adequate for DC

## 2023-03-22 NOTE — ASSESSMENT & PLAN NOTE
X-ray imaging with possible air-fluid levels less prominent on follow-up CT differential includes small obstruction infectious inflammatory.   No fevers chills or other infectious symptoms at this time. He has been able to move bowels with alternating constipation diarrhea.     03/21/2023  Small-bowel follow-through with slow transit time can not rule out small-bowel obstruction versus slow transit.  Intraperitoneal carcinomatosis high risk for small-bowel obstruction can not rule out however clinically his tolerance of clear liquids is reassuring at this time but would need close monitoring if advance diet.  Will discuss with primary team.  He is high risk for delayed transit with diffuse pain and narcotic escalation.  Recommend discussion with GI is methylnaltreone may be considered/helpful in his case

## 2023-03-22 NOTE — PROGRESS NOTES
[facility] Surg  Hematology/Oncology  Progress Note    Patient Name: Guevara Osullivan II  Admission Date: 3/20/2023  Hospital Length of Stay: 0 days  Code Status: Full Code     Subjective:     HPI:  No notes on file    Interval History:  Clear liquid diet yesterday which patient says that he tolerated well without exacerbation of his diffuse abdominal pelvic pain.  He notes improvement in nausea.  He continues to have waxing and waning episodes small amount liquid bowel movement.  Palliative care continues to be involved in his care with ongoing goals of care discussion with them and myself this morning.    Oncology Treatment Plan:   OP CISPLATIN GEMCITABINE DURVALUMAB Q3W FOLLOWED BY MAINTENANCE DURVALUMAB 1500MG Q4W    Medications:  Continuous Infusions:   sodium chloride 0.9% 125 mL/hr at 03/22/23 0634     Scheduled Meds:   aluminum-magnesium hydroxide-simethicone  30 mL Oral BID    ceFEPime (MAXIPIME) IVPB  2 g Intravenous Q8H    enoxaparin  40 mg Subcutaneous Daily    fentaNYL  1 patch Transdermal Q72H    OLANZapine zydis  10 mg Oral Once    polyethylene glycol  17 g Oral BID    potassium chloride  10 mEq Intravenous Q1H    pregabalin  50 mg Oral BID    scopolamine  1 patch Transdermal Q3 Days     PRN Meds:acetaminophen, ALPRAZolam, aluminum-magnesium hydroxide-simethicone, dextrose 10%, dextrose 10%, dextrose, dextrose, glucagon (human recombinant), HYDROmorphone, naloxone, ondansetron, prochlorperazine, simethicone, sodium chloride 0.9%     Review of Systems   Constitutional:  Positive for fatigue. Negative for activity change, appetite change, chills, diaphoresis, fever and unexpected weight change.   HENT:  Negative for congestion, rhinorrhea and sinus pain.    Respiratory:  Negative for cough, choking, chest tightness, shortness of breath, wheezing and stridor.    Cardiovascular:  Negative for chest pain, palpitations and leg swelling.   Gastrointestinal:  Positive for abdominal pain,  constipation and diarrhea. Negative for abdominal distention, anal bleeding, blood in stool, nausea, rectal pain and vomiting.   Skin:  Negative for rash.   Hematological:  Does not bruise/bleed easily.   Psychiatric/Behavioral: Negative.     Objective:     Vital Signs (Most Recent):  Temp: 96.9 °F (36.1 °C) (03/22/23 0710)  Pulse: 81 (03/22/23 0710)  Resp: 18 (03/22/23 0710)  BP: 131/84 (03/22/23 0710)  SpO2: 99 % (03/22/23 0710) Vital Signs (24h Range):  Temp:  [96.9 °F (36.1 °C)-99 °F (37.2 °C)] 96.9 °F (36.1 °C)  Pulse:  [64-92] 81  Resp:  [16-18] 18  SpO2:  [97 %-100 %] 99 %  BP: (131-137)/(75-90) 131/84     Weight: 75.2 kg (165 lb 12.6 oz)  Body mass index is 22.48 kg/m².  Body surface area is 1.95 meters squared.      Intake/Output Summary (Last 24 hours) at 3/22/2023 0755  Last data filed at 3/22/2023 0634  Gross per 24 hour   Intake 2976.56 ml   Output --   Net 2976.56 ml       Physical Exam  Vitals reviewed.   Constitutional:       General: He is not in acute distress.     Appearance: Normal appearance. He is ill-appearing.   HENT:      Head: Normocephalic and atraumatic.      Nose: No congestion.      Mouth/Throat:      Mouth: Mucous membranes are moist.   Eyes:      Extraocular Movements: Extraocular movements intact.      Conjunctiva/sclera: Conjunctivae normal.   Cardiovascular:      Rate and Rhythm: Normal rate.   Pulmonary:      Effort: Pulmonary effort is normal. No respiratory distress.   Abdominal:      General: There is no distension.      Palpations: Abdomen is soft. There is no mass.      Tenderness: There is abdominal tenderness. There is no guarding.      Hernia: No hernia is present.   Musculoskeletal:         General: No swelling.      Cervical back: Neck supple.   Skin:     General: Skin is warm.      Coloration: Skin is not jaundiced.   Neurological:      General: No focal deficit present.      Mental Status: He is alert and oriented to person, place, and time.   Psychiatric:         Mood  and Affect: Mood normal.         Behavior: Behavior normal.         Thought Content: Thought content normal.         Judgment: Judgment normal.       Significant Labs:   CBC:   Recent Labs   Lab 03/20/23  1550 03/21/23  0446 03/22/23  0559   WBC 1.27* 2.67* 3.62*   HGB 12.1* 10.1* 9.9*   HCT 36.4* 30.8* 30.6*   * 278 299    and CMP:   Recent Labs   Lab 03/20/23  1550 03/21/23  0446 03/22/23  0559   * 135* 137   K 3.7 3.7 3.4*   CL 93* 97 100   CO2 28 28 27   * 93 97   BUN 13 12 13   CREATININE 0.7 0.6 0.7   CALCIUM 9.2 8.5* 9.0   PROT 7.5 6.4 6.7   ALBUMIN 3.2* 2.7* 2.8*   BILITOT 1.2* 0.9 1.1*   ALKPHOS 598* 479* 825*   AST 53* 36 73*   ALT 75* 56* 80*   ANIONGAP 13 10 10       Diagnostic Results:  I have reviewed all pertinent imaging results/findings within the past 24 hours.    Assessment/Plan:     Nausea and vomiting  No further vomiting.  Nausea improved, antiemetics including Zofran p.r.n. have been helpful    Chemotherapy-induced neutropenia  Continued improvement in leukopenia/neutropenia.  No evidence of infection symptoms.  Continue to monitor.    Intractable generalized abdominal pain   X-ray imaging with possible air-fluid levels less prominent on follow-up CT differential includes small obstruction infectious inflammatory.   No fevers chills or other infectious symptoms at this time. He has been able to move bowels with alternating constipation diarrhea.     03/21/2023  Small-bowel follow-through with slow transit time can not rule out small-bowel obstruction versus slow transit.  Intraperitoneal carcinomatosis high risk for small-bowel obstruction can not rule out however clinically his tolerance of clear liquids is reassuring at this time but would need close monitoring if advance diet.  Will discuss with primary team.  He is high risk for delayed transit with diffuse pain and narcotic escalation.  Recommend discussion with GI is methylnaltreone may be considered/helpful in his  case    Abdominal carcinomatosis  See above    Metastatic adenocarcinoma  On palliative chemotherapy with cisplatin gemcitabine and durvalumab under direction of primary oncologist Dr. Shell.  Will notify regarding his inpatient admission, hold chemotherapy planned this week and reschedule next week if possible.  Given ongoing issues with hydration he may benefit from standing IV fluid support following chemotherapy day and will discuss with nurse navigation to arrange per patient request.      Ongoing goals of care discussion with palliative care and oncology team.  He does understand guarded prognosis and potential for progressive disease.  Given recent initiation of his current chemotherapy immunotherapy regimen he is interested in continuing as possible as outpatient for now but does understand possibility of supportive care only/hospice in the future.      Thank you for your consult. I will follow-up with patient. Please contact us if you have any additional questions.     Sally Castellanos MD  Hematology/Oncology  [facility]

## 2023-03-22 NOTE — ASSESSMENT & PLAN NOTE
On palliative chemotherapy with cisplatin gemcitabine and durvalumab under direction of primary oncologist Dr. Shell.  Will notify regarding his inpatient admission, hold chemotherapy planned this week and reschedule next week if possible.  Given ongoing issues with hydration he may benefit from standing IV fluid support following chemotherapy day and will discuss with nurse navigation to arrange per patient request.      Ongoing goals of care discussion with palliative care and oncology team.  He does understand guarded prognosis and potential for progressive disease.  Given recent initiation of his current chemotherapy immunotherapy regimen he is interested in continuing as possible as outpatient for now but does understand possibility of supportive care only/hospice in the future.

## 2023-03-22 NOTE — SUBJECTIVE & OBJECTIVE
Interval History:  Clear liquid diet yesterday which patient says that he tolerated well without exacerbation of his diffuse abdominal pelvic pain.  He notes improvement in nausea.  He continues to have waxing and waning episodes small amount liquid bowel movement.  Palliative care continues to be involved in his care with ongoing goals of care discussion with them and myself this morning.    Oncology Treatment Plan:   OP CISPLATIN GEMCITABINE DURVALUMAB Q3W FOLLOWED BY MAINTENANCE DURVALUMAB 1500MG Q4W    Medications:  Continuous Infusions:   sodium chloride 0.9% 125 mL/hr at 03/22/23 0634     Scheduled Meds:   aluminum-magnesium hydroxide-simethicone  30 mL Oral BID    ceFEPime (MAXIPIME) IVPB  2 g Intravenous Q8H    enoxaparin  40 mg Subcutaneous Daily    fentaNYL  1 patch Transdermal Q72H    OLANZapine zydis  10 mg Oral Once    polyethylene glycol  17 g Oral BID    potassium chloride  10 mEq Intravenous Q1H    pregabalin  50 mg Oral BID    scopolamine  1 patch Transdermal Q3 Days     PRN Meds:acetaminophen, ALPRAZolam, aluminum-magnesium hydroxide-simethicone, dextrose 10%, dextrose 10%, dextrose, dextrose, glucagon (human recombinant), HYDROmorphone, naloxone, ondansetron, prochlorperazine, simethicone, sodium chloride 0.9%     Review of Systems   Constitutional:  Positive for fatigue. Negative for activity change, appetite change, chills, diaphoresis, fever and unexpected weight change.   HENT:  Negative for congestion, rhinorrhea and sinus pain.    Respiratory:  Negative for cough, choking, chest tightness, shortness of breath, wheezing and stridor.    Cardiovascular:  Negative for chest pain, palpitations and leg swelling.   Gastrointestinal:  Positive for abdominal pain, constipation and diarrhea. Negative for abdominal distention, anal bleeding, blood in stool, nausea, rectal pain and vomiting.   Skin:  Negative for rash.   Hematological:  Does not bruise/bleed easily.   Psychiatric/Behavioral: Negative.      Objective:     Vital Signs (Most Recent):  Temp: 96.9 °F (36.1 °C) (03/22/23 0710)  Pulse: 81 (03/22/23 0710)  Resp: 18 (03/22/23 0710)  BP: 131/84 (03/22/23 0710)  SpO2: 99 % (03/22/23 0710) Vital Signs (24h Range):  Temp:  [96.9 °F (36.1 °C)-99 °F (37.2 °C)] 96.9 °F (36.1 °C)  Pulse:  [64-92] 81  Resp:  [16-18] 18  SpO2:  [97 %-100 %] 99 %  BP: (131-137)/(75-90) 131/84     Weight: 75.2 kg (165 lb 12.6 oz)  Body mass index is 22.48 kg/m².  Body surface area is 1.95 meters squared.      Intake/Output Summary (Last 24 hours) at 3/22/2023 0755  Last data filed at 3/22/2023 0634  Gross per 24 hour   Intake 2976.56 ml   Output --   Net 2976.56 ml       Physical Exam  Vitals reviewed.   Constitutional:       General: He is not in acute distress.     Appearance: Normal appearance. He is ill-appearing.   HENT:      Head: Normocephalic and atraumatic.      Nose: No congestion.      Mouth/Throat:      Mouth: Mucous membranes are moist.   Eyes:      Extraocular Movements: Extraocular movements intact.      Conjunctiva/sclera: Conjunctivae normal.   Cardiovascular:      Rate and Rhythm: Normal rate.   Pulmonary:      Effort: Pulmonary effort is normal. No respiratory distress.   Abdominal:      General: There is no distension.      Palpations: Abdomen is soft. There is no mass.      Tenderness: There is abdominal tenderness. There is no guarding.      Hernia: No hernia is present.   Musculoskeletal:         General: No swelling.      Cervical back: Neck supple.   Skin:     General: Skin is warm.      Coloration: Skin is not jaundiced.   Neurological:      General: No focal deficit present.      Mental Status: He is alert and oriented to person, place, and time.   Psychiatric:         Mood and Affect: Mood normal.         Behavior: Behavior normal.         Thought Content: Thought content normal.         Judgment: Judgment normal.       Significant Labs:   CBC:   Recent Labs   Lab 03/20/23  1550 03/21/23  0446 03/22/23  0559    WBC 1.27* 2.67* 3.62*   HGB 12.1* 10.1* 9.9*   HCT 36.4* 30.8* 30.6*   * 278 299    and CMP:   Recent Labs   Lab 03/20/23  1550 03/21/23  0446 03/22/23  0559   * 135* 137   K 3.7 3.7 3.4*   CL 93* 97 100   CO2 28 28 27   * 93 97   BUN 13 12 13   CREATININE 0.7 0.6 0.7   CALCIUM 9.2 8.5* 9.0   PROT 7.5 6.4 6.7   ALBUMIN 3.2* 2.7* 2.8*   BILITOT 1.2* 0.9 1.1*   ALKPHOS 598* 479* 825*   AST 53* 36 73*   ALT 75* 56* 80*   ANIONGAP 13 10 10       Diagnostic Results:  I have reviewed all pertinent imaging results/findings within the past 24 hours.

## 2023-03-23 NOTE — PROGRESS NOTES
Wife called to r/s appts from 3/23- to next week, would like to change to Tue for lab/MD and Wed for infusion with possibility of getting fluids on Fri after tx. States the weekend is when pt tends to struggle with weakness, fatigue and need for fluids. Told her we can change appts to Tue/Wed going forward. Asked palliative to cxl 3/29 appt since she has seen him during current admission, wife agrees. Spoke with pt also, states he feels better and is read to go home. States she spoke with their [facility] rep about HH for fluids, instead of pt having to come in to office d/t his weakness. Told her I will ask SW for point of contact. All questions answered, they voiced understanding and know to call with any questions.   Oncology Navigation   Intake  Date of Diagnosis:  (negative path)  Cancer Type: GI  Internal / External Referral: Internal  Date of Referral: 23  Initial Nurse Navigator Contact: 23  Referral to Initial Contact Timeline (days): 0  Date Worked: 23  First Appointment Available: 23  Appointment Date: 23  First Available Date vs. Scheduled Date (days): 3  Reason for Treatment Delay: -- (seeking 2nd opinion @ [facility])     Treatment  Current Status: Staging work-up  Date Presented to Tumor Board: 23    Surgical Oncologist: Nikunj  Consult Date: 23    Medical Oncologist: Dr. Shell  Consult Date: 23  Chemotherapy: Planned  Chemotherapy Regimen: Gemcitabine/Cisplatin       Procedures: Genetic test  Biopsy Schedule Date: 22  CT Schedule Date: 23  EUS / EGD: EUS- 2022  Genetic Testing Date Sent: 23  MRI Schedule Date: 23  Port / PICC Schedule Date: 23             Support Systems: Family members     Acuity  Stage: 2  Systemic Treatment - predicted or initiated: Chemotherapy Regimen with Multiple drugs (+1)  ECO  Comorbidities in Medical History: 2  Support: 0  Transportation: 0  Verbalizes the need for more education: 1  Navigation  Acuity: 0     Follow Up  No follow-ups on file.

## 2023-03-23 NOTE — ASSESSMENT & PLAN NOTE
Secondary to malignancy, recurrent, will manage conservatively for now due to recurrent nature and Hx of improvement with conservative mearsures, if no improvement will consider General Surgery or GI Consult.     --Provided dietary recommendations and educational materials on signs/symptoms to report to primary oncologist

## 2023-03-23 NOTE — DISCHARGE SUMMARY
[facility] Hospital Medicine  Discharge Summary      Patient Name: Guevara Osullivan II  MRN: [id number]  BRANDI: [id number]  Patient Class: OP- Observation  Admission Date: 3/20/2023  Hospital Length of Stay: 0 days  Discharge Date and Time:  03/23/2023 9:55 AM  Attending Physician: No att. providers found   Discharging Provider: Shahida Naik NP  Primary Care Provider: Dima Ch MD    Primary Care Team: Networked reference to record PCT     HPI:   53 y.o. 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 Pt reports that he is using his prescribed Fentanyl, Dilaudid, and medical marijuana for his pain, but it is not providing any relief.  Symptoms are constant and moderate in severity. No mitigating or exacerbating factors reported. Associated sxs include nausea, a reduced appetite, constipation, and a mass to the lower mid-abdominal wall. Patient denies any fever, chills, V/D, CP, SOB, weakness, and all other sxs at this time. Pt takes Miralax and Mylanta daily. In the ED, CT abdomen: Small-bowel wall thickening and dilation with scattered air-fluid levels.  Is unclear if this relates to obstruction or infectious or inflammatory enteritis. WBC: 1.27, H/H: 12.1/36.4, Pl: 456, Alk Phos: 598, AST/ALT: 53/75. Patient treated with IV fluids, antiemetics, IV pain medication. Patient is a full code, surrogate decision maker is his spouse, Leticia Osullivan. Placed in observation under the care of Hospital Medicine for intractable N/V, Neutropenia.       * No surgery found *      Hospital Course:   53 year old male, under the care of hospital medicine for intractable N/V, SBO, Neutropenia. WBC improved, continues to be  afebrile. Oncology evaluated. Palliative med discussed GOC with patient and spouse. Palliative adjusted pain regimen, added lyrica. Continues to have difficulty tolerating clear liquids, advanced to full liquids. SBFT completed: delayed passage, 5.5H. Provided educational materials on SBO, partial SBO. Dietary recommendations and signs and symptoms to report. Initiated olanzapine for intractable N/V. Oncology plans to arrange OP IV Fluids following chemotherapy, spouse contacted health insurance company to discuss benefits, benefits will cover for Home Health to give IV fluids at home. Spouse notified oncology to assist with orders. Patient reported significant improvement, able to tolerate full liquid diet without N/V or pain. Patient seen and examined on day of discharge and determined stable for discharge.         Goals of Care Treatment Preferences:  Code Status: Full Code          What is most important right now is to focus on remaining as independent as possible, symptom/pain control.  Accordingly, we have decided that the best plan to meet the patient's goals includes continuing with treatment.      Consults:   Consults (From admission, onward)        Status Ordering Provider     Inpatient consult to Registered Dietitian/Nutritionist  Once        Provider:  (Not yet assigned)    Completed DEMETRIA CLARKE     Inpatient consult to Palliative Care  Once        Provider:  (Not yet assigned)    Completed YARIEL LOPEZ.     Inpatient consult to Hematology/Oncology  Once        Provider:  (Not yet assigned)    Completed YARIEL LOPEZ.          Oncology  Metastatic adenocarcinoma  S/P chemotherapy 3/16/2023, Gemzar/Cisplatin.     --Follow-up Hem/Onc  --Follow-Up Palliative med      GI  * Partial small bowel obstruction  Secondary to malignancy, recurrent, will manage conservatively for now due to recurrent nature and Hx of improvement with conservative mearsures, if no improvement will consider General  Surgery or GI Consult.     --Provided dietary recommendations and educational materials on signs/symptoms to report to primary oncologist        Other  Immunodeficiency due to chemotherapy  Secondary to chemotherapy, WBC: 1.27 on admission, reported Chills, diaphoresis and rigors night PTA.     --Follow-up Oncology    Cachexia  Secondary to advanced malignancy, limited oral intake.     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, T45.1X5A] 03/22/2023 Yes    Immunodeficiency due to chemotherapy [D84.821, T45.1X5A, Z79.899] 02/15/2023 Not Applicable    Cachexia [R64] 01/31/2023 Yes    Abdominal carcinomatosis [C76.2] 01/31/2023 Yes    Metastatic adenocarcinoma [C79.9] 01/18/2023 Yes      Problems Resolved During this Admission:    Diagnosis Date Noted Date Resolved POA    Nausea and vomiting [R11.2] 03/22/2023 03/22/2023 Yes    Chemotherapy-induced neutropenia [D70.1, T45.1X5A] 03/21/2023 03/22/2023 Yes    Intractable generalized abdominal pain [R10.84] 03/20/2023 03/22/2023 Yes       Discharged Condition: stable    Disposition: Home or Self Care    Follow Up:   Follow-up Information     Dima Ch MD Follow up in 1 week(s).    Specialty: Internal Medicine  Contact information:  [address] [place] [address] [phone]             Preston Shell MD Follow up on 3/28/2023.    Specialty: Hematology and Oncology  Contact information:  [address]  [place] [address] [phone]                       Patient Instructions:      Diet full liquid     Notify your health care provider if you experience any of the following:  temperature >100.4     Notify your health care  provider if you experience any of the following:  persistent nausea and vomiting or diarrhea     Notify your health care provider if you experience any of the following:  severe uncontrolled pain     No dressing needed     Activity as tolerated       Significant Diagnostic Studies: Labs:   CMP   Recent Labs   Lab 03/22/23  0559      K 3.4*      CO2 27   GLU 97   BUN 13   CREATININE 0.7   CALCIUM 9.0   PROT 6.7   ALBUMIN 2.8*   BILITOT 1.1*   ALKPHOS 825*   AST 73*   ALT 80*   ANIONGAP 10    and CBC   Recent Labs   Lab 03/22/23  0559   WBC 3.62*   HGB 9.9*   HCT 30.6*        Radiology: All imaging studies reviewed.     Pending Diagnostic Studies:     Procedure Component Value Units Date/Time    Urinalysis, Reflex to Urine Culture Urine, Clean Catch [[id number]] Collected: 03/20/23 1538    Order Status: Sent Lab Status: In process Updated: 03/20/23 1538    Specimen: Urine          Medications:  Reconciled Home Medications:      Medication List      START taking these medications    OLANZapine 10 MG tablet  Commonly known as: ZyPREXA  Take 1 tablet (10 mg total) by mouth every evening.     pregabalin 50 MG capsule  Commonly known as: LYRICA  Take 1 capsule (50 mg total) by mouth 2 (two) times daily.        CONTINUE taking these medications    ALPRAZolam 0.25 MG tablet  Commonly known as: XANAX  Take 1 tablet (0.25 mg total) by mouth 2 (two) times daily as needed for Anxiety.     dexAMETHasone 4 MG Tab  Commonly known as: DECADRON  Take 2 tablets (8 mg total) by mouth once daily. Take as directed on days 2 and 3 of your chemotherapy cycle.     fentaNYL 75 mcg/hr  Commonly known as: DURAGESIC  Place 1 patch onto the skin every 72 hours. for 15 days     HYDROmorphone 4 MG tablet  Commonly known as: DILAUDID  Take 1 tablet (4 mg total) by mouth every 4 (four) hours as needed for Pain. Max 5 doses per day     lactulose 20 gram/30 mL Soln  Commonly known as: CHRONULAC  Take 30 mLs (20 g total) by mouth 3  (three) times daily.     LIDOcaine-prilocaine cream  Commonly known as: EMLA  Apply to affected area once as directed     ondansetron 8 MG Tbdl  Commonly known as: ZOFRAN-ODT  Take 1 tablet (8 mg total) by mouth every 8 (eight) hours as needed (nausea/vomiting).     prochlorperazine 5 MG tablet  Commonly known as: COMPAZINE  Take 1 tablet (5 mg total) by mouth every 6 (six) hours as needed for Nausea.            Indwelling Lines/Drains at time of discharge:   Lines/Drains/Airways     None                 Time spent on the discharge of patient: 75 minutes         Shahida Naik NP  Department of Hospital Medicine  [facility]

## 2023-03-23 NOTE — TELEPHONE ENCOUNTER
[facility] PALLIATIVE MEDICINE  Medical Specialty       Patient Name: Guevara Osullivan II  MRN: [id number]  Primary Care Physician: Dima Ch MD    Received call from wife reporting pt started Zyprexa to help with sleep and was agitated all night. She wanted to ask PM NP if he should stop this since it's the only new med he was started on since d/c.   Spoke to NP who states okay to stop this med due to agitation and also wanted pt to be seen on 3/29 and unsure why appt was rescheduled.   Called pt's wife back to let her know what NP said and to get pt back on schedule for next week. She explained his chemo was rescheduled so this is why their palliative appt was rescheduled. Explained NP would still like to f/u after appt with Dr. Shell and this visit can be virtual if needed. Plan to reschedule pt on 3/29 for a virtual visit. Wife will check [facility] for appt details.     Myranda Carroll, [place], [facility]  [phone]

## 2023-03-23 NOTE — PROGRESS NOTES
Called and spoke with pt wife, pt still in patient at this time. States his pain has decreased, pt is waiting for add'l tests to be performed some time today. States they are hanging in there hoping to go home in the next day or two. No NN needs at this time but will call if anything changes.   Oncology Navigation   Intake  Date of Diagnosis:  (negative path)  Cancer Type: GI  Internal / External Referral: Internal  Date of Referral: 23  Initial Nurse Navigator Contact: 23  Referral to Initial Contact Timeline (days): 0  Date Worked: 23  First Appointment Available: 23  Appointment Date: 23  First Available Date vs. Scheduled Date (days): 3  Reason for Treatment Delay: -- (seeking 2nd opinion @ [facility])     Treatment  Current Status: Staging work-up  Date Presented to Tumor Board: 23    Surgical Oncologist: Nikunj  Consult Date: 23    Medical Oncologist: Dr. Shell  Consult Date: 23  Chemotherapy: Planned  Chemotherapy Regimen: Gemcitabine/Cisplatin       Procedures: Genetic test  Biopsy Schedule Date: 22  CT Schedule Date: 23  EUS / EGD: EUS- 2022  Genetic Testing Date Sent: 23  MRI Schedule Date: 23  Port / PICC Schedule Date: 23             Support Systems: Family members     Acuity  Stage: 2  Systemic Treatment - predicted or initiated: Chemotherapy Regimen with Multiple drugs (+1)  ECO  Comorbidities in Medical History: 2  Support: 0  Transportation: 0  Verbalizes the need for more education: 1  Navigation Acuity: 0     Follow Up  No follow-ups on file.

## 2023-03-23 NOTE — ASSESSMENT & PLAN NOTE
Secondary to chemotherapy, WBC: 1.27 on admission, reported Chills, diaphoresis and rigors night PTA.     --Follow-up Oncology

## 2023-03-23 NOTE — ASSESSMENT & PLAN NOTE
S/P chemotherapy 3/16/2023, Gemzar/Cisplatin.     --Follow-up Hem/Onc  --Follow-Up Palliative med

## 2023-03-23 NOTE — ASSESSMENT & PLAN NOTE
Secondary to advanced malignancy, limited oral intake.     --Full Liquid, provided with diet recommendations for partial SBO

## 2023-03-24 NOTE — TELEPHONE ENCOUNTER
[facility] PALLIATIVE MEDICINE  Medical Specialty       Patient Name: Guevara Osullivan II  MRN: [id number]  Primary Care Physician: Dima Ch MD    Received call from wife today for f/u. Pt slept slightly better last night, but still mumbled in his sleep a lot and having some moments of confusion today. She wanted to know if it would be okay to also stop the Lyrica prescribed while pt was in the hospital. Explained I would speak to NP and call her back.      Returned call to pt's wife to let her know PM NP said it would be okay to stop Lyrica to see if this may also be causing pt's issues at night. Also explained, if symptoms persist after stopping both the Zyprexa and Lyrica that it could be something else going on such as progression on his disease. We plan to f/u again Monday or at their scheduled virtual appt if no issues before then.       [place], [facility]  [phone]

## 2023-03-27 NOTE — PROGRESS NOTES
Pt wife called with questions about pt having n/v/d since yesterday. Can't keep pain med or nausea med down, would like nausea med called into local pharmacy. Would also like to know if fluids can be given when he comes in tomorrow, told her will schedule for possible fluids. In basket msg sent to Zelda requesting n/v med be called in to [facility], spoke with her also, med called in. Notified pt wife compazine suppositories called in and told them if taking oral don't use suppository and vie versa. They voiced under standing and plan to keep MD appt tomorrow. No other questions at this time.   Oncology Navigation   Intake  Date of Diagnosis:  (negative path)  Cancer Type: GI  Internal / External Referral: Internal  Date of Referral: 23  Initial Nurse Navigator Contact: 23  Referral to Initial Contact Timeline (days): 0  Date Worked: 23  First Appointment Available: 23  Appointment Date: 23  First Available Date vs. Scheduled Date (days): 3  Reason for Treatment Delay: -- (seeking 2nd opinion @ [facility])     Treatment  Current Status: Staging work-up  Date Presented to Tumor Board: 23    Surgical Oncologist: Nikunj  Consult Date: 23    Medical Oncologist: Dr. Shell  Consult Date: 23  Chemotherapy: Planned  Chemotherapy Regimen: Gemcitabine/Cisplatin       Procedures: Genetic test  Biopsy Schedule Date: 22  CT Schedule Date: 23  EUS / EGD: EUS- 2022  Genetic Testing Date Sent: 23  MRI Schedule Date: 23  Port / PICC Schedule Date: 23             Support Systems: Family members     Acuity  Stage: 2  Systemic Treatment - predicted or initiated: Chemotherapy Regimen with Multiple drugs (+1)  ECO  Comorbidities in Medical History: 2  Support: 0  Transportation: 0  Verbalizes the need for more education: 1  Navigation Acuity: 0     Follow Up  Follow up in 1 day (on 3/28/2023) for labs/MD/possible fluids.

## 2023-03-28 NOTE — PROGRESS NOTES
Palliative Medicine  Consult    DATE OF VISIT: 3/28/23     The patient location is: [facility]   The chief complaint leading to consultation is: GOC, ACP, Pain/symptom management      Visit type: audiovisual     Face to Face time with patient:  20 minutes  60 minutes of total time spent on the encounter, which includes face to face time and non-face to face time preparing to see the patient (eg, review of tests), Obtaining and/or reviewing separately obtained history, Documenting clinical information in the electronic or other health record, Independently interpreting results (not separately reported) and communicating results to the patient/family/caregiver, or Care coordination (not separately reported).      Each patient to whom he or she provides medical services by telemedicine is:  (1) informed of the relationship between the physician and patient and the respective role of any other health care provider with respect to management of the patient; and (2) notified that he or she may decline to receive medical services by telemedicine and may withdraw from such care at any time.     Notes:      Patient ID:?Guevara Osullivan is a 53 y.o. female.?? MR#: [id number]  PRIMARY ONCOLOGIST: Dr. Shell      ? Primary Care Providers: Dima Ch MD     (General)   Consult Requested By: No ref. provider found  Reason for Consult: GOC, ACP, Pain/symptom management       SUBJECTIVE:     History of Present Illness:  Patient is a 53 y.o. year old male presenting with history of intra-abdominal carcinomatosis, treatment with cisplatin + gemzar under the care of Dr. Shell for goals of care, advance care planning, and pain/symptom management. I have seen patient during prior hospitalizations as well in which pain regimen was established and has been adjusted. I explained why I was consulted to participate in the patient's care. We talked about the role palliative care often plays in helping patients with advanced care  planning and symptom management. I particularly expressed how important it was to clarify what type of care the patient would like to receive moving forward given the current status. Patient seen during virtual visit accompanied by wife Leticia. He is receiving IVFs today as he reports he hasn't tolerated treatment well. Also with recent hospitalization for intractable N/V and partial bowel obstruction which improved spontaneously, this is recurrent and typically improves with conservative measures, [facility] team noted if no improvement will consult GI or general surgery. Patient reports generalized weakness, N/V, constipation and diarrhea, and stable abdominal pain on current pain regimen (Fentanyl 75 mcg patch q 72 hours and Dilaudid 4mg po q 4 hrs prn break through pain not to exceed 4-5 doses daily). He is ECOG status 2. He notes that abdominal/neoplasm pain is stable and current regimen effective. He notes he also smokes medical marijuana which helps with all previously listed symptoms. He is taking 4-5 doses of break through dilaudid. He notes that his nausea has improved with prn zofran and phenergan. Also patient was noted with new onset neuropathy to RLE onset with chemo, trial dose of lyrica was started inpatient 50mg po bid and [facility] team started zyprexa 10mg po q hs to help with nausea and insomnia. When they got home patient had hallucinations and confusion, so they d/c zyprexa and it still continued so they d/c Lyrica. Wife and patient believe it was lyrica so they want to try zyprexa again tonight as patient's wife will be with him to monitor him. I told them to try 5mg instead of 10mg tonight and monitor closely and update me tomorrow, they verbalized understanding. Will order EKG due to multiple qt prolonging medications being on board as well. Patient with no chest pain or palpitations or cardiac complaints. We discussed side effects to monitor for with medications, safe administration in which narcan  script was filled, and preventions of OIC. I also made patient aware of my fear in increasing pain regimen in the setting of partial obstruction due to side effects of medication also we will continue GOC conversations in that setting. During recent hospitalization, patient noted if treatment was not beneficial in the setting of his functional decline and not tolerating treatment well, he may likely transition to comfort focused treatment. He did see Dr. Shell today in which he elected to continue with treatment, received IVFs today and with plans for CT c/a/p and visit with Dr. Shell in April to determine 3rd cycle of therapy, switching to Folfox, or visit with MD Mclain. At this time patient wants to continue treatment and see what CT says to determine further GOC in which I will follow up with. Patient not feeling well during visit and receiving IV hydration so we did not further discuss code status, he noted that we can further discuss on follow up visit. Will send script in for fentanyl patches today as he has one patch left, they have po dilaudid.        LA  reviewed and summarized:        Past Surgical History:   Procedure Laterality Date    ABDOMINAL WASHOUT N/A 1/18/2023    Procedure: LAVAGE, PERITONEAL,;  Surgeon: Onur Calvin Jr., MD;  Location: [address];  Service: General;  Laterality: N/A;    BIOPSY OF PERITONEUM N/A 1/18/2023    Procedure: BIOPSY, PERITONEUM;  Surgeon: Onur Calvin Jr., MD;  Location: [address];  Service: General;  Laterality: N/A;    COLON SURGERY      Colectomy with J- pouch    DIAGNOSTIC LAPAROSCOPY N/A 1/18/2023    Procedure: LAPAROSCOPY, DIAGNOSTIC WITH WASHING;  Surgeon: Onur Calvin Jr., MD;  Location: [address];  Service: General;  Laterality: N/A;    ENDOSCOPIC ULTRASOUND OF UPPER GASTROINTESTINAL TRACT N/A 10/14/2022    Procedure: ULTRASOUND, UPPER GI TRACT,;  Surgeon: Vince Teran MD;  Location: [facility];  Service:  Endoscopy;  Laterality: N/A;  upper and linear    ENDOSCOPIC ULTRASOUND OF UPPER GASTROINTESTINAL TRACT N/A 12/13/2022    Procedure: ULTRASOUND, UPPER GI TRACT, ENDOSCOPIC;  Surgeon: Vince Teran MD;  Location: [facility];  Service: Endoscopy;  Laterality: N/A;    ERCP N/A 10/14/2022    Procedure: ERCP (ENDOSCOPIC RETROGRADE CHOLANGIOPANCREATOGRAPHY) with spyglass;  Surgeon: Vince Teran MD;  Location: [facility];  Service: Endoscopy;  Laterality: N/A;    ERCP N/A 12/13/2022    Procedure: ERCP (ENDOSCOPIC RETROGRADE CHOLANGIOPANCREATOGRAPHY);  Surgeon: Vince Teran MD;  Location: [facility];  Service: Endoscopy;  Laterality: N/A;    ERCP N/A 12/20/2022    Procedure: ERCP (ENDOSCOPIC RETROGRADE CHOLANGIOPANCREATOGRAPHY);  Surgeon: Vince Teran MD;  Location: [facility];  Service: Endoscopy;  Laterality: N/A;    ERCP N/A 1/4/2023    Procedure: ERCP (ENDOSCOPIC RETROGRADE CHOLANGIOPANCREATOGRAPHY);  Surgeon: Vince Teran MD;  Location: [facility];  Service: Endoscopy;  Laterality: N/A;  room 1    ERCP N/A 2/22/2023    Procedure: ERCP (ENDOSCOPIC RETROGRADE CHOLANGIOPANCREATOGRAPHY);  Surgeon: Vince Teran MD;  Location: [facility];  Service: Endoscopy;  Laterality: N/A;    ERCP N/A 2/23/2023    Procedure: ERCP (ENDOSCOPIC RETROGRADE CHOLANGIOPANCREATOGRAPHY);  Surgeon: Vince Teran MD;  Location: [facility];  Service: Endoscopy;  Laterality: N/A;    ESOPHAGOGASTRODUODENOSCOPY N/A 12/20/2022    Procedure: EGD (ESOPHAGOGASTRODUODENOSCOPY);  Surgeon: Vince Teran MD;  Location: [facility];  Service: Endoscopy;  Laterality: N/A;    ESOPHAGOGASTRODUODENOSCOPY N/A 1/4/2023    Procedure: EGD (ESOPHAGOGASTRODUODENOSCOPY);  Surgeon: Vince Teran MD;  Location: [facility];  Service: Endoscopy;  Laterality: N/A;    INSERTION OF TUNNELED CENTRAL VENOUS CATHETER (CVC) WITH SUBCUTANEOUS PORT Left 2/9/2023    Procedure:  [id number];  Surgeon: Wojciech Martínez MD;  Location: [facility];  Service: General;  Laterality: Left;    POUCHOSCOPY       Family History   Problem Relation Age of Onset    Cancer Mother         GYN?    Testicular cancer Father     Skin cancer Father     Lung cancer Maternal Grandmother         +smoking hx    Heart disease Paternal Uncle      Review of patient's allergies indicates:   Allergen Reactions    Vicryl [sutures, polyglycolic acid] Other (See Comments)     Wound dehiscence after achilles sx    Ciprofloxacin Other (See Comments)     Pt previously had medication reaction; suffered achilles rupture     Meperidine Hives       Medications:    Current Outpatient Medications:     ALPRAZolam (XANAX) 0.25 MG tablet, Take 1 tablet (0.25 mg total) by mouth 2 (two) times daily as needed for Anxiety., Disp: 60 tablet, Rfl: 1    dexAMETHasone (DECADRON) 4 MG Tab, Take 2 tablets (8 mg total) by mouth once daily. Take as directed on days 2 and 3 of your chemotherapy cycle., Disp: 24 tablet, Rfl: 5    fentaNYL (DURAGESIC) 75 mcg/hr, Place 1 patch onto the skin every 72 hours. for 15 days, Disp: 5 patch, Rfl: 0    HYDROmorphone (DILAUDID) 4 MG tablet, Take 1 tablet (4 mg total) by mouth every 4 (four) hours as needed for Pain. Max 5 doses per day, Disp: 120 tablet, Rfl: 0    lactulose (CHRONULAC) 20 gram/30 mL Soln, Take 30 mLs (20 g total) by mouth 3 (three) times daily., Disp: 600 mL, Rfl: 2    LIDOcaine-prilocaine (EMLA) cream, Apply to affected area once as directed, Disp: 5 g, Rfl: 11    OLANZapine (ZYPREXA) 10 MG tablet, Take 1 tablet (10 mg total) by mouth every evening., Disp: 30 tablet, Rfl: 0    ondansetron (ZOFRAN-ODT) 8 MG TbDL, Take 1 tablet (8 mg total) by mouth every 8 (eight) hours as needed (nausea/vomiting)., Disp: 60 tablet, Rfl: 5    pregabalin (LYRICA) 50 MG capsule, Take 1 capsule (50 mg total) by mouth 2 (two) times daily., Disp: 60 capsule, Rfl: 0    prochlorperazine (COMPAZINE) 25 MG  suppository, Place 1 suppository (25 mg total) rectally every 12 (twelve) hours as needed for Nausea., Disp: 3 suppository, Rfl: 1    prochlorperazine (COMPAZINE) 5 MG tablet, Take 1 tablet (5 mg total) by mouth every 6 (six) hours as needed for Nausea., Disp: 20 tablet, Rfl: 11  No current facility-administered medications for this visit.    Facility-Administered Medications Ordered in Other Visits:     alteplase injection 2 mg, 2 mg, Intra-Catheter, PRN, Preston Shell MD    alteplase injection 2 mg, 2 mg, Intra-Catheter, PRN, Preston Shell MD    heparin, porcine (PF) 100 unit/mL injection flush 500 Units, 500 Units, Intravenous, PRN, Preston Shell MD    heparin, porcine (PF) 100 unit/mL injection flush 500 Units, 500 Units, Intravenous, PRN, Preston Shell MD, 500 Units at 03/28/23 1301    sodium chloride 0.9% flush 10 mL, 10 mL, Intravenous, PRN, Preston Shell MD    sodium chloride 0.9% flush 10 mL, 10 mL, Intravenous, PRN, Preston Shell MD, 10 mL at 03/28/23 1300    OBJECTIVE:     ROS:  Review of Systems   Constitutional:  Positive for activity change, appetite change, fatigue and unexpected weight change.   HENT:  Negative for congestion.    Respiratory:  Negative for chest tightness, shortness of breath and wheezing.    Cardiovascular:  Negative for chest pain, palpitations and leg swelling.   Gastrointestinal:  Positive for abdominal distention, abdominal pain, constipation, diarrhea, nausea and vomiting.   Musculoskeletal:  Negative for arthralgias.   Skin:  Negative for color change.   Allergic/Immunologic: Positive for immunocompromised state.   Neurological:  Positive for weakness.        Generalized weakness, no c/o neuropathy during visit   Psychiatric/Behavioral:  Negative for agitation. The patient is not nervous/anxious.      Review of Symptoms      Symptom Assessment (ESAS 0-10 Scale)  Pain:  0  Dyspnea:  0  Anxiety:  0  Nausea:  0  Depression:  0  Anorexia:  0  Fatigue:   0  Insomnia:  0  Restlessness:  0  Agitation:  0       Anxiety:  Is not nervous/anxious  Constipation:  Constipation    Pain Assessment:    Location(s): abdomen    Abdomen       Location: generalized        Quality: Dull, aching and cramping        Quantity: 0/10 in intensity        Chronicity: Onset 0 second(s) ago, stable        Aggravating Factors: None        Alleviating Factors: Opiates        Associated Symptoms: Anorexia    ECOG Performance Status rdGrdrrdarddrderd:rd rd3rd Living Arrangements:  Lives with spouse    Psychosocial/Cultural:   See Palliative Psychosocial Note: No  **Primary  to Follow**  Palliative Care  Consult: No    Advance Care Planning   Advance Directives:   Do Not Resuscitate Status: No      Decision Making:  Patient answered questions and Family answered questions  Goals of Care: What is most important right now is to focus on remaining as independent as possible, symptom/pain control. Accordingly, we have decided that the best plan to meet the patient's goals includes continuing with treatment.          Physical Exam:  Vitals:    Physical Exam  Vitals and nursing note (VS utilized from VS obtained for MD visit today) reviewed.   Constitutional:       General: He is not in acute distress.     Appearance: He is ill-appearing.   HENT:      Head: Normocephalic.      Nose: Nose normal.      Mouth/Throat:      Mouth: Mucous membranes are dry.   Eyes:      General:         Right eye: No discharge.         Left eye: No discharge.   Cardiovascular:      Rate and Rhythm: Normal rate.   Pulmonary:      Effort: Pulmonary effort is normal. No respiratory distress.   Abdominal:      General: There is distension.   Musculoskeletal:         General: Normal range of motion.      Cervical back: Normal range of motion.   Skin:     General: Skin is warm.      Coloration: Skin is pale.   Neurological:      Mental Status: He is alert and oriented to person, place, and time.   Psychiatric:          Mood and Affect: Mood normal. Affect is flat.         Behavior: Behavior normal.         Thought Content: Thought content normal.         Judgment: Judgment normal.       Labs and radiology data reviewed    ASSESSMENT/PLAN:   **Abdominal carcinomatosis   **Metastatic adenocarcinoma   **Primary peritoneal adenocarcinoma   -Under the care of Dr. Shell  -Reported difficulty tolerating treatment during inpatient hospital stay patient noted  if treatment no longer beneficial or progression of disease despite treatment patient likely will transition to comfort focused treatment   -Today saw Dr. Shell w/ plans to continue treatment with follow up CT c/a/p and visit with Dr. Shell to discuss continuation with 3rd cycle of therapy, or switch to Folfox, or visit with MD Mclain. Will follow up for continued GOC discussion. (See Dr. Shell note for further details)    **Neoplasm related pain   -Continue home dose Fentanyl 75 mcg patch q 72 hrs with dilaudid po 4mg q 4 hrs prn break through pain (Currently taking 4-5 doses daily) with addition of medical marijuana   -Continue current regimen as ongoing GOC discussions occurring, will adjust as needed. Expressed my concern with increased opioids with current delayed transit on SBFT and partial obstruction.     -Discussed safe prescribing with patient due to increases in pain regimen, OIC prevention, and side effects to monitor for. Also undergoing work up and treatment with question of progressive disease vs fair amount of tumor, partial obstruction, and  SBFT with delayed transit. Follows with GI outpatient.     Pain contract - Next visit   -UDS - next visit (patient utilizes medical marijuana)     **Encounter for Palliative Care  -Code status: Full code, did not discuss as not to over whelm patient and wife. Patient stated he does not feel well and would prefer we discuss more on follow up.   -HCPOA: Surrogate decision maker is wife Leticia  -GOC: Focus on remaining as  independent as possible, symptom/pain control. Accordingly, we have decided that the best plan to meet the patient's goals includes continuing with treatment.  Ongoing GOC discussion, patient notes if treatment that is palliative in nature no longer beneficial in the setting of inability to tolerate treatment, he would likely transition to comfort focused treatment. At this time wants to continue treatment with follow up CT c/a/p and visit with Dr. Shell to discuss continuation with 3rd cycle of therapy, or switch to Folfox, or visit with MD Mclain.   -Patient to try zyprexa tonight, wife will monitor a they felt lyrica caused the hallucinations, instructed to try 5mg instead of 10mg po q hs and they will update me tomorrow.   -See HPI for further details       **Chemotherapy induced neuropathy   -Discontinue Lyrica 50mg po bid, pt reported hallucinations in which inpatient was started on this and zyprexa 10mg q hs  -No complaints during visit today     **Fear of side effects of medications  -EKG ordered in the setting of patient taking medications with risks of qt prolongation   -EKG 3/20/23 Qtc 424 ms    Follow up: 4 weeks     60 minutes total visit  40 minutes of total time spent on the encounter, which includes face to face time and non-face to face time preparing to see the patient (eg, review of tests), Obtaining and/or reviewing separately obtained history, Documenting clinical information in the electronic or other health record, Independently interpreting results (not separately reported) and communicating results to the patient/family/caregiver, or Care coordination (not separately reported).    20 minutes spent in discussing ACP    Signature: Abby Madera NP

## 2023-03-28 NOTE — NURSING
Left upper chest  Mediport accessed via sterile technique with #20 1 inch  hylton. Positive blood return noted, Blood specimen obtained for labs then flushed per protocol. Patient had Provider visit with Dr Shell. Then went on with IVF's of NS 0.9% 1000 cc over 1 hour .   Patient tolerated well and discharged via W/C  from clinic.

## 2023-03-28 NOTE — PROGRESS NOTES
Subjective:       Patient ID: Guevara Osullivan II is a 53 y.o. male.    Chief Complaint: Results, Chemotherapy, Cancer, and Pain    HPI:  53-year-old male history of intra-abdominal carcinomatosis.  Patient returns for cycle 2 day 8 of therapy.  Recent hospitalization for bowel obstruction resolved spontaneously.  ECOG status 2 accompanied by wife    Past Medical History:   Diagnosis Date    Cancer     COLON CANCER 1995    Digestive disorder     Hypertension     Primary sclerosing cholangitis     Ulcerative colitis     s/p colectomy with J- pouch     Family History   Problem Relation Age of Onset    Cancer Mother         GYN?    Testicular cancer Father     Skin cancer Father     Lung cancer Maternal Grandmother         +smoking hx    Heart disease Paternal Uncle      Social History     Socioeconomic History    Marital status:    Tobacco Use    Smoking status: Never     Passive exposure: Never    Smokeless tobacco: Never   Substance and Sexual Activity    Alcohol use: Not Currently    Drug use: Yes     Types: Marijuana     Comment: medical marijuana     Past Surgical History:   Procedure Laterality Date    ABDOMINAL WASHOUT N/A 1/18/2023    Procedure: LAVAGE, PERITONEAL,;  Surgeon: Onur Calvin Jr., MD;  Location: [facility] OR [address] [place] [address];  Service: General;  Laterality: N/A;    BIOPSY OF PERITONEUM N/A 1/18/2023    Procedure: BIOPSY, PERITONEUM;  Surgeon: Onur Calvin Jr., MD;  Location: [facility] OR [address] [place] [address];  Service: General;  Laterality: N/A;    COLON SURGERY      Colectomy with J- pouch    DIAGNOSTIC LAPAROSCOPY N/A 1/18/2023    Procedure: LAPAROSCOPY, DIAGNOSTIC WITH WASHING;  Surgeon: Onur Calvin Jr., MD;  Location: [facility] OR [address] [place] [address];  Service: General;  Laterality: N/A;    ENDOSCOPIC ULTRASOUND OF UPPER GASTROINTESTINAL TRACT N/A 10/14/2022    Procedure: ULTRASOUND, UPPER GI TRACT,;  Surgeon: Vince Teran MD;  Location: [facility] ENDO;  Service: Endoscopy;  Laterality: N/A;  upper and linear     ENDOSCOPIC ULTRASOUND OF UPPER GASTROINTESTINAL TRACT N/A 12/13/2022    Procedure: ULTRASOUND, UPPER GI TRACT, ENDOSCOPIC;  Surgeon: Vince Teran MD;  Location: [facility];  Service: Endoscopy;  Laterality: N/A;    ERCP N/A 10/14/2022    Procedure: ERCP (ENDOSCOPIC RETROGRADE CHOLANGIOPANCREATOGRAPHY) with spyglass;  Surgeon: Vince Teran MD;  Location: [facility];  Service: Endoscopy;  Laterality: N/A;    ERCP N/A 12/13/2022    Procedure: ERCP (ENDOSCOPIC RETROGRADE CHOLANGIOPANCREATOGRAPHY);  Surgeon: Vince Teran MD;  Location: [facility];  Service: Endoscopy;  Laterality: N/A;    ERCP N/A 12/20/2022    Procedure: ERCP (ENDOSCOPIC RETROGRADE CHOLANGIOPANCREATOGRAPHY);  Surgeon: Vince Teran MD;  Location: [facility];  Service: Endoscopy;  Laterality: N/A;    ERCP N/A 1/4/2023    Procedure: ERCP (ENDOSCOPIC RETROGRADE CHOLANGIOPANCREATOGRAPHY);  Surgeon: Vince Teran MD;  Location: [facility];  Service: Endoscopy;  Laterality: N/A;  room 1    ERCP N/A 2/22/2023    Procedure: ERCP (ENDOSCOPIC RETROGRADE CHOLANGIOPANCREATOGRAPHY);  Surgeon: Vince Teran MD;  Location: [facility];  Service: Endoscopy;  Laterality: N/A;    ERCP N/A 2/23/2023    Procedure: ERCP (ENDOSCOPIC RETROGRADE CHOLANGIOPANCREATOGRAPHY);  Surgeon: Vince Teran MD;  Location: [facility];  Service: Endoscopy;  Laterality: N/A;    ESOPHAGOGASTRODUODENOSCOPY N/A 12/20/2022    Procedure: EGD (ESOPHAGOGASTRODUODENOSCOPY);  Surgeon: Vince Teran MD;  Location: [facility];  Service: Endoscopy;  Laterality: N/A;    ESOPHAGOGASTRODUODENOSCOPY N/A 1/4/2023    Procedure: EGD (ESOPHAGOGASTRODUODENOSCOPY);  Surgeon: Vince Teran MD;  Location: [facility];  Service: Endoscopy;  Laterality: N/A;    INSERTION OF TUNNELED CENTRAL VENOUS CATHETER (CVC) WITH SUBCUTANEOUS PORT Left 2/9/2023    Procedure: [id number];  Surgeon: Wojciech Martínez MD;   Location: [facility];  Service: General;  Laterality: Left;    POUCHOSCOPY         Labs:  Lab Results   Component Value Date    WBC 7.04 03/28/2023    HGB 10.7 (L) 03/28/2023    HCT 31.5 (L) 03/28/2023    MCV 90 03/28/2023     03/28/2023     BMP  Lab Results   Component Value Date     03/22/2023    K 3.4 (L) 03/22/2023     03/22/2023    CO2 27 03/22/2023    BUN 13 03/22/2023    CREATININE 0.7 03/22/2023    CALCIUM 9.0 03/22/2023    ANIONGAP 10 03/22/2023     Lab Results   Component Value Date    ALT 80 (H) 03/22/2023    AST 73 (H) 03/22/2023     (H) 12/02/2022    ALKPHOS 825 (H) 03/22/2023    BILITOT 1.1 (H) 03/22/2023       Lab Results   Component Value Date    IRON 36 (L) 03/15/2023    TIBC 238 (L) 03/15/2023    FERRITIN 631 (H) 03/15/2023     No results found for: NYOXWVNW35  No results found for: FOLATE  Lab Results   Component Value Date    TSH 1.928 03/02/2023         Review of Systems   Constitutional:  Positive for fatigue. Negative for activity change, appetite change, chills, diaphoresis, fever and unexpected weight change.   HENT:  Negative for congestion, dental problem, drooling, ear discharge, ear pain, facial swelling, hearing loss, mouth sores, nosebleeds, postnasal drip, rhinorrhea, sinus pressure, sneezing, sore throat, tinnitus, trouble swallowing and voice change.    Eyes:  Negative for photophobia, pain, discharge, redness, itching and visual disturbance.   Respiratory:  Negative for apnea, cough, choking, chest tightness, shortness of breath, wheezing and stridor.    Cardiovascular:  Negative for chest pain, palpitations and leg swelling.   Gastrointestinal:  Positive for abdominal distention and abdominal pain. Negative for anal bleeding, blood in stool, constipation, diarrhea, nausea, rectal pain and vomiting.   Endocrine: Negative for cold intolerance, heat intolerance, polydipsia, polyphagia and polyuria.   Genitourinary:  Negative for decreased urine volume,  difficulty urinating, dysuria, enuresis, flank pain, frequency, genital sores, hematuria, penile discharge, penile pain, penile swelling, scrotal swelling, testicular pain and urgency.   Musculoskeletal:  Negative for arthralgias, back pain, gait problem, joint swelling, myalgias, neck pain and neck stiffness.   Skin:  Negative for color change, pallor, rash and wound.   Allergic/Immunologic: Negative for environmental allergies, food allergies and immunocompromised state.   Neurological:  Positive for weakness. Negative for dizziness, tremors, seizures, syncope, facial asymmetry, speech difficulty, light-headedness, numbness and headaches.   Hematological:  Negative for adenopathy. Does not bruise/bleed easily.   Psychiatric/Behavioral:  Positive for dysphoric mood. Negative for agitation, behavioral problems, confusion, decreased concentration, hallucinations, self-injury, sleep disturbance and suicidal ideas. The patient is nervous/anxious. The patient is not hyperactive.      Objective:      Physical Exam  Vitals reviewed.   Constitutional:       General: He is not in acute distress.     Appearance: He is well-developed. He is ill-appearing. He is not diaphoretic.   HENT:      Head: Normocephalic.      Right Ear: External ear normal.      Left Ear: External ear normal.      Nose: Nose normal.      Right Sinus: No maxillary sinus tenderness or frontal sinus tenderness.      Left Sinus: No maxillary sinus tenderness or frontal sinus tenderness.      Mouth/Throat:      Pharynx: No oropharyngeal exudate.   Eyes:      General: Lids are normal. No scleral icterus.        Right eye: No discharge.         Left eye: No discharge.      Extraocular Movements:      Right eye: Normal extraocular motion.      Left eye: Normal extraocular motion.      Conjunctiva/sclera:      Right eye: Right conjunctiva is not injected. No hemorrhage.     Left eye: Left conjunctiva is not injected. No hemorrhage.     Pupils: Pupils are equal,  round, and reactive to light.   Neck:      Thyroid: No thyromegaly.      Vascular: No JVD.      Trachea: No tracheal deviation.   Cardiovascular:      Rate and Rhythm: Normal rate.   Pulmonary:      Effort: Pulmonary effort is normal. No respiratory distress.      Breath sounds: No stridor.   Abdominal:      General: Bowel sounds are normal. There is distension.      Palpations: Abdomen is soft. There is no hepatomegaly, splenomegaly or mass.      Tenderness: There is no abdominal tenderness.   Musculoskeletal:         General: No tenderness. Normal range of motion.      Cervical back: Normal range of motion and neck supple.   Lymphadenopathy:      Head:      Right side of head: No posterior auricular or occipital adenopathy.      Left side of head: No posterior auricular or occipital adenopathy.      Cervical: No cervical adenopathy.      Right cervical: No superficial, deep or posterior cervical adenopathy.     Left cervical: No superficial, deep or posterior cervical adenopathy.      Upper Body:      Right upper body: No supraclavicular adenopathy.      Left upper body: No supraclavicular adenopathy.   Skin:     General: Skin is dry.      Findings: No erythema or rash.      Nails: There is no clubbing.   Neurological:      Mental Status: He is alert and oriented to person, place, and time.      Cranial Nerves: No cranial nerve deficit.      Coordination: Coordination normal.   Psychiatric:         Behavior: Behavior normal.         Thought Content: Thought content normal.         Judgment: Judgment normal.           Assessment:      1. Abdominal carcinomatosis    2. Iron deficiency anemia due to chronic blood loss    3. Immunodeficiency due to chemotherapy    4. Chemotherapy-induced neuropathy    5. Metastatic adenocarcinoma    6. 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 Neoplasm related pain    8. Primary peritoneal adenocarcinoma           Med Onc Chart Routing      Follow up with physician  . RTC return to clinic to see me in 2nd week of April with scans and labs prior to visit   Follow up with [facility]    Infusion scheduling note    Injection scheduling note Will hold starting 3 cycles of cisplatin based treatment until after scans   Labs    Imaging CT chest abdomen pelvis   Return to clinic in 2nd week in April with scans and labs prior to visit   Pharmacy appointment    Other referrals            Plan:      will proceed with cycle 2 day 8 of treatment tomorrow return to clinic to see me 2nd week of April with standing labs and CT chest abdomen pelvis prior to consideration to begin 3rd cycle of therapy standing orders in supportive orders written for IV fluids if patient needs.  Discussed above answered questions with family if progressive or stable disease and patient not improving would likely switch to FOLFOX treatment sense little response in terms of progression has occurred with this regimen discussed implications with patient and family.  Other possibility discuss if they wish to go to [facility] no actionable mutation but does have germ line positive LH as well as next generation sequencing mutation no clinically FDA approved regimen would potential target for phase 1 studies discussed        Preston Shell Jr, MD FACP

## 2023-03-28 NOTE — PATIENT INSTRUCTIONS
Try zyprexa 5mg po at night instead of 10 mg, Lyrica discontinued. Call me tomorrow to make me aware how you tolerate.    Refilled fentanyl, too early to fill dilaudid please send message when you have 3-4 day supply left

## 2023-03-29 NOTE — PLAN OF CARE
Problem: Adult Inpatient Plan of Care  Goal: Plan of Care Review  Outcome: Ongoing, Progressing  Flowsheets (Taken 3/29/2023 0936)  Plan of Care Reviewed With:   patient   spouse     Problem: Adult Inpatient Plan of Care  Goal: Patient-Specific Goal (Individualized)  Outcome: Ongoing, Progressing  Flowsheets (Taken 3/29/2023 0936)  Anxieties, Fears or Concerns:   Feeling weak and tired.  Anxiety related to treatment   pre-medication administered.  Diarrhea on and off   not currently having any at the moment.  Tingling still exists   numbness has subsided.  Pain within th abdomen   4 on a scale of 0-10.  Individualized Care Needs: Reclined in chair with feet elevated, warm blanket, pillow, snack, and ginger-erickson. Wife present at patients side.     Problem: Adult Inpatient Plan of Care  Goal: Optimal Comfort and Wellbeing  Outcome: Ongoing, Progressing  Intervention: Monitor Pain and Promote Comfort  Flowsheets (Taken 3/29/2023 0936)  Pain Management Interventions:   relaxation techniques promoted   warm blanket provided   quiet environment facilitated  Intervention: Provide Person-Centered Care  Flowsheets (Taken 3/29/2023 0936)  Trust Relationship/Rapport:   empathic listening provided   reassurance provided   care explained   choices provided   thoughts/feelings acknowledged   questions answered   emotional support provided   questions encouraged     Problem: Anemia (Chemotherapy Effects)  Goal: Anemia Symptom Improvement  Outcome: Ongoing, Progressing  Intervention: Monitor and Manage Anemia  Flowsheets (Taken 3/29/2023 0936)  Safety Promotion/Fall Prevention:   nonskid shoes/socks when out of bed   medications reviewed   assistive device/personal item within reach   in recliner, wheels locked  Fatigue Management:   frequent rest breaks encouraged   paced activity encouraged   activity assistance provided     Problem: Fall Injury Risk  Goal: Absence of Fall and Fall-Related Injury  Outcome: Ongoing,  Progressing  Intervention: Identify and Manage Contributors  Flowsheets (Taken 3/29/2023 0936)  Self-Care Promotion: BADL personal objects within reach  Medication Review/Management: medications reviewed  Intervention: Promote Injury-Free Environment  Flowsheets (Taken 3/29/2023 0936)  Safety Promotion/Fall Prevention:   nonskid shoes/socks when out of bed   medications reviewed   assistive device/personal item within reach   in recliner, wheels locked

## 2023-03-29 NOTE — TELEPHONE ENCOUNTER
EKG ordered to eval Qtc in the setting of patient being on medications with risk for qt prolongation.     3/20 EKG: Qtc 424 ms     3/29 EKG: Qtc 410 ms with improvement

## 2023-03-29 NOTE — PROGRESS NOTES
SWER was consulted to assist patient with fluids in the home. SWER called [facility] but however they are out of network with patient's insurance. SWER will remain available.

## 2023-03-29 NOTE — DISCHARGE INSTRUCTIONS
WAYS TO HELP PREVENT INFECTION        WASH YOUR HANDS OFTEN DURING THE DAY, ESPECIALLY BEFORE YOU EAT, AFTER USING THE BATHROOM, AND AFTER TOUCHING ANIMALS    STAY AWAY FROM PEOPLE WHO HAVE ILLNESSES YOU CAN CATCH; SUCH AS COLDS, FLU, CHICKEN POX    TRY TO AVOID CROWDS    STAY AWAY FROM CHILDREN WHO RECENTLY HAVE RECEIVED LIVE VIRUS VACCINES    MAINTAIN GOOD MOUTH CARE    DO NOT SQUEEZE OR SCRATCH PIMPLES    CLEAN CUTS & SCRAPES RIGHT AWAY AND DAILY UNTIL HEALED WITH WARM WATER, SOAP & AN ANTISEPTIC    AVOID CONTACT WITH LITTER BOXES, BIRD CAGES, & FISH TANKS    AVOID STANDING WATER, IE., BIRD BATHS, FLOWER POTS/VASES, OR HUMIDIFIERS    WEAR GLOVES WHEN GARDENING OR CLEANING UP AFTER OTHERS, ESPECIALLY BABIES & SMALL CHILDREN    DO NOT EAT RAW FISH, SEAFOOD, MEAT, OR EGGS  FALL PREVENTION   Falls often occur due to slipping, tripping or losing your balance. Here are ways to reduce your risk of falling again.   Was there anything that caused your fall that can be fixed, removed or replaced?   Make your home safe by keeping walkways clear of objects you may trip over.   Use non-slip pads under rugs.   Do not walk in poorly lit areas.   Do not stand on chairs or wobbly ladders.   Use caution when reaching overhead or looking upward. This position can cause a loss of balance.   Be sure your shoes fit properly, have non-slip bottoms and are in good condition.   Be cautious when going up and down stairs, curbs, and when walking on uneven sidewalks.   If your balance is poor, consider using a cane or walker.   If your fall was related to alcohol use, stop or limit alcohol intake.   If your fall was related to use of sleeping medicines, talk to your doctor about this. You may need to reduce your dosage at bedtime if you awaken during the night to go to the bathroom.   To reduce the need for nighttime bathroom trips:   Avoid drinking fluids for several hours before going to bed   Empty your bladder before going to bed    Men can keep a urinal at the bedside   © 8282-2606 [employer], [address] [place] [address], [place] [address]. All rights reserved. This information is not intended as a substitute for professional medical care. Always follow your healthcare professional's instructions.  Thank you for letting me take care of YOU!!    Pal, BRAYDON          [facility]  [id number] [place] [address] Drive  [phone] phone     [phone] fax  Hours of Operation: Monday- Friday 8:00am- 5:00pm  After hours phone  [phone]  Hematology / Oncology Physicians on call:    Dr. Suleman Jane        Nurse Practitioners:    Afia Gasca, JOHN Vela, ANGELICA Miramontes, JOHN Harper, JOHN Segal, NP      Please don't hesitate to call if you have any concerns.

## 2023-03-29 NOTE — NURSING
Reviewed plan of care and discussed treatment 'Gemzar/cisplatin/imfinzi/venofer' with patient.  Patient verbalized understanding. States having some tingling still within the legs, but numbness has subsided. Has diarrhea on and off, but not currently having any as of today.  Pain within the abdomen, 4 on a scale of 0-10. Presents with anxiety related to treatment; states did not take any anti-anxiety medication at home prior to treatment.  Patient treated with Ativan 0.5mg IVP prior to treatment. Accompanied by wife.    Labs: Total bilirubin 1.7; alk phos 800  Decrease in weight from 75.2kg  to 62.7kg     Ok'd to continue forth with treatment per Dr. Shell.     Pre-medication: Ativan IVP; Fosaprepitant IVPB; Aloxi/Decadron IVPB  Pre-hydration: NS with K+ and Mg+ added  Post Hydration: NS    Tolerated treatment.  No adverse reaction.

## 2023-03-30 NOTE — PROGRESS NOTES
Met with pt and wife while getting infusion. States feels like he's on the upside of most recent hospitalization. Told them SW to reach out to Georgiana with [facility] about home infusions b/c company she recommended doesn't take their insurance, bud did schedule fluids in office this week if unable to get home set up in time. Voiced understanding, they know how to reach me with any questions/concerns.   Oncology Navigation   Intake  Date of Diagnosis:  (negative path)  Cancer Type: GI  Internal / External Referral: Internal  Date of Referral: 23  Initial Nurse Navigator Contact: 23  Referral to Initial Contact Timeline (days): 0  Date Worked: 23  First Appointment Available: 23  Appointment Date: 23  First Available Date vs. Scheduled Date (days): 3  Reason for Treatment Delay: -- (seeking 2nd opinion @ [facility])     Treatment  Current Status: Staging work-up  Date Presented to Tumor Board: 23    Surgical Oncologist: Nikunj  Consult Date: 23    Medical Oncologist: Dr. Shell  Consult Date: 23  Chemotherapy: Planned  Chemotherapy Regimen: Gemcitabine/Cisplatin       Procedures: Genetic test  Biopsy Schedule Date: 22  CT Schedule Date: 23  EUS / EGD: EUS- 2022  Genetic Testing Date Sent: 23  MRI Schedule Date: 23  Port / PICC Schedule Date: 23             Support Systems: Family members     Acuity  Stage: 2  Systemic Treatment - predicted or initiated: Chemotherapy Regimen with Multiple drugs (+1)  ECO  Comorbidities in Medical History: 2  Support: 0  Transportation: 0  Verbalizes the need for more education: 1  Navigation Acuity: 0     Follow Up  Follow up in 12 days (on 2023) for ginny w/labs/scan.

## 2023-03-31 NOTE — DISCHARGE INSTRUCTIONS
[facility]  [address] [facility]  [address]  [phone] phone     [phone] fax  Hours of Operation: Monday- Friday 8:00am- 5:00pm  After hours phone  [phone]  Hematology / Oncology Physicians on call      CARYN Acuna Dr[address] Dr., JOHN Gasca, JOHN Mahoney, LIZET Min    Please call with any concerns regarding your appointment today.       FALL PREVENTION   Falls often occur due to slipping, tripping or losing your balance. Here are ways to reduce your risk of falling again.   Was there anything that caused your fall that can be fixed, removed or replaced?   Make your home safe by keeping walkways clear of objects you may trip over.   Use non-slip pads under rugs.   Do not walk in poorly lit areas.   Do not stand on chairs or wobbly ladders.   Use caution when reaching overhead or looking upward. This position can cause a loss of balance.   Be sure your shoes fit properly, have non-slip bottoms and are in good condition.   Be cautious when going up and down stairs, curbs, and when walking on uneven sidewalks.   If your balance is poor, consider using a cane or walker.   If your fall was related to alcohol use, stop or limit alcohol intake.   If your fall was related to use of sleeping medicines, talk to your doctor about this. You may need to reduce your dosage at bedtime if you awaken during the night to go to the bathroom.   To reduce the need for nighttime bathroom trips:   Avoid drinking fluids for several hours before going to bed   Empty your bladder before going to bed   Men can keep a urinal at the bedside   © 4131-4768 [facility], [address] [place] [address], [place] [address]. All rights reserved. This information is not intended as a substitute for professional medical care. Always follow your healthcare professional's instructions.

## 2023-04-03 NOTE — PROGRESS NOTES
"Received call from pt wife, requesting update on home infusions. Told her will check with [facility] and call her back. Spoke with Constance @ [facility], gave her contact info for Georgiana and "gap exception" info. States she will give me update by end of day. Notified pt wife, she voiced understanding.   Oncology Navigation   Intake  Date of Diagnosis:  (negative path)  Cancer Type: GI  Internal / External Referral: Internal  Date of Referral: 23  Initial Nurse Navigator Contact: 23  Referral to Initial Contact Timeline (days): 0  Date Worked: 23  First Appointment Available: 23  Appointment Date: 23  First Available Date vs. Scheduled Date (days): 3  Reason for Treatment Delay: -- (seeking 2nd opinion @ [facility])     Treatment  Current Status: Staging work-up  Date Presented to Tumor Board: 23    Surgical Oncologist: Nikunj  Consult Date: 23    Medical Oncologist: Dr. Shell  Consult Date: 23  Chemotherapy: Planned  Chemotherapy Regimen: Gemcitabine/Cisplatin       Procedures: Genetic test  Biopsy Schedule Date: 22  CT Schedule Date: 23  EUS / EGD: EUS- 2022  Genetic Testing Date Sent: 23  MRI Schedule Date: 23  Port / PICC Schedule Date: 23             Support Systems: Family members     Acuity  Stage: 2  Systemic Treatment - predicted or initiated: Chemotherapy Regimen with Multiple drugs (+1)  ECO  Comorbidities in Medical History: 2  Support: 0  Transportation: 0  Verbalizes the need for more education: 1  Navigation Acuity: 0     Follow Up  Follow up in 8 days (on 2023) for CT& lab review.         "

## 2023-04-04 NOTE — TELEPHONE ENCOUNTER
Patient and wife called due to patient not having an appetite noting that he hasn't eaten a full meal in two days. He wont even eat crackers or drink a boost shake or pedialyte. They understand that this is common in cancer or with cancer related treatment but asking if something can be prescribed to help stimulate appetite. Patient is taking Zyprexa 5mg at night, zofran 8mg po BID currently. Recent EKG with Qtc 410 ms. We discussed starting Remeron 7.5mg po q hs to help stimulate appetite and increasing to max dose of 15mg po q hs if needed. Before prescribing, I reached out to pharmacist for recommendations as well as patient on zyprexa as well. In this setting will start remeron 7.5mg po q hs sent to pharmacy, continue zyprexa at 5 mg and perform EKG on follow up which is 4/25/23 to monitor for qt prolongation. Patient and wife verbalized understanding and agreement with plan. Educated on side effects to monitor for as well. Patient going to infusion center to receive IV fluids today. Will follow up.

## 2023-04-05 NOTE — PROGRESS NOTES
Called pt, spoke with wife about recent lab results. Reviewed and stressed the importance of neutropenic precautions with her. She voiced understanding. States hospice nurse is coming to see them today. No questions/concerns at this time.  Oncology Navigation   Intake  Date of Diagnosis:  (negative path)  Cancer Type: GI  Internal / External Referral: Internal  Date of Referral: 23  Initial Nurse Navigator Contact: 23  Referral to Initial Contact Timeline (days): 0  Date Worked: 23  First Appointment Available: 23  Appointment Date: 23  First Available Date vs. Scheduled Date (days): 3  Reason for Treatment Delay: -- (seeking 2nd opinion @ [facility])     Treatment  Current Status: Staging work-up  Date Presented to Tumor Board: 23    Surgical Oncologist: Nikunj  Consult Date: 23    Medical Oncologist: Dr. Shell  Consult Date: 23  Chemotherapy: Planned  Chemotherapy Regimen: Gemcitabine/Cisplatin       Procedures: Genetic test  Biopsy Schedule Date: 22  CT Schedule Date: 23  EUS / EGD: EUS- 2022  Genetic Testing Date Sent: 23  MRI Schedule Date: 23  Port / PICC Schedule Date: 23             Support Systems: Family members     Acuity  Stage: 2  Systemic Treatment - predicted or initiated: Chemotherapy Regimen with Multiple drugs (+1)  ECO  Comorbidities in Medical History: 2  Support: 0  Transportation: 0  Verbalizes the need for more education: 1  Navigation Acuity: 0     Follow Up  No follow-ups on file.

## 2023-04-05 NOTE — PROGRESS NOTES
Spouse is interested in hospice informational with [facility]. SWER will place order for MD's signature.

## 2023-04-05 NOTE — PROGRESS NOTES
Incoming call from pt wife with concerns of not knowing what to do if pt symptoms worsen over the holiday weekend as pt doesn't really want to go to the hospital. Wife expressed self care concerns as she doesn't want to leave him alone b/c he's so weak and may need something. She also expressed not being medically knowledgeable, having to make decisions to give med without medical guidance. Validated her concerns, told her there are ways to assist with their situation. We discussed possibility of enrolling in hospice, requesting informational be done today or tomorrow, told her will send request to MD and company. Explained some benefits of hospice but told her company will be able to better inform them. They know to call 911 for cp, sob, life threatening situation. Told her will f/u with them later today. She voiced understanding and thanked me for being available to listen and answer their questions.   Oncology Navigation   Intake  Date of Diagnosis:  (negative path)  Cancer Type: GI  Internal / External Referral: Internal  Date of Referral: 01/19/23  Initial Nurse Navigator Contact: 01/19/23  Referral to Initial Contact Timeline (days): 0  Date Worked: 04/05/23  First Appointment Available: 01/06/23  Appointment Date: 01/09/23  First Available Date vs. Scheduled Date (days): 3  Reason for Treatment Delay: -- (seeking 2nd opinion @ [facility])     Treatment  Current Status: Staging work-up  Date Presented to Tumor Board: 02/03/23    Surgical Oncologist: Nikunj  Consult Date: 01/09/23    Medical Oncologist: Dr. Shell  Consult Date: 01/31/23  Chemotherapy: Planned  Chemotherapy Regimen: Gemcitabine/Cisplatin       Procedures: Genetic test  Biopsy Schedule Date: 12/20/22  CT Schedule Date: 02/02/23  EUS / EGD: EUS- 12/13/2022  Genetic Testing Date Sent: 01/31/23  MRI Schedule Date: 01/03/23  Port / PICC Schedule Date: 02/09/23             Support Systems: Family members     Acuity  Stage: 2  Systemic Treatment - predicted  or initiated: Chemotherapy Regimen with Multiple drugs (+1)  ECO  Comorbidities in Medical History: 2  Support: 0  Transportation: 0  Verbalizes the need for more education: 1  Navigation Acuity: 0     Follow Up  Follow up in 6 days (on 2023) for Review CT scans.

## 2023-04-05 NOTE — PROGRESS NOTES
"Met with pt and wife during infusion. Pt states he's weak, hasn't eaten in a few days but trying to push through. Declines going to hospital at this time. Spoke with wife in lobby, very emotional during our conversation as she is worried "this is coming to an end" b/c he no longer wants to eat. Told her for some people, chemo really wears them out and for others they don't get as tired and weak. We discussed hospice and some of it's benefits +/- continuing tx. Morena ggested they wait until after scans next week to decide on plan going forward unless pt has his mind made up about it already. Asked if they wanted to come for fluids Thursday since we're closed Fri, they agreed. All questions answered, voiced understanding all information and they know to call me if needed. Discussed fluids 4/6 @ 2pm with [facility], infusion charge, 330pm slot for 1hr   Oncology Navigation   Intake  Date of Diagnosis:  (negative path)  Cancer Type: GI  Internal / External Referral: Internal  Date of Referral: 01/19/23  Initial Nurse Navigator Contact: 01/19/23  Referral to Initial Contact Timeline (days): 0  Date Worked: 04/04/23  First Appointment Available: 01/06/23  Appointment Date: 01/09/23  First Available Date vs. Scheduled Date (days): 3  Reason for Treatment Delay: -- (seeking 2nd opinion @ [facility])     Treatment  Current Status: Staging work-up  Date Presented to Tumor Board: 02/03/23    Surgical Oncologist: Nikunj  Consult Date: 01/09/23    Medical Oncologist: Dr. Shell  Consult Date: 01/31/23  Chemotherapy: Planned  Chemotherapy Regimen: Gemcitabine/Cisplatin       Procedures: Genetic test  Biopsy Schedule Date: 12/20/22  CT Schedule Date: 02/02/23  EUS / EGD: EUS- 12/13/2022  Genetic Testing Date Sent: 01/31/23  MRI Schedule Date: 01/03/23  Port / PICC Schedule Date: 02/09/23             Support Systems: Family members     Acuity  Stage: 2  Systemic Treatment - predicted or initiated: Chemotherapy Regimen with Multiple drugs " (+1)  ECO  Comorbidities in Medical History: 2  Support: 0  Transportation: 0  Verbalizes the need for more education: 1  Navigation Acuity: 0     Follow Up  Follow up in 2 days (on 2023) for fluids.     but come at 2pm for 2 hours, she ok'd.

## 2023-04-11 NOTE — PROGRESS NOTES
Subjective:       Patient ID: Guevara Osullivan II is a 53 y.o. male.    Chief Complaint: Results, Chemotherapy, and Cancer    HPI:  53-year-old male intra-abdominal carcinomatosis.  Patient has had 2 cycles of cisplatin gemcitabine with durvalumab.  Patient returns for repeat CT imaging.  ECOG status 2    Past Medical History:   Diagnosis Date    Cancer     COLON CANCER 1995    Digestive disorder     Genetic testing 03/31/2023    [employer] Multi Cancer Panel (84 genes) - FH mutation    Genetic testing 02/14/2023    Granular Hereditary Cancer Test (48 genes) - FH mutation    Hypertension     Primary sclerosing cholangitis     Ulcerative colitis     s/p colectomy with J- pouch     Family History   Problem Relation Age of Onset    Cancer Mother         GYN?    Testicular cancer Father     Skin cancer Father     Lung cancer Maternal Grandmother         +smoking hx    Heart disease Paternal Uncle      Social History     Socioeconomic History    Marital status:    Tobacco Use    Smoking status: Never     Passive exposure: Never    Smokeless tobacco: Never   Substance and Sexual Activity    Alcohol use: Not Currently    Drug use: Yes     Types: Marijuana     Comment: medical marijuana     Past Surgical History:   Procedure Laterality Date    ABDOMINAL WASHOUT N/A 1/18/2023    Procedure: LAVAGE, PERITONEAL,;  Surgeon: Onur Calvin Jr., MD;  Location: [facility] OR [address] [place] [address];  Service: General;  Laterality: N/A;    BIOPSY OF PERITONEUM N/A 1/18/2023    Procedure: BIOPSY, PERITONEUM;  Surgeon: Onur Calvin Jr., MD;  Location: [facility] OR [address] [place] [address];  Service: General;  Laterality: N/A;    COLON SURGERY      Colectomy with J- pouch    DIAGNOSTIC LAPAROSCOPY N/A 1/18/2023    Procedure: LAPAROSCOPY, DIAGNOSTIC WITH WASHING;  Surgeon: Onur Calvin Jr., MD;  Location: [facility] OR [address] [place] [address];  Service: General;  Laterality: N/A;    ENDOSCOPIC ULTRASOUND OF UPPER GASTROINTESTINAL TRACT N/A 10/14/2022    Procedure: ULTRASOUND,  UPPER GI TRACT,;  Surgeon: Vince Teran MD;  Location: [facility];  Service: Endoscopy;  Laterality: N/A;  upper and linear    ENDOSCOPIC ULTRASOUND OF UPPER GASTROINTESTINAL TRACT N/A 12/13/2022    Procedure: ULTRASOUND, UPPER GI TRACT, ENDOSCOPIC;  Surgeon: Vince Teran MD;  Location: [facility];  Service: Endoscopy;  Laterality: N/A;    ERCP N/A 10/14/2022    Procedure: ERCP (ENDOSCOPIC RETROGRADE CHOLANGIOPANCREATOGRAPHY) with spyglass;  Surgeon: Vince Teran MD;  Location: [facility];  Service: Endoscopy;  Laterality: N/A;    ERCP N/A 12/13/2022    Procedure: ERCP (ENDOSCOPIC RETROGRADE CHOLANGIOPANCREATOGRAPHY);  Surgeon: Vince Teran MD;  Location: [facility];  Service: Endoscopy;  Laterality: N/A;    ERCP N/A 12/20/2022    Procedure: ERCP (ENDOSCOPIC RETROGRADE CHOLANGIOPANCREATOGRAPHY);  Surgeon: Vince Teran MD;  Location: [facility];  Service: Endoscopy;  Laterality: N/A;    ERCP N/A 1/4/2023    Procedure: ERCP (ENDOSCOPIC RETROGRADE CHOLANGIOPANCREATOGRAPHY);  Surgeon: Vince Teran MD;  Location: [facility];  Service: Endoscopy;  Laterality: N/A;  room 1    ERCP N/A 2/22/2023    Procedure: ERCP (ENDOSCOPIC RETROGRADE CHOLANGIOPANCREATOGRAPHY);  Surgeon: Vince Teran MD;  Location: [facility];  Service: Endoscopy;  Laterality: N/A;    ERCP N/A 2/23/2023    Procedure: ERCP (ENDOSCOPIC RETROGRADE CHOLANGIOPANCREATOGRAPHY);  Surgeon: Vince eTran MD;  Location: [facility];  Service: Endoscopy;  Laterality: N/A;    ESOPHAGOGASTRODUODENOSCOPY N/A 12/20/2022    Procedure: EGD (ESOPHAGOGASTRODUODENOSCOPY);  Surgeon: Vince Teran MD;  Location: [facility];  Service: Endoscopy;  Laterality: N/A;    ESOPHAGOGASTRODUODENOSCOPY N/A 1/4/2023    Procedure: EGD (ESOPHAGOGASTRODUODENOSCOPY);  Surgeon: Vince Teran MD;  Location: [facility];  Service: Endoscopy;  Laterality: N/A;    INSERTION OF TUNNELED  CENTRAL VENOUS CATHETER (CVC) WITH SUBCUTANEOUS PORT Left 2/9/2023    Procedure: [id number];  Surgeon: oWjciech Martínez MD;  Location: [facility];  Service: General;  Laterality: Left;    POUCHOSCOPY         Labs:  Lab Results   Component Value Date    WBC 5.26 04/11/2023    HGB 11.8 (L) 04/11/2023    HCT 35.6 (L) 04/11/2023    MCV 92 04/11/2023     04/11/2023     BMP  Lab Results   Component Value Date     04/11/2023    K 3.5 04/11/2023    CL 94 (L) 04/11/2023    CO2 32 (H) 04/11/2023    BUN 9 04/11/2023    CREATININE 0.7 04/11/2023    CALCIUM 9.2 04/11/2023    ANIONGAP 10 04/11/2023     Lab Results   Component Value Date     (H) 04/11/2023    AST 74 (H) 04/11/2023     (H) 12/02/2022    ALKPHOS 873 (H) 04/11/2023    BILITOT 0.9 04/11/2023       Lab Results   Component Value Date    IRON 36 (L) 03/15/2023    TIBC 238 (L) 03/15/2023    FERRITIN 631 (H) 03/15/2023     No results found for: [id number]  No results found for: FOLATE  Lab Results   Component Value Date    TSH 1.928 03/02/2023         Review of Systems   Constitutional:  Positive for activity change, appetite change, fatigue and unexpected weight change. Negative for chills, diaphoresis and fever.   HENT:  Negative for congestion, dental problem, drooling, ear discharge, ear pain, facial swelling, hearing loss, mouth sores, nosebleeds, postnasal drip, rhinorrhea, sinus pressure, sneezing, sore throat, tinnitus, trouble swallowing and voice change.    Eyes:  Negative for photophobia, pain, discharge, redness, itching and visual disturbance.   Respiratory:  Negative for apnea, cough, choking, chest tightness, shortness of breath, wheezing and stridor.    Cardiovascular:  Negative for chest pain, palpitations and leg swelling.   Gastrointestinal:  Positive for nausea. Negative for abdominal distention, abdominal pain, anal bleeding, blood in stool, constipation, diarrhea, rectal pain and vomiting.   Endocrine: Negative for cold  intolerance, heat intolerance, polydipsia, polyphagia and polyuria.   Genitourinary:  Negative for decreased urine volume, difficulty urinating, dysuria, enuresis, flank pain, frequency, genital sores, hematuria, penile discharge, penile pain, penile swelling, scrotal swelling, testicular pain and urgency.   Musculoskeletal:  Negative for arthralgias, back pain, gait problem, joint swelling, myalgias, neck pain and neck stiffness.   Skin:  Negative for color change, pallor, rash and wound.   Allergic/Immunologic: Negative for environmental allergies, food allergies and immunocompromised state.   Neurological:  Positive for weakness. Negative for dizziness, tremors, seizures, syncope, facial asymmetry, speech difficulty, light-headedness, numbness and headaches.   Hematological:  Negative for adenopathy. Does not bruise/bleed easily.   Psychiatric/Behavioral:  Positive for dysphoric mood. Negative for agitation, behavioral problems, confusion, decreased concentration, hallucinations, self-injury, sleep disturbance and suicidal ideas. The patient is nervous/anxious. The patient is not hyperactive.      Objective:      Physical Exam  Vitals reviewed.   Constitutional:       General: He is not in acute distress.     Appearance: He is well-developed. He is cachectic. He is ill-appearing. He is not diaphoretic.   HENT:      Head: Normocephalic.      Right Ear: External ear normal.      Left Ear: External ear normal.      Nose: Nose normal.      Right Sinus: No maxillary sinus tenderness or frontal sinus tenderness.      Left Sinus: No maxillary sinus tenderness or frontal sinus tenderness.      Mouth/Throat:      Pharynx: No oropharyngeal exudate.   Eyes:      General: Lids are normal. No scleral icterus.        Right eye: No discharge.         Left eye: No discharge.      Extraocular Movements:      Right eye: Normal extraocular motion.      Left eye: Normal extraocular motion.      Conjunctiva/sclera:      Right eye:  Right conjunctiva is not injected. No hemorrhage.     Left eye: Left conjunctiva is not injected. No hemorrhage.     Pupils: Pupils are equal, round, and reactive to light.   Neck:      Thyroid: No thyromegaly.      Vascular: No JVD.      Trachea: No tracheal deviation.   Cardiovascular:      Rate and Rhythm: Normal rate.   Pulmonary:      Effort: Pulmonary effort is normal. No respiratory distress.      Breath sounds: No stridor.   Abdominal:      General: Bowel sounds are normal.      Palpations: Abdomen is soft. There is no hepatomegaly, splenomegaly or mass.      Tenderness: There is no abdominal tenderness.   Musculoskeletal:         General: No tenderness. Normal range of motion.      Cervical back: Normal range of motion and neck supple.   Lymphadenopathy:      Head:      Right side of head: No posterior auricular or occipital adenopathy.      Left side of head: No posterior auricular or occipital adenopathy.      Cervical: No cervical adenopathy.      Right cervical: No superficial, deep or posterior cervical adenopathy.     Left cervical: No superficial, deep or posterior cervical adenopathy.      Upper Body:      Right upper body: No supraclavicular adenopathy.      Left upper body: No supraclavicular adenopathy.   Skin:     General: Skin is dry.      Findings: No erythema or rash.      Nails: There is no clubbing.   Neurological:      Mental Status: He is alert and oriented to person, place, and time.      Cranial Nerves: No cranial nerve deficit.      Motor: Weakness present.      Coordination: Coordination abnormal.      Gait: Gait abnormal.   Psychiatric:         Behavior: Behavior normal.         Thought Content: Thought content normal.         Judgment: Judgment normal.           Assessment:      1. 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 Neoplasm related pain    3. Metastatic adenocarcinoma    4. Abdominal carcinomatosis    5. Immunodeficiency due to chemotherapy    6. Primary  hypertension    7. Pure hypertriglyceridemia    8. Primary peritoneal adenocarcinoma    9. Cachexia    10. S/P total colectomy           Med Onc Chart Routing      Follow up with physician . Return to clinic in 1 week CBC CMP and magnesium for evaluation for further systemic therapy   Follow up with [facility]    Infusion scheduling note    Injection scheduling note    Labs    Imaging    Pharmacy appointment    Other referrals            Plan:     Reviewed results of CT chest abdomen and pelvis with patient verbal report stable findings.  At this time will hold treatment allow for 1 week recovery and then moved to probably day 115 treatments.  Discussed implications and goals of therapy with patient and wife nurse navigation present receiving intermittent IV fluids at home        Preston Shell Jr, MD FACP

## 2023-04-11 NOTE — TELEPHONE ENCOUNTER
As I said during the visit I do think there has been a response here want to give you arrest to see whether not your can addition improves before any further treatment

## 2023-04-12 NOTE — PROGRESS NOTES
Called to check on pt, wife states he is improving and put him on speaker phone. He validated wife's statement, stating he feels better so much so that he has been eating the last couple days. Told me all the things he's eaten and had to drink, states the banana bag really was beneficial to and for him. Told him he sounds good which is good. They are getting ready to come to tg for appts. Told them I will see them when they come for MD appt. They voiced understanding.   Called to exam room by MD to discuss plan. Pt will take another week to continue to recover and plan for tx next week with possible add'l week in b/t tx regimen. Discussed with them continuing with hospice until after next weeks MD visit, told them I will reach out to hospice personnel to discuss. L/M for Michelet to call me back. Scheduled labs & virtual MD 4/18 and possible infusion 4/19, times and locations discussed with pt and wife. Pt did ask about [facility] but doesn't think he can physically withstand the travel and extended stay expected by [facility] as part of their assessment/eval of pt. Told pt that decision will have to be made by him but we are in support of what he thinks is best for his care. Encouraged pt and wife to take it week by week before deciding on hospice vs tx, question will be if chemo puts him down this long next time, is he willing to go through the recovery with the possibility of him feeling a little or a lot worse than he did this time, pt states that's what he is concerned about, again encouraged him to go week to week and listen to his body. Both pt and wife agree to take small steps as they go along. Content with plan as scheduled at this time and know they can change as needed. No other questions at this time, they have my call back number. At end of business, Michelet had not called me back. Will call again tomorrow.   Oncology Navigation   Intake  Date of Diagnosis:  (negative path)  Cancer Type: GI  Internal / External Referral:  Internal  Date of Referral: 23  Initial Nurse Navigator Contact: 23  Referral to Initial Contact Timeline (days): 0  Date Worked: 23  First Appointment Available: 23  Appointment Date: 23  First Available Date vs. Scheduled Date (days): 3  Reason for Treatment Delay: -- (seeking 2nd opinion @ [facility])     Treatment  Current Status: Staging work-up  Date Presented to Tumor Board: 23    Surgical Oncologist: Nikunj  Consult Date: 23    Medical Oncologist: Dr. Shell  Consult Date: 23  Chemotherapy: Planned  Chemotherapy Regimen: Gemcitabine/Cisplatin       Procedures: Genetic test  Biopsy Schedule Date: 22  CT Schedule Date: 23  EUS / EGD: EUS- 2022  Genetic Testing Date Sent: 23  MRI Schedule Date: 23  Port / PICC Schedule Date: 23             Support Systems: Family members     Acuity  Stage: 2  Systemic Treatment - predicted or initiated: Chemotherapy Regimen with Multiple drugs (+1)  ECO  Comorbidities in Medical History: 2  Support: 0  Transportation: 0  Verbalizes the need for more education: 1  Navigation Acuity: 0     Follow Up  No follow-ups on file.

## 2023-04-18 NOTE — PROGRESS NOTES
Called pt and spoke with wife about referral to [facility] for stent replacement. Wife states pt will not receive chemo d/t rise in bilirubin. Told wife will as [facility] to expedite appt for stent replacement. She voiced understanding, has no other questions at this time. In basket msg sent to [facility] for GI appt, awaiting response.   Oncology Navigation   Intake  Date of Diagnosis:  (negative path)  Cancer Type: GI  Internal / External Referral: Internal  Date of Referral: 23  Initial Nurse Navigator Contact: 23  Referral to Initial Contact Timeline (days): 0  Date Worked: 23  First Appointment Available: 23  Appointment Date: 23  First Available Date vs. Scheduled Date (days): 3  Reason for Treatment Delay: -- (seeking 2nd opinion @ [facility])     Treatment  Current Status: Staging work-up  Date Presented to Tumor Board: 23    Surgical Oncologist: Nikunj  Consult Date: 23    Medical Oncologist: Dr. Shell  Consult Date: 23  Chemotherapy: Planned  Chemotherapy Regimen: Gemcitabine/Cisplatin       Procedures: Genetic test  Biopsy Schedule Date: 22  CT Schedule Date: 23  EUS / EGD: EUS- 2022  Genetic Testing Date Sent: 23  MRI Schedule Date: 23  Port / PICC Schedule Date: 23             Support Systems: Family members     Acuity  Stage: 2  Systemic Treatment - predicted or initiated: Chemotherapy Regimen with Multiple drugs (+1)  ECO  Comorbidities in Medical History: 2  Support: 0  Transportation: 0  Verbalizes the need for more education: 1  Navigation Acuity: 0     Follow Up  No follow-ups on file.

## 2023-04-19 NOTE — TELEPHONE ENCOUNTER
Returned call to pt wife and she states she wants to speak to Silvia NP. Informed her I will forward her message to her.

## 2023-04-20 NOTE — PROGRESS NOTES
[facility]  Gastroenterology    Patient evaluated at the request of Preston Shell MD  [id number] [facility] [place] [address]    PCP: Dima Ch MD    4/20/23    Reason for Visit: PSC, Elevated LFTs    Subjective:   Guevara Osullivan II is a 53 y.o. male with history of PSC and elevated LFTs. Previously followed with Dr. Teran. Last ERCP in 02/2023 with stent placed for biliary stricture. He was seen recently and had labs done which showed elevation in LFTs and bilirubin up to 3.6. Reports worsening N/V and abdominal pain. Is still having bowel movements. Recent staging CT without any evidence of obstruction. Patient is in need of another ERCP and Dr. Sauceda is planning for next week. Patient and his wife are trying to call back to schedule with them because patient is uncertain he can make it to next week. He recently started steroids and there is leukocytosis present on labs- could be related to decadron use vs biliary obstruction given the rising LFTs. Patient denies fever, low grade temps up to 99 F.     Also reports history of chronic perirectal abscess ever since his J pouch- follows with Dr. Mancia for drainage/packing as needed. Denies any worsening of symptoms with that at this time. States its not bothering him.       Past Medical History:   Diagnosis Date    Cancer     COLON CANCER 1995    Digestive disorder     Genetic testing 03/31/2023    [employer] Multi Cancer Panel (84 genes) - FH mutation    Genetic testing 02/14/2023    [employer] Hereditary Cancer Test (48 genes) - FH mutation    Hypertension     Primary sclerosing cholangitis     Ulcerative colitis     s/p colectomy with J- pouch       Past Surgical History:   Procedure Laterality Date    ABDOMINAL WASHOUT N/A 1/18/2023    Procedure: LAVAGE, PERITONEAL,;  Surgeon: Onur Calvin Jr., MD;  Location: [facility] OR [address] [place] [address];  Service: General;  Laterality: N/A;    BIOPSY OF PERITONEUM N/A 1/18/2023    Procedure:  BIOPSY, PERITONEUM;  Surgeon: Onur Calvin Jr., MD;  Location: [facility] OR 2ND FLR;  Service: General;  Laterality: N/A;    COLON SURGERY      Colectomy with J- pouch    DIAGNOSTIC LAPAROSCOPY N/A 1/18/2023    Procedure: LAPAROSCOPY, DIAGNOSTIC WITH WASHING;  Surgeon: Onur Calvin Jr., MD;  Location: [facility] OR 2ND FLR;  Service: General;  Laterality: N/A;    ENDOSCOPIC ULTRASOUND OF UPPER GASTROINTESTINAL TRACT N/A 10/14/2022    Procedure: ULTRASOUND, UPPER GI TRACT,;  Surgeon: Vince Teran MD;  Location: [facility];  Service: Endoscopy;  Laterality: N/A;  upper and linear    ENDOSCOPIC ULTRASOUND OF UPPER GASTROINTESTINAL TRACT N/A 12/13/2022    Procedure: ULTRASOUND, UPPER GI TRACT, ENDOSCOPIC;  Surgeon: Vince Teran MD;  Location: [facility];  Service: Endoscopy;  Laterality: N/A;    ERCP N/A 10/14/2022    Procedure: ERCP (ENDOSCOPIC RETROGRADE CHOLANGIOPANCREATOGRAPHY) with spyglass;  Surgeon: Vince Teran MD;  Location: [facility];  Service: Endoscopy;  Laterality: N/A;    ERCP N/A 12/13/2022    Procedure: ERCP (ENDOSCOPIC RETROGRADE CHOLANGIOPANCREATOGRAPHY);  Surgeon: Vince Teran MD;  Location: [facility];  Service: Endoscopy;  Laterality: N/A;    ERCP N/A 12/20/2022    Procedure: ERCP (ENDOSCOPIC RETROGRADE CHOLANGIOPANCREATOGRAPHY);  Surgeon: Vince Teran MD;  Location: [facility];  Service: Endoscopy;  Laterality: N/A;    ERCP N/A 1/4/2023    Procedure: ERCP (ENDOSCOPIC RETROGRADE CHOLANGIOPANCREATOGRAPHY);  Surgeon: Vince Teran MD;  Location: [facility];  Service: Endoscopy;  Laterality: N/A;  room 1    ERCP N/A 2/22/2023    Procedure: ERCP (ENDOSCOPIC RETROGRADE CHOLANGIOPANCREATOGRAPHY);  Surgeon: Vince Teran MD;  Location: [facility];  Service: Endoscopy;  Laterality: N/A;    ERCP N/A 2/23/2023    Procedure: ERCP (ENDOSCOPIC RETROGRADE CHOLANGIOPANCREATOGRAPHY);  Surgeon: Vince Teran MD;  Location:  [facility] ENDO;  Service: Endoscopy;  Laterality: N/A;    ESOPHAGOGASTRODUODENOSCOPY N/A 12/20/2022    Procedure: EGD (ESOPHAGOGASTRODUODENOSCOPY);  Surgeon: Vince Teran MD;  Location: [facility];  Service: Endoscopy;  Laterality: N/A;    ESOPHAGOGASTRODUODENOSCOPY N/A 1/4/2023    Procedure: EGD (ESOPHAGOGASTRODUODENOSCOPY);  Surgeon: Vince Teran MD;  Location: [facility] ENDO;  Service: Endoscopy;  Laterality: N/A;    INSERTION OF TUNNELED CENTRAL VENOUS CATHETER (CVC) WITH SUBCUTANEOUS PORT Left 2/9/2023    Procedure: [id number];  Surgeon: Wojciech Martínez MD;  Location: [facility] OR;  Service: General;  Laterality: Left;    POUCHOSCOPY         Current Outpatient Medications on File Prior to Visit   Medication Sig Dispense Refill    dexAMETHasone (DECADRON) 4 MG Tab Take 2 tablets (8 mg total) by mouth once daily. Take as directed on days 2 and 3 of your chemotherapy cycle. 24 tablet 5    lactulose (CHRONULAC) 20 gram/30 mL Soln Take 30 mLs (20 g total) by mouth 3 (three) times daily. 600 mL 2    LIDOcaine-prilocaine (EMLA) cream Apply to affected area once as directed 5 g 11    mirtazapine (REMERON) 7.5 MG Tab Take 1 tablet (7.5 mg total) by mouth every evening. 30 tablet 0    OLANZapine (ZYPREXA) 10 MG tablet Take 1 tablet (10 mg total) by mouth every evening. 30 tablet 0    ondansetron (ZOFRAN-ODT) 8 MG TbDL Take 1 tablet (8 mg total) by mouth every 8 (eight) hours as needed (nausea/vomiting). 60 tablet 5    ALPRAZolam (XANAX) 0.25 MG tablet Take 1 tablet (0.25 mg total) by mouth 2 (two) times daily as needed for Anxiety. 60 tablet 1    HYDROmorphone (DILAUDID) 4 MG tablet Take 1 tablet (4 mg total) by mouth every 4 (four) hours as needed for Pain. Max 5 doses per day (Patient not taking: Reported on 4/20/2023) 120 tablet 0    prochlorperazine (COMPAZINE) 25 MG suppository Place 1 suppository (25 mg total) rectally every 12 (twelve) hours as needed for Nausea. (Patient not taking: Reported on  4/20/2023) 3 suppository 1    prochlorperazine (COMPAZINE) 5 MG tablet Take 1 tablet (5 mg total) by mouth every 6 (six) hours as needed for Nausea. (Patient not taking: Reported on 4/20/2023) 20 tablet 11     No current facility-administered medications on file prior to visit.       Review of patient's allergies indicates:   Allergen Reactions    Vicryl [sutures, polyglycolic acid] Other (See Comments)     Wound dehiscence after achilles sx    Ciprofloxacin Other (See Comments)     Pt previously had medication reaction; suffered achilles rupture     Meperidine Hives       Social History     Socioeconomic History    Marital status:    Tobacco Use    Smoking status: Never     Passive exposure: Never    Smokeless tobacco: Never   Substance and Sexual Activity    Alcohol use: Not Currently    Drug use: Yes     Types: Marijuana     Comment: medical marijuana       Family History   Problem Relation Age of Onset    Cancer Mother         GYN?    Testicular cancer Father     Skin cancer Father     Lung cancer Maternal Grandmother         +smoking hx    Heart disease Paternal Uncle        Review of Systems   Constitutional:  Negative for appetite change, fever and unexpected weight change.   HENT:  Negative for sore throat and trouble swallowing.    Eyes:  Negative for visual disturbance.   Respiratory:  Negative for cough, shortness of breath and wheezing.    Cardiovascular:  Negative for chest pain, palpitations and leg swelling.   Gastrointestinal:  Positive for abdominal pain, nausea and vomiting. Negative for blood in stool, constipation and diarrhea.   Genitourinary:  Negative for dysuria.   Musculoskeletal:  Negative for arthralgias and myalgias.   Skin:  Negative for color change, pallor and rash.   Neurological:  Negative for dizziness and weakness.   Hematological:  Negative for adenopathy.   Psychiatric/Behavioral:  Negative for agitation.          Objective:   Vitals: There were no vitals filed for this  visit.    Physical Exam  Vitals reviewed.   Constitutional:       General: He is not in acute distress.     Appearance: He is not diaphoretic.      Comments: Thin male   HENT:      Head: Normocephalic and atraumatic.      Mouth/Throat:      Pharynx: No oropharyngeal exudate.   Eyes:      General: No scleral icterus.        Right eye: No discharge.         Left eye: No discharge.      Conjunctiva/sclera: Conjunctivae normal.      Pupils: Pupils are equal, round, and reactive to light.   Cardiovascular:      Rate and Rhythm: Normal rate and regular rhythm.      Heart sounds: Normal heart sounds. No murmur heard.    No friction rub. No gallop.   Pulmonary:      Effort: Pulmonary effort is normal. No respiratory distress.      Breath sounds: Normal breath sounds. No stridor. No wheezing or rales.   Abdominal:      General: Bowel sounds are normal. There is no distension.      Palpations: Abdomen is soft. There is no mass.      Tenderness: There is no abdominal tenderness. There is no guarding.   Musculoskeletal:         General: Normal range of motion.      Cervical back: Normal range of motion.   Skin:     General: Skin is warm and dry.      Coloration: Skin is jaundiced. Skin is not pale.      Findings: No erythema or rash.   Neurological:      Mental Status: He is alert and oriented to person, place, and time.       CT Chest Abdomen Pelvis With Contrast    Result Date: 4/11/2023  EXAMINATION: CT CHEST ABDOMEN PELVIS WITH CONTRAST (XPD) CLINICAL HISTORY: Metastatic disease evaluation;Malignant neoplasm of abdomen, primary peritoneal adenocarcinoma with abdominal carcinomatosis TECHNIQUE: Low dose axial images, sagittal and coronal reformations were obtained from the thoracic inlet to the pubic synthesis following administration of 75 cc intravenous Omnipaque 350 and 1000 cc oral Omnipaque 9. COMPARISON: CT abdomen pelvis with contrast 03/20/2023, CT chest abdomen pelvis 02/02/2023 FINDINGS: Thoracic soft tissues: A  left chest port lies within the subcutaneous tissues of the upper left chest wall, catheter terminating within the superior SVC. Aorta: Normal in course and caliber, without significant atherosclerotic plaque. There are three branching vessels at the arch. Heart: Normal in size. No pericardial effusion. Linda/Mediastinum: No significant lymphadenopathy Lungs: Well aerated, without consolidation or pleural fluid. Stable 2 mm pulmonary nodule within the left lower lobe (axial series 6, image 252).  There is right middle lobe calcified granuloma.  There are new clustered nodules present within the basilar right lower lobe with the largest nodule measuring 5 mm.  Given distribution, findings suggestive of infectious etiology, though neoplasm is possible. Liver: Enlarged, measuring 19 cm in craniocaudal dimension.  There is stable focal geographic hypoattenuation adjacent to the gallbladder fossa, nonspecific and potentially correlating with focal fatty infiltration versus underlying mass. Gallbladder: The gallbladder is distended, similar to comparison exams. Bile Ducts: A common bile duct stent is present in similar position with pigtail appropriately terminating within the duodenum.  There is stable mild intrahepatic biliary ductal dilatation Pancreas: Peripancreatic fluid collection is significantly decreased in size since 02/02/2023 and similar in appearance to the most recent exam on 03/20/2023.  Collection contains small volume air.  Luminal fluid is no longer evident.  There is stable positioning of a cyst gastrostomy tube and persistent surrounding soft tissue thickening/inflammatory change in this region. Spleen: Unremarkable. Adrenals: Unremarkable. Kidneys/ Ureters: Normal in size and location. Normal parenchymal enhancement.  A tiny subcentimeter right renal hypodensity is too small to characterize.  No nephrolithiasis or hydronephrosis.  No ureteral dilatation. Bladder: The urinary bladder is decompressed  limiting evaluation.  No significant mural thickening given degree of decompression. Reproductive organs: Unremarkable. GI Tract/Mesentery: Prior colectomy with surgical material present the right lower quadrant and at the rectum.  The proximal small bowel opacifies with ingested contrast.  Small bowel caliber significantly improved since the most recent comparison exam.  Bowel contains air-fluid levels with liquid stool distally extending to the rectum suggesting ileus or diarrheal illness.  No definite obstruction.  Dilated loops on today's study appears similar to prior study on 02/02/2023. Peritoneal Space: There is trace ascites, evident in the right lower quadrant, increased since the comparison exam.  No free air. Redemonstration of peritoneal carcinomatosis with findings most significant along the right hepatic lobe.  Findings are grossly similar to the comparison exam noting maximal thickness in this region measures 1.3 cm (previously 1.5 cm on 02/02/2023).  Ill-defined soft tissue attenuation at the tameka hepatis/gallbladder fossa measures 5.8 x 4.1 cm, previously 5.9 x 3.5 cm on 02/02/2023.  Central peritoneal nodules appear similar in size to the 02/02/2023 examination with the largest nodule measuring 1.9 x 1.9 cm.  Previously measured central mesenteric lymph node appears decreased in size measuring 6 mm in short axis (previously 8 mm).  Nodule adjacent to the bladder dome is now difficult separate from surrounding small bowel loops limiting evaluation.  Nodule estimated to measure 2.5 cm (previously 2.3 cm on 02/02/2023).  There is diffuse mild mesenteric edema, increased since 02/02/2023 and not significantly changed since 03/20/2023. Abdominal wall: Within the left perineum, there is a 1.4 x 2.2 cm rim enhancing fluid collection concerning for tiny abscess lying just to the left of the inferior gluteal cleft.  There is small bilateral fat containing umbilical hernias.  There is moderate body wall  edema. Vasculature: No significant atherosclerosis or aneurysm. Bones: No acute fracture.  There is degenerative change of the spine with multilevel vacuum disc phenomenon associated endplate change.  Limbus vertebra noted at L3.     Significant interval improvement in small bowel dilatation.  Air-fluid levels remain within the distal bowel extending to the rectum, suggesting ileus or diarrheal illness.  Correlate clinically.  Definite obstruction is not appreciated.  Pending clinical concern, small-bowel follow-through could be considered for further evaluation. New clustered nodules within the basilar right lower lobe measuring up to 5 mm.  Given distribution, findings are suggestive of infectious etiology though neoplasm is possible.  In this patient with history of malignancy, clinical concern will dictate the schedule for surveillance. Similar appearance of nodular soft tissue and enlarged mesenteric nodes within the abdomen pelvis, compatible with patient's history of peritoneal carcinomatosis.  Some regions appears slightly increased since 02/02/2023 and others decreased since that time as described above. 2.2 cm rim enhancing fluid collection in the left perineum, concerning for tiny abscess or extraperitoneal implant. Slight increase in body wall and mesenteric edema with trace ascites. Electronically signed by: Delia Vasquez Date:    04/11/2023 Time:    12:56      IMPRESSION     Problem List Items Addressed This Visit          GI    Primary sclerosing cholangitis - Primary    Relevant Orders    CBC Auto Differential    Comprehensive Metabolic Panel     Other Visit Diagnoses       Elevated LFTs        Relevant Orders    CBC Auto Differential    Comprehensive Metabolic Panel            PLANS:    - Leukocytosis, worsening N/V and rising LFTs and bilirubin concerning for biliary obstruction  - Dr. Zhong's office arranging ERCP at this time  - Will repeat CBC, CMP today to trend  - If any worsening of  symptoms before procedure, recommend reporting to ED for further evaluation/admission    Primary sclerosing cholangitis  -     CBC Auto Differential; Future; Expected date: 04/20/2023  -     Comprehensive Metabolic Panel; Future; Expected date: 04/20/2023    Elevated LFTs  -     CBC Auto Differential; Future; Expected date: 04/20/2023  -     Comprehensive Metabolic Panel; Future; Expected date: 04/20/2023      Carmen Goetz MD  Gastroenterology

## 2023-04-20 NOTE — TELEPHONE ENCOUNTER
Pt is scheduled for ECRP on 4/24/23.  Reviewed medical history and instructions with pts wife.  Pts wife verbalized understanding.  Instructions sent to email

## 2023-04-21 NOTE — TELEPHONE ENCOUNTER
----- Message from Lowell Shirley MA sent at 4/21/2023 11:49 AM CDT -----  Regarding: procedure  Contact: wife [phone]  Pt wife is calling to speak with someone in provider office is asking if patient procedure will  be  a one day procedure or will he have  to stay for observation patient wife balwinder hanson  a return call please call pt at  [phone]

## 2023-04-24 NOTE — H&P
Short Stay Endoscopy History and Physical    PCP - Dima Ch MD  Referring Physician - Silvia Cortes, APRN,ANP-C  [address] [place] [address]    Procedure - ERCP  ASA - per anesthesia  Mallampati - per anesthesia  History of Anesthesia problems - per anesthesia  Family history Anesthesia problems -  per anesthesia   Plan of anesthesia - per anesthesia    HPI:  This is a 53 y.o. male here for evaluation of: ERCP for stent exchange in setting of PSC with recently increasing liver tests suggestive of occluded biliary stent; previously placed 7Fr x 7cm plastic biliary stent for distal CBD PSC stricture.    Reflux - no  Dysphagia - no  Abdominal pain - no  Diarrhea - no    ROS:  Constitutional: No fevers, chills, No weight loss  CV: No chest pain  Pulm: No cough, No shortness of breath  Ophtho: No vision changes  GI: see HPI  Derm: No rash    Medical History:  has a past medical history of Cancer, Digestive disorder, Genetic testing (03/31/2023), Genetic testing (02/14/2023), Hypertension, Primary sclerosing cholangitis, and Ulcerative colitis.    Surgical History:  has a past surgical history that includes Colon surgery; Pouchoscopy; ERCP (N/A, 10/14/2022); Endoscopic ultrasound of upper gastrointestinal tract (N/A, 10/14/2022); ERCP (N/A, 12/13/2022); Endoscopic ultrasound of upper gastrointestinal tract (N/A, 12/13/2022); ERCP (N/A, 12/20/2022); Esophagogastroduodenoscopy (N/A, 12/20/2022); ERCP (N/A, 1/4/2023); Esophagogastroduodenoscopy (N/A, 1/4/2023); Diagnostic laparoscopy (N/A, 1/18/2023); Biopsy of peritoneum (N/A, 1/18/2023); Abdominal washout (N/A, 1/18/2023); Insertion of tunneled central venous catheter (CVC) with subcutaneous port (Left, 2/9/2023); ERCP (N/A, 2/22/2023); and ERCP (N/A, 2/23/2023).    Family History: family history includes Cancer in his mother; Heart disease in his paternal uncle; Lung cancer in his maternal grandmother; Skin cancer in his father; Testicular  cancer in his father..    Social History:  reports that he has never smoked. He has never been exposed to tobacco smoke. He has never used smokeless tobacco. He reports that he does not currently use alcohol. He reports current drug use. Drug: Marijuana.    Review of patient's allergies indicates:   Allergen Reactions    Vicryl [sutures, polyglycolic acid] Other (See Comments)     Wound dehiscence after achilles sx    Ciprofloxacin Other (See Comments)     Pt previously had medication reaction; suffered achilles rupture     Meperidine Hives       Medications:   Medications Prior to Admission   Medication Sig Dispense Refill Last Dose    ALPRAZolam (XANAX) 0.25 MG tablet Take 1 tablet (0.25 mg total) by mouth 2 (two) times daily as needed for Anxiety. 60 tablet 1 4/23/2023    dexAMETHasone (DECADRON) 4 MG Tab Take 2 tablets (8 mg total) by mouth once daily. Take as directed on days 2 and 3 of your chemotherapy cycle. 24 tablet 5 4/23/2023    fentaNYL (DURAGESIC) 75 mcg/hr Place 1 patch onto the skin every 72 hours.   4/24/2023    HYDROmorphone (DILAUDID) 4 MG tablet Take 1 tablet (4 mg total) by mouth every 4 (four) hours as needed for Pain. Max 5 doses per day 120 tablet 0 4/23/2023    mirtazapine (REMERON) 7.5 MG Tab Take 1 tablet (7.5 mg total) by mouth every evening. 30 tablet 0 Past Week    OLANZapine (ZYPREXA) 10 MG tablet Take 1 tablet (10 mg total) by mouth every evening. 30 tablet 0 Past Week    ondansetron (ZOFRAN-ODT) 8 MG TbDL Take 1 tablet (8 mg total) by mouth every 8 (eight) hours as needed (nausea/vomiting). 60 tablet 5 4/23/2023    lactulose (CHRONULAC) 20 gram/30 mL Soln Take 30 mLs (20 g total) by mouth 3 (three) times daily. 600 mL 2     LIDOcaine-prilocaine (EMLA) cream Apply to affected area once as directed 5 g 11     prochlorperazine (COMPAZINE) 25 MG suppository Place 1 suppository (25 mg total) rectally every 12 (twelve) hours as needed for Nausea. (Patient not taking: Reported on 4/20/2023)  3 suppository 1     prochlorperazine (COMPAZINE) 5 MG tablet Take 1 tablet (5 mg total) by mouth every 6 (six) hours as needed for Nausea. (Patient not taking: Reported on 4/20/2023) 20 tablet 11        Physical Exam:    Vital Signs:   Vitals:    04/24/23 1138   BP: 121/81   Pulse: 73   Resp: 16   Temp: 98.2 °F (36.8 °C)       General Appearance: Well appearing in no acute distress    Labs:  Lab Results   Component Value Date    WBC 6.88 04/20/2023    HGB 12.0 (L) 04/20/2023    HCT 37.9 (L) 04/20/2023     04/20/2023     (H) 04/20/2023     (H) 04/20/2023     04/20/2023    K 4.2 04/20/2023    CL 96 04/20/2023    CREATININE 0.7 04/20/2023    BUN 9 04/20/2023    CO2 29 04/20/2023    TSH 2.190 04/11/2023    HGBA1C 5.5 10/20/2021       I have explained the risks and benefits of this endoscopic procedure to the patient including but not limited to bleeding, inflammation, infection, perforation, pancreatitis, missing a lesion and death.      Selvin Martinez MD

## 2023-04-24 NOTE — ANESTHESIA PREPROCEDURE EVALUATION
04/24/2023  Guevara Osullivan II is a 53 y.o., male.      Pre-op Assessment    I have reviewed the Patient Summary Reports.     I have reviewed the Nursing Notes.    I have reviewed the Medications.     Review of Systems  Anesthesia Hx:  Denies Family Hx of Anesthesia complications.   Denies Personal Hx of Anesthesia complications.        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      There is no height or weight on file to calculate BMI.    Tobacco Use: Low Risk     Smoking Tobacco Use: Never    Smokeless Tobacco Use: Never    Passive Exposure: Never       Social History     Substance and Sexual Activity   Drug Use Yes    Types: Marijuana    Comment: medical marijuana        Alcohol Use: Not on file       Review of patient's allergies  indicates:   Allergen Reactions    Vicryl [sutures, polyglycolic acid] Other (See Comments)     Wound dehiscence after achilles sx    Ciprofloxacin Other (See Comments)     Pt previously had medication reaction; suffered achilles rupture     Meperidine Hives         Airway:  No value filed.     Physical Exam    Airway:  Mouth Opening: Normal  TM Distance: Normal          Anesthesia Plan  Type of Anesthesia, risks & benefits discussed:    Anesthesia Type: Gen Natural Airway, Gen ETT  Intra-op Monitoring Plan: Standard ASA Monitors  Post Op Pain Control Plan: multimodal analgesia  Induction:  IV  Airway Plan: Video  Informed Consent: Informed consent signed with the Patient and all parties understand the risks and agree with anesthesia plan.  All questions answered.   ASA Score: 3  Day of Surgery Review of History & Physical: H&P Update referred to the surgeon/provider.    Ready For Surgery From Anesthesia Perspective.     .

## 2023-04-24 NOTE — ANESTHESIA POSTPROCEDURE EVALUATION
Anesthesia Post Evaluation    Patient: Guevara Osullivan II    Procedure(s) Performed: Procedure(s) (LRB):  ERCP (ENDOSCOPIC RETROGRADE CHOLANGIOPANCREATOGRAPHY) (N/A)    Final Anesthesia Type: general      Patient location during evaluation: PACU  Patient participation: Yes- Able to Participate  Level of consciousness: awake and alert  Post-procedure vital signs: reviewed and stable  Pain management: adequate  Airway patency: patent    PONV status at discharge: No PONV  Anesthetic complications: no      Cardiovascular status: stable  Respiratory status: spontaneous ventilation  Hydration status: euvolemic  Follow-up not needed.          Vitals Value Taken Time   /80 04/24/23 1521   Temp 36.4 °C (97.6 °F) 04/24/23 1445   Pulse 66 04/24/23 1526   Resp 10 04/24/23 1526   SpO2 100 % 04/24/23 1526   Vitals shown include unvalidated device data.      Event Time   Out of Recovery 15:20:00         Pain/Margo Score: Margo Score: 8 (4/24/2023  2:45 PM)

## 2023-04-24 NOTE — PROVATION PATIENT INSTRUCTIONS
Discharge Summary/Instructions after an Endoscopic Procedure  Patient Name: Guevara Osullivan  Patient MRN: [id number]  Patient YOB: 1970 Monday, April 24, 2023  Selvin Martinez MD  Dear patient,  As a result of recent federal legislation (The Federal Cures Act), you may   receive lab or pathology results from your procedure in your [facility]   account before your physician is able to contact you. Your physician or   their representative will relay the results to you with their   recommendations at their soonest availability.  Thank you,  Your health is very important to us during the Covid Crisis. Following your   procedure today, you will receive a daily text for 2 weeks asking about   signs or symptoms of Covid 19.  Please respond to this text when you   receive it so we can follow up and keep you as safe as possible.   RESTRICTIONS:  During your procedure today, you received medications for sedation.  These   medications may affect your judgment, balance and coordination.  Therefore,   for 24 hours, you have the following restrictions:   - DO NOT drive a car, operate machinery, make legal/financial decisions,   sign important papers or drink alcohol.    ACTIVITY:  Today: no heavy lifting, straining or running due to procedural   sedation/anesthesia.  The following day: return to full activity including work.  DIET:  Eat and drink normally unless instructed otherwise.     TREATMENT FOR COMMON SIDE EFFECTS:  - Mild abdominal pain, nausea, belching, bloating or excessive gas:  rest,   eat lightly and use a heating pad.  - Sore Throat: treat with throat lozenges and/or gargle with warm salt   water.  - Because air was used during the procedure, expelling large amounts of air   from your rectum or belching is normal.  - If a bowel prep was taken, you may not have a bowel movement for 1-3 days.    This is normal.  SYMPTOMS TO WATCH FOR AND REPORT TO YOUR PHYSICIAN:  1. Abdominal pain or bloating, other than gas  cramps.  2. Chest pain.  3. Back pain.  4. Signs of infection such as: chills or fever occurring within 24 hours   after the procedure.  5. Rectal bleeding, which would show as bright red, maroon, or black stools.   (A tablespoon of blood from the rectum is not serious, especially if   hemorrhoids are present.)  6. Vomiting.  7. Weakness or dizziness.  GO DIRECTLY TO THE NEAREST EMERGENCY ROOM IF YOU HAVE ANY OF THE FOLLOWING:      Difficulty breathing              Chills and/or fever over 101 F   Persistent vomiting and/or vomiting blood   Severe abdominal pain   Severe chest pain   Black, tarry stools   Bleeding- more than one tablespoon   Any other symptom or condition that you feel may need urgent attention  Your doctor recommends these additional instructions:  If any biopsies were taken, your doctors clinic will contact you in 1 to 2   weeks with any results.  - Discharge patient to home (ambulatory).   - Patient has a contact number available for emergencies.  The signs and   symptoms of potential delayed complications were discussed with the   patient.  Return to normal activities tomorrow.  Written discharge   instructions were provided to the patient.   - Watch for pancreatitis, bleeding, perforation, and cholangitis.   - Augmentin (amoxicillin/clavulanate) 875 mg PO BID for 5 days.   - Repeat ERCP in 8-10 weeks to exchange stent.   - Return to referring provider/Oncologist  - Monitor liver tests via referring provider/Oncologist.  For questions, problems or results please call your physician - Selvin Martinez MD.  EMERGENCY PHONE NUMBER: [phone],  LAB RESULTS: [phone]  IF A COMPLICATION OR EMERGENCY SITUATION ARISES AND YOU ARE UNABLE TO REACH   YOUR PHYSICIAN - GO DIRECTLY TO THE EMERGENCY ROOM.  Selvin Martinez MD  4/24/2023 2:45:36 PM  This report has been verified and signed electronically.  Dear patient,  As a result of recent federal legislation (The Federal Cures Act), you may    receive lab or pathology results from your procedure in your [facility]   account before your physician is able to contact you. Your physician or   their representative will relay the results to you with their   recommendations at their soonest availability.  Thank you,  PROVATION

## 2023-04-24 NOTE — TRANSFER OF CARE
"Anesthesia Transfer of Care Note    Patient: Guevara Osullivan II    Procedure(s) Performed: Procedure(s) (LRB):  ERCP (ENDOSCOPIC RETROGRADE CHOLANGIOPANCREATOGRAPHY) (N/A)    Patient location: PACU    Anesthesia Type: general    Transport from OR: Transported from OR on 6-10 L/min O2 by face mask with adequate spontaneous ventilation    Post pain: adequate analgesia    Post assessment: no apparent anesthetic complications and tolerated procedure well    Post vital signs: stable    Level of consciousness: responds to stimulation    Nausea/Vomiting: no nausea/vomiting    Complications: none    Transfer of care protocol was followed      Last vitals:   Visit Vitals  /81 (BP Location: Left arm, Patient Position: Lying)   Pulse 73   Temp 36.8 °C (98.2 °F) (Temporal)   Resp 16   Ht 5' 11.5" (1.816 m)   Wt 63.5 kg (140 lb)   SpO2 100%   BMI 19.25 kg/m²     "

## 2023-04-24 NOTE — ANESTHESIA PROCEDURE NOTES
Intubation    Date/Time: 4/24/2023 1:26 PM  Performed by: Mario Nowak CRNA  Authorized by: Herman Benoit Jr., MD     Intubation:     Induction:  Intravenous    Intubated:  Postinduction    Mask Ventilation:  Easy mask    Attempts:  1    Attempted By:  CRNA    Method of Intubation:  Video laryngoscopy    Blade:  Palacio 3    Laryngeal View Grade: Grade I - full view of cords      Difficult Airway Encountered?: No      Complications:  None    Airway Device:  Oral endotracheal tube    Airway Device Size:  8.0    Style/Cuff Inflation:  Cuffed (inflated to minimal occlusive pressure)    Tube secured:  22    Secured at:  The teeth    Placement Verified By:  Capnometry    Complicating Factors:  None    Findings Post-Intubation:  Atraumatic/condition of teeth unchanged and BS equal bilateral

## 2023-04-25 NOTE — PROGRESS NOTES
Called to check on pt post procedure , no answer. LVM with call back number.   Oncology Navigation   Intake  Date of Diagnosis:  (negative path)  Cancer Type: GI  Internal / External Referral: Internal  Date of Referral: 23  Initial Nurse Navigator Contact: 23  Referral to Initial Contact Timeline (days): 0  Date Worked: 23  First Appointment Available: 23  Appointment Date: 23  First Available Date vs. Scheduled Date (days): 3  Reason for Treatment Delay: -- (seeking 2nd opinion @ [facility])     Treatment  Current Status: Staging work-up  Date Presented to Tumor Board: 23    Surgical Oncologist: Nikunj  Consult Date: 23    Medical Oncologist: Dr. Shell  Consult Date: 23  Chemotherapy: Planned  Chemotherapy Regimen: Gemcitabine/Cisplatin       Procedures: Genetic test  Biopsy Schedule Date: 22  CT Schedule Date: 23  EUS / EGD: EUS- 2022  Genetic Testing Date Sent: 23  MRI Schedule Date: 23  Port / PICC Schedule Date: 23             Support Systems: Family members     Acuity  Stage: 2  Systemic Treatment - predicted or initiated: Chemotherapy Regimen with Multiple drugs (+1)  ECO  Comorbidities in Medical History: 2  Support: 0  Transportation: 0  Verbalizes the need for more education: 1  Navigation Acuity: 0     Follow Up  No follow-ups on file.

## 2023-04-26 NOTE — PROGRESS NOTES
Spoke with pt wife, reports n/v/d since last night with no relief. Did call hospice nurse, she advised symptom mgmt which wife states did give relief to pt. States pt was able to rest comfortably throughout the day and has even had small sips of fluids and med without difficulty or nausea. Plan for in home fluids tomorrow and see how pt feels with regards to virtual or in person visit with Dr. Shell in the near future. No questions at this time, will call as  Oncology Navigation   Intake  Date of Diagnosis:  (negative path)  Cancer Type: GI  Internal / External Referral: Internal  Date of Referral: 23  Initial Nurse Navigator Contact: 23  Referral to Initial Contact Timeline (days): 0  Date Worked: 23  First Appointment Available: 23  Appointment Date: 23  First Available Date vs. Scheduled Date (days): 3  Reason for Treatment Delay: -- (seeking 2nd opinion @ [facility])     Treatment  Current Status: Staging work-up  Date Presented to Tumor Board: 23    Surgical Oncologist: Nikunj  Consult Date: 23    Medical Oncologist: Dr. Shell  Consult Date: 23  Chemotherapy: Planned  Chemotherapy Regimen: Gemcitabine/Cisplatin       Procedures: Genetic test  Biopsy Schedule Date: 22  CT Schedule Date: 23  EUS / EGD: EUS- 2022  Genetic Testing Date Sent: 23  MRI Schedule Date: 23  Port / PICC Schedule Date: 23             Support Systems: Family members     Acuity  Stage: 2  Systemic Treatment - predicted or initiated: Chemotherapy Regimen with Multiple drugs (+1)  ECO  Comorbidities in Medical History: 2  Support: 0  Transportation: 0  Verbalizes the need for more education: 1  Navigation Acuity: 0     Follow Up  No follow-ups on file.      needed.   Oncology Navigation   Intake  Date of Diagnosis:  (negative path)  Cancer Type: GI  Internal / External Referral: Internal  Date of Referral: 23  Initial Nurse Navigator Contact:  23  Referral to Initial Contact Timeline (days): 0  Date Worked: 23  First Appointment Available: 23  Appointment Date: 23  First Available Date vs. Scheduled Date (days): 3  Reason for Treatment Delay: -- (seeking 2nd opinion @ [facility])     Treatment  Current Status: Staging work-up  Date Presented to Tumor Board: 23    Surgical Oncologist: Nikunj  Consult Date: 23    Medical Oncologist: Dr. Shell  Consult Date: 23  Chemotherapy: Planned  Chemotherapy Regimen: Gemcitabine/Cisplatin       Procedures: Genetic test  Biopsy Schedule Date: 22  CT Schedule Date: 23  EUS / EGD: EUS- 2022  Genetic Testing Date Sent: 23  MRI Schedule Date: 23  Port / PICC Schedule Date: 23             Support Systems: Family members     Acuity  Stage: 2  Systemic Treatment - predicted or initiated: Chemotherapy Regimen with Multiple drugs (+1)  ECO  Comorbidities in Medical History: 2  Support: 0  Transportation: 0  Verbalizes the need for more education: 1  Navigation Acuity: 0     Follow Up  No follow-ups on file.

## 2023-06-07 ENCOUNTER — DOCUMENTATION ONLY (OUTPATIENT)
Dept: HEMATOLOGY/ONCOLOGY | Facility: CLINIC | Age: 53
End: 2023-06-07
Payer: COMMERCIAL

## 2023-06-07 NOTE — PROGRESS NOTES
Reached out to pt wife to check on them, pt has been in patient hospice since . States pt passed and expressed gratitude for the care we provided them since his diagnosis. Offered my condolences, thanked her for allowing [facility] to be a part of his care.   Oncology Navigation   Intake  Date of Diagnosis:  (negative path)  Cancer Type: GI  Internal / External Referral: Internal  Date of Referral: 23  Initial Nurse Navigator Contact: 23  Referral to Initial Contact Timeline (days): 0  Date Worked: 23  First Appointment Available: 23  Appointment Date: 23  First Available Date vs. Scheduled Date (days): 3  Reason for Treatment Delay: -- (seeking 2nd opinion @ [facility])     Treatment  Current Status: Staging work-up  Date Presented to Tumor Board: 23    Surgical Oncologist: Nikunj  Consult Date: 23    Medical Oncologist: Dr. Shell  Consult Date: 23  Chemotherapy: Planned  Chemotherapy Regimen: Gemcitabine/Cisplatin       Procedures: Genetic test  Biopsy Schedule Date: 22  CT Schedule Date: 23  EUS / EGD: EUS- 2022  Genetic Testing Date Sent: 23  MRI Schedule Date: 23  Port / PICC Schedule Date: 23             Support Systems: Family members     Acuity  Stage: 2  Systemic Treatment - predicted or initiated: Chemotherapy Regimen with Multiple drugs (+1)  ECO  Comorbidities in Medical History: 2  Support: 0  Transportation: 0  Verbalizes the need for more education: 1  Navigation Acuity: 0     Follow Up  No follow-ups on file.

## 2024-04-03 NOTE — PROGRESS NOTES
S-(situation): BP Check    B-(background): 08/05/19 [place]: APPROVAL PENDING :BP high today , needs to return for a BP recheck in a few days , has been off his BP medications.   BP at /102 and 144/98    A-(assessment):   Vital Signs 8/9/2019 8/9/2019   Systolic 142 134   Diastolic 90 82   Pulse 74 74   Manual    No side effects noted from medication.     R-(recommendations): Routing to provider. OK to addend pre-op?     LENNY MontemayorN, RN       Subjective:       Patient ID: Guevara Osullivan II is a 53 y.o. male.    Chief Complaint: Results, Pain, and Cancer    HPI:  53-year-old male with primary cholangiocarcinoma.  Patient has rising bilirubin has episode of nausea vomiting last night patient seen in video visit consultationThe patient location is:   Notes:     Past Medical History:   Diagnosis Date    Cancer     COLON CANCER 1995    Digestive disorder     Genetic testing 03/31/2023    [employer] Multi Cancer Panel (84 genes) - FH mutation    Genetic testing 02/14/2023    [employer] Hereditary Cancer Test (48 genes) - FH mutation    Hypertension     Primary sclerosing cholangitis     Ulcerative colitis     s/p colectomy with J- pouch     Family History   Problem Relation Age of Onset    Cancer Mother         GYN?    Testicular cancer Father     Skin cancer Father     Lung cancer Maternal Grandmother         +smoking hx    Heart disease Paternal Uncle      Social History     Socioeconomic History    Marital status:    Tobacco Use    Smoking status: Never     Passive exposure: Never    Smokeless tobacco: Never   Substance and Sexual Activity    Alcohol use: Not Currently    Drug use: Yes     Types: Marijuana     Comment: medical marijuana     Past Surgical History:   Procedure Laterality Date    ABDOMINAL WASHOUT N/A 1/18/2023    Procedure: LAVAGE, PERITONEAL,;  Surgeon: Onur Calvin Jr., MD;  Location: [address];  Service: General;  Laterality: N/A;    BIOPSY OF PERITONEUM N/A 1/18/2023    Procedure: BIOPSY, PERITONEUM;  Surgeon: Onur Calvin Jr., MD;  Location: [address];  Service: General;  Laterality: N/A;    COLON SURGERY      Colectomy with J- pouch    DIAGNOSTIC LAPAROSCOPY N/A 1/18/2023    Procedure: LAPAROSCOPY, DIAGNOSTIC WITH WASHING;  Surgeon: Onur Calvin Jr., MD;  Location: [address];  Service: General;  Laterality: N/A;    ENDOSCOPIC ULTRASOUND OF UPPER GASTROINTESTINAL TRACT N/A 10/14/2022     Procedure: ULTRASOUND, UPPER GI TRACT,;  Surgeon: Vince Teran MD;  Location: [facility];  Service: Endoscopy;  Laterality: N/A;  upper and linear    ENDOSCOPIC ULTRASOUND OF UPPER GASTROINTESTINAL TRACT N/A 12/13/2022    Procedure: ULTRASOUND, UPPER GI TRACT, ENDOSCOPIC;  Surgeon: Vince Teran MD;  Location: [facility];  Service: Endoscopy;  Laterality: N/A;    ERCP N/A 10/14/2022    Procedure: ERCP (ENDOSCOPIC RETROGRADE CHOLANGIOPANCREATOGRAPHY) with spyglass;  Surgeon: Vince Teran MD;  Location: [facility];  Service: Endoscopy;  Laterality: N/A;    ERCP N/A 12/13/2022    Procedure: ERCP (ENDOSCOPIC RETROGRADE CHOLANGIOPANCREATOGRAPHY);  Surgeon: Vince Teran MD;  Location: [facility];  Service: Endoscopy;  Laterality: N/A;    ERCP N/A 12/20/2022    Procedure: ERCP (ENDOSCOPIC RETROGRADE CHOLANGIOPANCREATOGRAPHY);  Surgeon: Vince Teran MD;  Location: [facility];  Service: Endoscopy;  Laterality: N/A;    ERCP N/A 1/4/2023    Procedure: ERCP (ENDOSCOPIC RETROGRADE CHOLANGIOPANCREATOGRAPHY);  Surgeon: Vince Teran MD;  Location: [facility];  Service: Endoscopy;  Laterality: N/A;  room 1    ERCP N/A 2/22/2023    Procedure: ERCP (ENDOSCOPIC RETROGRADE CHOLANGIOPANCREATOGRAPHY);  Surgeon: Vince Teran MD;  Location: [facility];  Service: Endoscopy;  Laterality: N/A;    ERCP N/A 2/23/2023    Procedure: ERCP (ENDOSCOPIC RETROGRADE CHOLANGIOPANCREATOGRAPHY);  Surgeon: Vince Teran MD;  Location: [facility];  Service: Endoscopy;  Laterality: N/A;    ESOPHAGOGASTRODUODENOSCOPY N/A 12/20/2022    Procedure: EGD (ESOPHAGOGASTRODUODENOSCOPY);  Surgeon: Vince Teran MD;  Location: [facility];  Service: Endoscopy;  Laterality: N/A;    ESOPHAGOGASTRODUODENOSCOPY N/A 1/4/2023    Procedure: EGD (ESOPHAGOGASTRODUODENOSCOPY);  Surgeon: Vince Teran MD;  Location: [facility];  Service: Endoscopy;  Laterality: N/A;     INSERTION OF TUNNELED CENTRAL VENOUS CATHETER (CVC) WITH SUBCUTANEOUS PORT Left 2/9/2023    Procedure: RQYRLETLE-HZYC-A-CATH;  Surgeon: Wojciech Martínez MD;  Location[facility] OR;  Service: General;  Laterality: Left;    POUCHOSCOPY         Labs:  Lab Results   Component Value Date    WBC 14.58 (H) 04/18/2023    HGB 11.6 (L) 04/18/2023    HCT 35.0 (L) 04/18/2023    MCV 94 04/18/2023     04/18/2023     BMP  Lab Results   Component Value Date     04/18/2023    K 3.7 04/18/2023    CL 96 04/18/2023    CO2 31 (H) 04/18/2023    BUN 10 04/18/2023    CREATININE 0.8 04/18/2023    CALCIUM 9.2 04/18/2023    ANIONGAP 11 04/18/2023     Lab Results   Component Value Date     (H) 04/18/2023     (H) 04/18/2023     (H) 12/02/2022    ALKPHOS 1,245 (H) 04/18/2023    BILITOT 3.6 (H) 04/18/2023       Lab Results   Component Value Date    IRON 36 (L) 03/15/2023    TIBC 238 (L) 03/15/2023    FERRITIN 631 (H) 03/15/2023     No results found for: [id number]  No results found for: FOLATE  Lab Results   Component Value Date    TSH 2.190 04/11/2023         Review of Systems   Constitutional:  Positive for fatigue.   Gastrointestinal:  Positive for nausea.   Neurological:  Positive for weakness.   Psychiatric/Behavioral:  Positive for dysphoric mood. The patient is nervous/anxious.      Objective:      Physical Exam  Constitutional:       Appearance: He is ill-appearing.           Assessment:      1. Primary peritoneal adenocarcinoma    2. Metastatic adenocarcinoma    3. Abdominal carcinomatosis    4. Immunodeficiency due to chemotherapy    5. Dilated bile duct           Med Onc Chart Routing      Follow up with physician . Needs to be set up ASAP for evaluation for ERCP and stent replacement   Follow up with [facility]    Infusion scheduling note    Injection scheduling note    Labs    Imaging    Pharmacy appointment    Other referrals            Plan:     The patient location is:  Home  The chief complaint leading to  consultation is:  Abdominal pain nausea    Visit type: audiovisual    Face to Face time with patient: 25 minutes of total time spent on the encounter, which includes face to face time and non-face to face time preparing to see the patient (eg, review of tests), Obtaining and/or reviewing separately obtained history, Documenting clinical information in the electronic or other health record, Independently interpreting results (not separately reported) and communicating results to the patient/family/caregiver, or Care coordination (not separately reported).         Each patient to whom he or she provides medical services by telemedicine is:  (1) informed of the relationship between the physician and patient and the respective role of any other health care provider with respect to management of the patient; and (2) notified that he or she may decline to receive medical services by telemedicine and may withdraw from such care at any time.    Notes:   Reviewed information with him.  Patient has rising bilirubin will ask nurse navigation team to coordinate expedite the patient for Interventional GI for stent replacement prior to any further systemic therapy discussed implications of answered questions with him      Preston Shell Jr, MD FACP     10-Apr-2024

## 2025-05-13 NOTE — DISCHARGE INSTRUCTIONS
Soft diet as tolerated.   Antibiotic x 1 more week.  CT scan in one week.   EGD beginning of January.   Contact Dr. Teran or clinic for any questions or concerns.   
2

## (undated) DEVICE — DRAPE ABDOMINAL TIBURON 14X11

## (undated) DEVICE — SUT PDSII 3-0 SH 27IN CLEAR

## (undated) DEVICE — GOWN POLY REINF BRTH SLV XL

## (undated) DEVICE — TUBING HF INSUFFLATION W/ FLTR

## (undated) DEVICE — DRAPE MOBILE C-ARM

## (undated) DEVICE — DRESSING TRANS 4X4 TEGADERM

## (undated) DEVICE — SOL NS 1000CC

## (undated) DEVICE — COVER CAMERA OPERATING ROOM

## (undated) DEVICE — SUPPORT ULNA NERVE PROTECTOR

## (undated) DEVICE — ADHESIVE DERMABOND ADVANCED

## (undated) DEVICE — CONTAINER SPECIMEN STRL 4OZ

## (undated) DEVICE — UNDERGLOVES BIOGEL PI SZ 7 LF

## (undated) DEVICE — SOL 9P NACL IRR PIC IL

## (undated) DEVICE — SUT PDS PLUS 3-0 SH 27IN

## (undated) DEVICE — PACK BASIC SETUP SC BR

## (undated) DEVICE — TOWEL OR DISP STRL BLUE 4/PK

## (undated) DEVICE — IRRIGATOR ENDOSCOPY DISP.

## (undated) DEVICE — APPLICATOR CHLORAPREP ORN 26ML

## (undated) DEVICE — SUT MONOCYRL 4-0 PS2 UND

## (undated) DEVICE — POSITIONER HEAD DONUT 9IN FOAM

## (undated) DEVICE — MANIFOLD 4 PORT

## (undated) DEVICE — TROCAR ENDOPATH XCEL 5X100MM

## (undated) DEVICE — GLOVE SURGICAL LATEX SZ 7

## (undated) DEVICE — NDL SAFETY 25G X 1.5 ECLIPSE

## (undated) DEVICE — SYR 10CC LUER LOCK

## (undated) DEVICE — SUT MONOCRYL 4.0 PS2 CP496G

## (undated) DEVICE — GAUZE SPONGE 4X4 12PLY

## (undated) DEVICE — DRAPE THYROID SOFT STERILE

## (undated) DEVICE — TRAY MINOR GEN SURG OMC

## (undated) DEVICE — COVER LIGHT HANDLE 80/CA

## (undated) DEVICE — DRAPE CORETEMP FLD WRM 56X62IN

## (undated) DEVICE — CLOSURE SKIN STERI STRIP 1/2X4

## (undated) DEVICE — SCISSOR 5MMX35CM DIRECT DRIVE

## (undated) DEVICE — ELECTRODE REM PLYHSV RETURN 9

## (undated) DEVICE — ADHESIVE MASTISOL VIAL 48/BX

## (undated) DEVICE — GOWN SURGICAL X-LARGE